# Patient Record
Sex: FEMALE | Race: WHITE | Employment: OTHER | URBAN - NONMETROPOLITAN AREA
[De-identification: names, ages, dates, MRNs, and addresses within clinical notes are randomized per-mention and may not be internally consistent; named-entity substitution may affect disease eponyms.]

---

## 2017-02-07 ENCOUNTER — POST-OP TELEPHONE (OUTPATIENT)
Dept: VASCULAR SURGERY | Age: 77
End: 2017-02-07

## 2018-01-22 ENCOUNTER — HOSPITAL ENCOUNTER (OUTPATIENT)
Dept: GENERAL RADIOLOGY | Age: 78
Discharge: HOME | End: 2018-01-22

## 2018-01-22 DIAGNOSIS — M25.512 LEFT SHOULDER PAIN, UNSPECIFIED CHRONICITY: ICD-10-CM

## 2018-06-08 ENCOUNTER — HOSPITAL ENCOUNTER (OUTPATIENT)
Dept: CARDIAC REHAB | Age: 78
Discharge: HOME | End: 2018-06-08
Attending: FAMILY MEDICINE | Admitting: FAMILY MEDICINE

## 2018-06-08 DIAGNOSIS — R07.89 CHEST PAIN, LOCALIZED: ICD-10-CM

## 2018-06-08 LAB
EKG ATRIAL RATE: 70 BPM
EKG DIAGNOSIS: NORMAL
EKG P AXIS: 68 DEGREES
EKG P-R INTERVAL: 168 MS
EKG Q-T INTERVAL: 446 MS
EKG QRS DURATION: 134 MS
EKG QTC CALCULATION (BAZETT): 481 MS
EKG R AXIS: 49 DEGREES
EKG T AXIS: 77 DEGREES
EKG VENTRICULAR RATE: 70 BPM

## 2018-11-01 ENCOUNTER — HOSPITAL ENCOUNTER (OUTPATIENT)
Dept: MAMMOGRAPHY | Age: 78
Discharge: HOME | End: 2018-11-01
Payer: MEDICARE

## 2018-11-01 DIAGNOSIS — Z12.31 VISIT FOR SCREENING MAMMOGRAM: ICD-10-CM

## 2018-11-01 PROCEDURE — 77067 SCR MAMMO BI INCL CAD: CPT

## 2019-11-06 ENCOUNTER — HOSPITAL ENCOUNTER (OUTPATIENT)
Dept: MAMMOGRAPHY | Age: 79
Discharge: HOME | End: 2019-11-06
Payer: MEDICARE

## 2019-11-06 DIAGNOSIS — Z12.39 BREAST CANCER SCREENING: ICD-10-CM

## 2019-11-06 PROCEDURE — 77067 SCR MAMMO BI INCL CAD: CPT

## 2019-12-17 ENCOUNTER — HOSPITAL ENCOUNTER (OUTPATIENT)
Age: 79
Discharge: HOME | End: 2019-12-17
Payer: MEDICARE

## 2019-12-17 ENCOUNTER — HOSPITAL ENCOUNTER (OUTPATIENT)
Dept: CT IMAGING | Age: 79
Discharge: HOME | End: 2019-12-17
Payer: MEDICARE

## 2019-12-17 DIAGNOSIS — R10.32 ABDOMINAL PAIN, LEFT LOWER QUADRANT: ICD-10-CM

## 2019-12-17 LAB
ALBUMIN SERPL-MCNC: 4.2 GM/DL (ref 3.4–5)
ALP BLD-CCNC: 49 IU/L (ref 40–129)
ALT SERPL-CCNC: 11 U/L (ref 10–40)
ANION GAP SERPL CALCULATED.3IONS-SCNC: 16 MMOL/L (ref 4–16)
AST SERPL-CCNC: 17 IU/L (ref 15–37)
BACTERIA: ABNORMAL /HPF
BILIRUB SERPL-MCNC: 0.7 MG/DL (ref 0–1)
BILIRUBIN URINE: NEGATIVE MG/DL
BLOOD, URINE: ABNORMAL
BUN BLDV-MCNC: 10 MG/DL (ref 6–23)
CALCIUM SERPL-MCNC: 9.4 MG/DL (ref 8.3–10.6)
CAST TYPE: NEGATIVE /HPF
CHLORIDE BLD-SCNC: 97 MMOL/L (ref 99–110)
CLARITY: ABNORMAL
CO2: 25 MMOL/L (ref 21–32)
COLOR: YELLOW
CREAT SERPL-MCNC: 0.7 MG/DL (ref 0.6–1.1)
CRYSTAL TYPE: NEGATIVE /HPF
EPITHELIAL CELLS, UA: ABNORMAL /HPF
GFR AFRICAN AMERICAN: >60 ML/MIN/1.73M2
GFR AFRICAN AMERICAN: >60 ML/MIN/1.73M2
GFR NON-AFRICAN AMERICAN: >60 ML/MIN/1.73M2
GFR NON-AFRICAN AMERICAN: >60 ML/MIN/1.73M2
GLUCOSE BLD-MCNC: 130 MG/DL (ref 70–99)
GLUCOSE, URINE: NEGATIVE MG/DL
HCT VFR BLD CALC: 45.9 % (ref 37–47)
HEMOGLOBIN: 15.4 GM/DL (ref 12.5–16)
KETONES, URINE: ABNORMAL MG/DL
LEUKOCYTE ESTERASE, URINE: ABNORMAL
LIPASE: 16 IU/L (ref 13–60)
MCH RBC QN AUTO: 31.8 PG (ref 27–31)
MCHC RBC AUTO-ENTMCNC: 33.6 % (ref 32–36)
MCV RBC AUTO: 94.6 FL (ref 78–100)
NITRITE URINE, QUANTITATIVE: POSITIVE
PDW BLD-RTO: 12.8 % (ref 11.7–14.9)
PH, URINE: 6 (ref 5–8)
PLATELET # BLD: 248 K/CU MM (ref 140–440)
PMV BLD AUTO: 8.6 FL (ref 7.5–11.1)
POC CREATININE: 0.7 MG/DL (ref 0.6–1.1)
POTASSIUM SERPL-SCNC: 4.2 MMOL/L (ref 3.5–5.1)
PROTEIN UA: NEGATIVE MG/DL
RBC # BLD: 4.85 M/CU MM (ref 4.2–5.4)
RBC URINE: ABNORMAL /HPF (ref 0–6)
SODIUM BLD-SCNC: 138 MMOL/L (ref 135–145)
SPECIFIC GRAVITY UA: 1.01 (ref 1–1.03)
TOTAL PROTEIN: 6.7 GM/DL (ref 6.4–8.2)
UROBILINOGEN, URINE: 0.2 MG/DL (ref 0.2–1)
WBC # BLD: 14.5 K/CU MM (ref 4–10.5)
WBC UA: ABNORMAL /HPF (ref 0–5)

## 2019-12-17 PROCEDURE — 80053 COMPREHEN METABOLIC PANEL: CPT

## 2019-12-17 PROCEDURE — 83690 ASSAY OF LIPASE: CPT

## 2019-12-17 PROCEDURE — 36415 COLL VENOUS BLD VENIPUNCTURE: CPT

## 2019-12-17 PROCEDURE — 74177 CT ABD & PELVIS W/CONTRAST: CPT

## 2019-12-17 PROCEDURE — 6360000004 HC RX CONTRAST MEDICATION: Performed by: PHYSICIAN ASSISTANT

## 2019-12-17 PROCEDURE — 87077 CULTURE AEROBIC IDENTIFY: CPT

## 2019-12-17 PROCEDURE — 81001 URINALYSIS AUTO W/SCOPE: CPT

## 2019-12-17 PROCEDURE — 87186 SC STD MICRODIL/AGAR DIL: CPT

## 2019-12-17 PROCEDURE — 85027 COMPLETE CBC AUTOMATED: CPT

## 2019-12-17 PROCEDURE — 87086 URINE CULTURE/COLONY COUNT: CPT

## 2019-12-17 RX ADMIN — IOHEXOL 50 ML: 240 INJECTION, SOLUTION INTRATHECAL; INTRAVASCULAR; INTRAVENOUS; ORAL at 17:19

## 2019-12-17 RX ADMIN — IOPAMIDOL 75 ML: 755 INJECTION, SOLUTION INTRAVENOUS at 17:19

## 2019-12-20 LAB
CULTURE: ABNORMAL
Lab: ABNORMAL
SPECIMEN: ABNORMAL
TOTAL COLONY COUNT: ABNORMAL

## 2020-12-08 ENCOUNTER — HOSPITAL ENCOUNTER (OUTPATIENT)
Dept: ULTRASOUND IMAGING | Age: 80
Discharge: HOME | End: 2020-12-08
Payer: MEDICARE

## 2020-12-08 PROCEDURE — 76770 US EXAM ABDO BACK WALL COMP: CPT

## 2020-12-18 ENCOUNTER — HOSPITAL ENCOUNTER (OUTPATIENT)
Age: 80
Discharge: HOME | End: 2020-12-18
Payer: MEDICARE

## 2020-12-18 PROCEDURE — U0002 COVID-19 LAB TEST NON-CDC: HCPCS

## 2020-12-18 PROCEDURE — C9803 HOPD COVID-19 SPEC COLLECT: HCPCS

## 2020-12-19 LAB
SARS-COV-2: NOT DETECTED
SOURCE: NORMAL

## 2021-06-07 LAB
ALBUMIN SERPL-MCNC: 4.6 G/DL
ALBUMIN SERPL-MCNC: 4.6 G/DL
ALP BLD-CCNC: 53 U/L
ALP BLD-CCNC: 53 U/L
ALT SERPL-CCNC: 17 U/L
ALT SERPL-CCNC: 17 U/L
ANION GAP SERPL CALCULATED.3IONS-SCNC: NORMAL MMOL/L
ANION GAP SERPL CALCULATED.3IONS-SCNC: NORMAL MMOL/L
AST SERPL-CCNC: 32 U/L
AST SERPL-CCNC: 53 U/L
BASOPHILS ABSOLUTE: ABNORMAL
BASOPHILS RELATIVE PERCENT: ABNORMAL
BILIRUB SERPL-MCNC: 0.5 MG/DL (ref 0.1–1.4)
BILIRUB SERPL-MCNC: 0.5 MG/DL (ref 0.1–1.4)
BUN BLDV-MCNC: 11 MG/DL
BUN BLDV-MCNC: 11 MG/DL
CALCIUM SERPL-MCNC: 9.6 MG/DL
CALCIUM SERPL-MCNC: 9.6 MG/DL
CHLORIDE BLD-SCNC: 99 MMOL/L
CHLORIDE BLD-SCNC: 99 MMOL/L
CO2: 31 MMOL/L
CO2: 31 MMOL/L
CREAT SERPL-MCNC: 0.8 MG/DL
CREAT SERPL-MCNC: 0.8 MG/DL
EOSINOPHILS ABSOLUTE: ABNORMAL
EOSINOPHILS RELATIVE PERCENT: ABNORMAL
GFR CALCULATED: NORMAL
GFR CALCULATED: NORMAL
GLUCOSE BLD-MCNC: 102 MG/DL
GLUCOSE BLD-MCNC: 102 MG/DL
HCT VFR BLD CALC: 48.5 % (ref 36–46)
HEMOGLOBIN: 15.8 G/DL (ref 12–16)
LYMPHOCYTES ABSOLUTE: ABNORMAL
LYMPHOCYTES RELATIVE PERCENT: ABNORMAL
MCH RBC QN AUTO: ABNORMAL PG
MCHC RBC AUTO-ENTMCNC: ABNORMAL G/DL
MCV RBC AUTO: ABNORMAL FL
MONOCYTES ABSOLUTE: ABNORMAL
MONOCYTES RELATIVE PERCENT: ABNORMAL
NEUTROPHILS ABSOLUTE: ABNORMAL
NEUTROPHILS RELATIVE PERCENT: ABNORMAL
PLATELET # BLD: 249 K/ΜL
PMV BLD AUTO: ABNORMAL FL
POTASSIUM SERPL-SCNC: 4.9 MMOL/L
POTASSIUM SERPL-SCNC: 4.9 MMOL/L
RBC # BLD: 5.03 10^6/ΜL
SODIUM BLD-SCNC: 137 MMOL/L
SODIUM BLD-SCNC: 137 MMOL/L
TOTAL PROTEIN: 7.3
TOTAL PROTEIN: 7.3
TROPONIN: 0.01
WBC # BLD: 6 10^3/ML

## 2021-06-11 ENCOUNTER — HOSPITAL ENCOUNTER (OUTPATIENT)
Dept: ULTRASOUND IMAGING | Age: 81
Discharge: HOME | End: 2021-06-11
Payer: MEDICARE

## 2021-06-11 DIAGNOSIS — R10.11 RIGHT UPPER QUADRANT PAIN: ICD-10-CM

## 2021-06-11 PROCEDURE — 76705 ECHO EXAM OF ABDOMEN: CPT

## 2021-06-15 ENCOUNTER — HOSPITAL ENCOUNTER (OUTPATIENT)
Dept: CARDIAC REHAB | Age: 81
Discharge: HOME | End: 2021-06-15
Payer: MEDICARE

## 2021-06-15 PROCEDURE — 93226 XTRNL ECG REC<48 HR SCAN A/R: CPT

## 2021-06-15 PROCEDURE — 93225 XTRNL ECG REC<48 HRS REC: CPT

## 2021-06-23 NOTE — PROCEDURES
[facility]                  [address] [facility], [address] [facility]                                 HOLTER MONITOR    PATIENT NAME: Enriqueta Aviles                 :        1940  MED REC NO:   [id number]                          ROOM:  ACCOUNT NO:   [de-identified]                           ADMIT DATE: 06/15/2021  PROVIDER:     Emeka Falcon MD    HOLTER MONITOR 24-HOURS    DATE OF STUDY:  06/15/2021    INDICATION:  Palpitations. INTERPRETATION:  24 hours of cardiac monitoring consistent with normal  sinus rhythm at average rate of 68 beats per minute. Minimum rate of 52  beats per minute occurred at 06:07 a.m. Maximum rate was 98 beats per  minute, occurred at 11:37 a.m. Frequent 12,192 PVCs are noted. Sometimes, PVCs occurred in trigeminal fashion without complex  ventricular arrhythmias. No significant supraventricular ectopy is  noted. The patient did not submit the diary, did not report any  activities or symptoms for correlation. PVC burden appeared to be relatively high at night  between 06:00 p.m. and 04:00 a.m. CONCLUSION:  Abnormal Holter findings suggesting normal sinus rhythm  with extremely frequent low-grade ventricular ectopy. Symptom  correlation is needed.         Domingo Roca MD    D: 2021 13:16:18       T: 2021 17:18:40     [email or web address]  Job#: [id number]     Doc#: [id number]    CC:

## 2021-06-29 RX ORDER — MECLIZINE HYDROCHLORIDE 25 MG/1
25 TABLET ORAL 3 TIMES DAILY PRN
Status: ON HOLD | COMMUNITY
Start: 2020-07-06 | End: 2021-08-20 | Stop reason: HOSPADM

## 2021-06-29 RX ORDER — METOPROLOL SUCCINATE 25 MG/1
25 TABLET, EXTENDED RELEASE ORAL DAILY
COMMUNITY
Start: 2021-03-03 | End: 2021-07-15 | Stop reason: ALTCHOICE

## 2021-06-29 RX ORDER — OMEPRAZOLE 20 MG/1
20 CAPSULE, DELAYED RELEASE ORAL PRN
Status: ON HOLD | COMMUNITY
Start: 2021-06-07 | End: 2021-08-20 | Stop reason: HOSPADM

## 2021-06-29 RX ORDER — ACETAMINOPHEN 325 MG/1
325-650 TABLET ORAL EVERY 6 HOURS PRN
COMMUNITY

## 2021-06-29 RX ORDER — MULTIVITAMIN
1 CAPSULE ORAL
COMMUNITY

## 2021-06-29 RX ORDER — ESTRADIOL 0.1 MG/G
CREAM VAGINAL
COMMUNITY
Start: 2020-11-04

## 2021-06-29 RX ORDER — HYOSCYAMINE SULFATE 0.12 MG/1
TABLET SUBLINGUAL
Status: ON HOLD | COMMUNITY
Start: 2021-01-13 | End: 2021-08-20 | Stop reason: HOSPADM

## 2021-06-29 RX ORDER — LISINOPRIL 10 MG/1
10 TABLET ORAL
Status: ON HOLD | COMMUNITY
Start: 2021-04-28 | End: 2021-08-20 | Stop reason: HOSPADM

## 2021-06-29 RX ORDER — OXYCODONE HYDROCHLORIDE 5 MG/1
5 TABLET ORAL EVERY 6 HOURS PRN
COMMUNITY
Start: 2020-08-03 | End: 2021-07-15 | Stop reason: ALTCHOICE

## 2021-06-29 RX ORDER — LORAZEPAM 0.5 MG/1
TABLET ORAL
COMMUNITY
Start: 2020-10-08 | End: 2021-07-15 | Stop reason: ALTCHOICE

## 2021-07-15 ENCOUNTER — HOSPITAL ENCOUNTER (OUTPATIENT)
Age: 81
Discharge: HOME | End: 2021-07-15
Payer: MEDICARE

## 2021-07-15 ENCOUNTER — INITIAL CONSULT (OUTPATIENT)
Dept: CARDIOLOGY CLINIC | Age: 81
End: 2021-07-15
Payer: MEDICARE

## 2021-07-15 VITALS
HEART RATE: 70 BPM | RESPIRATION RATE: 16 BRPM | BODY MASS INDEX: 26.13 KG/M2 | DIASTOLIC BLOOD PRESSURE: 68 MMHG | WEIGHT: 142 LBS | HEIGHT: 62 IN | SYSTOLIC BLOOD PRESSURE: 122 MMHG

## 2021-07-15 DIAGNOSIS — R53.83 OTHER FATIGUE: ICD-10-CM

## 2021-07-15 DIAGNOSIS — R07.89 CHEST PRESSURE: ICD-10-CM

## 2021-07-15 DIAGNOSIS — R07.89 OTHER CHEST PAIN: ICD-10-CM

## 2021-07-15 DIAGNOSIS — R00.2 PALPITATIONS: Primary | ICD-10-CM

## 2021-07-15 DIAGNOSIS — I49.3 PVC (PREMATURE VENTRICULAR CONTRACTION): ICD-10-CM

## 2021-07-15 LAB
MAGNESIUM: 2.2 MG/DL (ref 1.8–2.4)
T4 FREE: 1.44 NG/DL (ref 0.9–1.8)
TSH HIGH SENSITIVITY: 2.2 UIU/ML (ref 0.27–4.2)

## 2021-07-15 PROCEDURE — 99204 OFFICE O/P NEW MOD 45 MIN: CPT | Performed by: INTERNAL MEDICINE

## 2021-07-15 PROCEDURE — G8417 CALC BMI ABV UP PARAM F/U: HCPCS | Performed by: INTERNAL MEDICINE

## 2021-07-15 PROCEDURE — G8427 DOCREV CUR MEDS BY ELIG CLIN: HCPCS | Performed by: INTERNAL MEDICINE

## 2021-07-15 PROCEDURE — 84439 ASSAY OF FREE THYROXINE: CPT

## 2021-07-15 PROCEDURE — 84443 ASSAY THYROID STIM HORMONE: CPT

## 2021-07-15 PROCEDURE — 1090F PRES/ABSN URINE INCON ASSESS: CPT | Performed by: INTERNAL MEDICINE

## 2021-07-15 PROCEDURE — 36415 COLL VENOUS BLD VENIPUNCTURE: CPT

## 2021-07-15 PROCEDURE — 93000 ELECTROCARDIOGRAM COMPLETE: CPT | Performed by: INTERNAL MEDICINE

## 2021-07-15 PROCEDURE — 83735 ASSAY OF MAGNESIUM: CPT

## 2021-07-15 RX ORDER — DILTIAZEM HYDROCHLORIDE 120 MG/1
120 CAPSULE, COATED, EXTENDED RELEASE ORAL DAILY
Qty: 30 CAPSULE | Refills: 3 | Status: ON HOLD | OUTPATIENT
Start: 2021-07-15 | End: 2021-08-20 | Stop reason: HOSPADM

## 2021-07-15 RX ORDER — MAGNESIUM OXIDE 400 MG/1
400 TABLET ORAL DAILY
Qty: 30 TABLET | Refills: 1 | Status: ON HOLD | OUTPATIENT
Start: 2021-07-15 | End: 2021-08-20 | Stop reason: HOSPADM

## 2021-07-15 NOTE — PROGRESS NOTES
CARDIOLOGY CONSULT  NOTE    Chief Complaint: pvc     HPI:   Josiah Hernández is a [de-identified]y.o. year old who has Past medical history as noted below. Very pleasant lady comes in for evaluation due to abnormal EKG showing frequent PVCs. Started on metoprolol but it made her very tired and fatigued she could not continue taking it. Says she is tired and fatigued all the time she did have a Holter monitor in June 2021 for 24 hours during which she was noted to have significant PVC burden especially while sleeping. Importantly she does snore at night. She also reports some chest pressure. Her EKG in the office today shows frequent PVC  with sinus rhythm  Reports that occasionally she notices it also gets dizzy      Current Outpatient Medications   Medication Sig Dispense Refill    acetaminophen (TYLENOL) 325 MG tablet Take 325-650 mg by mouth every 6 hours as needed      estradiol (ESTRACE) 0.1 MG/GM vaginal cream Apply pea sized amount to vagina nightly x7 days, then every other night for 7 days, then every 3rd night therafter      Hyoscyamine Sulfate SL 0.125 MG SUBL       lisinopril (PRINIVIL;ZESTRIL) 10 MG tablet Take 10 mg by mouth Daily with lunch      meclizine (ANTIVERT) 25 MG tablet Take 25 mg by mouth 3 times daily as needed      Multiple Vitamin (MULTIVITAMIN) capsule Take 1 capsule by mouth Daily with lunch      omeprazole (PRILOSEC) 20 MG delayed release capsule Take 20 mg by mouth as needed       levothyroxine (SYNTHROID) 50 MCG tablet Take 50 mcg by mouth Daily      dicyclomine (BENTYL) 10 MG capsule Take 10 mg by mouth as needed       ALPRAZolam (XANAX) 0.25 MG tablet Take 0.25 mg by mouth nightly as needed for Sleep       No current facility-administered medications for this visit.        Allergies:   Iodine and Pcn [penicillins]    Patient History:  Past Medical History:   Diagnosis Date    Arthritis     History of Holter monitoring 06/15/2021    Frequent PVCs. 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  Thyroid disease      Past Surgical History:   Procedure Laterality Date    APPENDECTOMY      BACK SURGERY      COLONOSCOPY      DILATION AND CURETTAGE OF UTERUS      HYSTERECTOMY      TUMOR REMOVAL       Family History   Problem Relation Age of Onset    Cancer Father      Social History     Tobacco Use    Smoking status: Never Smoker    Smokeless tobacco: Never Used   Substance Use Topics    Alcohol use: No        Review of Systems:   · Constitutional: No Fever or Weight Loss   · Eyes: No Decreased Vision  · ENT: No Headaches, Hearing Loss or Vertigo  · Cardiovascular: as per note above   · Respiratory: No cough or wheezing and as per note above. · Gastrointestinal: No abdominal pain, appetite loss, blood in stools, constipation, diarrhea or heartburn  · Genitourinary: No dysuria, trouble voiding, or hematuria  · Musculoskeletal:  denies any new  joint aches , swelling  or pain   · Integumentary: No rash or pruritis  · Neurological: No TIA or stroke symptoms  · Psychiatric: No anxiety or depression  · Endocrine: No malaise, fatigue or temperature intolerance  · Hematologic/Lymphatic: No bleeding problems, blood clots or swollen lymph nodes  · Allergic/Immunologic: No nasal congestion or hives    Objective:      Physical Exam:  /68   Pulse 70   Resp 16   Ht 5' 2\" (1.575 m)   Wt 142 lb (64.4 kg)   BMI 25.97 kg/m²   Wt Readings from Last 3 Encounters:   07/15/21 142 lb (64.4 kg)   02/02/17 140 lb 6.4 oz (63.7 kg)   07/31/16 142 lb (64.4 kg)     Body mass index is 25.97 kg/m². Vitals:    07/15/21 1047   BP: 122/68   Pulse: 70   Resp: 16        General Appearance:  No distress, conversant  Constitutional:  Well developed, Well nourished, No acute distress, Non-toxic appearance.    HENT:  Normocephalic, Atraumatic, Bilateral external ears normal, Oropharynx moist, No oral exudates, Nose normal. Neck- Normal range of motion, No tenderness, Supple, No stridor,no apical-carotid delay  Eyes:  PERRL, EOMI, Conjunctiva normal, No discharge. Respiratory:  Normal breath sounds, No respiratory distress, No wheezing, No chest tenderness. ,no use of accessory muscles, NO crackles  Cardiovascular: (PMI) apex non displaced,no lifts no thrills,S1 and S2 audible, No added heart sounds, No signs of ankle edema, or volume overload, No evidence of JVD, No crackles  GI:  Bowel sounds normal, Soft, No tenderness, No masses, No gross visceromegaly   :  No costovertebral angle tenderness   Musculoskeletal:  No edema, no tenderness, no deformities. Back- no tenderness  Integument:  Well hydrated, no rash   Lymphatic:  No lymphadenopathy noted   Neurologic:  Alert & oriented x 3, CN 2-12 normal, normal motor function, normal sensory function, no focal deficits noted   Psychiatric:  Speech and behavior appropriate       Medical decision making and Data review:  DATA:  Lab Results   Component Value Date    TROPONINT <0.010 09/30/2015     BNP:    Lab Results   Component Value Date    PROBNP 377.3 (H) 09/30/2015     PT/INR:  No results found for: PTINR  No results found for: LABA1C  Lab Results   Component Value Date    CHOL 237 (H) 09/10/2012    TRIG 109 09/10/2012    HDL 68 09/10/2012    LDLCALC 147 (H) 09/10/2012     Lab Results   Component Value Date    ALT 17 06/07/2021    ALT 17 06/07/2021    AST 32 06/07/2021    AST 53 06/07/2021     No results for input(s): WBC, HGB, HCT, MCV, PLT in the last 72 hours.   TSH: No results found for: TSH  Lab Results   Component Value Date    AST 32 06/07/2021    AST 53 06/07/2021    ALT 17 06/07/2021    ALT 17 06/07/2021    BILITOT 0.5 06/07/2021    BILITOT 0.5 06/07/2021    ALKPHOS 53 06/07/2021    ALKPHOS 53 06/07/2021     Lab Results   Component Value Date    PROBNP 377.3 (H) 09/30/2015     No results found for: LABA1C  Lab Results   Component Value Date    WBC 6.00 06/07/2021    HGB 15.8 06/07/2021    HCT 48.5 (A) 06/07/2021     06/07/2021     All labs, medications and tests reviewed by myself including data and history from outside source , patient and available family . Assessment & Plan:      1. Palpitations    2. Other chest pain    3. PVC (premature ventricular contraction)    4. Other fatigue    5. Chest pressure         No problem-specific Assessment & Plan notes found for this encounter. Dyslipidemia :  All available lab work was reviewed. Patient was advised to repeat lab work before next visit. Necessary orders were placed , instructions given by myself       Counseled extensively and medication compliance urged. We discussed that for the  prevention of ASCVD our  goal is aggressive risk modification. Patient is encouraged to exercise if they can , educated about  brisk walk for 30 minutes  at least 3 to 4 times a week if there are no physical limitations  Various goals were discussed and questions answered. Continue current medications. Appropriate prescriptions are addressed and refills ordered. Questions answered and patient verbalizes understanding. Call for any problems, questions, or concerns. Greater than 60 % of time spent counseling besides reviewing data and images     Continue all other medications of all above medical condition listed as is. No follow-ups on file. Please note this report has been partially produced using speech recognition software and may contain errors related to that system including errors in grammar, punctuation, and spelling, as well as words and phrases that may be inappropriate. If there are any questions or concerns please feel free to contact the dictating provider for clarification.

## 2021-07-15 NOTE — ASSESSMENT & PLAN NOTE
I would like to see the PVC burden after starting Cardizem 120 mg daily and magnesium 400 mg daily please Holter monitor 2 weeks after starting medication also get stress test to rule out ischemia as etiology. Check magnesium level check TSH level get echo to rule out structural heart disease.

## 2021-07-15 NOTE — ASSESSMENT & PLAN NOTE
Notices some pressure with exertion also having a lot of PVCs we will get a stress test ideally a Cardiolite

## 2021-07-28 ENCOUNTER — HOSPITAL ENCOUNTER (OUTPATIENT)
Dept: SLEEP CENTER | Age: 81
Discharge: HOME | DRG: 233 | End: 2021-07-28
Payer: MEDICARE

## 2021-07-28 VITALS
HEIGHT: 62 IN | WEIGHT: 142 LBS | OXYGEN SATURATION: 99 % | BODY MASS INDEX: 26.13 KG/M2 | SYSTOLIC BLOOD PRESSURE: 139 MMHG | DIASTOLIC BLOOD PRESSURE: 64 MMHG | HEART RATE: 70 BPM

## 2021-07-28 DIAGNOSIS — G47.10 HYPERSOMNIA: ICD-10-CM

## 2021-07-28 DIAGNOSIS — G47.33 OSA (OBSTRUCTIVE SLEEP APNEA): ICD-10-CM

## 2021-07-28 PROCEDURE — 99204 OFFICE O/P NEW MOD 45 MIN: CPT | Performed by: INTERNAL MEDICINE

## 2021-07-28 PROCEDURE — 99211 OFF/OP EST MAY X REQ PHY/QHP: CPT

## 2021-07-28 ASSESSMENT — SLEEP AND FATIGUE QUESTIONNAIRES
HOW LIKELY ARE YOU TO NOD OFF OR FALL ASLEEP WHILE SITTING INACTIVE IN A PUBLIC PLACE: 0
HOW LIKELY ARE YOU TO NOD OFF OR FALL ASLEEP IN A CAR, WHILE STOPPED FOR A FEW MINUTES IN TRAFFIC: 0
HOW LIKELY ARE YOU TO NOD OFF OR FALL ASLEEP WHILE LYING DOWN TO REST IN THE AFTERNOON WHEN CIRCUMSTANCES PERMIT: 3
HOW LIKELY ARE YOU TO NOD OFF OR FALL ASLEEP WHILE WATCHING TV: 1
HOW LIKELY ARE YOU TO NOD OFF OR FALL ASLEEP WHILE SITTING QUIETLY AFTER LUNCH WITHOUT ALCOHOL: 0
ESS TOTAL SCORE: 6
HOW LIKELY ARE YOU TO NOD OFF OR FALL ASLEEP WHEN YOU ARE A PASSENGER IN A CAR FOR AN HOUR WITHOUT A BREAK: 1
HOW LIKELY ARE YOU TO NOD OFF OR FALL ASLEEP WHILE SITTING AND TALKING TO SOMEONE: 0
HOW LIKELY ARE YOU TO NOD OFF OR FALL ASLEEP WHILE SITTING AND READING: 1

## 2021-07-28 NOTE — CONSULTS
Jose Martin Yanes MD, Rossy Galdamez MD, Shani Arroyo MD, Camilla Jade MD, [facility]      [address], [address] [facility]   PH: [phone]  F: [phone]     Subjective:     Patient ID: Sarahi Romero is a [de-identified] y.o. female, referred to the sleep center for   Chief Complaint   Patient presents with    Palpitations    Chest Pain    Fatigue   .     Referring physician:  Dr. Kimmie Campbell been referred for the HORACE for palpitations and PVC    Symptoms:   [x]  Snoring                                                                    [x]  Dry Mouth  []  Choking                                                                   [x]  Morning Headaches  []  Gasping for Air                                                        []  Trouble Falling asleep  [x]  Tired during the daytime                                         []  Trouble Staying Asleep  [x]  Tired when you wake up                                         []  Weight Gain in Last 5 Years  [x]  Wake up frequently at night                                    []  Weight Loss in Last 5 Years  []  Shortness Of Breath                                               []  Shift Worker  []  Coughing                                                                []  Smoker (Previous or Current)  []  Chest Pain                                                              []  Anxiety  []  Trouble keeping your legs still at night                   []  Depression  []  Kicking your legs in your sleep                               []  Insomnia     [] Palpitation  []   Stop breathing      []  Other:     Significant Co-morbidities:  []  Congestive Heart Failure     []  COPD         []  Stroke (Past 30 Days)      []  Supplemental Oxygen Usage       []  Cognitive Impairment      []  Neuromuscular Problems  []  Epilepsy/Neurological Disorders           Social History     Socioeconomic History    Marital status:      Spouse name: Not on file    Number of children: Not on file    Years of education: Not on file    Highest education level: Not on file   Occupational History    Not on file   Tobacco Use    Smoking status: Never Smoker    Smokeless tobacco: Never Used   Substance and Sexual Activity    Alcohol use: No    Drug use: No    Sexual activity: Not on file   Other Topics Concern    Not on file   Social History Narrative    Not on file     Social Determinants of Health     Financial Resource Strain:     Difficulty of Paying Living Expenses:    Food Insecurity:     Worried About Running Out of Food in the Last Year:     [address] in the Last Year:    Transportation Needs:     Lack of Transportation (Medical):  Lack of Transportation (Non-Medical):    Physical Activity:     Days of Exercise per Week:     Minutes of Exercise per Session:    Stress:     Feeling of Stress :    Social Connections:     Frequency of Communication with Friends and Family:     Frequency of Social Gatherings with Friends and Family:     Attends Islam Services:     Active Member of Clubs or Organizations:     Attends Club or Organization Meetings:     Marital Status:    Intimate Partner Violence:     Fear of Current or Ex-Partner:     Emotionally Abused:     Physically Abused:     Sexually Abused:        Prior to Admission medications    Medication Sig Start Date End Date Taking?  Authorizing Provider   magnesium oxide (MAG-OX) 400 MG tablet Take 1 tablet by mouth daily 7/15/21  Yes El Yancey MD   dilTIAZem (CARDIZEM CD) 120 MG extended release capsule Take 1 capsule by mouth daily 7/15/21  Yes El Yancey MD   acetaminophen (TYLENOL) 325 MG tablet Take 325-650 mg by mouth every 6 hours as needed   Yes Historical Provider, MD   estradiol (ESTRACE) 0.1 MG/GM vaginal cream Apply pea sized amount to vagina nightly x7 days, then every other night for 7 days, then every 3rd night therafter 11/4/20  Yes Historical Provider, MD   lisinopril (PRINIVIL;ZESTRIL) 10 MG tablet Take 10 mg by mouth Daily with lunch 4/28/21  Yes Historical Provider, MD   Multiple Vitamin (MULTIVITAMIN) capsule Take 1 capsule by mouth Daily with lunch   Yes Historical Provider, MD   levothyroxine (SYNTHROID) 50 MCG tablet Take 50 mcg by mouth Daily   Yes Historical Provider, MD   dicyclomine (BENTYL) 10 MG capsule Take 10 mg by mouth as needed    Yes Historical Provider, MD   ALPRAZolam (XANAX) 0.25 MG tablet Take 0.25 mg by mouth nightly as needed for Sleep   Yes Historical Provider, MD   Hyoscyamine Sulfate SL 0.125 MG SUBL  1/13/21   Historical Provider, MD   meclizine (ANTIVERT) 25 MG tablet Take 25 mg by mouth 3 times daily as needed  Patient not taking: Reported on 7/28/2021 7/6/20   Historical Provider, MD   omeprazole (PRILOSEC) 20 MG delayed release capsule Take 20 mg by mouth as needed   Patient not taking: Reported on 7/28/2021 6/7/21   Historical Provider, MD       Allergies as of 07/28/2021 - Fully Reviewed 07/28/2021   Allergen Reaction Noted    Iodine Hives 09/30/2015    Pcn [penicillins] Hives 09/30/2015       Patient Active Problem List   Diagnosis    PVC (premature ventricular contraction)    Fatigue    Chest pressure    HORACE (obstructive sleep apnea)    Hypersomnia       Past Medical History:   Diagnosis Date    Arthritis     History of Holter monitoring 06/15/2021    Frequent PVCs. 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     Thyroid disease        Past Surgical History:   Procedure Laterality Date    APPENDECTOMY      BACK SURGERY      COLONOSCOPY      DILATION AND CURETTAGE OF UTERUS      HYSTERECTOMY      TUMOR REMOVAL         Family History   Problem Relation Age of Onset    Cancer Father          Objective:   /64   Pulse 70   Ht 5' 2\" (1.575 m)   Wt 142 lb (64.4 kg)   SpO2 99%   BMI 25.97 kg/m²   Body mass index is 25.97 kg/m². Sleep Medicine 7/28/2021   Sitting and reading 1   Watching TV 1   Sitting, inactive in a public place (e.g. a theatre or a meeting) 0   As a passenger in a car for an hour without a break 1   Lying down to rest in the afternoon when circumstances permit 3   Sitting and talking to someone 0   Sitting quietly after a lunch without alcohol 0   In a car, while stopped for a few minutes in traffic 0   Total score 6   Neck circumference 12.25     {MALLAMPATI:3    Vitals:    07/28/21 1335   BP: 139/64   Pulse: 70   SpO2: 99%   Weight: 142 lb (64.4 kg)   Height: 5' 2\" (1.575 m)     Neck circumference: 12.25  Inches  [place] - Total score: 6    Gen: No distress. Eyes: PERRL. No sclera icterus. No conjunctival injection. ENT: No discharge. Pharynx clear. External appearance of ears and nose normal.  Neck: Trachea midline. No obvious mass. Resp: No accessory muscle use. No crackles. No wheezes. No rhonchi. No dullness on percussion. CV: Regular rate. Regular rhythm. No murmur or rub. No edema. GI: Non-tender. Non-distended. No hernia. Skin: Warm, dry, normal texture and turgor. No nodule on exposed extremities. Lymph: No cervical LAD. No supraclavicular LAD. M/S: No cyanosis. No clubbing. No joint deformity. Psych: Oriented x 3. No anxiety. Awake. Alert. Intact judgement and insight.     Mallampati Airway Classification:   []1 []2 [x]3 []4        Assessment and Plan     Diagnosis:    Problem List        Pulmonary Problems    HORACE (obstructive sleep apnea)      She has symptoms of HORACE  Advised to go for the PSG           Relevant Orders    Baseline Diagnostic Sleep Study       Other    Hypersomnia      Advised to go for the PSG  Sleep hygiene measures                     Additional Plan:     [x]  Sleep hygiene/ relaxation methods & CBTi principles review with patient   [x]  Avoid supine/back sleep until sleep study   [x]  Driving precautions   [x]  Medical consequences of untreated HORACE   [x]  Weight loss recommendations   [x]  Diet recommendations   [x]  Exercise   []  Advised to quit smoking       []  PFT referral   []  Bariatric Program referral      Follow-Up:    No follow-ups on file.     Electronically signed by Lashell Sorensen MD on 7/28/2021 at 1:53 PM

## 2021-07-29 ENCOUNTER — NURSE ONLY (OUTPATIENT)
Dept: CARDIOLOGY CLINIC | Age: 81
End: 2021-07-29

## 2021-07-29 ENCOUNTER — PROCEDURE VISIT (OUTPATIENT)
Dept: CARDIOLOGY CLINIC | Age: 81
End: 2021-07-29
Payer: MEDICARE

## 2021-07-29 ENCOUNTER — HOSPITAL ENCOUNTER (INPATIENT)
Dept: CARDIAC CATH/INVASIVE PROCEDURES | Age: 81
LOS: 11 days | Discharge: NURSING, REHAB OR LONG-TERM CARE | DRG: 233 | End: 2021-08-09
Attending: INTERNAL MEDICINE
Payer: MEDICARE

## 2021-07-29 DIAGNOSIS — R53.83 OTHER FATIGUE: ICD-10-CM

## 2021-07-29 DIAGNOSIS — R00.2 PALPITATIONS: ICD-10-CM

## 2021-07-29 DIAGNOSIS — R07.89 CHEST PRESSURE: ICD-10-CM

## 2021-07-29 DIAGNOSIS — R07.89 OTHER CHEST PAIN: ICD-10-CM

## 2021-07-29 DIAGNOSIS — I49.3 PVC (PREMATURE VENTRICULAR CONTRACTION): ICD-10-CM

## 2021-07-29 PROBLEM — I25.10 CAD IN NATIVE ARTERY: Status: ACTIVE | Noted: 2021-07-29

## 2021-07-29 PROBLEM — I10 ESSENTIAL HYPERTENSION: Status: ACTIVE | Noted: 2021-07-29

## 2021-07-29 PROBLEM — I20.0 UNSTABLE ANGINA (HCC): Status: ACTIVE | Noted: 2021-07-29

## 2021-07-29 LAB
ANION GAP SERPL CALCULATED.3IONS-SCNC: 10 MMOL/L (ref 4–16)
BASE EXCESS MIXED: 1.8 (ref 0–2.3)
BUN BLDV-MCNC: 11 MG/DL (ref 6–23)
CALCIUM SERPL-MCNC: 9.5 MG/DL (ref 8.3–10.6)
CARBON MONOXIDE, BLOOD: 2 % (ref 0–5)
CHLORIDE BLD-SCNC: 100 MMOL/L (ref 99–110)
CO2 CONTENT: 26.8 MMOL/L (ref 19–24)
CO2: 30 MMOL/L (ref 21–32)
COMMENT: ABNORMAL
CREAT SERPL-MCNC: 0.7 MG/DL (ref 0.6–1.1)
ESTIMATED AVERAGE GLUCOSE: 111 MG/DL
GFR AFRICAN AMERICAN: >60 ML/MIN/1.73M2
GFR NON-AFRICAN AMERICAN: >60 ML/MIN/1.73M2
GLUCOSE BLD-MCNC: 102 MG/DL (ref 70–99)
HBA1C MFR BLD: 5.5 % (ref 4.2–6.3)
HCO3 ARTERIAL: 25.7 MMOL/L (ref 18–23)
HCT VFR BLD CALC: 47.6 % (ref 37–47)
HEMOGLOBIN: 15.6 GM/DL (ref 12.5–16)
LV EF: 23 %
LV EF: 58 %
LVEF MODALITY: NORMAL
LVEF MODALITY: NORMAL
MCH RBC QN AUTO: 31.4 PG (ref 27–31)
MCHC RBC AUTO-ENTMCNC: 32.8 % (ref 32–36)
MCV RBC AUTO: 95.8 FL (ref 78–100)
METHEMOGLOBIN ARTERIAL: 1.2 %
O2 SATURATION: 94 % (ref 96–97)
PCO2 ARTERIAL: 37 MMHG (ref 32–45)
PDW BLD-RTO: 12.1 % (ref 11.7–14.9)
PH BLOOD: 7.45 (ref 7.34–7.45)
PLATELET # BLD: 269 K/CU MM (ref 140–440)
PMV BLD AUTO: 8.7 FL (ref 7.5–11.1)
PO2 ARTERIAL: 70 MMHG (ref 75–100)
POTASSIUM SERPL-SCNC: 4.3 MMOL/L (ref 3.5–5.1)
RBC # BLD: 4.97 M/CU MM (ref 4.2–5.4)
SARS-COV-2, NAAT: NOT DETECTED
SODIUM BLD-SCNC: 140 MMOL/L (ref 135–145)
SOURCE: NORMAL
WBC # BLD: 5.7 K/CU MM (ref 4–10.5)

## 2021-07-29 PROCEDURE — 93458 L HRT ARTERY/VENTRICLE ANGIO: CPT

## 2021-07-29 PROCEDURE — C1894 INTRO/SHEATH, NON-LASER: HCPCS

## 2021-07-29 PROCEDURE — 93016 CV STRESS TEST SUPVJ ONLY: CPT | Performed by: INTERNAL MEDICINE

## 2021-07-29 PROCEDURE — 85027 COMPLETE CBC AUTOMATED: CPT

## 2021-07-29 PROCEDURE — 93306 TTE W/DOPPLER COMPLETE: CPT | Performed by: INTERNAL MEDICINE

## 2021-07-29 PROCEDURE — 87635 SARS-COV-2 COVID-19 AMP PRB: CPT

## 2021-07-29 PROCEDURE — 2500000003 HC RX 250 WO HCPCS

## 2021-07-29 PROCEDURE — 2580000003 HC RX 258: Performed by: INTERNAL MEDICINE

## 2021-07-29 PROCEDURE — 2140000000 HC CCU INTERMEDIATE R&B

## 2021-07-29 PROCEDURE — 94761 N-INVAS EAR/PLS OXIMETRY MLT: CPT

## 2021-07-29 PROCEDURE — 80048 BASIC METABOLIC PNL TOTAL CA: CPT

## 2021-07-29 PROCEDURE — 83036 HEMOGLOBIN GLYCOSYLATED A1C: CPT

## 2021-07-29 PROCEDURE — 93017 CV STRESS TEST TRACING ONLY: CPT | Performed by: INTERNAL MEDICINE

## 2021-07-29 PROCEDURE — 85610 PROTHROMBIN TIME: CPT

## 2021-07-29 PROCEDURE — 93018 CV STRESS TEST I&R ONLY: CPT | Performed by: INTERNAL MEDICINE

## 2021-07-29 PROCEDURE — 2709999900 HC NON-CHARGEABLE SUPPLY

## 2021-07-29 PROCEDURE — 6360000002 HC RX W HCPCS

## 2021-07-29 PROCEDURE — 93458 L HRT ARTERY/VENTRICLE ANGIO: CPT | Performed by: INTERNAL MEDICINE

## 2021-07-29 PROCEDURE — A9500 TC99M SESTAMIBI: HCPCS | Performed by: INTERNAL MEDICINE

## 2021-07-29 PROCEDURE — 82803 BLOOD GASES ANY COMBINATION: CPT

## 2021-07-29 PROCEDURE — 6360000004 HC RX CONTRAST MEDICATION

## 2021-07-29 PROCEDURE — 6370000000 HC RX 637 (ALT 250 FOR IP): Performed by: SURGERY

## 2021-07-29 RX ORDER — SODIUM CHLORIDE 0.9 % (FLUSH) 0.9 %
5-40 SYRINGE (ML) INJECTION EVERY 12 HOURS SCHEDULED
Status: DISCONTINUED | OUTPATIENT
Start: 2021-07-29 | End: 2021-08-09 | Stop reason: HOSPADM

## 2021-07-29 RX ORDER — DIPHENHYDRAMINE HCL 25 MG
25 TABLET ORAL ONCE
Status: DISCONTINUED | OUTPATIENT
Start: 2021-07-29 | End: 2021-08-03

## 2021-07-29 RX ORDER — DIAZEPAM 5 MG/1
5 TABLET ORAL ONCE
Status: DISCONTINUED | OUTPATIENT
Start: 2021-07-29 | End: 2021-08-03

## 2021-07-29 RX ORDER — SODIUM CHLORIDE 9 MG/ML
INJECTION, SOLUTION INTRAVENOUS CONTINUOUS
Status: DISPENSED | OUTPATIENT
Start: 2021-07-29 | End: 2021-07-30

## 2021-07-29 RX ORDER — ASPIRIN 81 MG/1
81 TABLET ORAL DAILY
Qty: 30 TABLET | Refills: 5 | OUTPATIENT
Start: 2021-07-29

## 2021-07-29 RX ORDER — SODIUM CHLORIDE 9 MG/ML
INJECTION, SOLUTION INTRAVENOUS CONTINUOUS
Status: DISCONTINUED | OUTPATIENT
Start: 2021-07-29 | End: 2021-07-30

## 2021-07-29 RX ORDER — SODIUM CHLORIDE 0.9 % (FLUSH) 0.9 %
5-40 SYRINGE (ML) INJECTION PRN
Status: DISCONTINUED | OUTPATIENT
Start: 2021-07-29 | End: 2021-08-09 | Stop reason: HOSPADM

## 2021-07-29 RX ORDER — SODIUM CHLORIDE 9 MG/ML
25 INJECTION, SOLUTION INTRAVENOUS PRN
Status: DISCONTINUED | OUTPATIENT
Start: 2021-07-29 | End: 2021-08-09 | Stop reason: HOSPADM

## 2021-07-29 RX ORDER — SIMETHICONE 80 MG
80 TABLET,CHEWABLE ORAL 4 TIMES DAILY PRN
Status: DISCONTINUED | OUTPATIENT
Start: 2021-07-29 | End: 2021-08-09 | Stop reason: HOSPADM

## 2021-07-29 RX ORDER — ACETAMINOPHEN 325 MG/1
650 TABLET ORAL EVERY 4 HOURS PRN
Status: DISCONTINUED | OUTPATIENT
Start: 2021-07-29 | End: 2021-08-09 | Stop reason: HOSPADM

## 2021-07-29 RX ORDER — LANOLIN ALCOHOL/MO/W.PET/CERES
3 CREAM (GRAM) TOPICAL NIGHTLY PRN
Status: DISCONTINUED | OUTPATIENT
Start: 2021-07-29 | End: 2021-08-09 | Stop reason: HOSPADM

## 2021-07-29 RX ADMIN — Medication 3 MG: at 22:50

## 2021-07-29 RX ADMIN — SODIUM CHLORIDE: 9 INJECTION, SOLUTION INTRAVENOUS at 16:00

## 2021-07-29 RX ADMIN — SODIUM CHLORIDE, PRESERVATIVE FREE 10 ML: 5 INJECTION INTRAVENOUS at 22:50

## 2021-07-29 RX ADMIN — SODIUM CHLORIDE: 9 INJECTION, SOLUTION INTRAVENOUS at 22:53

## 2021-07-29 RX ADMIN — SODIUM CHLORIDE, PRESERVATIVE FREE 10 ML: 5 INJECTION INTRAVENOUS at 22:34

## 2021-07-29 RX ADMIN — SIMETHICONE 80 MG: 80 TABLET, CHEWABLE ORAL at 22:50

## 2021-07-29 NOTE — LETTER
Erica            [address] [facility], [address], [facility], [address] [facility]  [address]  INFORMED CONSENT FOR NUCLEAR STRESS TESTING - (Treadmill & Pharmacologic)    Patient Name:Chrystal Pollack      :1940      K#[id number]    Purpose & Explanation  In order to assist in the evaluation & treatment of a possible cardiac illness, your physician has ordered a nuclear stress test for you. The test will determine the state of blood flow to your heart muscle. This information will aid your physician in determining the state of my cardiovascular health. This test requires a small IV to be inserted into your hand/arm. Baseline EKGs & blood pressures will be obtained before, during, & after test. Depending on your ability to walk & your EKG, your physician my decide to preform a treadmill or pharmacologic (medication) stress test. If it is felt that you are able to walk, the test will be performed on a monitor driven treadmill with the amount of effort gradually increasing with advance stages, depending on your heart rate & fitness level. I do understand that I may stop the test at anytime do to signs of discomfort or fatigue. At peak exercise, the Cardiolite imaging agent will be injected through your established IV. Exercise will continue for one more minute & then the treadmill will slowly stop. This is the recovery stage. If your physician decides to perform the pharmacological stress test, you will be given an IV medication called Lexiscan that will increase blood flow to the heart that are similar to the treadmill exercise test. No matter which test you have done, you will be require to return hours later for the second set of images. Risks & Discomforts   There exists the possibility of certain changes to occur during either test. The medication normally produces changes in the heart rate & blood pressure.  Side-effects include shortness of breath, chest pain, nausea, headache, abdominal cramping, dizziness, flushed/sweaty-feeling, heart rhythm changes, & in rare instances, heart attack, stroke, or even death. Every effort will be made to minimize these risks by evaluation of preliminary information & by observations during the test.     Responsibilities of the patient  Information you possess about your health status or previous experience of unusual feelings with physical effort will affect the safety & value of your test. Your prompt reporting of feelings with the effort during the test itself, are also of great importance. You are responsible to fully disclose such information when requested by the testing staff. Inquires  Any questions about the procedures used during testing are encouraged. If you have any doubts or questions, please ask us for further explanations. The information which is obtained will be treated as privileged & confidential and will not be released or revealed to such person except my physician without my written consent. The information obtained, however, may be used for a statistical or scientific purpose with my right to privacy retained. Freedom of consents  Your permission to perform this testing is voluntary. You are free to deny consent or stop the test at any point, if you so desire. I HAVE READ THE FORGOING, & UNDERSTAND THE PROCEDURE, ALONG WITH THE POTENTIAL RISKS INVOLVED. I ALSO UNDERSTAND ANY QUESTION THAT MAY OCCUR TO ME, HAS BEEN ANSWERED TO MY SATISFACTION. SIGNATURE OF PATIENT:_____________________________________DATE: _____________    Oksana Epley OF WITNESS:____________________________________DATE: _____________               [place]            [address], [address], [facility], [address] [facility]  [address] [facility], [address] [facility]  Date:_______________     Patient Hayley Gonzales    [facility]#[id number]    Doctor: Dr. Tyler Toledo    PCP: Age:80 y.o. Sex:female     Allergies:Iodine and Pcn [penicillins]    Ht:      62\"     Wt:  142lbs    Medical History:                                                            **Indications for Test: CP, Palpitations  Past Medical History:   Diagnosis Date    Arthritis     History of Holter monitoring 06/15/2021    Frequent PVCs. 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  Thyroid disease       Stress Cardiolite   Lexiscan=Reason: ______________  Chest Pain Character: DENIES Burning  Pounding  Dull Ache/Discomfort    Squeezing  Pressure Twinge Sharp/Stab    Palpitation Heaviness  Soreness SOB/Tightness   Location: Substernal  Anterior Chest  Precordial Pain  Other   Radiation:  None  Left Arm  Right Arm Lt. Rt.    Jaw    Lt. Rt.  Neck Back  Rt. Shoulder  Lt. Shoulder   Precipitating Factors:  None  Exertion  Exercise  Stress     Eating  Other      Relieved By:  Rest  NTG Antacids  Other    **COMMENTS DURING STRESS**  Chest Pain:   Arrhythmia:   ST Changes:   Symptoms:   SOB  Fatigue  Flushed/Diaphoresis  HA  Palpitations  Cramps/Nausea  Dizziness  ___Sx's Resolved   THR Achieved:           YES         NO          N/A   Exercise Effort:           Poor        Fair         Good          Excellent         N/A   B-Blocker Held:          YES         NO          N/A          **Beta Blocker: None  *Blood Thinner: None   Joaquin-dur Held:            YES         NO         N/A      METS:                                         %MPHR:                                  **THR: _____________**   Stress Activity:_________mCi of Cardiolite. Rest Activity:________mCi of Cardiolite.    **Cardiolite Injected: @                       to                        ____*0.4 mg Lexiscan (Regadenoson) IV Given                                    ____Stress/Rest    ____*50 mg Aminophylline IV Given          ____*Caffeine Given          ____Rest/Stress        Pre BP:  Recovery BP:  Recovery HR:   Post BP: 2 min BP:    Resting HR:  3 min BP:    Maximum HR: 4 min BP:     Tot. Exer. Time:       min Max. Speed:  Elevation:                        %           Stage  MPH Grade Time B/P HR   I 1.7 10%      II 2.5 12%      III 3.4 14%      IV 4.2 16%      V 5 18%

## 2021-07-29 NOTE — PROGRESS NOTES
Dr. Gia Johnston here with patient and family speaking to them regarding the need for surgery. Patient will be admitted and scheduled for surgery for Monday 8/2/2021 at 0700.

## 2021-07-29 NOTE — CONSULTS
Department of Cardiovascular & Thoracic Surgery   Consult Note    Reason for Consult: Coronary artery disease    Requesting Physician: Dr. Edouard Hall    Date of Consult: 7/29/21      History Obtained From:  patient     HISTORY OF PRESENT ILLNESS:    The patient is a [de-identified] y.o. female who presents with history of cardiac symptoms including chest pain irregular heartbeats and shortness of breath  Underwent cardiac catheter today following which an urgent consultation requested because of the findings  Patient despite her age claims to be very active and independent and her family members confirm her active status  H she is tired and not able to carry on things that she was doing before. Past Medical History:        Diagnosis Date    Arthritis     CAD in native artery 7/29/2021    Essential hypertension 7/29/2021    History of Holter monitoring 06/15/2021    Frequent PVCs. 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  Thyroid disease      Past Surgical History:        Procedure Laterality Date    APPENDECTOMY      BACK SURGERY      COLONOSCOPY      DILATION AND CURETTAGE OF UTERUS      HYSTERECTOMY      TUMOR REMOVAL       Current Medications:   Current Facility-Administered Medications: diphenhydrAMINE (BENADRYL) tablet 25 mg, 25 mg, Oral, Once  diazePAM (VALIUM) tablet 5 mg, 5 mg, Oral, Once  0.9 % sodium chloride infusion, , Intravenous, Continuous  0.9 % sodium chloride infusion, , Intravenous, Continuous  sodium chloride flush 0.9 % injection 5-40 mL, 5-40 mL, Intravenous, 2 times per day  sodium chloride flush 0.9 % injection 5-40 mL, 5-40 mL, Intravenous, PRN  0.9 % sodium chloride infusion, 25 mL, Intravenous, PRN  acetaminophen (TYLENOL) tablet 650 mg, 650 mg, Oral, Q4H PRN  Allergies:     Allergies   Allergen Reactions    Iodine Hives     Topical only per pt; sts 1974 she had sx and broke out in hives after application of topical iodine only; denies knowledge of allergy to contrast dyes     Pcn [Penicillins] Hives       Social History:   Social History     Socioeconomic History    Marital status:      Spouse name: Not on file    Number of children: Not on file    Years of education: Not on file    Highest education level: Not on file   Occupational History    Not on file   Tobacco Use    Smoking status: Never Smoker    Smokeless tobacco: Never Used   Substance and Sexual Activity    Alcohol use: No    Drug use: No    Sexual activity: Not on file   Other Topics Concern    Not on file   Social History Narrative    Not on file     Social Determinants of Health     Financial Resource Strain:     Difficulty of Paying Living Expenses:    Food Insecurity:     Worried About Running Out of Food in the Last Year:     [address] in the Last Year:    Transportation Needs:     Lack of Transportation (Medical):  Lack of Transportation (Non-Medical):    Physical Activity:     Days of Exercise per Week:     Minutes of Exercise per Session:    Stress:     Feeling of Stress :    Social Connections:     Frequency of Communication with Friends and Family:     Frequency of Social Gatherings with Friends and Family:     Attends Religion Services:     Active Member of Clubs or Organizations:     Attends Club or Organization Meetings:     Marital Status:    Intimate Partner Violence:     Fear of Current or Ex-Partner:     Emotionally Abused:     Physically Abused:     Sexually Abused:        Family History:        Problem Relation Age of Onset    Cancer Father        REVIEW OF SYSTEMS:  Constitutional: Negative for fever, chills, diaphoresis, appetite change and fatigue. HENT: Negative for sore throat, trouble swallowing and voice change. Respiratory: Negative for cough, positive for shortness of breath no wheezing. Cardiovascular: Negative for chest pain positive for SOB with one flight of stairs exertion, no pitting LE edema. Gastrointestinal: Negative for nausea, vomiting, abdominal distention, constipation, no diarrhea, blood in stool, anal bleeding or rectal pain. Musculoskeletal: Negative for joint swelling and arthralgias. Skin: Warm and dry, well perfused. Neurological: Negative for seizures, syncope, speech difficulty and weakness. Hematological/Lymphatic: Negative for adenopathy. No history of DVT/PE. Does not bruise/bleed easily. Psychiatric/Behavioral: Negative for agitation. All others reviewed and negative. EXAM:  Constitutional: Blood pressure 128/66, pulse 74, temperature 97.8 °F (36.6 °C), resp. rate 12, height 5' 2\" (1.575 m), weight 142 lb (64.4 kg), SpO2 98 %. No apparent distress, appears stated age and cooperative. Neurologic: follows commands, no focal weakness noted   Lungs: Good respiratory effort.  Clear to auscultation,   CV: Regular rate/ rhythm , no peripheral edema, feet warm and well perfused  GI: Soft, non-tender in all four quadrants, non-distended, + bowel sounds, liver and spleen no palpable masses  : bladder nondistended   MSK: no obvious deformity   Skin: warm, pink and dry       DATA:  Cardiac catheterization images reviewed and discussed with Dr. Belvie Goldberg  Multiple severe lesions of nearly all coronary arteries are noted as described  Left ventricular anterior apical portion appear to be hypofunctional overall ejection fraction probably in the 40% range    IMPRESSION  Patient Active Problem List   Diagnosis    PVC (premature ventricular contraction)    Fatigue    Chest pressure    HORACE (obstructive sleep apnea)    Hypersomnia    Unstable angina (HCC)    Essential hypertension    CAD in native artery             RECOMMENDATIONS:  Discussed the imaging findings and treatment options the patient multiple times members and consideration revascularization surgery especially in view of her age and ventricular function after extended discussions and answering multiple questions

## 2021-07-29 NOTE — PROGRESS NOTES
Chart reviewed by Cardiac Rehab for consult for CABG by Dr Karen Escalante for Dr. Aaron Mustafa. Patient seen in Cath lab holding with spouse at bedside. History gathered. Dr. Rossy Khanna will be in after clinic to see patient.

## 2021-07-29 NOTE — PROGRESS NOTES
1035-  Called to stress test room in office due to abnormal EKG while patient was under stress. EKG does have multiple T wave abnormalities noted including lead aVL to pericardial leads. Patient did have complaints of chest pain with stress 6 out of 10 midsternal.  EKGs changes and chest pain resolved with reversal.  103-  Called Dr. Irwin Zaragoza as he is [facility]. Reviewed EKG changes and patient chest pain. Advised to call Dr. Anjelica Patel for discussion of left heart cath  [phone]  Call Cath Lab and spoke to Marsha Enriquez RN about patient situation and she relayed to Dr. Tammie Valle requested patient be sent by squad to Cath Lab holding for left heart cath today.   EMS called by office staff for patient to be transported to hospital.

## 2021-07-29 NOTE — H&P
HISTORY & PHYSICAL      CHIEF COMPLAINT: Chest pain    HISTORY OF PRESENT ILLNESS:  Quinn Kenny is a [de-identified] y.o. female being evaluated for Chest pain & frequent PVCs, developed chest pain with EKG abnormalities with Inferior wall ST depressions, AVR ST elevations & LBBB during stress testing. Test was aborted & Patient sent to Cath Lab. Patient is being treated for HTN. Sukhi Last has the following history recorded in [facility]:  Patient Active Problem List    Diagnosis Date Noted    HORACE (obstructive sleep apnea) 07/28/2021    Hypersomnia 07/28/2021    PVC (premature ventricular contraction) 07/15/2021    Fatigue 07/15/2021    Chest pressure 07/15/2021     No current facility-administered medications for this encounter. Allergies: Iodine and Pcn [penicillins]  Past Medical History:   Diagnosis Date    Arthritis     History of Holter monitoring 06/15/2021    Frequent PVCs. 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     Thyroid disease      Past Surgical History:   Procedure Laterality Date    APPENDECTOMY      BACK SURGERY      COLONOSCOPY      DILATION AND CURETTAGE OF UTERUS      HYSTERECTOMY      TUMOR REMOVAL       Family History   Problem Relation Age of Onset    Cancer Father      Social History     Tobacco Use    Smoking status: Never Smoker    Smokeless tobacco: Never Used   Substance Use Topics    Alcohol use: No      Review of systems:  HEENT: Neg  Card:Chest pain  GI;Neg  : Neg  Neuro: Neg  Psych: Neg  Derm: Neg  MS; Neg  All: Documented  Constitutional: Neg    Objective:    /68   Pulse 70   Resp 16   Ht 5' 2\" (1.575 m)   Wt 142 lb (64.4 kg)   BMI 25.97 kg/m²     Wt Readings from Last 3 Encounters:   07/28/21 142 lb (64.4 kg)   07/15/21 142 lb (64.4 kg)   02/02/17 140 lb 6.4 oz (63.7 kg)     GENERAL - Alert, oriented, pleasant, in no apparent distress. HEENT - Unremarkable. Neck - Supple. No jugular venous distention noted. No carotid bruits. Cardiovascular - Normal S1 and S2 without obvious murmur or gallop. Extremities - No cyanosis, clubbing, or significant edema. Pulmonary - No respiratory distress. No wheezes or rales. Abdomen - No masses, tenderness, or organomegaly. Musculoskeletal - No significant edema. Neurologic - Cranial nerves II through XII are grossly intact. There were no gross focal neurologic abnormalities. Lab Review   Lab Results   Component Value Date    TROPONINI 0.013 06/07/2021     BNP:  No results found for: BNP  PT/INR:  No results found for: PTINR  No results found for: LABA1C  Lab Results   Component Value Date    CHOL 237 (H) 09/10/2012    TRIG 109 09/10/2012    HDL 68 09/10/2012    LDLCALC 147 (H) 09/10/2012     Lab Results   Component Value Date    ALT 17 06/07/2021    ALT 17 06/07/2021    AST 32 06/07/2021    AST 53 06/07/2021     BMP:    Lab Results   Component Value Date     06/07/2021     06/07/2021    K 4.9 06/07/2021    K 4.9 06/07/2021    CL 99 06/07/2021    CL 99 06/07/2021    CO2 31.0 06/07/2021    CO2 31.0 06/07/2021    BUN 11 06/07/2021    BUN 11 06/07/2021     CMP:   Lab Results   Component Value Date     06/07/2021     06/07/2021    K 4.9 06/07/2021    K 4.9 06/07/2021    CL 99 06/07/2021    CL 99 06/07/2021    CO2 31.0 06/07/2021    CO2 31.0 06/07/2021    BUN 11 06/07/2021    BUN 11 06/07/2021    PROT 6.7 12/17/2019    PROT 6.6 09/10/2012     TSH:  No results found for: TSH    Impression:    Patient Active Problem List   Diagnosis    PVC (premature ventricular contraction)    Fatigue    Chest pressure    HORACE (obstructive sleep apnea)    Hypersomnia     Plan:  Left Heart Cath possible PCI. Informed consent obtained. ASA & Mallampati 2:2  Further recommendations to be based on Cath findings.

## 2021-07-30 ENCOUNTER — APPOINTMENT (OUTPATIENT)
Dept: CT IMAGING | Age: 81
DRG: 233 | End: 2021-07-30
Attending: INTERNAL MEDICINE
Payer: MEDICARE

## 2021-07-30 ENCOUNTER — APPOINTMENT (OUTPATIENT)
Dept: ULTRASOUND IMAGING | Age: 81
DRG: 233 | End: 2021-07-30
Attending: INTERNAL MEDICINE
Payer: MEDICARE

## 2021-07-30 LAB
ALBUMIN SERPL-MCNC: 4.1 GM/DL (ref 3.4–5)
ALP BLD-CCNC: 56 IU/L (ref 40–128)
ALT SERPL-CCNC: 16 U/L (ref 10–40)
ANION GAP SERPL CALCULATED.3IONS-SCNC: 11 MMOL/L (ref 4–16)
APTT: 26.4 SECONDS (ref 25.1–37.1)
AST SERPL-CCNC: 25 IU/L (ref 15–37)
BACTERIA: ABNORMAL /HPF
BASOPHILS ABSOLUTE: 0 K/CU MM
BASOPHILS RELATIVE PERCENT: 0.7 % (ref 0–1)
BILIRUB SERPL-MCNC: 0.5 MG/DL (ref 0–1)
BILIRUBIN URINE: NEGATIVE MG/DL
BLOOD, URINE: ABNORMAL
BUN BLDV-MCNC: 9 MG/DL (ref 6–23)
CALCIUM SERPL-MCNC: 9.1 MG/DL (ref 8.3–10.6)
CHLORIDE BLD-SCNC: 102 MMOL/L (ref 99–110)
CLARITY: ABNORMAL
CO2: 25 MMOL/L (ref 21–32)
COLOR: YELLOW
CREAT SERPL-MCNC: 0.6 MG/DL (ref 0.6–1.1)
DIFFERENTIAL TYPE: ABNORMAL
EOSINOPHILS ABSOLUTE: 0 K/CU MM
EOSINOPHILS RELATIVE PERCENT: 0.2 % (ref 0–3)
ESTIMATED AVERAGE GLUCOSE: 111 MG/DL
GFR AFRICAN AMERICAN: >60 ML/MIN/1.73M2
GFR NON-AFRICAN AMERICAN: >60 ML/MIN/1.73M2
GLUCOSE BLD-MCNC: 161 MG/DL (ref 70–99)
GLUCOSE, URINE: NEGATIVE MG/DL
HBA1C MFR BLD: 5.5 % (ref 4.2–6.3)
HCT VFR BLD CALC: 47 % (ref 37–47)
HEMOGLOBIN: 15.8 GM/DL (ref 12.5–16)
IMMATURE NEUTROPHIL %: 0.4 % (ref 0–0.43)
INR BLD: 0.88 INDEX
KETONES, URINE: ABNORMAL MG/DL
LEUKOCYTE ESTERASE, URINE: ABNORMAL
LYMPHOCYTES ABSOLUTE: 1.8 K/CU MM
LYMPHOCYTES RELATIVE PERCENT: 31.8 % (ref 24–44)
MCH RBC QN AUTO: 32.1 PG (ref 27–31)
MCHC RBC AUTO-ENTMCNC: 33.6 % (ref 32–36)
MCV RBC AUTO: 95.5 FL (ref 78–100)
MONOCYTES ABSOLUTE: 0.6 K/CU MM
MONOCYTES RELATIVE PERCENT: 10.9 % (ref 0–4)
NITRITE URINE, QUANTITATIVE: POSITIVE
NUCLEATED RBC %: 0 %
PDW BLD-RTO: 12.4 % (ref 11.7–14.9)
PH, URINE: 5 (ref 5–8)
PLATELET # BLD: 231 K/CU MM (ref 140–440)
PMV BLD AUTO: 8.5 FL (ref 7.5–11.1)
POTASSIUM SERPL-SCNC: 4.3 MMOL/L (ref 3.5–5.1)
PROTEIN UA: NEGATIVE MG/DL
PROTHROMBIN TIME: 11.3 SECONDS (ref 11.7–14.5)
RBC # BLD: 4.92 M/CU MM (ref 4.2–5.4)
RBC URINE: 5 /HPF (ref 0–6)
SEGMENTED NEUTROPHILS ABSOLUTE COUNT: 3.1 K/CU MM
SEGMENTED NEUTROPHILS RELATIVE PERCENT: 56 % (ref 36–66)
SODIUM BLD-SCNC: 138 MMOL/L (ref 135–145)
SPECIFIC GRAVITY UA: 1.01 (ref 1–1.03)
SQUAMOUS EPITHELIAL: 1 /HPF
TOTAL IMMATURE NEUTOROPHIL: 0.02 K/CU MM
TOTAL NUCLEATED RBC: 0 K/CU MM
TOTAL PROTEIN: 6.2 GM/DL (ref 6.4–8.2)
TRANSITIONAL EPITHELIAL: <1 /HPF
TRICHOMONAS: ABNORMAL /HPF
UROBILINOGEN, URINE: NEGATIVE MG/DL (ref 0.2–1)
WBC # BLD: 5.5 K/CU MM (ref 4–10.5)
WBC CLUMP: ABNORMAL /HPF
WBC UA: 69 /HPF (ref 0–5)

## 2021-07-30 PROCEDURE — 86900 BLOOD TYPING SEROLOGIC ABO: CPT

## 2021-07-30 PROCEDURE — 86901 BLOOD TYPING SEROLOGIC RH(D): CPT

## 2021-07-30 PROCEDURE — 94761 N-INVAS EAR/PLS OXIMETRY MLT: CPT

## 2021-07-30 PROCEDURE — 93971 EXTREMITY STUDY: CPT

## 2021-07-30 PROCEDURE — 94010 BREATHING CAPACITY TEST: CPT

## 2021-07-30 PROCEDURE — 93880 EXTRACRANIAL BILAT STUDY: CPT

## 2021-07-30 PROCEDURE — 83036 HEMOGLOBIN GLYCOSYLATED A1C: CPT

## 2021-07-30 PROCEDURE — 2580000003 HC RX 258: Performed by: PHYSICIAN ASSISTANT

## 2021-07-30 PROCEDURE — 80053 COMPREHEN METABOLIC PANEL: CPT

## 2021-07-30 PROCEDURE — 85025 COMPLETE CBC W/AUTO DIFF WBC: CPT

## 2021-07-30 PROCEDURE — 6370000000 HC RX 637 (ALT 250 FOR IP): Performed by: PHYSICIAN ASSISTANT

## 2021-07-30 PROCEDURE — 71250 CT THORAX DX C-: CPT

## 2021-07-30 PROCEDURE — 85730 THROMBOPLASTIN TIME PARTIAL: CPT

## 2021-07-30 PROCEDURE — 86922 COMPATIBILITY TEST ANTIGLOB: CPT

## 2021-07-30 PROCEDURE — 81001 URINALYSIS AUTO W/SCOPE: CPT

## 2021-07-30 PROCEDURE — 85610 PROTHROMBIN TIME: CPT

## 2021-07-30 PROCEDURE — 6370000000 HC RX 637 (ALT 250 FOR IP): Performed by: INTERNAL MEDICINE

## 2021-07-30 PROCEDURE — 86850 RBC ANTIBODY SCREEN: CPT

## 2021-07-30 PROCEDURE — 36415 COLL VENOUS BLD VENIPUNCTURE: CPT

## 2021-07-30 PROCEDURE — 6370000000 HC RX 637 (ALT 250 FOR IP): Performed by: SURGERY

## 2021-07-30 PROCEDURE — 2140000000 HC CCU INTERMEDIATE R&B

## 2021-07-30 PROCEDURE — 87081 CULTURE SCREEN ONLY: CPT

## 2021-07-30 RX ORDER — SODIUM CHLORIDE 0.9 % (FLUSH) 0.9 %
10 SYRINGE (ML) INJECTION EVERY 12 HOURS SCHEDULED
Status: DISCONTINUED | OUTPATIENT
Start: 2021-07-30 | End: 2021-08-02 | Stop reason: ALTCHOICE

## 2021-07-30 RX ORDER — ASPIRIN 81 MG/1
81 TABLET ORAL DAILY
Status: DISCONTINUED | OUTPATIENT
Start: 2021-07-30 | End: 2021-08-02 | Stop reason: ALTCHOICE

## 2021-07-30 RX ORDER — ATORVASTATIN CALCIUM 80 MG/1
80 TABLET, FILM COATED ORAL NIGHTLY
Status: DISCONTINUED | OUTPATIENT
Start: 2021-07-30 | End: 2021-08-02 | Stop reason: ALTCHOICE

## 2021-07-30 RX ORDER — SODIUM CHLORIDE 9 MG/ML
25 INJECTION, SOLUTION INTRAVENOUS PRN
Status: DISCONTINUED | OUTPATIENT
Start: 2021-07-30 | End: 2021-08-02 | Stop reason: ALTCHOICE

## 2021-07-30 RX ORDER — SODIUM CHLORIDE 0.9 % (FLUSH) 0.9 %
10 SYRINGE (ML) INJECTION PRN
Status: DISCONTINUED | OUTPATIENT
Start: 2021-07-30 | End: 2021-08-02 | Stop reason: ALTCHOICE

## 2021-07-30 RX ADMIN — SIMETHICONE 80 MG: 80 TABLET, CHEWABLE ORAL at 20:09

## 2021-07-30 RX ADMIN — ATORVASTATIN CALCIUM 80 MG: 80 TABLET, FILM COATED ORAL at 20:09

## 2021-07-30 RX ADMIN — ACETAMINOPHEN 650 MG: 325 TABLET ORAL at 01:58

## 2021-07-30 RX ADMIN — Medication 3 MG: at 20:09

## 2021-07-30 RX ADMIN — SODIUM CHLORIDE, PRESERVATIVE FREE 10 ML: 5 INJECTION INTRAVENOUS at 20:09

## 2021-07-30 RX ADMIN — ACETAMINOPHEN 650 MG: 325 TABLET ORAL at 17:06

## 2021-07-30 RX ADMIN — ASPIRIN 81 MG: 81 TABLET, COATED ORAL at 17:06

## 2021-07-30 ASSESSMENT — PAIN SCALES - GENERAL
PAINLEVEL_OUTOF10: 1
PAINLEVEL_OUTOF10: 0
PAINLEVEL_OUTOF10: 3
PAINLEVEL_OUTOF10: 3
PAINLEVEL_OUTOF10: 0

## 2021-07-30 ASSESSMENT — PAIN DESCRIPTION - PAIN TYPE: TYPE: ACUTE PAIN

## 2021-07-30 ASSESSMENT — PAIN DESCRIPTION - LOCATION: LOCATION: FLANK;RIB CAGE

## 2021-07-30 ASSESSMENT — PAIN DESCRIPTION - ORIENTATION: ORIENTATION: RIGHT;OUTER

## 2021-07-30 NOTE — PROGRESS NOTES
PATIENT NAME: Moo Brito    TODAY'S DATE: 07/30/21    Reason for today's visit: CAD awaiting CABG     SUBJECTIVE:    Pt is feeling well. She has had some mild chest discomfort intermittently. OBJECTIVE:   VITALS:    Vitals:    07/30/21 1645   BP: 117/88   Pulse: 78   Resp:    Temp: 98 °F (36.7 °C)   SpO2:      INTAKE/OUTPUT:    Date 07/30/21 0000 - 07/30/21 2359   Shift 4049-5466 5231-2805 9562-3238 24 Hour Total   INTAKE   I.V.(mL/kg/hr) 650(1.3)   650   Shift Total(mL/kg) 650(10.3)   650(10.3)   OUTPUT   Shift Total(mL/kg)       Weight (kg) 63 63 63 63      Patient Vitals for the past 96 hrs (Last 3 readings):   Weight   07/30/21 0426 139 lb (63 kg)   07/29/21 1208 142 lb (64.4 kg)       EXAM:  Constitutional: Blood pressure 117/88, pulse 78, temperature 98 °F (36.7 °C), temperature source Oral, resp. rate 18, height 5' 2\" (1.575 m), weight 139 lb (63 kg), SpO2 96 %. No apparent distress, appears stated age and cooperative. Neurologic: follows commands, no focal weakness noted   Lungs: Good respiratory effort. Clear to auscultation,   CV: Regular rate/ rhythm , no peripheral edema, feet warm and well perfused  GI: Soft, non-tender in all four quadrants, non-distended, + bowel sounds, spleen and liver no palpable masses   : bladder nondistended   MSK: no obvious deformity   Skin: warm, pink and dry       Data:  CBC:   Recent Labs     07/29/21  1200 07/30/21  1301   WBC 5.7 5.5   HGB 15.6 15.8   HCT 47.6* 47.0    231     BMP:    Recent Labs     07/29/21  1200 07/30/21  1301    138   K 4.3 4.3    102   CO2 30 25   BUN 11 9   CREATININE 0.7 0.6   GLUCOSE 102* 161*     Hepatic:   Recent Labs     07/30/21  1301   AST 25   ALT 16   BILITOT 0.5   ALKPHOS 56     Mag:    No results for input(s): MG in the last 72 hours. Phos:   No results for input(s): PHOS in the last 72 hours.    INR:   Recent Labs     07/30/21  1301   INR 0.88       Radiology Review: preop testing noted, no issues noted       ASSESSMENT:  Patient Active Problem List   Diagnosis    PVC (premature ventricular contraction)    Fatigue    Chest pressure    HORACE (obstructive sleep apnea)    Hypersomnia    Unstable angina (HCC)    Essential hypertension    CAD in native artery       CAD    PLAN:     preop testing complete. Will proceed with CABG on Monday. Questions answered.      Reji Paz PA-C

## 2021-07-30 NOTE — PROGRESS NOTES
Chart reviewed by cardiac rehab nurse. Met patient and grandchildren in room 3125. Introduced myself and teaching plan. Patient's planned procedure is CABG. Teaching done on A&P of the heart and formation of CAD. Explained the surgical process, Pre-Op,Inter-Op and Post-Op. Discussed length of stay and recovery. Explanation given of pre op routine tests, lines/tubes/equipment, and infection control. Educated on weaning from ventilator, medication and equipment to expect, on progressive activity, post op pain, and how it will be managed. Encouraged pt to communicate about pain in order to create a partnership for her recovery success. Discussed importance of coughing and deep breathing and sternal precautions. Introduced multidisciplinary approach and daily routine in CVICU. Pt verbalized commitment to recovery program.        Teaching done on post discharge recovery, activity guidelines, and weight monitoring. Discussed follow up appointments, possibility of ECF, role of home health, and family involvement. Pt lives with her spouse but he requires a lot of care. Patient has two kids and two sisters who will be assisting with her care when she returns home. Introduced benefits of out patient cardiac rehab after appropriate time frame. Went over visitation policy with patient. Given Understanding Heart Surgery book. Patient is concerned about her  being at home without her during her stay, she states he is very dependent on her and doesn't like her away from him for any length of time. She stated that her kids and her sister would be assisting with his care while she is hospitalized. Patient and family had no further questions at this time. Kelly Lopesma notified that patient needs home meds restarted.

## 2021-07-30 NOTE — PLAN OF CARE
Problem: Pain:  Goal: Pain level will decrease  Description: Pain level will decrease  Outcome: Ongoing  Goal: Control of acute pain  Description: Control of acute pain  Outcome: Ongoing  Goal: Control of chronic pain  Description: Control of chronic pain  Outcome: Ongoing     Problem: Cardiac Output - Decreased:  Goal: Hemodynamic stability will improve  Description: Hemodynamic stability will improve  Outcome: Ongoing     Problem: SAFETY  Goal: Free from accidental physical injury  Outcome: Ongoing  Goal: Free from intentional harm  Outcome: Ongoing     Problem: DAILY CARE  Goal: Daily care needs are met  Outcome: Ongoing     Problem: SKIN INTEGRITY  Goal: Skin integrity is maintained or improved  Outcome: Ongoing     Problem: KNOWLEDGE DEFICIT  Goal: Patient/S.O. demonstrates understanding of disease process, treatment plan, medications, and discharge instructions.   Outcome: Ongoing     Problem: DISCHARGE BARRIERS  Goal: Patient's continuum of care needs are met  Outcome: Ongoing

## 2021-07-30 NOTE — PROGRESS NOTES
Bedside Spirometry complete results in red chart. Normal Spirometry, no obstruction or restriction noted.

## 2021-07-30 NOTE — CARE COORDINATION
CM in to see Pt to discuss discharge planning. Pt grandchildren present. Pt is from home with her spouse. Pt has numerous family members that live nearby that provide support as needed. Pt states she is independent with ADL's prior to admission and uses no DME. Pt denies the need for home care at this time. Pt has insurance, pcp, and can afford medications. Pt denies any needs at this time. CM available if needs arise.

## 2021-07-30 NOTE — PROGRESS NOTES
I was requested by floor nurse to call and speak to patient's sister Maryanne Brunner who is her POA. I spoke to Maryanne Brunner and she was confused regarding visitation during stay at the hospital.  I informed her that 2 visitors are allowed to come see her prior to surgery that morning, during surgery in the waiting area only 1 visitor is permitted, then after surgery once visitation is permitted 2 visitors at a time permitted. Sister had no further questions at this time.

## 2021-07-30 NOTE — PROGRESS NOTES
Spoke to Canon agustin RN on [employer] regarding patient. Red folder placed in chart with STS form. Awaiting pre-op orders for patient. Risk score and pre-op check list started and placed in chart. Not completed due to testing not completed at this time.

## 2021-07-31 ENCOUNTER — ANESTHESIA EVENT (OUTPATIENT)
Dept: OPERATING ROOM | Age: 81
DRG: 233 | End: 2021-07-31
Payer: MEDICARE

## 2021-07-31 LAB
APTT: 116 SECONDS (ref 25.1–37.1)
APTT: 66.6 SECONDS (ref 25.1–37.1)
APTT: >240 SECONDS (ref 25.1–37.1)
HCT VFR BLD CALC: 46.3 % (ref 37–47)
HEMOGLOBIN: 15.9 GM/DL (ref 12.5–16)
MCH RBC QN AUTO: 32.4 PG (ref 27–31)
MCHC RBC AUTO-ENTMCNC: 34.3 % (ref 32–36)
MCV RBC AUTO: 94.5 FL (ref 78–100)
PDW BLD-RTO: 12.5 % (ref 11.7–14.9)
PLATELET # BLD: 243 K/CU MM (ref 140–440)
PMV BLD AUTO: 8.7 FL (ref 7.5–11.1)
RBC # BLD: 4.9 M/CU MM (ref 4.2–5.4)
WBC # BLD: 5.8 K/CU MM (ref 4–10.5)

## 2021-07-31 PROCEDURE — 6360000002 HC RX W HCPCS: Performed by: PHYSICIAN ASSISTANT

## 2021-07-31 PROCEDURE — 36415 COLL VENOUS BLD VENIPUNCTURE: CPT

## 2021-07-31 PROCEDURE — 94761 N-INVAS EAR/PLS OXIMETRY MLT: CPT

## 2021-07-31 PROCEDURE — 6370000000 HC RX 637 (ALT 250 FOR IP): Performed by: PHYSICIAN ASSISTANT

## 2021-07-31 PROCEDURE — 2140000000 HC CCU INTERMEDIATE R&B

## 2021-07-31 PROCEDURE — 85027 COMPLETE CBC AUTOMATED: CPT

## 2021-07-31 PROCEDURE — 85730 THROMBOPLASTIN TIME PARTIAL: CPT

## 2021-07-31 PROCEDURE — 6370000000 HC RX 637 (ALT 250 FOR IP): Performed by: INTERNAL MEDICINE

## 2021-07-31 PROCEDURE — 2580000003 HC RX 258: Performed by: PHYSICIAN ASSISTANT

## 2021-07-31 RX ORDER — HEPARIN SODIUM 1000 [USP'U]/ML
30 INJECTION, SOLUTION INTRAVENOUS; SUBCUTANEOUS PRN
Status: ACTIVE | OUTPATIENT
Start: 2021-07-31 | End: 2021-08-02

## 2021-07-31 RX ORDER — LEVOTHYROXINE SODIUM 0.05 MG/1
50 TABLET ORAL DAILY
Status: DISCONTINUED | OUTPATIENT
Start: 2021-07-31 | End: 2021-08-09 | Stop reason: HOSPADM

## 2021-07-31 RX ORDER — HEPARIN SODIUM 10000 [USP'U]/100ML
5-30 INJECTION, SOLUTION INTRAVENOUS CONTINUOUS
Status: DISCONTINUED | OUTPATIENT
Start: 2021-07-31 | End: 2021-08-01

## 2021-07-31 RX ORDER — ALPRAZOLAM 0.25 MG/1
0.25 TABLET ORAL NIGHTLY PRN
Status: DISCONTINUED | OUTPATIENT
Start: 2021-07-31 | End: 2021-08-09 | Stop reason: HOSPADM

## 2021-07-31 RX ORDER — HEPARIN SODIUM 1000 [USP'U]/ML
60 INJECTION, SOLUTION INTRAVENOUS; SUBCUTANEOUS ONCE
Status: COMPLETED | OUTPATIENT
Start: 2021-07-31 | End: 2021-07-31

## 2021-07-31 RX ORDER — HEPARIN SODIUM 1000 [USP'U]/ML
60 INJECTION, SOLUTION INTRAVENOUS; SUBCUTANEOUS PRN
Status: ACTIVE | OUTPATIENT
Start: 2021-07-31 | End: 2021-08-02

## 2021-07-31 RX ADMIN — SODIUM CHLORIDE, PRESERVATIVE FREE 10 ML: 5 INJECTION INTRAVENOUS at 21:58

## 2021-07-31 RX ADMIN — LEVOTHYROXINE SODIUM 50 MCG: 0.05 TABLET ORAL at 12:28

## 2021-07-31 RX ADMIN — ASPIRIN 81 MG: 81 TABLET, COATED ORAL at 08:50

## 2021-07-31 RX ADMIN — HEPARIN SODIUM AND DEXTROSE 12 UNITS/KG/HR: 10000; 5 INJECTION INTRAVENOUS at 12:27

## 2021-07-31 RX ADMIN — SODIUM CHLORIDE, PRESERVATIVE FREE 10 ML: 5 INJECTION INTRAVENOUS at 12:33

## 2021-07-31 RX ADMIN — ACETAMINOPHEN 650 MG: 325 TABLET ORAL at 00:39

## 2021-07-31 RX ADMIN — ACETAMINOPHEN 650 MG: 325 TABLET ORAL at 08:54

## 2021-07-31 RX ADMIN — ALPRAZOLAM 0.25 MG: 0.25 TABLET ORAL at 21:58

## 2021-07-31 RX ADMIN — ATORVASTATIN CALCIUM 80 MG: 80 TABLET, FILM COATED ORAL at 21:58

## 2021-07-31 RX ADMIN — HEPARIN SODIUM 3790 UNITS: 1000 INJECTION INTRAVENOUS; SUBCUTANEOUS at 12:28

## 2021-07-31 ASSESSMENT — PAIN DESCRIPTION - PROGRESSION: CLINICAL_PROGRESSION: GRADUALLY WORSENING

## 2021-07-31 ASSESSMENT — PAIN SCALES - GENERAL
PAINLEVEL_OUTOF10: 0
PAINLEVEL_OUTOF10: 3
PAINLEVEL_OUTOF10: 5
PAINLEVEL_OUTOF10: 0
PAINLEVEL_OUTOF10: 3

## 2021-07-31 ASSESSMENT — PAIN - FUNCTIONAL ASSESSMENT: PAIN_FUNCTIONAL_ASSESSMENT: ACTIVITIES ARE NOT PREVENTED

## 2021-07-31 ASSESSMENT — PAIN DESCRIPTION - FREQUENCY: FREQUENCY: CONTINUOUS

## 2021-07-31 ASSESSMENT — PAIN DESCRIPTION - PAIN TYPE: TYPE: ACUTE PAIN

## 2021-07-31 ASSESSMENT — PAIN DESCRIPTION - ONSET: ONSET: GRADUAL

## 2021-07-31 ASSESSMENT — PAIN DESCRIPTION - ORIENTATION: ORIENTATION: MID;LOWER

## 2021-07-31 ASSESSMENT — PAIN DESCRIPTION - DESCRIPTORS: DESCRIPTORS: ACHING;CONSTANT

## 2021-07-31 ASSESSMENT — PAIN DESCRIPTION - LOCATION: LOCATION: BACK

## 2021-07-31 NOTE — PROGRESS NOTES
contraction)    Fatigue    Chest pressure    HORACE (obstructive sleep apnea)    Hypersomnia    Unstable angina (HCC)    Essential hypertension    CAD in native artery       CAD    PLAN:     preop testing complete. Will proceed with CABG on Monday. Questions answered. Transfer to ICU for closer monitoring of her chest pain. She may need nitro or heparin if it persists.      Reji Paz PA-C

## 2021-07-31 NOTE — PROGRESS NOTES
Clarifying with lab if thermal amplitudes done on blood work sent, Al Stern confirmed they have not, pt will need to be redrawn. Clarified that patient has not had blood transfusion in the last 3 months because type and screen will  Monday night. Al Stern will send extension form to patient to have her sign, to extend type and screen to 14 days, purple top needs drawn and send with label to run thermal amplitudes. Shira Miller RN with plan.

## 2021-07-31 NOTE — ANESTHESIA PRE PROCEDURE
Department of Anesthesiology  Preprocedure Note       Name:  Jermaine Srinivasan   Age:  [de-identified] y.o.  :  1940                                          MRN:  [id number]         Date:  2021      Surgeon: Balbina Shepard):  Merrilee Cabot, MD    Procedure: Procedure(s):  CABG CORONARY ARTERY BYPASS    Medications prior to admission:   Prior to Admission medications    Medication Sig Start Date End Date Taking?  Authorizing Provider   magnesium oxide (MAG-OX) 400 MG tablet Take 1 tablet by mouth daily 7/15/21  Yes Krishan Benites MD   dilTIAZem (CARDIZEM CD) 120 MG extended release capsule Take 1 capsule by mouth daily 7/15/21  Yes Krishan Benites MD   acetaminophen (TYLENOL) 325 MG tablet Take 325-650 mg by mouth every 6 hours as needed   Yes Historical Provider, MD   estradiol (ESTRACE) 0.1 MG/GM vaginal cream Apply pea sized amount to vagina nightly x7 days, then every other night for 7 days, then every 3rd night therafter 20  Yes Historical Provider, MD   lisinopril (PRINIVIL;ZESTRIL) 10 MG tablet Take 10 mg by mouth Daily with lunch 21  Yes Historical Provider, MD   Multiple Vitamin (MULTIVITAMIN) capsule Take 1 capsule by mouth Daily with lunch   Yes Historical Provider, MD   levothyroxine (SYNTHROID) 50 MCG tablet Take 50 mcg by mouth Daily   Yes Historical Provider, MD   dicyclomine (BENTYL) 10 MG capsule Take 10 mg by mouth as needed    Yes Historical Provider, MD   ALPRAZolam (XANAX) 0.25 MG tablet Take 0.25 mg by mouth nightly as needed for Sleep   Yes Historical Provider, MD   Hyoscyamine Sulfate SL 0.125 MG SUBL  21   Historical Provider, MD   meclizine (ANTIVERT) 25 MG tablet Take 25 mg by mouth 3 times daily as needed  Patient not taking: Reported on 2021   Historical Provider, MD   omeprazole (PRILOSEC) 20 MG delayed release capsule Take 20 mg by mouth as needed   Patient not taking: Reported on 2021   Historical Provider, MD       Current medications: Current Facility-Administered Medications   Medication Dose Route Frequency Provider Last Rate Last Admin    ALPRAZolam Rhoderick Mullet) tablet 0.25 mg  0.25 mg Oral Nightly PRN Marisa Solo, PA-C        levothyroxine (SYNTHROID) tablet 50 mcg  50 mcg Oral Daily Marisa Solo, PA-C        sodium chloride flush 0.9 % injection 10 mL  10 mL Intravenous 2 times per day Marisa Solo, PA-C   10 mL at 07/30/21 2009    sodium chloride flush 0.9 % injection 10 mL  10 mL Intravenous PRN Marisa Solo, PA-C        0.9 % sodium chloride infusion  25 mL Intravenous PRN Marisa Solo, PA-C        aspirin EC tablet 81 mg  81 mg Oral Daily Marisa Solo, PA-C   81 mg at 07/31/21 0850    atorvastatin (LIPITOR) tablet 80 mg  80 mg Oral Nightly Marisa Solo, PA-C   80 mg at 07/30/21 2009    diphenhydrAMINE (BENADRYL) tablet 25 mg  25 mg Oral Once Nati Lundberg MD        diazePAM (VALIUM) tablet 5 mg  5 mg Oral Once Nati Lundberg MD        sodium chloride flush 0.9 % injection 5-40 mL  5-40 mL Intravenous 2 times per day Nati Lundberg MD   10 mL at 07/29/21 2250    sodium chloride flush 0.9 % injection 5-40 mL  5-40 mL Intravenous PRN Nati Lundberg MD        0.9 % sodium chloride infusion  25 mL Intravenous PRN Nati Lundberg MD        acetaminophen (TYLENOL) tablet 650 mg  650 mg Oral Q4H PRN Nati Lundberg MD   650 mg at 07/31/21 0854    melatonin tablet 3 mg  3 mg Oral Nightly PRN Mars Haynes MD   3 mg at 07/30/21 2009    [facility]) chewable tablet 80 mg  80 mg Oral 4x Daily PRN Mars Haynes MD   80 mg at 07/30/21 2009       Allergies:     Allergies   Allergen Reactions    Iodine Hives     Topical only per pt; sts 1974 she had sx and broke out in hives after application of topical iodine only; denies knowledge of allergy to contrast dyes     Pcn [Penicillins] Hives       Problem List:    Patient Active Problem List   Diagnosis Code    PVC (premature ventricular contraction) I49.3    Fatigue R53.83    Chest pressure R07.89    HORACE (obstructive sleep apnea) G47.33    Hypersomnia G47.10    Unstable angina (HCC) I20.0    Essential hypertension I10    CAD in native artery I25.10       Past Medical History:        Diagnosis Date    Arthritis     CAD in native artery 07/29/2021    Essential hypertension 07/29/2021    H/O echocardiogram 07/29/2021    Left ventricular function and size is normal, EF is estimated at 55-60%. Mild mitral ,tricuspid and moderate aortic regurgitation is present.  History of Holter monitoring 06/15/2021    Frequent PVCs. 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  Thyroid disease        Past Surgical History:        Procedure Laterality Date    APPENDECTOMY      BACK SURGERY      COLONOSCOPY      DILATION AND CURETTAGE OF UTERUS      HYSTERECTOMY      TUMOR REMOVAL         Social History:    Social History     Tobacco Use    Smoking status: Never Smoker    Smokeless tobacco: Never Used   Substance Use Topics    Alcohol use: No                                Counseling given: Not Answered      Vital Signs (Current):   Vitals:    07/30/21 1645 07/30/21 2001 07/31/21 0040 07/31/21 0813   BP: 117/88 112/74 133/70 121/69   Pulse: 78 87 83 84   Resp:  20 20 20   Temp: 36.7 °C (98 °F) 36.6 °C (97.8 °F) 36.4 °C (97.6 °F) 36.8 °C (98.2 °F)   TempSrc: Oral Oral Oral Oral   SpO2:  98% 97% 97%   Weight:       Height:                                                  BP Readings from Last 3 Encounters:   07/31/21 121/69   07/28/21 139/64   07/15/21 122/68       NPO Status:                                                                                 BMI:   Wt Readings from Last 3 Encounters:   07/30/21 139 lb (63 kg)   07/28/21 142 lb (64.4 kg)   07/15/21 142 lb (64.4 kg)     Body mass index is 25.42 kg/m².     CBC:   Lab Results   Component Value Date    WBC 5.5 07/30/2021    RBC 4.92 07/30/2021    HGB 15.8 07/30/2021    HCT 47.0 07/30/2021    MCV 95.5 07/30/2021    RDW 12.4 07/30/2021     07/30/2021       CMP:   Lab Results   Component Value Date     07/30/2021    K 4.3 07/30/2021     07/30/2021    CO2 25 07/30/2021    BUN 9 07/30/2021    CREATININE 0.6 07/30/2021    GFRAA >60 07/30/2021    LABGLOM >60 07/30/2021    GLUCOSE 161 07/30/2021    PROT 6.2 07/30/2021    PROT 6.6 09/10/2012    CALCIUM 9.1 07/30/2021    BILITOT 0.5 07/30/2021    ALKPHOS 56 07/30/2021    AST 25 07/30/2021    ALT 16 07/30/2021       POC Tests: No results for input(s): POCGLU, POCNA, POCK, POCCL, POCBUN, POCHEMO, POCHCT in the last 72 hours.     Coags:   Lab Results   Component Value Date    PROTIME 11.3 07/30/2021    INR 0.88 07/30/2021    APTT 26.4 07/30/2021       HCG (If Applicable): No results found for: PREGTESTUR, PREGSERUM, HCG, HCGQUANT     ABGs:   Lab Results   Component Value Date    PO2ART 70 07/29/2021    QPU0XWQ 37.0 07/29/2021    PYA3IMK 25.7 07/29/2021        Type & Screen (If Applicable):  No results found for: LABABO, LABRH    Drug/Infectious Status (If Applicable):  No results found for: HIV, HEPCAB    COVID-19 Screening (If Applicable):   Lab Results   Component Value Date    COVID19 NOT DETECTED 07/29/2021    COVID19 NOT DETECTED 12/18/2020           Anesthesia Evaluation  Patient summary reviewed no history of anesthetic complications:   Airway: Mallampati: II  TM distance: >3 FB   Neck ROM: full  Mouth opening: > = 3 FB Dental:      Comment: Denies loose teeth     Pulmonary:normal exam    (+) sleep apnea:      (-) not a current smoker                          ROS comment: Mild aortic calcinosis, mainly involving the aortic arch and the descending  thoracic aorta.     Otherwise unremarkable noncontrast chest CT.          Cardiovascular:  Exercise tolerance: poor (<4 METS),   (+) hypertension:, angina:, CAD:,       NYHA Classification: III                 Beta Blocker:  Dose within 24 Hrs      ROS comment: C  LM: No significant disease. LAD: 80 % tandem lesions proximally, followed by long area of   diffuse disease + 99 & 95 % tandem lesions distally. CIRCUMFLEX: 90 & 80 % tandem stenosis in proximal portion. with similar lesions in a high good size OM. RCA: 90 % mid vessel tandem stenosis in its mid section in   addition to 50 %, hazy proximal stenosis. LV: Regional + global WMA with severely reduced LV function. LVEF is estimated to be 20 to 25 %. Echo   Summary   Left ventricular function and size is normal, EF is estimated at 55-60%. Mild left ventricular hypertrophy. E/A reversal; indeterminate diastolic function. Hypokineses of the basal anterior lateral and mid anterior lateral segments. Aneurysmal interatrial septum. Mild mitral ,tricuspid and moderate aortic regurgitation is present. RVSP is 25 mmHg. No evidence of pericardial effusion. Neuro/Psych:      (-) neuromuscular disease and TIA           GI/Hepatic/Renal:        (-) GERD       Endo/Other:                     Abdominal:             Vascular:           ROS comment: The right internal carotid artery demonstrates 0-50% stenosis.     The left internal carotid artery demonstrates 0-50% stenosis.     Bilateral vertebral arteries are patent with flow in the normal direction. . Other Findings:           Anesthesia Plan      general     ASA 4       Induction: intravenous. arterial line, BIS, central line, CVP, PA catheter and RALEIGH  MIPS: Postoperative opioids intended, Prophylactic antiemetics administered and Postoperative ventilation. Anesthetic plan and risks discussed with patient. Use of blood products discussed with patient whom. Plan discussed with attending.                 Mackey Nyhan, ROMAINE - CRNA   7/31/2021

## 2021-08-01 LAB
APTT: 93.4 SECONDS (ref 25.1–37.1)
APTT: 93.7 SECONDS (ref 25.1–37.1)
CULTURE: NORMAL
EKG ATRIAL RATE: 76 BPM
EKG DIAGNOSIS: NORMAL
EKG P AXIS: 50 DEGREES
EKG P-R INTERVAL: 178 MS
EKG Q-T INTERVAL: 482 MS
EKG QRS DURATION: 138 MS
EKG QTC CALCULATION (BAZETT): 542 MS
EKG R AXIS: -9 DEGREES
EKG T AXIS: 196 DEGREES
EKG VENTRICULAR RATE: 76 BPM
Lab: NORMAL
SPECIMEN: NORMAL

## 2021-08-01 PROCEDURE — 6370000000 HC RX 637 (ALT 250 FOR IP): Performed by: PHYSICIAN ASSISTANT

## 2021-08-01 PROCEDURE — 6370000000 HC RX 637 (ALT 250 FOR IP): Performed by: SURGERY

## 2021-08-01 PROCEDURE — 93010 ELECTROCARDIOGRAM REPORT: CPT | Performed by: INTERNAL MEDICINE

## 2021-08-01 PROCEDURE — 36415 COLL VENOUS BLD VENIPUNCTURE: CPT

## 2021-08-01 PROCEDURE — 2000000000 HC ICU R&B

## 2021-08-01 PROCEDURE — 85730 THROMBOPLASTIN TIME PARTIAL: CPT

## 2021-08-01 PROCEDURE — 2580000003 HC RX 258: Performed by: INTERNAL MEDICINE

## 2021-08-01 PROCEDURE — 93005 ELECTROCARDIOGRAM TRACING: CPT | Performed by: SURGERY

## 2021-08-01 PROCEDURE — 6360000002 HC RX W HCPCS: Performed by: PHYSICIAN ASSISTANT

## 2021-08-01 PROCEDURE — 2580000003 HC RX 258: Performed by: PHYSICIAN ASSISTANT

## 2021-08-01 PROCEDURE — 94761 N-INVAS EAR/PLS OXIMETRY MLT: CPT

## 2021-08-01 PROCEDURE — 6370000000 HC RX 637 (ALT 250 FOR IP): Performed by: INTERNAL MEDICINE

## 2021-08-01 RX ORDER — LOPERAMIDE HYDROCHLORIDE 2 MG/1
2 CAPSULE ORAL 2 TIMES DAILY PRN
Status: DISCONTINUED | OUTPATIENT
Start: 2021-08-01 | End: 2021-08-09 | Stop reason: HOSPADM

## 2021-08-01 RX ORDER — CEFAZOLIN SODIUM 2 G/100ML
2000 INJECTION, SOLUTION INTRAVENOUS
Status: DISCONTINUED | OUTPATIENT
Start: 2021-08-02 | End: 2021-08-02 | Stop reason: HOSPADM

## 2021-08-01 RX ORDER — LOPERAMIDE HYDROCHLORIDE 2 MG/1
2 CAPSULE ORAL 2 TIMES DAILY PRN
COMMUNITY

## 2021-08-01 RX ORDER — CARVEDILOL 3.12 MG/1
3.12 TABLET ORAL
Status: COMPLETED | OUTPATIENT
Start: 2021-08-02 | End: 2021-08-02

## 2021-08-01 RX ORDER — CHLORHEXIDINE GLUCONATE 0.12 MG/ML
30 RINSE ORAL 2 TIMES DAILY
Status: COMPLETED | OUTPATIENT
Start: 2021-08-01 | End: 2021-08-02

## 2021-08-01 RX ORDER — NOREPINEPHRINE BIT/0.9 % NACL 16MG/250ML
2-100 INFUSION BOTTLE (ML) INTRAVENOUS
Status: COMPLETED | OUTPATIENT
Start: 2021-08-02 | End: 2021-08-02

## 2021-08-01 RX ORDER — CHLORHEXIDINE GLUCONATE 0.12 MG/ML
15 RINSE ORAL
Status: COMPLETED | OUTPATIENT
Start: 2021-08-02 | End: 2021-08-02

## 2021-08-01 RX ORDER — CHLORHEXIDINE GLUCONATE 4 G/100ML
SOLUTION TOPICAL SEE ADMIN INSTRUCTIONS
Status: DISCONTINUED | OUTPATIENT
Start: 2021-08-01 | End: 2021-08-02 | Stop reason: HOSPADM

## 2021-08-01 RX ORDER — HEPARIN SODIUM 10000 [USP'U]/100ML
5-30 INJECTION, SOLUTION INTRAVENOUS CONTINUOUS
Status: ACTIVE | OUTPATIENT
Start: 2021-08-01 | End: 2021-08-02

## 2021-08-01 RX ADMIN — SIMETHICONE 80 MG: 80 TABLET, CHEWABLE ORAL at 02:12

## 2021-08-01 RX ADMIN — SODIUM CHLORIDE, PRESERVATIVE FREE 10 ML: 5 INJECTION INTRAVENOUS at 08:57

## 2021-08-01 RX ADMIN — SODIUM CHLORIDE, PRESERVATIVE FREE 10 ML: 5 INJECTION INTRAVENOUS at 22:10

## 2021-08-01 RX ADMIN — HEPARIN SODIUM AND DEXTROSE 12 UNITS/KG/HR: 10000; 5 INJECTION INTRAVENOUS at 12:31

## 2021-08-01 RX ADMIN — ACETAMINOPHEN 650 MG: 325 TABLET ORAL at 02:11

## 2021-08-01 RX ADMIN — LEVOTHYROXINE SODIUM 50 MCG: 0.05 TABLET ORAL at 08:56

## 2021-08-01 RX ADMIN — 0.12% CHLORHEXIDINE GLUCONATE 30 ML: 1.2 RINSE ORAL at 22:05

## 2021-08-01 RX ADMIN — ASPIRIN 81 MG: 81 TABLET, COATED ORAL at 08:57

## 2021-08-01 RX ADMIN — SODIUM CHLORIDE, PRESERVATIVE FREE 10 ML: 5 INJECTION INTRAVENOUS at 22:11

## 2021-08-01 RX ADMIN — ALPRAZOLAM 0.25 MG: 0.25 TABLET ORAL at 22:05

## 2021-08-01 RX ADMIN — ACETAMINOPHEN 650 MG: 325 TABLET ORAL at 12:32

## 2021-08-01 RX ADMIN — ATORVASTATIN CALCIUM 80 MG: 80 TABLET, FILM COATED ORAL at 22:05

## 2021-08-01 RX ADMIN — Medication: at 22:05

## 2021-08-01 ASSESSMENT — PAIN DESCRIPTION - DESCRIPTORS
DESCRIPTORS: ACHING;CRAMPING;DISCOMFORT
DESCRIPTORS: ACHING;CRAMPING;DISCOMFORT

## 2021-08-01 ASSESSMENT — PAIN - FUNCTIONAL ASSESSMENT
PAIN_FUNCTIONAL_ASSESSMENT: ACTIVITIES ARE NOT PREVENTED
PAIN_FUNCTIONAL_ASSESSMENT: ACTIVITIES ARE NOT PREVENTED

## 2021-08-01 ASSESSMENT — PAIN DESCRIPTION - PAIN TYPE
TYPE: ACUTE PAIN
TYPE: ACUTE PAIN

## 2021-08-01 ASSESSMENT — PAIN SCALES - GENERAL
PAINLEVEL_OUTOF10: 0
PAINLEVEL_OUTOF10: 0
PAINLEVEL_OUTOF10: 3
PAINLEVEL_OUTOF10: 5
PAINLEVEL_OUTOF10: 0
PAINLEVEL_OUTOF10: 3
PAINLEVEL_OUTOF10: 0
PAINLEVEL_OUTOF10: 0

## 2021-08-01 ASSESSMENT — PAIN DESCRIPTION - FREQUENCY
FREQUENCY: CONTINUOUS
FREQUENCY: CONTINUOUS

## 2021-08-01 ASSESSMENT — PAIN DESCRIPTION - ONSET
ONSET: ON-GOING
ONSET: ON-GOING

## 2021-08-01 ASSESSMENT — PAIN DESCRIPTION - LOCATION
LOCATION: ABDOMEN;RIB CAGE
LOCATION: ABDOMEN;RIB CAGE

## 2021-08-01 ASSESSMENT — PAIN DESCRIPTION - ORIENTATION
ORIENTATION: LEFT;UPPER;LOWER
ORIENTATION: LEFT;LOWER;UPPER

## 2021-08-01 ASSESSMENT — LIFESTYLE VARIABLES: SMOKING_STATUS: 0

## 2021-08-01 ASSESSMENT — PAIN DESCRIPTION - PROGRESSION
CLINICAL_PROGRESSION: NOT CHANGED
CLINICAL_PROGRESSION: NOT CHANGED

## 2021-08-01 NOTE — PROGRESS NOTES
Pt arrived from - placed in 2102- connected to monitor. Pt awake- alert/oriented x4- oriented to room, call light within reach.  VSS

## 2021-08-01 NOTE — PROGRESS NOTES
Cardiac Surgery Risk Factor Worksheet      STS Criteria  Possible Score Score ICD 10 Notes   Emergency Case 6 0     Serum Creatinine   0  Baseline CR- 0.6   >1.2 0 0     >1.6 to <1.8mg/dl 1      >.1.9 4      Acute/Chronic Renal Failure/Renal Insufficiency 0 0 GFR  Stage of CKD    Left Ventricular Dysfunction(EF<40%) 3 0 0 EF- 55-60%   Operative Mitral Valve Insufficiency 3 0 0    Reoperation 3 0 0    Age >65 and <74 years 1  0    Age >75 years 2 2 yes Pt is [de-identified]   Prior Vascular Surgery 2 0 no    COPD +History of Patient Use of Bronchodilators 2 0 Acuity of  COPD    COPD 0 0     Current Smoker of History of Smoking  0 0     Anemia (Hemotocrit<0.34) 2 0  HCT- 46.3   ASA / Anticoagulants/ Bleeding Tendiencies / Antiplatelets 0 0     Operative Aortic Valve Stenosis 1 0       Weight<143 lbs (  BMI<18.5) 1 1 Obesity with  #'s   Weight >200 lbs (BMI >30    0 0     Diabetes Oral or Insulin Therapy 1 0 Type & Control HGB A1C- 5.5    Cerebrovascular Disease 1 0     Impaired Mobility 0 0     Walker/Cane/Wheelchair 0 0     Recent MI 0 0     Hypertension 0 0  yes   Peripheral Vascular Disease 0 0  Yes- numbness to bilateral feet    Lives Alone 0 0  Lives with     Other (GI Auto Immune Hepatic etc) 0 0 Malnutrition &   Acuity    CHF & Type & Acuity Pt has IBS    TOTAL       Note The surgeon must be notified for all risk scores >7    2  8/1/2021

## 2021-08-01 NOTE — PROGRESS NOTES
Clarified with Dr. Tila Antonio in regards to the time stopping IV HEP GTT prior to OR in the AM.    1. D/C IV Heparin Gtt 8-2-21 @ 0700( 4 hours prior to surgery)

## 2021-08-01 NOTE — PROGRESS NOTES
PATIENT NAME: Kacey Quinn    TODAY'S DATE: 08/01/21    Reason for visit: Unstable angina    SUBJECTIVE:    Pt has had some mild dull symptoms but she states this is not different from before. She did have an IBS attack last night. She feels weak from that. OBJECTIVE:   VITALS:    Vitals:    08/01/21 0737   BP: 126/71   Pulse: 92   Resp: 20   Temp: 98.2 °F (36.8 °C)   SpO2: 99%     INTAKE/OUTPUT:       EXAM:  Blood pressure 126/71, pulse 92, temperature 98.2 °F (36.8 °C), temperature source Oral, resp. rate 20, height 5' 2\" (1.575 m), weight 140 lb 6.4 oz (63.7 kg), SpO2 99 %. General appearance: No apparent distress, appears stated age and cooperative. Skin: unremarkable  HEENT Normocephalic, atraumatic without obvious deformity. Neck: Supple, Trachea midline   Lungs: Good respiratory effort. Clear to auscultation, bilaterally  Heart: Regular rate/ rhythm   Abdomen: Soft, non-tender or non-distended   Extremities: No lower extremity edema  Neurologic: Alert, grossly intact  Mental status: normal affect      Data:  CBC:   Recent Labs     07/29/21  1200 07/30/21  1301 07/31/21  1340   WBC 5.7 5.5 5.8   HGB 15.6 15.8 15.9   HCT 47.6* 47.0 46.3    231 243     BMP:    Recent Labs     07/29/21  1200 07/30/21  1301    138   K 4.3 4.3    102   CO2 30 25   BUN 11 9   CREATININE 0.7 0.6   GLUCOSE 102* 161*     Hepatic:   Recent Labs     07/30/21  1301   AST 25   ALT 16   BILITOT 0.5   ALKPHOS 56     Mag:    No results for input(s): MG in the last 72 hours. Phos:   No results for input(s): PHOS in the last 72 hours. INR:   Recent Labs     07/30/21  1301   INR 0.88     Radiology Review:      CT reviewed in detail. No significant ascending aortic calcification. No significant pulmonary issues.     ASSESSMENT AND PLAN:  Unstable angina    * No surgery date entered *  Patient Active Problem List   Diagnosis    PVC (premature ventricular contraction)    Fatigue    Chest pressure    HORACE (obstructive sleep apnea)    Hypersomnia    Unstable angina (HCC)    Essential hypertension    CAD in native artery       Plan for surgery tomorrow. We will stop the heparin drip 4 hours prior to surgery. She will be moved to the ICU this evening in preparation for surgery. All this was discussed in detail with the patient. She is a bit nervous for surgery though we did talk about the risks and benefits of surgery once again.       Electronically signed by Beto Siddiqui MD on 8/1/2021 at 10:43 AM

## 2021-08-02 ENCOUNTER — ANESTHESIA (OUTPATIENT)
Dept: OPERATING ROOM | Age: 81
DRG: 233 | End: 2021-08-02
Payer: MEDICARE

## 2021-08-02 ENCOUNTER — APPOINTMENT (OUTPATIENT)
Dept: GENERAL RADIOLOGY | Age: 81
DRG: 233 | End: 2021-08-02
Attending: INTERNAL MEDICINE
Payer: MEDICARE

## 2021-08-02 VITALS
SYSTOLIC BLOOD PRESSURE: 102 MMHG | OXYGEN SATURATION: 100 % | DIASTOLIC BLOOD PRESSURE: 59 MMHG | TEMPERATURE: 97.2 F | RESPIRATION RATE: 13 BRPM

## 2021-08-02 PROBLEM — I50.43 ACUTE ON CHRONIC COMBINED SYSTOLIC AND DIASTOLIC HEART FAILURE (HCC): Status: ACTIVE | Noted: 2021-08-02

## 2021-08-02 LAB
ANION GAP SERPL CALCULATED.3IONS-SCNC: 8 MMOL/L (ref 4–16)
APTT: 34.4 SECONDS (ref 25.1–37.1)
APTT: 85.2 SECONDS (ref 25.1–37.1)
BASE EXCESS MIXED: 0.1 (ref 0–2.3)
BASE EXCESS MIXED: 0.7 (ref 0–2.3)
BASE EXCESS MIXED: 1.5 (ref 0–2.3)
BASE EXCESS MIXED: 2.2 (ref 0–2.3)
BASE EXCESS MIXED: 3.4 (ref 0–2.3)
BASE EXCESS MIXED: 4.4 (ref 0–2.3)
BASE EXCESS MIXED: 6 (ref 0–2.3)
BASE EXCESS MIXED: 6.3 (ref 0–2.3)
BASE EXCESS MIXED: 6.6 (ref 0–2.3)
BASE EXCESS MIXED: 7 (ref 0–2.3)
BASE EXCESS MIXED: 7.9 (ref 0–2.3)
BASE EXCESS: ABNORMAL (ref 0–2)
BASE EXCESS: ABNORMAL (ref 0–2.4)
BUN BLDV-MCNC: 8 MG/DL (ref 6–23)
CALCIUM SERPL-MCNC: 7.7 MG/DL (ref 8.3–10.6)
CHLORIDE BLD-SCNC: 117 MMOL/L (ref 99–110)
CO2: 18 MMOL/L (ref 21–32)
CO2: 19 MMOL/L (ref 21–32)
CO2: 19 MMOL/L (ref 21–32)
CO2: 20 MMOL/L (ref 21–32)
CO2: 21 MMOL/L (ref 21–32)
CO2: 21 MMOL/L (ref 21–32)
CO2: 22 MMOL/L (ref 21–32)
CO2: 24 MMOL/L (ref 21–32)
CO2: 25 MMOL/L (ref 21–32)
CREAT SERPL-MCNC: 0.6 MG/DL (ref 0.6–1.1)
GFR AFRICAN AMERICAN: >60 ML/MIN/1.73M2
GFR AFRICAN AMERICAN: >60 ML/MIN/1.73M2
GFR NON-AFRICAN AMERICAN: 56 ML/MIN/1.73M2
GFR NON-AFRICAN AMERICAN: >60 ML/MIN/1.73M2
GLUCOSE BLD-MCNC: 105 MG/DL (ref 70–99)
GLUCOSE BLD-MCNC: 119 MG/DL (ref 70–99)
GLUCOSE BLD-MCNC: 125 MG/DL (ref 70–99)
GLUCOSE BLD-MCNC: 125 MG/DL (ref 70–99)
GLUCOSE BLD-MCNC: 130 MG/DL (ref 70–99)
GLUCOSE BLD-MCNC: 145 MG/DL (ref 70–99)
GLUCOSE BLD-MCNC: 150 MG/DL (ref 70–99)
GLUCOSE BLD-MCNC: 185 MG/DL (ref 70–99)
GLUCOSE BLD-MCNC: 57 MG/DL (ref 70–99)
GLUCOSE BLD-MCNC: 81 MG/DL (ref 70–99)
GLUCOSE BLD-MCNC: 82 MG/DL (ref 70–99)
GLUCOSE BLD-MCNC: 85 MG/DL (ref 70–99)
GLUCOSE BLD-MCNC: 90 MG/DL (ref 70–99)
GLUCOSE BLD-MCNC: 94 MG/DL (ref 70–99)
HCO3 ARTERIAL: 16.9 MMOL/L (ref 18–23)
HCO3 ARTERIAL: 18 MMOL/L (ref 18–23)
HCO3 ARTERIAL: 18.3 MMOL/L (ref 18–23)
HCO3 ARTERIAL: 19.3 MMOL/L (ref 18–23)
HCO3 ARTERIAL: 19.4 MMOL/L (ref 18–23)
HCO3 ARTERIAL: 21.2 MMOL/L (ref 18–23)
HCO3 ARTERIAL: 21.2 MMOL/L (ref 18–23)
HCO3 ARTERIAL: 21.5 MMOL/L (ref 18–23)
HCO3 ARTERIAL: 22.7 MMOL/L (ref 18–23)
HCO3 ARTERIAL: 22.8 MMOL/L (ref 18–23)
HCO3 ARTERIAL: 23.2 MMOL/L (ref 22–26)
HCO3 ARTERIAL: 23.8 MMOL/L (ref 18–23)
HCT VFR BLD CALC: 23 % (ref 37–47)
HCT VFR BLD CALC: 25 % (ref 37–47)
HCT VFR BLD CALC: 26 % (ref 37–47)
HCT VFR BLD CALC: 27 % (ref 37–47)
HCT VFR BLD CALC: 27 % (ref 37–47)
HCT VFR BLD CALC: 28 % (ref 37–47)
HCT VFR BLD CALC: 29 % (ref 37–47)
HCT VFR BLD CALC: 29 % (ref 37–47)
HCT VFR BLD CALC: 31.2 % (ref 37–47)
HCT VFR BLD CALC: 37 % (ref 37–47)
HCT VFR BLD CALC: 46.2 % (ref 37–47)
HEMOGLOBIN: 10 GM/DL (ref 12.5–16)
HEMOGLOBIN: 10.5 GM/DL (ref 12.5–16)
HEMOGLOBIN: 12.5 GM/DL (ref 12.5–16)
HEMOGLOBIN: 15.1 GM/DL (ref 12.5–16)
HEMOGLOBIN: 7.8 GM/DL (ref 12.5–16)
HEMOGLOBIN: 8.6 GM/DL (ref 12.5–16)
HEMOGLOBIN: 8.7 GM/DL (ref 12.5–16)
HEMOGLOBIN: 8.8 GM/DL (ref 12.5–16)
HEMOGLOBIN: 8.8 GM/DL (ref 12.5–16)
HEMOGLOBIN: 9 GM/DL (ref 12.5–16)
HEMOGLOBIN: 9.1 GM/DL (ref 12.5–16)
HEMOGLOBIN: 9.1 GM/DL (ref 12.5–16)
HEMOGLOBIN: 9.4 GM/DL (ref 12.5–16)
HEMOGLOBIN: 9.9 GM/DL (ref 12.5–16)
INR BLD: 1.4 INDEX
MAGNESIUM: 2.9 MG/DL (ref 1.8–2.4)
MCH RBC QN AUTO: 31.2 PG (ref 27–31)
MCH RBC QN AUTO: 32.6 PG (ref 27–31)
MCHC RBC AUTO-ENTMCNC: 32.7 % (ref 32–36)
MCHC RBC AUTO-ENTMCNC: 33.7 % (ref 32–36)
MCV RBC AUTO: 95.5 FL (ref 78–100)
MCV RBC AUTO: 96.9 FL (ref 78–100)
O2 SATURATION: 100 % (ref 94–100)
O2 SATURATION: 100 % (ref 96–97)
O2 SATURATION: 96.1 % (ref 96–97)
O2 SATURATION: 98.6 % (ref 96–97)
O2 SATURATION: 98.7 % (ref 96–97)
O2 SATURATION: 98.7 % (ref 96–97)
O2 SATURATION: 99.1 % (ref 96–97)
O2 SATURATION: 99.9 % (ref 96–97)
PCO2 ARTERIAL: 25.8 MMHG (ref 32–45)
PCO2 ARTERIAL: 31.3 MMHG (ref 32–45)
PCO2 ARTERIAL: 31.8 MMHG (ref 32–45)
PCO2 ARTERIAL: 33.3 MMHG (ref 32–45)
PCO2 ARTERIAL: 35.3 MMHG (ref 32–45)
PCO2 ARTERIAL: 35.7 MMHG (ref 32–45)
PCO2 ARTERIAL: 36.7 MMHG (ref 32–45)
PCO2 ARTERIAL: 37.9 MMHG (ref 32–45)
PCO2 ARTERIAL: 39 MMHG (ref 32–45)
PCO2 ARTERIAL: 39.4 MMHG (ref 32–45)
PCO2 ARTERIAL: 40.3 MMHG (ref 32–45)
PCO2 ARTERIAL: 41.8 MMHG (ref 32–45)
PDW BLD-RTO: 12.6 % (ref 11.7–14.9)
PDW BLD-RTO: 12.6 % (ref 11.7–14.9)
PH BLOOD: 7.3 (ref 7.34–7.45)
PH BLOOD: 7.33 (ref 7.34–7.45)
PH BLOOD: 7.33 (ref 7.34–7.45)
PH BLOOD: 7.34 (ref 7.34–7.45)
PH BLOOD: 7.34 (ref 7.34–7.45)
PH BLOOD: 7.35 (ref 7.34–7.45)
PH BLOOD: 7.37 (ref 7.34–7.45)
PH BLOOD: 7.38 (ref 7.34–7.45)
PH BLOOD: 7.38 (ref 7.34–7.45)
PH BLOOD: 7.41 (ref 7.34–7.45)
PH BLOOD: 7.42 (ref 7.34–7.45)
PH BLOOD: 7.53 (ref 7.34–7.45)
PLATELET # BLD: 211 K/CU MM (ref 140–440)
PLATELET # BLD: 277 K/CU MM (ref 140–440)
PMV BLD AUTO: 8.9 FL (ref 7.5–11.1)
PMV BLD AUTO: 9 FL (ref 7.5–11.1)
PO2 ARTERIAL: 122.3 MMHG (ref 75–100)
PO2 ARTERIAL: 123.8 MMHG (ref 75–100)
PO2 ARTERIAL: 125.2 MMHG (ref 75–100)
PO2 ARTERIAL: 136.5 MMHG (ref 75–100)
PO2 ARTERIAL: 266.5 MMHG (ref 75–100)
PO2 ARTERIAL: 347.2 MMHG (ref 75–100)
PO2 ARTERIAL: 411.5 MMHG (ref 75–100)
PO2 ARTERIAL: 425.4 MMHG (ref 75–100)
PO2 ARTERIAL: 641.5 MMHG (ref 75–100)
PO2 ARTERIAL: 651 MMHG (ref 75–100)
PO2 ARTERIAL: 734.4 MMHG (ref 80–90)
PO2 ARTERIAL: 86.4 MMHG (ref 75–100)
POC ACT PLUS: 125 SEC
POC ACT PLUS: 126 SEC
POC ACT PLUS: 447 SEC
POC ACT PLUS: 453 SEC
POC ACT PLUS: 552 SEC
POC ACT PLUS: 99 SEC
POC ACT PLUS: >1000 SEC
POC CALCIUM: 1 MMOL/L (ref 1.12–1.32)
POC CALCIUM: 1.08 MMOL/L (ref 1.12–1.32)
POC CALCIUM: 1.09 MMOL/L (ref 1.12–1.32)
POC CALCIUM: 1.14 MMOL/L (ref 1.12–1.32)
POC CALCIUM: 1.16 MMOL/L (ref 1.12–1.32)
POC CALCIUM: 1.18 MMOL/L (ref 1.12–1.32)
POC CALCIUM: 1.22 MMOL/L (ref 1.12–1.32)
POC CALCIUM: 1.24 MMOL/L (ref 1.12–1.32)
POC CALCIUM: 1.34 MMOL/L (ref 1.12–1.32)
POC CALCIUM: 1.35 MMOL/L (ref 1.12–1.32)
POC CALCIUM: 1.37 MMOL/L (ref 1.12–1.32)
POC CALCIUM: 1.5 MMOL/L (ref 1.12–1.32)
POC CHLORIDE: 107 MMOL/L (ref 98–109)
POC CHLORIDE: 108 MMOL/L (ref 98–109)
POC CHLORIDE: 111 MMOL/L (ref 98–109)
POC CHLORIDE: 113 MMOL/L (ref 98–109)
POC CHLORIDE: 113 MMOL/L (ref 98–109)
POC CHLORIDE: 115 MMOL/L (ref 98–109)
POC CHLORIDE: 116 MMOL/L (ref 98–109)
POC CHLORIDE: 116 MMOL/L (ref 98–109)
POC CHLORIDE: 118 MMOL/L (ref 98–109)
POC CHLORIDE: 118 MMOL/L (ref 98–109)
POC CREATININE: 0.8 MG/DL (ref 0.6–1.1)
POC CREATININE: 0.9 MG/DL (ref 0.6–1.1)
POC CREATININE: 1 MG/DL (ref 0.6–1.1)
POC CREATININE: 1 MG/DL (ref 0.6–1.1)
POC CREATININE: 1.1 MG/DL (ref 0.6–1.1)
POTASSIUM SERPL-SCNC: 3.7 MMOL/L (ref 3.5–4.5)
POTASSIUM SERPL-SCNC: 3.8 MMOL/L (ref 3.5–4.5)
POTASSIUM SERPL-SCNC: 3.8 MMOL/L (ref 3.5–5.1)
POTASSIUM SERPL-SCNC: 3.9 MMOL/L (ref 3.5–4.5)
POTASSIUM SERPL-SCNC: 4.3 MMOL/L (ref 3.5–4.5)
POTASSIUM SERPL-SCNC: 4.4 MMOL/L (ref 3.5–4.5)
POTASSIUM SERPL-SCNC: 4.5 MMOL/L (ref 3.5–4.5)
POTASSIUM SERPL-SCNC: 4.5 MMOL/L (ref 3.6–5.1)
POTASSIUM SERPL-SCNC: 5.3 MMOL/L (ref 3.5–4.5)
PROTHROMBIN TIME: 18.1 SECONDS (ref 11.7–14.5)
RBC # BLD: 3.22 M/CU MM (ref 4.2–5.4)
RBC # BLD: 4.84 M/CU MM (ref 4.2–5.4)
SODIUM BLD-SCNC: 140 MMOL/L (ref 138–146)
SODIUM BLD-SCNC: 143 MMOL/L (ref 138–146)
SODIUM BLD-SCNC: 143 MMOL/L (ref 138–146)
SODIUM BLD-SCNC: 144 MMOL/L (ref 136–145)
SODIUM BLD-SCNC: 144 MMOL/L (ref 138–146)
SODIUM BLD-SCNC: 144 MMOL/L (ref 138–146)
SODIUM BLD-SCNC: 145 MMOL/L (ref 138–146)
SODIUM BLD-SCNC: 146 MMOL/L (ref 135–145)
SODIUM BLD-SCNC: 146 MMOL/L (ref 138–146)
SODIUM BLD-SCNC: 147 MMOL/L (ref 138–146)
SODIUM BLD-SCNC: 149 MMOL/L (ref 138–146)
SOURCE, BLOOD GAS: ABNORMAL
WBC # BLD: 17.7 K/CU MM (ref 4–10.5)
WBC # BLD: 7.5 K/CU MM (ref 4–10.5)

## 2021-08-02 PROCEDURE — C1894 INTRO/SHEATH, NON-LASER: HCPCS | Performed by: SURGERY

## 2021-08-02 PROCEDURE — 2580000003 HC RX 258

## 2021-08-02 PROCEDURE — 94640 AIRWAY INHALATION TREATMENT: CPT

## 2021-08-02 PROCEDURE — P9016 RBC LEUKOCYTES REDUCED: HCPCS

## 2021-08-02 PROCEDURE — 5A1935Z RESPIRATORY VENTILATION, LESS THAN 24 CONSECUTIVE HOURS: ICD-10-PCS | Performed by: SURGERY

## 2021-08-02 PROCEDURE — B2111ZZ FLUOROSCOPY OF MULTIPLE CORONARY ARTERIES USING LOW OSMOLAR CONTRAST: ICD-10-PCS | Performed by: SURGERY

## 2021-08-02 PROCEDURE — 02110Z9 BYPASS CORONARY ARTERY, TWO ARTERIES FROM LEFT INTERNAL MAMMARY, OPEN APPROACH: ICD-10-PCS | Performed by: SURGERY

## 2021-08-02 PROCEDURE — 6370000000 HC RX 637 (ALT 250 FOR IP)

## 2021-08-02 PROCEDURE — 85730 THROMBOPLASTIN TIME PARTIAL: CPT

## 2021-08-02 PROCEDURE — 2500000003 HC RX 250 WO HCPCS: Performed by: PHYSICIAN ASSISTANT

## 2021-08-02 PROCEDURE — 2700000000 HC OXYGEN THERAPY PER DAY

## 2021-08-02 PROCEDURE — 2720000010 HC SURG SUPPLY STERILE: Performed by: SURGERY

## 2021-08-02 PROCEDURE — 6360000002 HC RX W HCPCS: Performed by: SURGERY

## 2021-08-02 PROCEDURE — 6370000000 HC RX 637 (ALT 250 FOR IP): Performed by: SURGERY

## 2021-08-02 PROCEDURE — 82962 GLUCOSE BLOOD TEST: CPT

## 2021-08-02 PROCEDURE — 2500000003 HC RX 250 WO HCPCS

## 2021-08-02 PROCEDURE — 3600000018 HC SURGERY OHS ADDTL 15MIN: Performed by: SURGERY

## 2021-08-02 PROCEDURE — 2500000003 HC RX 250 WO HCPCS: Performed by: SURGERY

## 2021-08-02 PROCEDURE — 4A03X5D MEASUREMENT OF ARTERIAL FLOW, INTRACRANIAL, EXTERNAL APPROACH: ICD-10-PCS | Performed by: SURGERY

## 2021-08-02 PROCEDURE — 7100000000 HC PACU RECOVERY - FIRST 15 MIN

## 2021-08-02 PROCEDURE — 6370000000 HC RX 637 (ALT 250 FOR IP): Performed by: PHYSICIAN ASSISTANT

## 2021-08-02 PROCEDURE — 2580000003 HC RX 258: Performed by: NURSE ANESTHETIST, CERTIFIED REGISTERED

## 2021-08-02 PROCEDURE — 2580000003 HC RX 258: Performed by: SURGERY

## 2021-08-02 PROCEDURE — C1729 CATH, DRAINAGE: HCPCS | Performed by: SURGERY

## 2021-08-02 PROCEDURE — 3700000001 HC ADD 15 MINUTES (ANESTHESIA): Performed by: SURGERY

## 2021-08-02 PROCEDURE — 6360000002 HC RX W HCPCS: Performed by: PHYSICIAN ASSISTANT

## 2021-08-02 PROCEDURE — 85027 COMPLETE CBC AUTOMATED: CPT

## 2021-08-02 PROCEDURE — 6360000002 HC RX W HCPCS: Performed by: NURSE ANESTHETIST, CERTIFIED REGISTERED

## 2021-08-02 PROCEDURE — 021209W BYPASS CORONARY ARTERY, THREE ARTERIES FROM AORTA WITH AUTOLOGOUS VENOUS TISSUE, OPEN APPROACH: ICD-10-PCS | Performed by: SURGERY

## 2021-08-02 PROCEDURE — 84132 ASSAY OF SERUM POTASSIUM: CPT

## 2021-08-02 PROCEDURE — 83735 ASSAY OF MAGNESIUM: CPT

## 2021-08-02 PROCEDURE — 71045 X-RAY EXAM CHEST 1 VIEW: CPT

## 2021-08-02 PROCEDURE — 3700000000 HC ANESTHESIA ATTENDED CARE: Performed by: SURGERY

## 2021-08-02 PROCEDURE — 94761 N-INVAS EAR/PLS OXIMETRY MLT: CPT

## 2021-08-02 PROCEDURE — 0BH17EZ INSERTION OF ENDOTRACHEAL AIRWAY INTO TRACHEA, VIA NATURAL OR ARTIFICIAL OPENING: ICD-10-PCS | Performed by: SURGERY

## 2021-08-02 PROCEDURE — 80048 BASIC METABOLIC PNL TOTAL CA: CPT

## 2021-08-02 PROCEDURE — 2580000003 HC RX 258: Performed by: PHYSICIAN ASSISTANT

## 2021-08-02 PROCEDURE — C1751 CATH, INF, PER/CENT/MIDLINE: HCPCS | Performed by: SURGERY

## 2021-08-02 PROCEDURE — 94002 VENT MGMT INPAT INIT DAY: CPT

## 2021-08-02 PROCEDURE — 06BP4ZZ EXCISION OF RIGHT SAPHENOUS VEIN, PERCUTANEOUS ENDOSCOPIC APPROACH: ICD-10-PCS | Performed by: SURGERY

## 2021-08-02 PROCEDURE — 85610 PROTHROMBIN TIME: CPT

## 2021-08-02 PROCEDURE — 36415 COLL VENOUS BLD VENIPUNCTURE: CPT

## 2021-08-02 PROCEDURE — 2709999900 HC NON-CHARGEABLE SUPPLY: Performed by: SURGERY

## 2021-08-02 PROCEDURE — 2500000003 HC RX 250 WO HCPCS: Performed by: NURSE ANESTHETIST, CERTIFIED REGISTERED

## 2021-08-02 PROCEDURE — 2580000003 HC RX 258: Performed by: INTERNAL MEDICINE

## 2021-08-02 PROCEDURE — 6360000002 HC RX W HCPCS

## 2021-08-02 PROCEDURE — 2000000000 HC ICU R&B

## 2021-08-02 PROCEDURE — 85347 COAGULATION TIME ACTIVATED: CPT

## 2021-08-02 PROCEDURE — 3600000008 HC SURGERY OHS BASE: Performed by: SURGERY

## 2021-08-02 PROCEDURE — P9045 ALBUMIN (HUMAN), 5%, 250 ML: HCPCS | Performed by: NURSE ANESTHETIST, CERTIFIED REGISTERED

## 2021-08-02 PROCEDURE — P9045 ALBUMIN (HUMAN), 5%, 250 ML: HCPCS | Performed by: PHYSICIAN ASSISTANT

## 2021-08-02 DEVICE — 6 FOOT DISPOSABLE EXTENSION CABLE WITH SAFE CONNECT / SCREW-DOWN: Type: IMPLANTABLE DEVICE | Site: CHEST | Status: FUNCTIONAL

## 2021-08-02 DEVICE — Z INACTIVE USE 2540311 LEAD PACE L475MM CHN A OR V MYOCARDIAL STEROID ELUT SIL: Type: IMPLANTABLE DEVICE | Site: CHEST | Status: FUNCTIONAL

## 2021-08-02 RX ORDER — CARVEDILOL 3.12 MG/1
3.12 TABLET ORAL 2 TIMES DAILY
Status: DISCONTINUED | OUTPATIENT
Start: 2021-08-02 | End: 2021-08-09 | Stop reason: HOSPADM

## 2021-08-02 RX ORDER — MAGNESIUM SULFATE IN WATER 40 MG/ML
2000 INJECTION, SOLUTION INTRAVENOUS PRN
Status: DISCONTINUED | OUTPATIENT
Start: 2021-08-02 | End: 2021-08-09 | Stop reason: HOSPADM

## 2021-08-02 RX ORDER — SODIUM CHLORIDE 9 MG/ML
25 INJECTION, SOLUTION INTRAVENOUS PRN
Status: DISCONTINUED | OUTPATIENT
Start: 2021-08-02 | End: 2021-08-09 | Stop reason: HOSPADM

## 2021-08-02 RX ORDER — HYDROCODONE BITARTRATE AND ACETAMINOPHEN 5; 325 MG/1; MG/1
1 TABLET ORAL EVERY 4 HOURS PRN
Status: DISCONTINUED | OUTPATIENT
Start: 2021-08-02 | End: 2021-08-09

## 2021-08-02 RX ORDER — IPRATROPIUM BROMIDE AND ALBUTEROL SULFATE 2.5; .5 MG/3ML; MG/3ML
1 SOLUTION RESPIRATORY (INHALATION)
Status: DISCONTINUED | OUTPATIENT
Start: 2021-08-02 | End: 2021-08-04

## 2021-08-02 RX ORDER — FENTANYL CITRATE 0.05 MG/ML
INJECTION, SOLUTION INTRAMUSCULAR; INTRAVENOUS PRN
Status: DISCONTINUED | OUTPATIENT
Start: 2021-08-02 | End: 2021-08-02 | Stop reason: SDUPTHER

## 2021-08-02 RX ORDER — SENNA AND DOCUSATE SODIUM 50; 8.6 MG/1; MG/1
1 TABLET, FILM COATED ORAL DAILY
Status: DISCONTINUED | OUTPATIENT
Start: 2021-08-02 | End: 2021-08-09 | Stop reason: HOSPADM

## 2021-08-02 RX ORDER — ACETAMINOPHEN 325 MG/1
650 TABLET ORAL EVERY 4 HOURS PRN
Status: DISCONTINUED | OUTPATIENT
Start: 2021-08-02 | End: 2021-08-09 | Stop reason: HOSPADM

## 2021-08-02 RX ORDER — VANCOMYCIN HYDROCHLORIDE 1 G/20ML
INJECTION, POWDER, LYOPHILIZED, FOR SOLUTION INTRAVENOUS PRN
Status: DISCONTINUED | OUTPATIENT
Start: 2021-08-02 | End: 2021-08-02 | Stop reason: SDUPTHER

## 2021-08-02 RX ORDER — ATORVASTATIN CALCIUM 20 MG/1
20 TABLET, FILM COATED ORAL NIGHTLY
Status: DISCONTINUED | OUTPATIENT
Start: 2021-08-03 | End: 2021-08-09 | Stop reason: HOSPADM

## 2021-08-02 RX ORDER — DEXAMETHASONE SODIUM PHOSPHATE 4 MG/ML
INJECTION, SOLUTION INTRA-ARTICULAR; INTRALESIONAL; INTRAMUSCULAR; INTRAVENOUS; SOFT TISSUE PRN
Status: DISCONTINUED | OUTPATIENT
Start: 2021-08-02 | End: 2021-08-02 | Stop reason: SDUPTHER

## 2021-08-02 RX ORDER — EPINEPHRINE 1 MG/ML
INJECTION, SOLUTION, CONCENTRATE INTRAVENOUS PRN
Status: DISCONTINUED | OUTPATIENT
Start: 2021-08-02 | End: 2021-08-02 | Stop reason: SDUPTHER

## 2021-08-02 RX ORDER — ONDANSETRON 2 MG/ML
INJECTION INTRAMUSCULAR; INTRAVENOUS PRN
Status: DISCONTINUED | OUTPATIENT
Start: 2021-08-02 | End: 2021-08-02 | Stop reason: SDUPTHER

## 2021-08-02 RX ORDER — FUROSEMIDE 10 MG/ML
20 INJECTION INTRAMUSCULAR; INTRAVENOUS
Status: ACTIVE | OUTPATIENT
Start: 2021-08-02 | End: 2021-08-02

## 2021-08-02 RX ORDER — HYDRALAZINE HYDROCHLORIDE 20 MG/ML
5 INJECTION INTRAMUSCULAR; INTRAVENOUS EVERY 5 MIN PRN
Status: DISCONTINUED | OUTPATIENT
Start: 2021-08-02 | End: 2021-08-09 | Stop reason: HOSPADM

## 2021-08-02 RX ORDER — NOREPINEPHRINE BIT/0.9 % NACL 16MG/250ML
2 INFUSION BOTTLE (ML) INTRAVENOUS CONTINUOUS PRN
Status: DISCONTINUED | OUTPATIENT
Start: 2021-08-02 | End: 2021-08-09 | Stop reason: HOSPADM

## 2021-08-02 RX ORDER — ONDANSETRON 2 MG/ML
4 INJECTION INTRAMUSCULAR; INTRAVENOUS EVERY 8 HOURS PRN
Status: DISCONTINUED | OUTPATIENT
Start: 2021-08-02 | End: 2021-08-09 | Stop reason: HOSPADM

## 2021-08-02 RX ORDER — VANCOMYCIN 1 G/200ML
1000 INJECTION, SOLUTION INTRAVENOUS EVERY 12 HOURS
Status: COMPLETED | OUTPATIENT
Start: 2021-08-03 | End: 2021-08-04

## 2021-08-02 RX ORDER — ALBUMIN, HUMAN INJ 5% 5 %
25 SOLUTION INTRAVENOUS PRN
Status: DISCONTINUED | OUTPATIENT
Start: 2021-08-02 | End: 2021-08-09 | Stop reason: HOSPADM

## 2021-08-02 RX ORDER — FENTANYL CITRATE 50 UG/ML
25 INJECTION, SOLUTION INTRAMUSCULAR; INTRAVENOUS
Status: ACTIVE | OUTPATIENT
Start: 2021-08-02 | End: 2021-08-03

## 2021-08-02 RX ORDER — SODIUM CHLORIDE 0.9 % (FLUSH) 0.9 %
10 SYRINGE (ML) INJECTION PRN
Status: DISCONTINUED | OUTPATIENT
Start: 2021-08-02 | End: 2021-08-09 | Stop reason: HOSPADM

## 2021-08-02 RX ORDER — METOPROLOL TARTRATE 5 MG/5ML
2.5 INJECTION INTRAVENOUS EVERY 10 MIN PRN
Status: DISCONTINUED | OUTPATIENT
Start: 2021-08-02 | End: 2021-08-09 | Stop reason: HOSPADM

## 2021-08-02 RX ORDER — GLYCOPYRROLATE 1 MG/5 ML
SYRINGE (ML) INTRAVENOUS PRN
Status: DISCONTINUED | OUTPATIENT
Start: 2021-08-02 | End: 2021-08-02 | Stop reason: SDUPTHER

## 2021-08-02 RX ORDER — SODIUM CHLORIDE 0.9 % (FLUSH) 0.9 %
10 SYRINGE (ML) INJECTION EVERY 12 HOURS SCHEDULED
Status: DISCONTINUED | OUTPATIENT
Start: 2021-08-02 | End: 2021-08-09 | Stop reason: HOSPADM

## 2021-08-02 RX ORDER — KETOROLAC TROMETHAMINE 30 MG/ML
15 INJECTION, SOLUTION INTRAMUSCULAR; INTRAVENOUS EVERY 6 HOURS PRN
Status: DISPENSED | OUTPATIENT
Start: 2021-08-02 | End: 2021-08-07

## 2021-08-02 RX ORDER — VECURONIUM BROMIDE 1 MG/ML
INJECTION, POWDER, LYOPHILIZED, FOR SOLUTION INTRAVENOUS PRN
Status: DISCONTINUED | OUTPATIENT
Start: 2021-08-02 | End: 2021-08-02 | Stop reason: SDUPTHER

## 2021-08-02 RX ORDER — NITROGLYCERIN 20 MG/100ML
10 INJECTION INTRAVENOUS CONTINUOUS PRN
Status: DISCONTINUED | OUTPATIENT
Start: 2021-08-02 | End: 2021-08-09 | Stop reason: HOSPADM

## 2021-08-02 RX ORDER — BISACODYL 10 MG
10 SUPPOSITORY, RECTAL RECTAL DAILY PRN
Status: DISCONTINUED | OUTPATIENT
Start: 2021-08-02 | End: 2021-08-09 | Stop reason: HOSPADM

## 2021-08-02 RX ORDER — MULTIVITAMIN WITH IRON
1 TABLET ORAL
Status: DISCONTINUED | OUTPATIENT
Start: 2021-08-03 | End: 2021-08-09 | Stop reason: HOSPADM

## 2021-08-02 RX ORDER — ALBUMIN, HUMAN INJ 5% 5 %
SOLUTION INTRAVENOUS PRN
Status: DISCONTINUED | OUTPATIENT
Start: 2021-08-02 | End: 2021-08-02 | Stop reason: SDUPTHER

## 2021-08-02 RX ORDER — PROPOFOL 10 MG/ML
INJECTION, EMULSION INTRAVENOUS PRN
Status: DISCONTINUED | OUTPATIENT
Start: 2021-08-02 | End: 2021-08-02 | Stop reason: SDUPTHER

## 2021-08-02 RX ORDER — CALCIUM GLUCONATE 20 MG/ML
2000 INJECTION, SOLUTION INTRAVENOUS PRN
Status: DISCONTINUED | OUTPATIENT
Start: 2021-08-02 | End: 2021-08-09 | Stop reason: HOSPADM

## 2021-08-02 RX ORDER — PANTOPRAZOLE SODIUM 40 MG/1
40 TABLET, DELAYED RELEASE ORAL DAILY
Status: DISCONTINUED | OUTPATIENT
Start: 2021-08-02 | End: 2021-08-09 | Stop reason: HOSPADM

## 2021-08-02 RX ORDER — PROTAMINE SULFATE 10 MG/ML
INJECTION, SOLUTION INTRAVENOUS PRN
Status: DISCONTINUED | OUTPATIENT
Start: 2021-08-02 | End: 2021-08-02 | Stop reason: SDUPTHER

## 2021-08-02 RX ORDER — DEXTROSE MONOHYDRATE 25 G/50ML
12.5 INJECTION, SOLUTION INTRAVENOUS PRN
Status: DISCONTINUED | OUTPATIENT
Start: 2021-08-02 | End: 2021-08-09 | Stop reason: HOSPADM

## 2021-08-02 RX ORDER — SODIUM CHLORIDE 450 MG/100ML
INJECTION, SOLUTION INTRAVENOUS CONTINUOUS
Status: DISCONTINUED | OUTPATIENT
Start: 2021-08-02 | End: 2021-08-06

## 2021-08-02 RX ORDER — AMIODARONE HYDROCHLORIDE 200 MG/1
200 TABLET ORAL DAILY
Status: DISCONTINUED | OUTPATIENT
Start: 2021-08-08 | End: 2021-08-09 | Stop reason: HOSPADM

## 2021-08-02 RX ORDER — DEXTROSE MONOHYDRATE 50 MG/ML
100 INJECTION, SOLUTION INTRAVENOUS PRN
Status: DISCONTINUED | OUTPATIENT
Start: 2021-08-02 | End: 2021-08-09 | Stop reason: HOSPADM

## 2021-08-02 RX ORDER — MIDAZOLAM HYDROCHLORIDE 5 MG/ML
INJECTION INTRAMUSCULAR; INTRAVENOUS PRN
Status: DISCONTINUED | OUTPATIENT
Start: 2021-08-02 | End: 2021-08-02 | Stop reason: SDUPTHER

## 2021-08-02 RX ORDER — POTASSIUM CHLORIDE 29.8 MG/ML
20 INJECTION INTRAVENOUS PRN
Status: DISCONTINUED | OUTPATIENT
Start: 2021-08-02 | End: 2021-08-09 | Stop reason: HOSPADM

## 2021-08-02 RX ORDER — SUCCINYLCHOLINE/SOD CL,ISO/PF 100 MG/5ML
SYRINGE (ML) INTRAVENOUS PRN
Status: DISCONTINUED | OUTPATIENT
Start: 2021-08-02 | End: 2021-08-02 | Stop reason: SDUPTHER

## 2021-08-02 RX ORDER — SODIUM PHOSPHATE, DIBASIC AND SODIUM PHOSPHATE, MONOBASIC 7; 19 G/133ML; G/133ML
1 ENEMA RECTAL DAILY PRN
Status: DISCONTINUED | OUTPATIENT
Start: 2021-08-02 | End: 2021-08-09 | Stop reason: HOSPADM

## 2021-08-02 RX ORDER — HYDROCODONE BITARTRATE AND ACETAMINOPHEN 5; 325 MG/1; MG/1
2 TABLET ORAL EVERY 4 HOURS PRN
Status: DISCONTINUED | OUTPATIENT
Start: 2021-08-02 | End: 2021-08-09

## 2021-08-02 RX ORDER — NICOTINE POLACRILEX 4 MG
15 LOZENGE BUCCAL PRN
Status: DISCONTINUED | OUTPATIENT
Start: 2021-08-02 | End: 2021-08-09 | Stop reason: HOSPADM

## 2021-08-02 RX ORDER — HEPARIN SODIUM 1000 [USP'U]/ML
INJECTION, SOLUTION INTRAVENOUS; SUBCUTANEOUS PRN
Status: DISCONTINUED | OUTPATIENT
Start: 2021-08-02 | End: 2021-08-02 | Stop reason: SDUPTHER

## 2021-08-02 RX ORDER — AMIODARONE HYDROCHLORIDE 200 MG/1
200 TABLET ORAL 3 TIMES DAILY
Status: COMPLETED | OUTPATIENT
Start: 2021-08-02 | End: 2021-08-07

## 2021-08-02 RX ORDER — ROCURONIUM BROMIDE 10 MG/ML
INJECTION, SOLUTION INTRAVENOUS PRN
Status: DISCONTINUED | OUTPATIENT
Start: 2021-08-02 | End: 2021-08-02 | Stop reason: SDUPTHER

## 2021-08-02 RX ORDER — ASPIRIN 81 MG/1
81 TABLET ORAL DAILY
Status: DISCONTINUED | OUTPATIENT
Start: 2021-08-03 | End: 2021-08-09 | Stop reason: HOSPADM

## 2021-08-02 RX ADMIN — MIDAZOLAM 1 MG: 5 INJECTION INTRAMUSCULAR; INTRAVENOUS at 15:48

## 2021-08-02 RX ADMIN — FENTANYL CITRATE 200 MCG: 50 INJECTION, SOLUTION INTRAMUSCULAR; INTRAVENOUS at 12:01

## 2021-08-02 RX ADMIN — Medication 50 MEQ: at 16:42

## 2021-08-02 RX ADMIN — EPINEPHRINE 10 MCG: 1 INJECTION, SOLUTION, CONCENTRATE INTRAVENOUS at 13:38

## 2021-08-02 RX ADMIN — CALCIUM GLUCONATE 2000 MG: 20 INJECTION, SOLUTION INTRAVENOUS at 20:23

## 2021-08-02 RX ADMIN — SODIUM BICARBONATE 50 MEQ: 84 INJECTION, SOLUTION INTRAVENOUS at 21:30

## 2021-08-02 RX ADMIN — FENTANYL CITRATE 100 MCG: 50 INJECTION, SOLUTION INTRAMUSCULAR; INTRAVENOUS at 12:43

## 2021-08-02 RX ADMIN — Medication 2 MCG/MIN: at 17:48

## 2021-08-02 RX ADMIN — IPRATROPIUM BROMIDE AND ALBUTEROL SULFATE 1 AMPULE: .5; 3 SOLUTION RESPIRATORY (INHALATION) at 19:06

## 2021-08-02 RX ADMIN — LEVOTHYROXINE SODIUM 50 MCG: 0.05 TABLET ORAL at 07:29

## 2021-08-02 RX ADMIN — AMIODARONE HYDROCHLORIDE 200 MG: 200 TABLET ORAL at 22:53

## 2021-08-02 RX ADMIN — MIDAZOLAM 2 MG: 5 INJECTION INTRAMUSCULAR; INTRAVENOUS at 17:07

## 2021-08-02 RX ADMIN — FENTANYL CITRATE 100 MCG: 50 INJECTION, SOLUTION INTRAMUSCULAR; INTRAVENOUS at 12:42

## 2021-08-02 RX ADMIN — KETOROLAC TROMETHAMINE 15 MG: 30 INJECTION, SOLUTION INTRAMUSCULAR; INTRAVENOUS at 20:23

## 2021-08-02 RX ADMIN — 0.12% CHLORHEXIDINE GLUCONATE 30 ML: 1.2 RINSE ORAL at 07:29

## 2021-08-02 RX ADMIN — PROTAMINE SULFATE 300 MG: 10 INJECTION, SOLUTION INTRAVENOUS at 15:49

## 2021-08-02 RX ADMIN — AMINOCAPROIC ACID 5 G/HR: 250 INJECTION, SOLUTION INTRAVENOUS at 12:30

## 2021-08-02 RX ADMIN — VANCOMYCIN 1000 MG: 1 INJECTION, SOLUTION INTRAVENOUS at 12:33

## 2021-08-02 RX ADMIN — Medication: at 07:41

## 2021-08-02 RX ADMIN — DEXAMETHASONE SODIUM PHOSPHATE 8 MG: 4 INJECTION, SOLUTION INTRAMUSCULAR; INTRAVENOUS at 16:01

## 2021-08-02 RX ADMIN — FENTANYL CITRATE 50 MCG: 50 INJECTION, SOLUTION INTRAMUSCULAR; INTRAVENOUS at 16:30

## 2021-08-02 RX ADMIN — Medication 0.3 MG: at 12:44

## 2021-08-02 RX ADMIN — CARVEDILOL 3.12 MG: 3.12 TABLET, FILM COATED ORAL at 07:29

## 2021-08-02 RX ADMIN — Medication: at 07:29

## 2021-08-02 RX ADMIN — HYDROCODONE BITARTRATE AND ACETAMINOPHEN 1 TABLET: 5; 325 TABLET ORAL at 22:53

## 2021-08-02 RX ADMIN — MIDAZOLAM 2 MG: 5 INJECTION INTRAMUSCULAR; INTRAVENOUS at 12:01

## 2021-08-02 RX ADMIN — ROCURONIUM BROMIDE 50 MG: 10 INJECTION INTRAVENOUS at 12:09

## 2021-08-02 RX ADMIN — INSULIN HUMAN 2 UNITS/HR: 1 INJECTION, SOLUTION INTRAVENOUS at 19:17

## 2021-08-02 RX ADMIN — VECURONIUM BROMIDE FOR INJECTION 5 MG: 1 INJECTION, POWDER, LYOPHILIZED, FOR SOLUTION INTRAVENOUS at 12:41

## 2021-08-02 RX ADMIN — MIDAZOLAM 1 MG: 5 INJECTION INTRAMUSCULAR; INTRAVENOUS at 16:15

## 2021-08-02 RX ADMIN — ALBUMIN (HUMAN) 250 ML: 12.5 INJECTION, SOLUTION INTRAVENOUS at 16:07

## 2021-08-02 RX ADMIN — HEPARIN SODIUM 25000 UNITS: 1000 INJECTION, SOLUTION INTRAVENOUS; SUBCUTANEOUS at 13:38

## 2021-08-02 RX ADMIN — FENTANYL CITRATE 50 MCG: 50 INJECTION, SOLUTION INTRAMUSCULAR; INTRAVENOUS at 15:51

## 2021-08-02 RX ADMIN — VECURONIUM BROMIDE FOR INJECTION 2 MG: 1 INJECTION, POWDER, LYOPHILIZED, FOR SOLUTION INTRAVENOUS at 13:56

## 2021-08-02 RX ADMIN — ALBUMIN (HUMAN) 25 G: 12.5 INJECTION, SOLUTION INTRAVENOUS at 17:30

## 2021-08-02 RX ADMIN — PROPOFOL 50 MG: 10 INJECTION, EMULSION INTRAVENOUS at 12:01

## 2021-08-02 RX ADMIN — SODIUM CHLORIDE, PRESERVATIVE FREE 10 ML: 5 INJECTION INTRAVENOUS at 21:09

## 2021-08-02 RX ADMIN — MIDAZOLAM 1 MG: 5 INJECTION INTRAMUSCULAR; INTRAVENOUS at 11:45

## 2021-08-02 RX ADMIN — SODIUM CHLORIDE: 9 INJECTION, SOLUTION INTRAVENOUS at 11:45

## 2021-08-02 RX ADMIN — 0.12% CHLORHEXIDINE GLUCONATE 30 ML: 1.2 RINSE ORAL at 07:35

## 2021-08-02 RX ADMIN — ONDANSETRON 4 MG: 2 INJECTION INTRAMUSCULAR; INTRAVENOUS at 16:01

## 2021-08-02 RX ADMIN — Medication 100 MG: at 12:01

## 2021-08-02 RX ADMIN — MIDAZOLAM 2 MG: 5 INJECTION INTRAMUSCULAR; INTRAVENOUS at 12:40

## 2021-08-02 RX ADMIN — SODIUM CHLORIDE, PRESERVATIVE FREE 10 ML: 5 INJECTION INTRAVENOUS at 21:03

## 2021-08-02 RX ADMIN — EPINEPHRINE 3 MCG/MIN: 1 INJECTION, SOLUTION, CONCENTRATE INTRAVENOUS at 15:38

## 2021-08-02 RX ADMIN — Medication: at 21:03

## 2021-08-02 RX ADMIN — SODIUM CHLORIDE: 4.5 INJECTION, SOLUTION INTRAVENOUS at 17:16

## 2021-08-02 RX ADMIN — MIDAZOLAM 1 MG: 5 INJECTION INTRAMUSCULAR; INTRAVENOUS at 14:00

## 2021-08-02 RX ADMIN — EPINEPHRINE 5 MCG/MIN: 1 INJECTION, SOLUTION, CONCENTRATE INTRAVENOUS at 17:17

## 2021-08-02 RX ADMIN — Medication 2 MCG/MIN: at 17:55

## 2021-08-02 ASSESSMENT — PULMONARY FUNCTION TESTS
PIF_VALUE: 13
PIF_VALUE: 1
PIF_VALUE: 10
PIF_VALUE: 12
PIF_VALUE: 13
PIF_VALUE: 17
PIF_VALUE: 17
PIF_VALUE: 11
PIF_VALUE: 17
PIF_VALUE: 16
PIF_VALUE: 17
PIF_VALUE: 17
PIF_VALUE: 1
PIF_VALUE: 10
PIF_VALUE: 1
PIF_VALUE: 1
PIF_VALUE: 10
PIF_VALUE: 1
PIF_VALUE: 12
PIF_VALUE: 1
PIF_VALUE: 1
PIF_VALUE: 14
PIF_VALUE: 12
PIF_VALUE: 15
PIF_VALUE: 8
PIF_VALUE: 18
PIF_VALUE: 15
PIF_VALUE: 19
PIF_VALUE: 1
PIF_VALUE: 13
PIF_VALUE: 1
PIF_VALUE: 1
PIF_VALUE: 10
PIF_VALUE: 1
PIF_VALUE: 16
PIF_VALUE: 1
PIF_VALUE: 15
PIF_VALUE: 13
PIF_VALUE: 1
PIF_VALUE: 17
PIF_VALUE: 18
PIF_VALUE: 1
PIF_VALUE: 1
PIF_VALUE: 12
PIF_VALUE: 9
PIF_VALUE: 12
PIF_VALUE: 1
PIF_VALUE: 13
PIF_VALUE: 12
PIF_VALUE: 17
PIF_VALUE: 1
PIF_VALUE: 15
PIF_VALUE: 13
PIF_VALUE: 12
PIF_VALUE: 9
PIF_VALUE: 15
PIF_VALUE: 17
PIF_VALUE: 1
PIF_VALUE: 12
PIF_VALUE: 1
PIF_VALUE: 1
PIF_VALUE: 17
PIF_VALUE: 1
PIF_VALUE: 1
PIF_VALUE: 12
PIF_VALUE: 15
PIF_VALUE: 15
PIF_VALUE: 11
PIF_VALUE: 11
PIF_VALUE: 1
PIF_VALUE: 11
PIF_VALUE: 0
PIF_VALUE: 12
PIF_VALUE: 1
PIF_VALUE: 12
PIF_VALUE: 12
PIF_VALUE: 1
PIF_VALUE: 20
PIF_VALUE: 12
PIF_VALUE: 1
PIF_VALUE: 12
PIF_VALUE: 12
PIF_VALUE: 1
PIF_VALUE: 1
PIF_VALUE: 18
PIF_VALUE: 7
PIF_VALUE: 1
PIF_VALUE: 19
PIF_VALUE: 20
PIF_VALUE: 12
PIF_VALUE: 1
PIF_VALUE: 9
PIF_VALUE: 1
PIF_VALUE: 15
PIF_VALUE: 7
PIF_VALUE: 12
PIF_VALUE: 1
PIF_VALUE: 17
PIF_VALUE: 12
PIF_VALUE: 17
PIF_VALUE: 12
PIF_VALUE: 11
PIF_VALUE: 12
PIF_VALUE: 17
PIF_VALUE: 10
PIF_VALUE: 1
PIF_VALUE: 20
PIF_VALUE: 12
PIF_VALUE: 16
PIF_VALUE: 13
PIF_VALUE: 15
PIF_VALUE: 1
PIF_VALUE: 1
PIF_VALUE: 13
PIF_VALUE: 1
PIF_VALUE: 13
PIF_VALUE: 17
PIF_VALUE: 11
PIF_VALUE: 10
PIF_VALUE: 9
PIF_VALUE: 13
PIF_VALUE: 1
PIF_VALUE: 1
PIF_VALUE: 10
PIF_VALUE: 12
PIF_VALUE: 1
PIF_VALUE: 12
PIF_VALUE: 12
PIF_VALUE: 11
PIF_VALUE: 21
PIF_VALUE: 10
PIF_VALUE: 22
PIF_VALUE: 8
PIF_VALUE: 2
PIF_VALUE: 9
PIF_VALUE: 2
PIF_VALUE: 13
PIF_VALUE: 1
PIF_VALUE: 1
PIF_VALUE: 17
PIF_VALUE: 1
PIF_VALUE: 12
PIF_VALUE: 0
PIF_VALUE: 1
PIF_VALUE: 15
PIF_VALUE: 12
PIF_VALUE: 12
PIF_VALUE: 1
PIF_VALUE: 10
PIF_VALUE: 12
PIF_VALUE: 10
PIF_VALUE: 22
PIF_VALUE: 12
PIF_VALUE: 17
PIF_VALUE: 1
PIF_VALUE: 9
PIF_VALUE: 1
PIF_VALUE: 12
PIF_VALUE: 13
PIF_VALUE: 12
PIF_VALUE: 12
PIF_VALUE: 15
PIF_VALUE: 15
PIF_VALUE: 1
PIF_VALUE: 14
PIF_VALUE: 2
PIF_VALUE: 12
PIF_VALUE: 15
PIF_VALUE: 13
PIF_VALUE: 15
PIF_VALUE: 12
PIF_VALUE: 18
PIF_VALUE: 13
PIF_VALUE: 15
PIF_VALUE: 24
PIF_VALUE: 1
PIF_VALUE: 13
PIF_VALUE: 19
PIF_VALUE: 15
PIF_VALUE: 1
PIF_VALUE: 13
PIF_VALUE: 1
PIF_VALUE: 14
PIF_VALUE: 1
PIF_VALUE: 1
PIF_VALUE: 12
PIF_VALUE: 17
PIF_VALUE: 17
PIF_VALUE: 1
PIF_VALUE: 3
PIF_VALUE: 17
PIF_VALUE: 15
PIF_VALUE: 15
PIF_VALUE: 10
PIF_VALUE: 1
PIF_VALUE: 1
PIF_VALUE: 21
PIF_VALUE: 1
PIF_VALUE: 9
PIF_VALUE: 18
PIF_VALUE: 12
PIF_VALUE: 1
PIF_VALUE: 12
PIF_VALUE: 16
PIF_VALUE: 16
PIF_VALUE: 1
PIF_VALUE: 1
PIF_VALUE: 15
PIF_VALUE: 8
PIF_VALUE: 11
PIF_VALUE: 1
PIF_VALUE: 17
PIF_VALUE: 9
PIF_VALUE: 1
PIF_VALUE: 1
PIF_VALUE: 9
PIF_VALUE: 15
PIF_VALUE: 9
PIF_VALUE: 12
PIF_VALUE: 1
PIF_VALUE: 10
PIF_VALUE: 13
PIF_VALUE: 1
PIF_VALUE: 12
PIF_VALUE: 12
PIF_VALUE: 1
PIF_VALUE: 12
PIF_VALUE: 1
PIF_VALUE: 10
PIF_VALUE: 9
PIF_VALUE: 12
PIF_VALUE: 24
PIF_VALUE: 16
PIF_VALUE: 1
PIF_VALUE: 20
PIF_VALUE: 12
PIF_VALUE: 10
PIF_VALUE: 1
PIF_VALUE: 9
PIF_VALUE: 14
PIF_VALUE: 1
PIF_VALUE: 15
PIF_VALUE: 1
PIF_VALUE: 16
PIF_VALUE: 12
PIF_VALUE: 1
PIF_VALUE: 10
PIF_VALUE: 12
PIF_VALUE: 1
PIF_VALUE: 2
PIF_VALUE: 9
PIF_VALUE: 18
PIF_VALUE: 10
PIF_VALUE: 12
PIF_VALUE: 1
PIF_VALUE: 12
PIF_VALUE: 10
PIF_VALUE: 12
PIF_VALUE: 1
PIF_VALUE: 11
PIF_VALUE: 1
PIF_VALUE: 9
PIF_VALUE: 13
PIF_VALUE: 1
PIF_VALUE: 20
PIF_VALUE: 5
PIF_VALUE: 1
PIF_VALUE: 1
PIF_VALUE: 13
PIF_VALUE: 12
PIF_VALUE: 1
PIF_VALUE: 10
PIF_VALUE: 12
PIF_VALUE: 9
PIF_VALUE: 11
PIF_VALUE: 17
PIF_VALUE: 1
PIF_VALUE: 16
PIF_VALUE: 1
PIF_VALUE: 1
PIF_VALUE: 13
PIF_VALUE: 1
PIF_VALUE: 17
PIF_VALUE: 1
PIF_VALUE: 13
PIF_VALUE: 12
PIF_VALUE: 13
PIF_VALUE: 1
PIF_VALUE: 1
PIF_VALUE: 9
PIF_VALUE: 10
PIF_VALUE: 1
PIF_VALUE: 17
PIF_VALUE: 10
PIF_VALUE: 17
PIF_VALUE: 1
PIF_VALUE: 12
PIF_VALUE: 1
PIF_VALUE: 13
PIF_VALUE: 10
PIF_VALUE: 1
PIF_VALUE: 9
PIF_VALUE: 12
PIF_VALUE: 17
PIF_VALUE: 1
PIF_VALUE: 10
PIF_VALUE: 1
PIF_VALUE: 12

## 2021-08-02 ASSESSMENT — PAIN - FUNCTIONAL ASSESSMENT
PAIN_FUNCTIONAL_ASSESSMENT: PREVENTS OR INTERFERES SOME ACTIVE ACTIVITIES AND ADLS
PAIN_FUNCTIONAL_ASSESSMENT: PREVENTS OR INTERFERES SOME ACTIVE ACTIVITIES AND ADLS

## 2021-08-02 ASSESSMENT — PAIN DESCRIPTION - LOCATION
LOCATION: STERNUM
LOCATION: STERNUM

## 2021-08-02 ASSESSMENT — PAIN DESCRIPTION - PAIN TYPE
TYPE: SURGICAL PAIN
TYPE: SURGICAL PAIN

## 2021-08-02 ASSESSMENT — PAIN DESCRIPTION - ORIENTATION
ORIENTATION: MID
ORIENTATION: MID

## 2021-08-02 ASSESSMENT — PAIN DESCRIPTION - PROGRESSION
CLINICAL_PROGRESSION: GRADUALLY WORSENING
CLINICAL_PROGRESSION: GRADUALLY WORSENING

## 2021-08-02 ASSESSMENT — PAIN SCALES - GENERAL
PAINLEVEL_OUTOF10: 0
PAINLEVEL_OUTOF10: 6

## 2021-08-02 ASSESSMENT — PAIN DESCRIPTION - FREQUENCY
FREQUENCY: INTERMITTENT
FREQUENCY: INTERMITTENT

## 2021-08-02 ASSESSMENT — PAIN DESCRIPTION - ONSET
ONSET: GRADUAL
ONSET: GRADUAL

## 2021-08-02 ASSESSMENT — PAIN DESCRIPTION - DESCRIPTORS
DESCRIPTORS: ACHING;SORE
DESCRIPTORS: ACHING;SORE

## 2021-08-02 ASSESSMENT — PAIN SCALES - WONG BAKER
WONGBAKER_NUMERICALRESPONSE: 0
WONGBAKER_NUMERICALRESPONSE: 4

## 2021-08-02 NOTE — PLAN OF CARE
Problem: Pain:  Goal: Pain level will decrease  Description: Pain level will decrease  8/2/2021 0815 by Sancho Kelley. Helen Camejo RN  Outcome: Ongoing  8/1/2021 2110 by Meredith Lee RN  Outcome: Ongoing  Goal: Control of acute pain  Description: Control of acute pain  8/2/2021 0815 by Sancho Kelley. Helen Camejo RN  Outcome: Ongoing  8/1/2021 2110 by Meredith Lee RN  Outcome: Ongoing  Goal: Control of chronic pain  Description: Control of chronic pain  8/2/2021 0815 by Sancho Kelley. Helen Camejo RN  Outcome: Ongoing  8/1/2021 2110 by Meredith Lee RN  Outcome: Ongoing     Problem: Cardiac Output - Decreased:  Goal: Hemodynamic stability will improve  Description: Hemodynamic stability will improve  8/2/2021 0815 by Sancho Kelley. Helen Camejo RN  Outcome: Ongoing  8/1/2021 2110 by Meredith Lee RN  Outcome: Ongoing     Problem: SAFETY  Goal: Free from accidental physical injury  8/2/2021 0815 by Sancho Kelley. Helen Camejo RN  Outcome: Ongoing  8/1/2021 2110 by Meredith Lee RN  Outcome: Ongoing  Goal: Free from intentional harm  8/2/2021 0815 by Sancho Kelley. Helen Camejo RN  Outcome: Ongoing  8/1/2021 2110 by Meredith Lee RN  Outcome: Ongoing     Problem: DAILY CARE  Goal: Daily care needs are met  8/2/2021 0815 by Sancho Kelley. Helen Camejo RN  Outcome: Ongoing  8/1/2021 2110 by Meredith Lee RN  Outcome: Ongoing     Problem: SKIN INTEGRITY  Goal: Skin integrity is maintained or improved  8/2/2021 0815 by Sancho Kelley. Helen Camejo RN  Outcome: Ongoing  8/1/2021 2110 by Meredith Lee RN  Outcome: Ongoing     Problem: KNOWLEDGE DEFICIT  Goal: Patient/S.O. demonstrates understanding of disease process, treatment plan, medications, and discharge instructions. 8/2/2021 0815 by Sancho Camejo RN  Outcome: Ongoing  8/1/2021 2110 by Meredith Lee RN  Outcome: Ongoing     Problem: DISCHARGE BARRIERS  Goal: Patient's continuum of care needs are met  8/2/2021 0815 by Sancho Kelley.  Helen Camejo RN  Outcome: Ongoing  8/1/2021 2110 by Debby Centeno RN  Outcome: Ongoing

## 2021-08-02 NOTE — PROGRESS NOTES
Patient arrived in CVICU from [address] [place] [address] with OR RN and anesthesia. Patient bagged per Anesthesia. Intubated pt connected to vent per Ebony RT at bedside. Patient connected to CVICU monitor. Vital signs obtained. Assessment completed, see documentation. Labs and ABG's obtained. Drips infusing from OR: Epi at 5 mcg/min  AV pacing wires in place. Connected to pacer box pacing at a rate of 80. Bilateral legs accessed for grafting. Ace wrap to bilateral legs. Report received from [address] [place] [address] at bedside. Out from OR:  EBL 850ml, cell saver 350 ml, and urine 400 ml. Recovery process in progress from CABG x 5. Will continue to monitor closely. AV pacing intermittent capture for atrial lead. OR team switched pacing blue wire out with same intermittent capture for atrial pacing. Will continue to monitor patient closely.    Electronically signed by Abram Nicholas RN on 8/2/2021 at 5:03 PM

## 2021-08-02 NOTE — PLAN OF CARE
Patient education attempted. Pt sedated from surgery still will reassess for ability to educate as patient wakes up.

## 2021-08-02 NOTE — ANESTHESIA POSTPROCEDURE EVALUATION
Department of Anesthesiology  Postprocedure Note    Patient: Jacinto Estrada  MRN: [id number]  YOB: 1940  Date of evaluation: 8/2/2021  Time:  5:21 PM     Procedure Summary     Date: 08/02/21 Room / Location: [address] [place] [address]    Anesthesia Start: 5649 Anesthesia Stop: 1635    Procedure: CABG CORONARY ARTERY BYPASS X 5, INTRAOPERATIVE RALEIGH, INDUCED HYPOTHERMIA AND BILATERAL ENDOHARVEST OF SAPHENOUS VEINS (N/A Chest) Diagnosis: (CAD)    Surgeons: Maryana Mosqueda MD Responsible Provider: Davon Barr MD    Anesthesia Type: general ASA Status: 4          Anesthesia Type: general    Nikolay Phase I: 7    Nikolay Phase II:  7    Last vitals: Reviewed and per EMR flowsheets.        Anesthesia Post Evaluation    Patient location during evaluation: ICU  Patient participation: complete - patient cannot participate  Level of consciousness: sedated and ventilated  Pain score: 1  Airway patency: patent  Nausea & Vomiting: no nausea and no vomiting  Complications: no  Cardiovascular status: vasoactive/inotropes  Respiratory status: ventilator and intubated  Hydration status: stable

## 2021-08-02 NOTE — PROGRESS NOTES
Dr. Samir Gifford in rounding on patient. Up dated on pacing troubles. Assessed underlying rhythm 2 to 1 heart block noted. Patient paced now at 80 AV sequential with intermittent capture. Dr Samir Gifford aware. Will continue to monitor.

## 2021-08-02 NOTE — OP NOTE
Operative Note      Patient: Selwyn Burkett  YOB: 1940  MRN: [id number]    Date of Procedure: 8/2/2021    Date: 08/02/21  Patient:  Ekta Vizcaino [facility]     Op Note    Pre-op Diagnosis:    Active Hospital Problems    Diagnosis Date Noted    Unstable angina ([facility] [address] [I20.0] 07/29/2021    Essential hypertension [I10] 07/29/2021    CAD in native artery [I25.10] 07/29/2021    HORACE (obstructive sleep apnea) [G47.33] 07/28/2021    PVC (premature ventricular contraction) [I49.3] 07/15/2021       Past Medical History:   Diagnosis Date    Arthritis     CAD in native artery 07/29/2021    Essential hypertension 07/29/2021    H/O cardiovascular stress test 07/29/2021    Abnormal Stress EKG results and patient was transferred to [facility] cath lab per Dr. Jaxson Willis.  H/O echocardiogram 07/29/2021    Left ventricular function and size is normal, EF is estimated at 55-60%. Mild mitral ,tricuspid and moderate aortic regurgitation is present.  History of Holter monitoring 06/15/2021    Frequent PVCs. 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     Thyroid disease         EF pre pump ~10%    Post-op Diagnosis and Findings: As above     Active Hospital Problems    Diagnosis Date Noted    Unstable angina ([facility] [address] [I20.0] 07/29/2021    Essential hypertension [I10] 07/29/2021    CAD in native artery [I25.10] 07/29/2021    HORACE (obstructive sleep apnea) [G47.33] 07/28/2021    PVC (premature ventricular contraction) [I49.3] 07/15/2021     LVH quality of the coronaries diffuse disease  Quality of YARELI excellent  Quality of saphanous vein good, lower leg vein small unusable therefore R thigh also harvested      Procedure:  CABG X 5  LIMA to LAD x 2  SVG to RCA, Ramus, OM    Additional procedure:  Triple lumen CVP  Sun Oar catheter  Vein Mapping    Surgeon: Tre Ochoa MD/Mars Hirsch MD    Assistant(s):  Micaela Thornton PA-C    Anesthesia:  General      Drains:  Two chest Mack Sicard, MD on 8/2/2021 at 4:25 PM

## 2021-08-03 ENCOUNTER — APPOINTMENT (OUTPATIENT)
Dept: CT IMAGING | Age: 81
DRG: 233 | End: 2021-08-03
Attending: INTERNAL MEDICINE
Payer: MEDICARE

## 2021-08-03 ENCOUNTER — APPOINTMENT (OUTPATIENT)
Dept: GENERAL RADIOLOGY | Age: 81
DRG: 233 | End: 2021-08-03
Attending: INTERNAL MEDICINE
Payer: MEDICARE

## 2021-08-03 LAB
ALBUMIN SERPL-MCNC: 3.6 GM/DL (ref 3.4–5)
ALP BLD-CCNC: 21 IU/L (ref 40–128)
ALT SERPL-CCNC: 74 U/L (ref 10–40)
ANION GAP SERPL CALCULATED.3IONS-SCNC: 11 MMOL/L (ref 4–16)
ANION GAP SERPL CALCULATED.3IONS-SCNC: 13 MMOL/L (ref 4–16)
ANION GAP SERPL CALCULATED.3IONS-SCNC: 13 MMOL/L (ref 4–16)
APTT: 31.8 SECONDS (ref 25.1–37.1)
APTT: 34 SECONDS (ref 25.1–37.1)
AST SERPL-CCNC: 227 IU/L (ref 15–37)
BILIRUB SERPL-MCNC: 0.6 MG/DL (ref 0–1)
BUN BLDV-MCNC: 14 MG/DL (ref 6–23)
BUN BLDV-MCNC: 14 MG/DL (ref 6–23)
BUN BLDV-MCNC: 15 MG/DL (ref 6–23)
CALCIUM IONIZED: 4.88 MG/DL (ref 4.48–5.28)
CALCIUM SERPL-MCNC: 7.7 MG/DL (ref 8.3–10.6)
CALCIUM SERPL-MCNC: 7.8 MG/DL (ref 8.3–10.6)
CALCIUM SERPL-MCNC: 8.6 MG/DL (ref 8.3–10.6)
CHLORIDE BLD-SCNC: 109 MMOL/L (ref 99–110)
CHLORIDE BLD-SCNC: 110 MMOL/L (ref 99–110)
CHLORIDE BLD-SCNC: 113 MMOL/L (ref 99–110)
CO2: 18 MMOL/L (ref 21–32)
CO2: 19 MMOL/L (ref 21–32)
CO2: 19 MMOL/L (ref 21–32)
CREAT SERPL-MCNC: 0.9 MG/DL (ref 0.6–1.1)
CREAT SERPL-MCNC: 1 MG/DL (ref 0.6–1.1)
CREAT SERPL-MCNC: 1 MG/DL (ref 0.6–1.1)
EKG ATRIAL RATE: 85 BPM
EKG DIAGNOSIS: NORMAL
EKG P AXIS: 60 DEGREES
EKG P-R INTERVAL: 146 MS
EKG Q-T INTERVAL: 404 MS
EKG QRS DURATION: 124 MS
EKG QTC CALCULATION (BAZETT): 480 MS
EKG R AXIS: 16 DEGREES
EKG T AXIS: 168 DEGREES
EKG VENTRICULAR RATE: 85 BPM
GFR AFRICAN AMERICAN: >60 ML/MIN/1.73M2
GFR NON-AFRICAN AMERICAN: 53 ML/MIN/1.73M2
GFR NON-AFRICAN AMERICAN: 53 ML/MIN/1.73M2
GFR NON-AFRICAN AMERICAN: >60 ML/MIN/1.73M2
GLUCOSE BLD-MCNC: 103 MG/DL (ref 70–99)
GLUCOSE BLD-MCNC: 104 MG/DL (ref 70–99)
GLUCOSE BLD-MCNC: 113 MG/DL (ref 70–99)
GLUCOSE BLD-MCNC: 115 MG/DL (ref 70–99)
GLUCOSE BLD-MCNC: 122 MG/DL (ref 70–99)
GLUCOSE BLD-MCNC: 123 MG/DL (ref 70–99)
GLUCOSE BLD-MCNC: 124 MG/DL (ref 70–99)
GLUCOSE BLD-MCNC: 125 MG/DL (ref 70–99)
GLUCOSE BLD-MCNC: 128 MG/DL (ref 70–99)
GLUCOSE BLD-MCNC: 131 MG/DL (ref 70–99)
GLUCOSE BLD-MCNC: 131 MG/DL (ref 70–99)
GLUCOSE BLD-MCNC: 132 MG/DL (ref 70–99)
GLUCOSE BLD-MCNC: 133 MG/DL (ref 70–99)
GLUCOSE BLD-MCNC: 134 MG/DL (ref 70–99)
GLUCOSE BLD-MCNC: 137 MG/DL (ref 70–99)
HCT VFR BLD CALC: 27.5 % (ref 37–47)
HCT VFR BLD CALC: 33 % (ref 37–47)
HEMOGLOBIN: 11.1 GM/DL (ref 12.5–16)
HEMOGLOBIN: 9.1 GM/DL (ref 12.5–16)
INR BLD: 1.19 INDEX
IONIZED CA: 1.22 MMOL/L (ref 1.12–1.32)
MAGNESIUM: 2.3 MG/DL (ref 1.8–2.4)
MCH RBC QN AUTO: 32.4 PG (ref 27–31)
MCH RBC QN AUTO: 32.8 PG (ref 27–31)
MCHC RBC AUTO-ENTMCNC: 33.1 % (ref 32–36)
MCHC RBC AUTO-ENTMCNC: 33.6 % (ref 32–36)
MCV RBC AUTO: 97.6 FL (ref 78–100)
MCV RBC AUTO: 97.9 FL (ref 78–100)
PDW BLD-RTO: 13.2 % (ref 11.7–14.9)
PDW BLD-RTO: 13.2 % (ref 11.7–14.9)
PLATELET # BLD: 108 K/CU MM (ref 140–440)
PLATELET # BLD: 150 K/CU MM (ref 140–440)
PMV BLD AUTO: 9.4 FL (ref 7.5–11.1)
PMV BLD AUTO: 9.4 FL (ref 7.5–11.1)
POTASSIUM SERPL-SCNC: 4.2 MMOL/L (ref 3.5–5.1)
POTASSIUM SERPL-SCNC: 4.2 MMOL/L (ref 3.5–5.1)
POTASSIUM SERPL-SCNC: 4.5 MMOL/L (ref 3.5–5.1)
PROTHROMBIN TIME: 15.4 SECONDS (ref 11.7–14.5)
RBC # BLD: 2.81 M/CU MM (ref 4.2–5.4)
RBC # BLD: 3.38 M/CU MM (ref 4.2–5.4)
SODIUM BLD-SCNC: 139 MMOL/L (ref 135–145)
SODIUM BLD-SCNC: 141 MMOL/L (ref 135–145)
SODIUM BLD-SCNC: 145 MMOL/L (ref 135–145)
TOTAL PROTEIN: 4.5 GM/DL (ref 6.4–8.2)
WBC # BLD: 12.6 K/CU MM (ref 4–10.5)
WBC # BLD: 16.5 K/CU MM (ref 4–10.5)

## 2021-08-03 PROCEDURE — 2700000000 HC OXYGEN THERAPY PER DAY

## 2021-08-03 PROCEDURE — 70498 CT ANGIOGRAPHY NECK: CPT

## 2021-08-03 PROCEDURE — 85730 THROMBOPLASTIN TIME PARTIAL: CPT

## 2021-08-03 PROCEDURE — 6370000000 HC RX 637 (ALT 250 FOR IP): Performed by: PHYSICIAN ASSISTANT

## 2021-08-03 PROCEDURE — 71045 X-RAY EXAM CHEST 1 VIEW: CPT

## 2021-08-03 PROCEDURE — 84484 ASSAY OF TROPONIN QUANT: CPT

## 2021-08-03 PROCEDURE — 80048 BASIC METABOLIC PNL TOTAL CA: CPT

## 2021-08-03 PROCEDURE — 6360000004 HC RX CONTRAST MEDICATION: Performed by: INTERNAL MEDICINE

## 2021-08-03 PROCEDURE — 2580000003 HC RX 258: Performed by: INTERNAL MEDICINE

## 2021-08-03 PROCEDURE — 85610 PROTHROMBIN TIME: CPT

## 2021-08-03 PROCEDURE — 6360000002 HC RX W HCPCS: Performed by: SURGERY

## 2021-08-03 PROCEDURE — 94761 N-INVAS EAR/PLS OXIMETRY MLT: CPT

## 2021-08-03 PROCEDURE — P9045 ALBUMIN (HUMAN), 5%, 250 ML: HCPCS

## 2021-08-03 PROCEDURE — 80053 COMPREHEN METABOLIC PANEL: CPT

## 2021-08-03 PROCEDURE — 83735 ASSAY OF MAGNESIUM: CPT

## 2021-08-03 PROCEDURE — 2000000000 HC ICU R&B

## 2021-08-03 PROCEDURE — 82962 GLUCOSE BLOOD TEST: CPT

## 2021-08-03 PROCEDURE — 94640 AIRWAY INHALATION TREATMENT: CPT

## 2021-08-03 PROCEDURE — 82330 ASSAY OF CALCIUM: CPT

## 2021-08-03 PROCEDURE — 6360000002 HC RX W HCPCS

## 2021-08-03 PROCEDURE — 85027 COMPLETE CBC AUTOMATED: CPT

## 2021-08-03 PROCEDURE — 2580000003 HC RX 258: Performed by: PHYSICIAN ASSISTANT

## 2021-08-03 PROCEDURE — 70450 CT HEAD/BRAIN W/O DYE: CPT

## 2021-08-03 PROCEDURE — 93010 ELECTROCARDIOGRAM REPORT: CPT | Performed by: INTERNAL MEDICINE

## 2021-08-03 PROCEDURE — P9045 ALBUMIN (HUMAN), 5%, 250 ML: HCPCS | Performed by: PHYSICIAN ASSISTANT

## 2021-08-03 PROCEDURE — 6360000002 HC RX W HCPCS: Performed by: PHYSICIAN ASSISTANT

## 2021-08-03 PROCEDURE — 93005 ELECTROCARDIOGRAM TRACING: CPT | Performed by: SURGERY

## 2021-08-03 RX ORDER — DIPHENHYDRAMINE HYDROCHLORIDE 50 MG/ML
12.5 INJECTION INTRAMUSCULAR; INTRAVENOUS ONCE
Status: COMPLETED | OUTPATIENT
Start: 2021-08-03 | End: 2021-08-03

## 2021-08-03 RX ORDER — CLOPIDOGREL BISULFATE 75 MG/1
75 TABLET ORAL DAILY
Status: DISCONTINUED | OUTPATIENT
Start: 2021-08-03 | End: 2021-08-09 | Stop reason: HOSPADM

## 2021-08-03 RX ORDER — MAGNESIUM SULFATE 1 G/100ML
1000 INJECTION INTRAVENOUS ONCE
Status: COMPLETED | OUTPATIENT
Start: 2021-08-03 | End: 2021-08-03

## 2021-08-03 RX ORDER — FUROSEMIDE 10 MG/ML
20 INJECTION INTRAMUSCULAR; INTRAVENOUS ONCE
Status: COMPLETED | OUTPATIENT
Start: 2021-08-03 | End: 2021-08-03

## 2021-08-03 RX ORDER — ALBUMIN, HUMAN INJ 5% 5 %
12.5 SOLUTION INTRAVENOUS ONCE
Status: COMPLETED | OUTPATIENT
Start: 2021-08-03 | End: 2021-08-03

## 2021-08-03 RX ORDER — MIDODRINE HYDROCHLORIDE 5 MG/1
10 TABLET ORAL
Status: DISCONTINUED | OUTPATIENT
Start: 2021-08-03 | End: 2021-08-09 | Stop reason: HOSPADM

## 2021-08-03 RX ADMIN — Medication: at 21:18

## 2021-08-03 RX ADMIN — ACETAMINOPHEN 650 MG: 325 TABLET ORAL at 13:41

## 2021-08-03 RX ADMIN — AMIODARONE HYDROCHLORIDE 200 MG: 200 TABLET ORAL at 13:41

## 2021-08-03 RX ADMIN — VANCOMYCIN 1000 MG: 1 INJECTION, SOLUTION INTRAVENOUS at 11:45

## 2021-08-03 RX ADMIN — PANTOPRAZOLE SODIUM 40 MG: 40 TABLET, DELAYED RELEASE ORAL at 06:18

## 2021-08-03 RX ADMIN — MIDODRINE HYDROCHLORIDE 10 MG: 5 TABLET ORAL at 17:11

## 2021-08-03 RX ADMIN — FUROSEMIDE 20 MG: 10 INJECTION, SOLUTION INTRAVENOUS at 06:56

## 2021-08-03 RX ADMIN — IPRATROPIUM BROMIDE AND ALBUTEROL SULFATE 1 AMPULE: .5; 3 SOLUTION RESPIRATORY (INHALATION) at 19:11

## 2021-08-03 RX ADMIN — IPRATROPIUM BROMIDE AND ALBUTEROL SULFATE 1 AMPULE: .5; 3 SOLUTION RESPIRATORY (INHALATION) at 07:43

## 2021-08-03 RX ADMIN — ALBUMIN (HUMAN) 25 G: 12.5 INJECTION, SOLUTION INTRAVENOUS at 05:58

## 2021-08-03 RX ADMIN — SODIUM CHLORIDE, PRESERVATIVE FREE 10 ML: 5 INJECTION INTRAVENOUS at 10:24

## 2021-08-03 RX ADMIN — HYDROCODONE BITARTRATE AND ACETAMINOPHEN 1 TABLET: 5; 325 TABLET ORAL at 06:18

## 2021-08-03 RX ADMIN — IPRATROPIUM BROMIDE AND ALBUTEROL SULFATE 1 AMPULE: .5; 3 SOLUTION RESPIRATORY (INHALATION) at 15:22

## 2021-08-03 RX ADMIN — VANCOMYCIN 1000 MG: 1 INJECTION, SOLUTION INTRAVENOUS at 00:41

## 2021-08-03 RX ADMIN — CLOPIDOGREL BISULFATE 75 MG: 75 TABLET ORAL at 13:48

## 2021-08-03 RX ADMIN — EPINEPHRINE 7 MCG/MIN: 1 INJECTION, SOLUTION, CONCENTRATE INTRAVENOUS at 14:39

## 2021-08-03 RX ADMIN — AMIODARONE HYDROCHLORIDE 200 MG: 200 TABLET ORAL at 21:18

## 2021-08-03 RX ADMIN — SODIUM CHLORIDE, PRESERVATIVE FREE 10 ML: 5 INJECTION INTRAVENOUS at 21:19

## 2021-08-03 RX ADMIN — ONDANSETRON 4 MG: 2 INJECTION INTRAMUSCULAR; INTRAVENOUS at 08:26

## 2021-08-03 RX ADMIN — LEVOTHYROXINE SODIUM 50 MCG: 0.05 TABLET ORAL at 06:18

## 2021-08-03 RX ADMIN — ASPIRIN 81 MG: 81 TABLET ORAL at 08:26

## 2021-08-03 RX ADMIN — KETOROLAC TROMETHAMINE 15 MG: 30 INJECTION, SOLUTION INTRAMUSCULAR; INTRAVENOUS at 15:10

## 2021-08-03 RX ADMIN — IPRATROPIUM BROMIDE AND ALBUTEROL SULFATE 1 AMPULE: .5; 3 SOLUTION RESPIRATORY (INHALATION) at 11:31

## 2021-08-03 RX ADMIN — ASPIRIN 81 MG: 81 TABLET ORAL at 10:00

## 2021-08-03 RX ADMIN — MAGNESIUM SULFATE HEPTAHYDRATE 1000 MG: 1 INJECTION, SOLUTION INTRAVENOUS at 11:48

## 2021-08-03 RX ADMIN — SODIUM CHLORIDE: 4.5 INJECTION, SOLUTION INTRAVENOUS at 07:30

## 2021-08-03 RX ADMIN — SODIUM CHLORIDE, PRESERVATIVE FREE 10 ML: 5 INJECTION INTRAVENOUS at 10:23

## 2021-08-03 RX ADMIN — HYDROCODONE BITARTRATE AND ACETAMINOPHEN 1 TABLET: 5; 325 TABLET ORAL at 18:41

## 2021-08-03 RX ADMIN — IOPAMIDOL 80 ML: 755 INJECTION, SOLUTION INTRAVENOUS at 09:17

## 2021-08-03 RX ADMIN — EPINEPHRINE 7 MCG/MIN: 1 INJECTION, SOLUTION, CONCENTRATE INTRAVENOUS at 03:59

## 2021-08-03 RX ADMIN — Medication: at 08:26

## 2021-08-03 RX ADMIN — AMIODARONE HYDROCHLORIDE 200 MG: 200 TABLET ORAL at 08:26

## 2021-08-03 RX ADMIN — MIDODRINE HYDROCHLORIDE 10 MG: 5 TABLET ORAL at 13:41

## 2021-08-03 RX ADMIN — DIPHENHYDRAMINE HYDROCHLORIDE 12.5 MG: 50 INJECTION INTRAMUSCULAR; INTRAVENOUS at 08:54

## 2021-08-03 RX ADMIN — ATORVASTATIN CALCIUM 20 MG: 20 TABLET, FILM COATED ORAL at 21:18

## 2021-08-03 RX ADMIN — ALBUMIN (HUMAN) 12.5 G: 12.5 INJECTION, SOLUTION INTRAVENOUS at 13:35

## 2021-08-03 RX ADMIN — KETOROLAC TROMETHAMINE 15 MG: 30 INJECTION, SOLUTION INTRAMUSCULAR; INTRAVENOUS at 08:26

## 2021-08-03 RX ADMIN — HYDROCODONE BITARTRATE AND ACETAMINOPHEN 1 TABLET: 5; 325 TABLET ORAL at 00:56

## 2021-08-03 RX ADMIN — SODIUM CHLORIDE, PRESERVATIVE FREE 10 ML: 5 INJECTION INTRAVENOUS at 15:10

## 2021-08-03 ASSESSMENT — PAIN SCALES - GENERAL
PAINLEVEL_OUTOF10: 0
PAINLEVEL_OUTOF10: 6
PAINLEVEL_OUTOF10: 8
PAINLEVEL_OUTOF10: 2
PAINLEVEL_OUTOF10: 3
PAINLEVEL_OUTOF10: 8
PAINLEVEL_OUTOF10: 6

## 2021-08-03 ASSESSMENT — PAIN DESCRIPTION - FREQUENCY
FREQUENCY: INTERMITTENT
FREQUENCY: CONTINUOUS
FREQUENCY: INTERMITTENT

## 2021-08-03 ASSESSMENT — PAIN DESCRIPTION - ORIENTATION
ORIENTATION: ANTERIOR
ORIENTATION: MID

## 2021-08-03 ASSESSMENT — PAIN DESCRIPTION - DESCRIPTORS
DESCRIPTORS: ACHING;SORE

## 2021-08-03 ASSESSMENT — PAIN DESCRIPTION - ONSET
ONSET: GRADUAL

## 2021-08-03 ASSESSMENT — PAIN DESCRIPTION - PROGRESSION
CLINICAL_PROGRESSION: GRADUALLY WORSENING

## 2021-08-03 ASSESSMENT — PAIN - FUNCTIONAL ASSESSMENT
PAIN_FUNCTIONAL_ASSESSMENT: PREVENTS OR INTERFERES SOME ACTIVE ACTIVITIES AND ADLS
PAIN_FUNCTIONAL_ASSESSMENT: ACTIVITIES ARE NOT PREVENTED

## 2021-08-03 ASSESSMENT — PAIN DESCRIPTION - LOCATION
LOCATION: STERNUM
LOCATION: CHEST;INCISION

## 2021-08-03 ASSESSMENT — PAIN DESCRIPTION - PAIN TYPE
TYPE: SURGICAL PAIN

## 2021-08-03 NOTE — FLOWSHEET NOTE
DR. Claudell Gamble on phone with [facility] radiology ( Dr. Russell Ralph) at this time for results of CTA.

## 2021-08-03 NOTE — PROGRESS NOTES
Pt sleepy but awakens easily. Right visual deficits are slightly decreased. Sister updated after security code was given. Albumin infusing per order. VSS. Pt does not want to eat yet today. Encouragement given.

## 2021-08-03 NOTE — FLOWSHEET NOTE
Stroke alert called at 0839, right peripheral unable to see. Dr. Berry Merlin to bedside, as well as Dr. Ester Cisneros.

## 2021-08-03 NOTE — FLOWSHEET NOTE
Placing a call to Dr Plummer Shelter ophthalmology patient sees eye doctor as out patient awaiting to see if he can come

## 2021-08-03 NOTE — CODE DOCUMENTATION
Dr. Zeenat Velasco will be reached by [facility] stroke staff, awaiting contact back.

## 2021-08-03 NOTE — PROGRESS NOTES
Jerry ECHEVERRIA called and updated on 3-4 beat runs of tach noted this last few minutes, VS, CO/CI, CVP 14. Electrolytes wnl and had received 1 gram IVBP mag earlier per Dr. Himanshu Evans for pairs of PVC's. Order to decrease epi gtt and see if that helps and draw another BMP to check lytes. Levo gtt off at this time. Pt refuses anything po except water and gingerale today so far. Urine output marginal today.

## 2021-08-03 NOTE — PROGRESS NOTES
Dr. Karyna Pena at bedside and ordered 1 gram IV mag and advanced the swan catheter to 60 cm from 48 cm. No need for a chest x ray for confirmation. Ophthalmology ordered by Dr. Karyna Pena as well. Pt has seen Dr. Trinidad Torres in the past for eye exams.

## 2021-08-03 NOTE — CODE DOCUMENTATION
Per Dr. Nayana Alvarenga not tpa candidate, awaiting results of CTA, if nothing critical continue current management. Pt cannot go for MRI per DR. Ramires. Stroke alert ended. Dr. Royal Spearing and Dell Adams deferring neuro consult at this time. Family at bedside, Dr. Royal Spearing explaining events.

## 2021-08-03 NOTE — CARE COORDINATION
Aj ECHEVERRIA spoke with CM re; possible ARU for pt due to stroke event this am affecting pt's vision. Cm contacted ARU admissions and requested that they follow for possible need for ARU at discharge. PT/OT are on hold until tomorrow. Cm to follow.

## 2021-08-03 NOTE — PROGRESS NOTES
Physical Therapy  Per RN, hold PT/OT all day due to stroke alert this morning. Will continue to attempt as pt becomes more medically stable.

## 2021-08-03 NOTE — PROGRESS NOTES
PATIENT NAME: Viki Conteh    TODAY'S DATE: 08/03/21    SUBJECTIVE:    Pt is POD # 1 s/p CABG x 5. Pt c/o peripheral vision loss. Stroke alert was called this am.      OBJECTIVE:   VITALS:    Vitals:    08/03/21 1153   BP:    Pulse: 85   Resp: 25   Temp:    SpO2: 93%     INTAKE/OUTPUT:    Date 08/03/21 0000 - 08/03/21 2359   Shift 5859-3784 6235-8222 0469-0380 24 Hour Total   INTAKE   P.O. 80   80   I. V.(mL/kg/hr) 0395(0.6)   1296   IV Piggyback 850   850   Shift Total(mL/kg) 4094(23)   7432(71)   OUTPUT   Urine(mL/kg/hr) 225(0.4) 455  680   Chest Tube 380 80  460   Shift Total(mL/kg) 605(9.5) 535(8.4)  1140(17.9)   Weight (kg) 63.7 63.7 63.7 63.7      Patient Vitals for the past 96 hrs (Last 3 readings):   Weight   08/01/21 0023 140 lb 6.4 oz (63.7 kg)       EXAM:  Blood pressure 117/67, pulse 85, temperature 99 °F (37.2 °C), temperature source Core, resp. rate 25, height 5' 2\" (1.575 m), weight 140 lb 6.4 oz (63.7 kg), SpO2 93 %. General appearance: No apparent distress, appears stated age and cooperative. Skin: unremarkable  HEENT Normocephalic, atraumatic without obvious deformity. Neck: Supple, Trachea midline   Lungs: Good respiratory effort. Clear to auscultation, bilaterally  Heart: Regular rate/ rhythm inc c/d/i  Abdomen: Soft, non-tender or non-distended   Extremities: 1+ edema warm well perfused  Neurologic: Alert, grossly intact, no focal weakness. R eye peripheral vision loss noted. Mental status: normal affect      Data:  CBC:   Recent Labs     08/02/21  1717 08/02/21  1800 08/02/21  2106 08/03/21  0530 08/03/21  0931   WBC 17.7*  --   --  16.5* 12.6*   HGB 10.5*   < > 10.0* 11.1* 9.1*   HCT 31.2*   < > 29.0* 33.0* 27.5*     --   --  150 108*    < > = values in this interval not displayed.      BMP:    Recent Labs     08/02/21  1717 08/02/21  1800 08/02/21  2106 08/03/21  0530 08/03/21  0931   *   < > 145 145 141   K 3.8   < > 3.8 4.5 4.2   *  --   --  113* 110   CO2 21 < > 20* 19* 18*   BUN 8  --   --  14 14   CREATININE 0.6   < > 0.9 1.0 0.9   GLUCOSE 94  --   --  131* 125*    < > = values in this interval not displayed. Hepatic:   Recent Labs     08/03/21  0931   *   ALT 74*   BILITOT 0.6   ALKPHOS 21*     Mag:      Recent Labs     08/02/21  1717 08/03/21  0530   MG 2.9* 2.3      Phos:   No results for input(s): PHOS in the last 72 hours. INR:   Recent Labs     08/02/21  1717 08/03/21  0931   INR 1.40 1.19       Radiology Review:  CXR  Impression:     Interval removal of the endotracheal tube and nasogastric tube, with mild   improved aeration at the lung bases. 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 There may be some subtle low-attenuation changes within the left occipital   lobe which is concordant with the patient's right visual field defect.  MRI   may be precluded given history of recent surgery     Small bilateral apical pneumothorax with associated subcutaneous emphysema   within the neck.  These changes are compatible with CABG 1 day prior            ASSESSMENT AND PLAN:    Patient Active Problem List   Diagnosis    PVC (premature ventricular contraction)    Fatigue    Chest pressure    HORACE (obstructive sleep apnea)    Hypersomnia    Unstable angina (HCC)    Essential hypertension    CAD in native artery    Acute on chronic combined systolic and diastolic heart failure (HCC)       S/P CABG x 5    Cardio: CI 1-1.5, on epi and levo. Add proamatine and will give some volume. Current gtts: epi 7, levo 2  Pulm: stable on RA, encourage IS  GI: stable  Renal: creatinine stable 0.9, marginal UOP. Will see how she responds to volume. Weight stable. Continue MIVF for now. Tubes/Lines: keep all tubes and lines for now. Wound: stable   Neuro: new R sided peripheral vision loss, CTA positive for occlusion of the distal P2 segment of the left posterior cerebral artery. Add plavix.  Will try to keep SBP > 120. Ophthalmology consult pending.      Sly Gonsalez PA-C

## 2021-08-03 NOTE — FLOWSHEET NOTE
abg results sent to Dr. Rossy Khanna. Ph 7.33, pco2 36.7,PO2 123, bicarb 19.3, base excess -6. Vent settings: ac 12, tv 480, p5, 40% O2. Orders given for 1 amp Na bicarb ivp now.

## 2021-08-04 ENCOUNTER — ANESTHESIA (OUTPATIENT)
Dept: ICU | Age: 81
DRG: 233 | End: 2021-08-04
Payer: MEDICARE

## 2021-08-04 ENCOUNTER — APPOINTMENT (OUTPATIENT)
Dept: GENERAL RADIOLOGY | Age: 81
DRG: 233 | End: 2021-08-04
Attending: INTERNAL MEDICINE
Payer: MEDICARE

## 2021-08-04 ENCOUNTER — ANESTHESIA EVENT (OUTPATIENT)
Dept: ICU | Age: 81
DRG: 233 | End: 2021-08-04
Payer: MEDICARE

## 2021-08-04 LAB
ANION GAP SERPL CALCULATED.3IONS-SCNC: 10 MMOL/L (ref 4–16)
APTT: 34.7 SECONDS (ref 25.1–37.1)
BUN BLDV-MCNC: 16 MG/DL (ref 6–23)
CALCIUM IONIZED: 4.92 MG/DL (ref 4.48–5.28)
CALCIUM SERPL-MCNC: 8 MG/DL (ref 8.3–10.6)
CHLORIDE BLD-SCNC: 109 MMOL/L (ref 99–110)
CO2: 20 MMOL/L (ref 21–32)
CREAT SERPL-MCNC: 0.8 MG/DL (ref 0.6–1.1)
GFR AFRICAN AMERICAN: >60 ML/MIN/1.73M2
GFR NON-AFRICAN AMERICAN: >60 ML/MIN/1.73M2
GLUCOSE BLD-MCNC: 100 MG/DL (ref 70–99)
GLUCOSE BLD-MCNC: 103 MG/DL (ref 70–99)
GLUCOSE BLD-MCNC: 104 MG/DL (ref 70–99)
GLUCOSE BLD-MCNC: 106 MG/DL (ref 70–99)
GLUCOSE BLD-MCNC: 106 MG/DL (ref 70–99)
GLUCOSE BLD-MCNC: 107 MG/DL (ref 70–99)
GLUCOSE BLD-MCNC: 107 MG/DL (ref 70–99)
GLUCOSE BLD-MCNC: 109 MG/DL (ref 70–99)
GLUCOSE BLD-MCNC: 110 MG/DL (ref 70–99)
GLUCOSE BLD-MCNC: 111 MG/DL (ref 70–99)
GLUCOSE BLD-MCNC: 113 MG/DL (ref 70–99)
GLUCOSE BLD-MCNC: 115 MG/DL (ref 70–99)
GLUCOSE BLD-MCNC: 117 MG/DL (ref 70–99)
GLUCOSE BLD-MCNC: 118 MG/DL (ref 70–99)
GLUCOSE BLD-MCNC: 123 MG/DL (ref 70–99)
GLUCOSE BLD-MCNC: 129 MG/DL (ref 70–99)
GLUCOSE BLD-MCNC: 98 MG/DL (ref 70–99)
HCT VFR BLD CALC: 30.1 % (ref 37–47)
HEMOGLOBIN: 9.5 GM/DL (ref 12.5–16)
IONIZED CA: 1.23 MMOL/L (ref 1.12–1.32)
MAGNESIUM: 2.2 MG/DL (ref 1.8–2.4)
MCH RBC QN AUTO: 31.5 PG (ref 27–31)
MCHC RBC AUTO-ENTMCNC: 31.6 % (ref 32–36)
MCV RBC AUTO: 99.7 FL (ref 78–100)
PDW BLD-RTO: 13.7 % (ref 11.7–14.9)
PLATELET # BLD: 107 K/CU MM (ref 140–440)
PMV BLD AUTO: 10.2 FL (ref 7.5–11.1)
POTASSIUM SERPL-SCNC: 4.3 MMOL/L (ref 3.5–5.1)
RBC # BLD: 3.02 M/CU MM (ref 4.2–5.4)
SODIUM BLD-SCNC: 139 MMOL/L (ref 135–145)
WBC # BLD: 14.6 K/CU MM (ref 4–10.5)

## 2021-08-04 PROCEDURE — 2580000003 HC RX 258: Performed by: INTERNAL MEDICINE

## 2021-08-04 PROCEDURE — 71045 X-RAY EXAM CHEST 1 VIEW: CPT

## 2021-08-04 PROCEDURE — 97530 THERAPEUTIC ACTIVITIES: CPT

## 2021-08-04 PROCEDURE — P9045 ALBUMIN (HUMAN), 5%, 250 ML: HCPCS | Performed by: PHYSICIAN ASSISTANT

## 2021-08-04 PROCEDURE — 85027 COMPLETE CBC AUTOMATED: CPT

## 2021-08-04 PROCEDURE — 6370000000 HC RX 637 (ALT 250 FOR IP): Performed by: PHYSICIAN ASSISTANT

## 2021-08-04 PROCEDURE — 97163 PT EVAL HIGH COMPLEX 45 MIN: CPT

## 2021-08-04 PROCEDURE — 2000000000 HC ICU R&B

## 2021-08-04 PROCEDURE — 82962 GLUCOSE BLOOD TEST: CPT

## 2021-08-04 PROCEDURE — 97167 OT EVAL HIGH COMPLEX 60 MIN: CPT

## 2021-08-04 PROCEDURE — 6360000002 HC RX W HCPCS: Performed by: PHYSICIAN ASSISTANT

## 2021-08-04 PROCEDURE — 80048 BASIC METABOLIC PNL TOTAL CA: CPT

## 2021-08-04 PROCEDURE — 94640 AIRWAY INHALATION TREATMENT: CPT

## 2021-08-04 PROCEDURE — 36620 INSERTION CATHETER ARTERY: CPT | Performed by: NURSE ANESTHETIST, CERTIFIED REGISTERED

## 2021-08-04 PROCEDURE — 2580000003 HC RX 258: Performed by: PHYSICIAN ASSISTANT

## 2021-08-04 PROCEDURE — 97112 NEUROMUSCULAR REEDUCATION: CPT

## 2021-08-04 PROCEDURE — 85730 THROMBOPLASTIN TIME PARTIAL: CPT

## 2021-08-04 PROCEDURE — 82330 ASSAY OF CALCIUM: CPT

## 2021-08-04 PROCEDURE — 83735 ASSAY OF MAGNESIUM: CPT

## 2021-08-04 PROCEDURE — 94761 N-INVAS EAR/PLS OXIMETRY MLT: CPT

## 2021-08-04 RX ORDER — IPRATROPIUM BROMIDE AND ALBUTEROL SULFATE 2.5; .5 MG/3ML; MG/3ML
1 SOLUTION RESPIRATORY (INHALATION) EVERY 4 HOURS PRN
Status: DISCONTINUED | OUTPATIENT
Start: 2021-08-04 | End: 2021-08-09 | Stop reason: HOSPADM

## 2021-08-04 RX ORDER — ALBUMIN, HUMAN INJ 5% 5 %
12.5 SOLUTION INTRAVENOUS ONCE
Status: COMPLETED | OUTPATIENT
Start: 2021-08-04 | End: 2021-08-04

## 2021-08-04 RX ORDER — DOBUTAMINE HYDROCHLORIDE 200 MG/100ML
2.5 INJECTION INTRAVENOUS CONTINUOUS
Status: DISCONTINUED | OUTPATIENT
Start: 2021-08-04 | End: 2021-08-06

## 2021-08-04 RX ADMIN — ACETAMINOPHEN 650 MG: 325 TABLET ORAL at 06:30

## 2021-08-04 RX ADMIN — PANTOPRAZOLE SODIUM 40 MG: 40 TABLET, DELAYED RELEASE ORAL at 06:29

## 2021-08-04 RX ADMIN — MIDODRINE HYDROCHLORIDE 10 MG: 5 TABLET ORAL at 16:52

## 2021-08-04 RX ADMIN — SODIUM CHLORIDE, PRESERVATIVE FREE 10 ML: 5 INJECTION INTRAVENOUS at 08:23

## 2021-08-04 RX ADMIN — SODIUM CHLORIDE, PRESERVATIVE FREE 10 ML: 5 INJECTION INTRAVENOUS at 21:22

## 2021-08-04 RX ADMIN — EPINEPHRINE 6 MCG/MIN: 1 INJECTION, SOLUTION, CONCENTRATE INTRAVENOUS at 05:32

## 2021-08-04 RX ADMIN — IPRATROPIUM BROMIDE AND ALBUTEROL SULFATE 1 AMPULE: .5; 3 SOLUTION RESPIRATORY (INHALATION) at 08:36

## 2021-08-04 RX ADMIN — MIDODRINE HYDROCHLORIDE 10 MG: 5 TABLET ORAL at 08:22

## 2021-08-04 RX ADMIN — ACETAMINOPHEN 650 MG: 325 TABLET ORAL at 00:20

## 2021-08-04 RX ADMIN — DOBUTAMINE IN DEXTROSE 5 MCG/KG/MIN: 200 INJECTION, SOLUTION INTRAVENOUS at 10:29

## 2021-08-04 RX ADMIN — HYDROCODONE BITARTRATE AND ACETAMINOPHEN 1 TABLET: 5; 325 TABLET ORAL at 21:21

## 2021-08-04 RX ADMIN — SODIUM CHLORIDE: 4.5 INJECTION, SOLUTION INTRAVENOUS at 13:39

## 2021-08-04 RX ADMIN — VANCOMYCIN 1000 MG: 1 INJECTION, SOLUTION INTRAVENOUS at 00:06

## 2021-08-04 RX ADMIN — ASPIRIN 81 MG: 81 TABLET ORAL at 08:22

## 2021-08-04 RX ADMIN — MIDODRINE HYDROCHLORIDE 10 MG: 5 TABLET ORAL at 12:52

## 2021-08-04 RX ADMIN — ACETAMINOPHEN 650 MG: 325 TABLET ORAL at 17:30

## 2021-08-04 RX ADMIN — THERA TABS 1 TABLET: TAB at 08:22

## 2021-08-04 RX ADMIN — SODIUM CHLORIDE: 4.5 INJECTION, SOLUTION INTRAVENOUS at 00:03

## 2021-08-04 RX ADMIN — Medication: at 21:21

## 2021-08-04 RX ADMIN — ONDANSETRON 4 MG: 2 INJECTION INTRAMUSCULAR; INTRAVENOUS at 08:46

## 2021-08-04 RX ADMIN — Medication: at 08:22

## 2021-08-04 RX ADMIN — SODIUM CHLORIDE, PRESERVATIVE FREE 10 ML: 5 INJECTION INTRAVENOUS at 21:23

## 2021-08-04 RX ADMIN — LEVOTHYROXINE SODIUM 50 MCG: 0.05 TABLET ORAL at 06:29

## 2021-08-04 RX ADMIN — AMIODARONE HYDROCHLORIDE 200 MG: 200 TABLET ORAL at 13:38

## 2021-08-04 RX ADMIN — ATORVASTATIN CALCIUM 20 MG: 20 TABLET, FILM COATED ORAL at 21:21

## 2021-08-04 RX ADMIN — ALBUMIN (HUMAN) 12.5 G: 12.5 INJECTION, SOLUTION INTRAVENOUS at 13:38

## 2021-08-04 RX ADMIN — DOCUSATE SODIUM 50 MG AND SENNOSIDES 8.6 MG 1 TABLET: 8.6; 5 TABLET, FILM COATED ORAL at 08:22

## 2021-08-04 RX ADMIN — AMIODARONE HYDROCHLORIDE 200 MG: 200 TABLET ORAL at 21:21

## 2021-08-04 RX ADMIN — CLOPIDOGREL BISULFATE 75 MG: 75 TABLET ORAL at 08:22

## 2021-08-04 RX ADMIN — AMIODARONE HYDROCHLORIDE 200 MG: 200 TABLET ORAL at 08:22

## 2021-08-04 ASSESSMENT — PAIN DESCRIPTION - LOCATION
LOCATION: STERNUM;NECK
LOCATION: CHEST
LOCATION: CHEST;STERNUM
LOCATION: CHEST;STERNUM
LOCATION: HEAD

## 2021-08-04 ASSESSMENT — PAIN DESCRIPTION - PROGRESSION
CLINICAL_PROGRESSION: GRADUALLY WORSENING
CLINICAL_PROGRESSION: GRADUALLY WORSENING
CLINICAL_PROGRESSION: NOT CHANGED
CLINICAL_PROGRESSION: GRADUALLY WORSENING
CLINICAL_PROGRESSION: GRADUALLY WORSENING

## 2021-08-04 ASSESSMENT — PAIN - FUNCTIONAL ASSESSMENT
PAIN_FUNCTIONAL_ASSESSMENT: ACTIVITIES ARE NOT PREVENTED
PAIN_FUNCTIONAL_ASSESSMENT: PREVENTS OR INTERFERES SOME ACTIVE ACTIVITIES AND ADLS

## 2021-08-04 ASSESSMENT — PAIN DESCRIPTION - DESCRIPTORS
DESCRIPTORS: ACHING;SORE
DESCRIPTORS: ACHING;SORE;HEADACHE
DESCRIPTORS: ACHING
DESCRIPTORS: ACHING;SORE
DESCRIPTORS: HEADACHE

## 2021-08-04 ASSESSMENT — PAIN DESCRIPTION - FREQUENCY
FREQUENCY: INTERMITTENT
FREQUENCY: CONTINUOUS
FREQUENCY: INTERMITTENT
FREQUENCY: CONTINUOUS
FREQUENCY: INTERMITTENT

## 2021-08-04 ASSESSMENT — PAIN DESCRIPTION - PAIN TYPE
TYPE: SURGICAL PAIN
TYPE: SURGICAL PAIN
TYPE: ACUTE PAIN
TYPE: SURGICAL PAIN
TYPE: SURGICAL PAIN

## 2021-08-04 ASSESSMENT — PAIN SCALES - GENERAL
PAINLEVEL_OUTOF10: 3
PAINLEVEL_OUTOF10: 6
PAINLEVEL_OUTOF10: 0
PAINLEVEL_OUTOF10: 3
PAINLEVEL_OUTOF10: 0
PAINLEVEL_OUTOF10: 0
PAINLEVEL_OUTOF10: 3
PAINLEVEL_OUTOF10: 0
PAINLEVEL_OUTOF10: 3
PAINLEVEL_OUTOF10: 3

## 2021-08-04 ASSESSMENT — PAIN DESCRIPTION - ORIENTATION
ORIENTATION: LEFT;MID
ORIENTATION: LEFT;MID
ORIENTATION: MID
ORIENTATION: MID
ORIENTATION: ANTERIOR

## 2021-08-04 ASSESSMENT — PAIN DESCRIPTION - ONSET
ONSET: ON-GOING
ONSET: ON-GOING
ONSET: GRADUAL

## 2021-08-04 NOTE — PLAN OF CARE
Nutrition Problem #1: Inadequate oral intake  Intervention: Food and/or Nutrient Delivery: Modify Current Diet, Start Oral Nutrition Supplement  Nutritional Goals: Pt will consume greater than 50% of meals and supplements

## 2021-08-04 NOTE — CONSULTS
[address], 1940, 2102/2102-A, 8/4/2021    History  [place]:  There were no encounter diagnoses. Patient  has a past medical history of Arthritis, CAD in native artery, Essential hypertension, H/O cardiovascular stress test, H/O echocardiogram, History of Holter monitoring, and Thyroid disease. Patient  has a past surgical history that includes tumor removal; Dilation and curettage of uterus; Hysterectomy; Colonoscopy; Appendectomy; back surgery; Coronary artery bypass graft (08/02/2021); and Coronary artery bypass graft (N/A, 8/2/2021). Subjective:  Patient states:  \"I'm sorry for being so weak. I'm not usually like this. \"    Pain:  Denies pain at rest, pain with L UE movement but pt does not rate. Communication with other providers:  Handoff to RN, OT  Restrictions: sternal precautions, fall risk, general precautions    Home Setup/Prior level of function  Social/Functional History  Lives With: Spouse  Type of Home: House  Home Layout: One level  Home Access: Stairs to enter without rails  Entrance Stairs - Number of Steps: 2  Entrance Stairs - Rails: None  Bathroom Shower/Tub: Walk-in shower  Bathroom Toilet: Standard  Bathroom Equipment: Grab bars in shower, Built-in shower seat, Grab bars around toilet  Home Equipment: [employer] ( uses cane)  ADL Assistance: Independent  Homemaking Assistance: Independent  Homemaking Responsibilities: Yes  Ambulation Assistance: Independent  Transfer Assistance: Independent  Active : Yes  Occupation: Retired    Examination of body systems (includes body structures/functions, activity/participation limitations):  · Observation:  Pt supine in bed with RN in room upon arrival and agreeable to therapy  · Vision:  Pt with R field cut of R eye. Bilateral tracking of L eye intact when R eye closed. Pt with R peripheral cut.   · [facility]  · Cardiopulmonary:  No O2 needs  · Cognition: impaired- sequencing impaired, see OT/SLP note for further evaluation. Musculoskeletal  · ROM R/L:  WFL. · Strength R/L:  4/5, moderate impairment in function and endurance. · Neuro:  Coordination impaired during finger to nose with R UE, unsure if this is focus/strength impairments or true coordination impairments. Sensation intact bilaterally. R field cut of R eye. Bilateral strength equal.      Mobility:  · [place] L/R: Pt educated on sternal precautions prior to mobility. Mod A x2 with cues for sequencing. · Supine to sit:  Mod A x2 with cues for sequencing. Increased time and effort to complete   · Transfers: Pt completed STS from EOB min A with cues for sequencing and sternal precautions. Pt completed stand pivot transfer with cardiac walker min A with one LOB backward requiring mod A to recover. Cues provided for sequencing throughout and assist provided for walker management throughout. · Sitting balance:  Fair: pt sat EOB ~15 minutes min A progressing to CGA with 3 LOB backward requiring min A to recover. Cues provided for sitting posture throughout and for maintaining eyes open. · Standing balance:  Fair+; pt stood at cardiac walker ~3 minutes CGA. Pt with complaints of dizziness, subsided with cues for pursed lip breathing. Bout terminated due to pt feeling lightheaded and pt stating they might pass out. Pt returned to sitting. BP obtained: 99/64 (75). · Gait: NT due to safety concerns    [facility] 6 Clicks Inpatient Mobility:  AM-PAC Inpatient Mobility Raw Score : 10    Safety: patient left in chair with alarm on and RN in room, call light within reach, RN notified, gait belt used. Assessment:  Pt is an [de-identified] y.o. female admitted to the hospital and underwent CABG x5 on 8/2/2021. Pt is typically independent with all ambulation and transfers without a device. Pt currently requires mod A x2 for bed mobility, mod A for transfers, and unable to ambulate at this time.  Pt is presenting with increased pain, decreased strength, impaired balance, impaired transfers, impaired gait, impaired coordination, impaired vision, and impaired bed mobility. Pt would benefit from continued acute care PT as well as ARU placement upon discharge to continue to address impairments as pt was fully independent without a device prior to surgery and expect pt to progress functionally as medical status improves. Complexity: high    Prognosis: Good, no significant barriers to participation at this time.      Plan Times per week: 6+/week     Equipment: TBD at next level of care    Goals:  Short term goals  Time Frame for Short term goals: 1 week  Short term goal 1: Pt to complete all bed mobility mod A  Short term goal 2: Pt to complete all STS transfers to/from bed, commode, and chair CGA  Short term goal 3: Pt to ambulate 22' with LRAD CGA       Treatment plan:  Bed mobility, transfers, balance, gait, TA, TX    Recommendations for NURSING mobility: stand pivot with mod A, cardiac walker, and gait belt    Time:   Time in: 0850  Time out: 0943  Timed treatment minutes: 40  Total time: 53    Electronically signed by:    Emely Warren PT  8/4/2021, 10:40 AM

## 2021-08-04 NOTE — PLAN OF CARE
Problem: Pain:  Goal: Pain level will decrease  Description: Pain level will decrease  Outcome: Ongoing  Goal: Control of acute pain  Description: Control of acute pain  Outcome: Ongoing  Goal: Control of chronic pain  Description: Control of chronic pain  Outcome: Ongoing     Problem: Cardiac Output - Decreased:  Goal: Hemodynamic stability will improve  Description: Hemodynamic stability will improve  Outcome: Ongoing     Problem: SAFETY  Goal: Free from accidental physical injury  Outcome: Ongoing  Goal: Free from intentional harm  Outcome: Ongoing     Problem: DAILY CARE  Goal: Daily care needs are met  Outcome: Ongoing     Problem: SKIN INTEGRITY  Goal: Skin integrity is maintained or improved  Outcome: Ongoing     Problem: KNOWLEDGE DEFICIT  Goal: Patient/S.O. demonstrates understanding of disease process, treatment plan, medications, and discharge instructions.   Outcome: Ongoing     Problem: DISCHARGE BARRIERS  Goal: Patient's continuum of care needs are met  Outcome: Ongoing     Problem: Nutrition  Goal: Optimal nutrition therapy  Outcome: Ongoing

## 2021-08-04 NOTE — PROGRESS NOTES
Pt educated on removal of chest tubes, deep breathing prior to removing chest tube. Pt verbalizes understanding. Chest tubes removed per order per sterile procedure, occlusive dressing placed, pt tolerated very well. Educated on bedrest x2 hours and post removal chest xray. Pt verbalizes understanding, will continue to monitor closely.    Electronically signed by Mainor Hutchinson RN on 8/4/2021 at 1:58 PM

## 2021-08-04 NOTE — PROGRESS NOTES
[address] [place] [address] ACUTE CARE OCCUPATIONAL THERAPY EVALUATION    Vinod Smith, 1940, 2102/2102-A, 8/5/2021    Discharge Recommendation:  Inpatient Rehabilitation      History:  [place]:  There were no encounter diagnoses. Subjective:  Patient states: Agreeable to therapy. Pain: Pt reported pain in LUE, dizziness and nausea throughout session. Does not give numerical rating. Communication with other providers: PT, RN,   Restrictions: General Precautions, Fall Risk, Sternal precautions    Home Setup/Prior level of function:  Social/Functional History  Lives With: Spouse  Type of Home: House  Home Layout: One level  Home Access: Stairs to enter without rails  Entrance Stairs - Number of Steps: 2  Entrance Stairs - Rails: None  Bathroom Shower/Tub: Walk-in shower  Bathroom Toilet: Standard  Bathroom Equipment: Grab bars in shower, Built-in shower seat, Grab bars around toilet  Home Equipment: [employer] ( uses cane)  ADL Assistance: Independent  Homemaking Assistance: Independent  Homemaking Responsibilities: Yes  Ambulation Assistance: Independent  Transfer Assistance: Independent  Active : Yes  Occupation: Retired    Examination:  · Observation: Supine in bed upon arrival  · Vision:   · L eye WFL  · Pt demo R side to midline visual field cut in R eye. · Pt demo imp convergence in R eye. · Hearing: [facility]  · Vitals: Stable vitals throughout session    Body Systems and functions:  · ROM:  LUE shoulder flexion approx 80 degrees  RUE shoulder flexion approx 120 degrees  · Strength:   ·  strength, symmetrical and WNL  · BUE shoulder flexion not formally tested d/t sternal precautions. · Sensation:   · LUE WFL  · Proximal RUE had inconsistent responses by pt for tactile sensation. · Distal RUE WFL for tactile sensation. · Tone: Normal  · Coordination:   · BUE were more deliberate and less isolated for opposition of thumb and digits assessment.   · BUE had decreased coordination for finger to nose assessment. Activities of Daily Living (ADLs):  · Feeding: SBA  setup, increased time and SBA d/t visual deficits. Recommend placement of food on L side, as well as contrast to increased visibility. · Grooming: Brittney In standing, anticipate pt would require assist for dynamic standing balance during task. Setup, increased time, VC throughout. Assist d/t visual deficits and decreased coordination at this time. Anticipate numerous rest breaks this date. · UB bathing: Brittney Setup, increased time, VC throughout. In seated, anticipate pt would require assist for dynamic sitting balance and assist d/t visual deficits and decreased coordination. · LB bathing: ModA Setup, increased time, VC throughout. In seated, anticipate pt would require assist for dynamic sitting balance and distal reaching, as well as assist d/t visual deficits. · UB dressing: ModA Setup, increased time, VC throughout. Anticipate pt would require assist for dynamic sitting balance and threading UB clothing d/t sternal precautions. · LB dressing: MaxA Setup, increased time, VC throughout. Anticipate pt would require assist for dynamic standing balance and threading LB clothing. · Toileting: MaxA Anticipate pt would require assist for commode transfer, nancy care and LB clothing manipulation. Cognitive and Psychosocial Functioning:  · Overall cognitive status: Pt oriented to time, date, person, place, city  · Affect: Normal     Balance:   · Sitting: Brittney (pt sat EOB demo poor dynamic sitting balance. Pt demo 1 LOB with posterior lean. Pt reports numerous episodes of feeling dizzy). · Standing: CGA-Brittney (pt stood with cardiac walker. Demo fair dynamic standing balance. Reports increased nausea and dizziness in standing and reports, \"I am going to faint. \" Pt demo decreased tolerance, returned to seated and was provided cool towel. BP noted to be 99/64 and stabilized in seated. RN notified).      Functional Mobility:   · Bed Mobility: ModA X2 (pt performed supine to seated bed mobility with assist for BLE positioning, trunk support, and VC for sequencing throughout). · Transfers: Brittney  (pt performed STS from EOB with with RW. Required VC throughout for sequencing and increased time). · Pt completed stand pivot with Brittney and 1 LOB and ModA to recover. · Ambulation: NT this date d/t safety concerns with pt reporting dizziness and fatigue. AM-PAC 6 click short form for inpatient daily activity:   How much help from another person does the patient currently need. .. Unable  Dep A Lot  Max A A Lot   Mod A A Little  Min A A Little   CGA  SBA None   Mod I  Indep  Sup   1. Putting on and taking off regular lower body clothing? [] 1    [x] 2   [] 2   [] 3   [] 3   [] 4      2. Bathing (including washing, rinsing, drying)? [] 1   [] 2   [x] 2 [] 3 [] 3 [] 4   3. Toileting, which includes using toilet, bedpan, or urinal? [] 1    [x] 2   [] 2   [] 3   [] 3   [] 4     4. Putting on and taking off regular upper body clothing? [] 1   [] 2   [x] 2   [] 3   [] 3    [] 4      5. Taking care of personal grooming such as brushing teeth? [] 1   [] 2    [] 2 [x] 3    [] 3   [] 4      6. Eating meals? [] 1   [] 2   [] 2   [] 3   [x] 3   [] 4      Raw Score:  14     [24=0% impaired(CH), 23=1-19%(CI), 20-22=20-39%(CJ), 15-19=40-59%(CK), 10-14=60-79%(CL), 7-9=80-99%(CM), 6=100%(CN)]    Treatment:  Therapeutic Activity Training:   Therapeutic activity training was instructed today. Cues were given for safety, sequence, UE/LE placement, awareness, and balance. Activities performed today included bed mobility training, sup-sit, sit-stand, ambulation.       Safety Measures: Gait belt used, Left in chair, Alarm in place    Assessment:  Assessment  Performance deficits / Impairments: Decreased functional mobility , Decreased strength, Decreased endurance, Decreased vision/visual deficit, Decreased coordination, Decreased ADL status, Decreased sensation, Decreased high-level IADLs, Decreased balance, Decreased fine motor control  Prognosis: Good  Decision Making: High Complexity  REQUIRES OT FOLLOW UP: Yes  Discharge Recommendations: Days[facility] Cueto    Pt is an [de-identified]year old F admitted for unstable angina. Pt underwent CABG on 08/02/2021. Pt had stroke alert called on 8/3 and appears to have visual deficits related to incident. Pt reports that prior to admission she was IND in ADLs, IADLs, and functional mobility. This date, pt demo decreased coordination, sensation, balance, activity tolerance, vision and overall functional mobility impacting ADL status. This date pt reports pain, dizziness, and nausea as limiting factors to performance in session. Pt is currently functioning below baseline and would benefit from skilled OT services at [facility]. Plan:  Plan  Times per week: 3+  Times per day: Daily      Goals:  1. Pt will complete all aspects of bed mobility for EOB/OOB ADLs Brittney. 2. Pt will complete LB bathing with Brittney and AE as needed. 3. Pt will complete all aspects of LB dressing with Brittney and AE as needed. 4. Pt will complete all functional transfers to and from bed, chair, toilet, shower chair with CGA and AD. 5. Pt will ambulate HH distance to bathroom for toileting with CGA and AD. 6. Pt will complete all aspects of toileting task with Brittney. 7. Pt will complete oral hygiene/grooming routine in standing at sink with CGA. demo good dynamic standing balance for approx 8 minutes. 8. Pt will complete ther ex/ther act with focus on UB strengthening. Time:   Time in: 850  Time out: 943  Timed treatment minutes: 40  Total time: 53    Electronically signed by:      Maninder Alberto OT/S    Electronically signed by:       REBEL Michelle/L, [address] [id number]

## 2021-08-04 NOTE — ANESTHESIA PROCEDURE NOTES
Arterial Line:    An arterial line was placed using surface landmarks, in the ICU for the following indication(s): continuous blood pressure monitoring and blood sampling needed. A 20 gauge (size), 1 and 3/4 inch (length), Angiocath (type) catheter was placed, Seldinger technique not used, into the right radial artery and 1% lido, secured by tape and Tegaderm. Anesthesia type: Local  Local infiltration: Injection    Events:  patient tolerated procedure well with no complications.     Additional notes:  Left side arterial line stooped working and is painful  8/4/2021 10:00 AM8/4/2021 10:10 AM  Resident/CRNA: ROMAINE Stanley - CRNA  Preanesthetic Checklist  Completed: patient identified, IV checked, site marked, risks and benefits discussed, monitors and equipment checked and timeout performed

## 2021-08-04 NOTE — PROGRESS NOTES
Comprehensive Nutrition Assessment    Type and Reason for Visit:  Positive Nutrition Screen    Nutrition Recommendations/Plan:   Liberalize diet to encourage increased po intake and no h/o DM per chart review  Add oral nutrition supplements TID  Encourage po intake as able  Please document all po intake  Will assess for appropriateness of diet education at f/u  Will monitor nutrition status, poc    Nutrition Assessment:  Pt admitted for abnormal regulst fo cardiovascular function study, CAD in native artery, PMH: CAD, thyroid disease, POD #2 s/p CABG x 5, noted +vision loss, noted pt weak and tired this morning, pt currently on 4 carb/2 gm sodium diet with no po intake documented in the past 72 hr, will defer diet education until patient more appropriate, pt is at high nutrition risk    Malnutrition Assessment:  Malnutrition Status:  Insufficient data    Context:  Acute Illness     Estimated Daily Nutrient Needs:  Energy (kcal):  0694-8913 (23-26 kcal/kg); Weight Used for Energy Requirements:  Current     Protein (g):  64-77 (1.0-1.2 g/kg); Weight Used for Protein Requirements:  Current        Fluid (ml/day):  2253-3576; Method Used for Fluid Requirements:  1 ml/kcal      Nutrition Related Findings:  POC glucose 118,109, 106,107      Wounds:  Surgical Incision       Current Nutrition Therapies:    ADULT DIET; Regular; 4 carb choices (60 gm/meal); Low Sodium (2 gm)    Anthropometric Measures:  · Height: 5' 2.01\" (157.5 cm)  · Current Body Weight: 140 lb 6.9 oz (63.7 kg)   · Admission Body Weight: 138 lb 14.2 oz (63 kg) (first measured weight)    · Ideal Body Weight: 110 lbs; % Ideal Body Weight 127.7 %   · BMI: 25.7  · BMI Categories: Overweight (BMI 25.0-29. 9)       Nutrition Diagnosis:   · Inadequate oral intake related to acute injury/trauma as evidenced by intake 0-25%    Nutrition Interventions:   Food and/or Nutrient Delivery:  Modify Current Diet, Start Oral Nutrition Supplement  Nutrition Education/Counseling:  Education needed   Coordination of Nutrition Care:  Continue to monitor while inpatient    Goals:  Pt will consume greater than 50% of meals and supplements       Nutrition Monitoring and Evaluation:   Behavioral-Environmental Outcomes:  None Identified   Food/Nutrient Intake Outcomes:  Supplement Intake, Food and Nutrient Intake  Physical Signs/Symptoms Outcomes:  Biochemical Data, Weight, GI Status, Hemodynamic Status, Fluid Status or Edema     Discharge Planning:     Too soon to determine     Electronically signed by Amy Bolden MS, RD, LD on 8/4/21 at 10:30 AM EDT    Contact: [phone]

## 2021-08-04 NOTE — PROGRESS NOTES
Mag:      Recent Labs     08/02/21  1717 08/03/21  0530 08/04/21  0530   MG 2.9* 2.3 2.2      Phos:   No results for input(s): PHOS in the last 72 hours. INR:   Recent Labs     08/02/21  1717 08/03/21  0931   INR 1.40 1.19       Radiology Review:  CXR  Impression:     1. Boykins-Raissa catheter has been advanced.  The tip now overlies the mid to   distal portion of the right pulmonary artery. 2. Low lung volumes with the right base airspace opacity, likely atelectasis. 3. Stable mild enlargement of the cardiac silhouette. 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 Start dobutamine at 5 and wean off epi   Current gtts: epi 6  Pulm: stable on RA, encourage IS  GI: stable  Renal: creatinine stable 0.8, marginal UOP. Hopefully will diurese more with dobutamine. DC MIVF when she is taking adequate PO. Tubes/Lines: new art line placed by anesthesia. DC CT after walk. Wound: stable   Neuro: on ASA/plavix for acute CVA, symptoms stable.        Ismael Hidalgo PA-C

## 2021-08-04 NOTE — PROGRESS NOTES
Pt up to chair, very weak and tired. Pt's CI 1.3, arterial line not working, 99/64 NIBP. Pt on epi @ 6mcg/min. JUWAN Salinas at bedside and updated. Orders to start dobutamine gtt @ 5mcg/min, wean off epi gtt as tolerated, place consult for anesthesia to replace arterial line, CRNA called and updated. Will continue to monitor closely.

## 2021-08-05 ENCOUNTER — APPOINTMENT (OUTPATIENT)
Dept: GENERAL RADIOLOGY | Age: 81
DRG: 233 | End: 2021-08-05
Attending: INTERNAL MEDICINE
Payer: MEDICARE

## 2021-08-05 LAB
ANION GAP SERPL CALCULATED.3IONS-SCNC: 10 MMOL/L (ref 4–16)
APTT: 31.4 SECONDS (ref 25.1–37.1)
BUN BLDV-MCNC: 12 MG/DL (ref 6–23)
CALCIUM IONIZED: 4.72 MG/DL (ref 4.48–5.28)
CALCIUM SERPL-MCNC: 8.1 MG/DL (ref 8.3–10.6)
CHLORIDE BLD-SCNC: 108 MMOL/L (ref 99–110)
CO2: 20 MMOL/L (ref 21–32)
CREAT SERPL-MCNC: 0.6 MG/DL (ref 0.6–1.1)
GFR AFRICAN AMERICAN: >60 ML/MIN/1.73M2
GFR NON-AFRICAN AMERICAN: >60 ML/MIN/1.73M2
GLUCOSE BLD-MCNC: 102 MG/DL (ref 70–99)
GLUCOSE BLD-MCNC: 125 MG/DL (ref 70–99)
GLUCOSE BLD-MCNC: 198 MG/DL (ref 70–99)
GLUCOSE BLD-MCNC: 86 MG/DL (ref 70–99)
GLUCOSE BLD-MCNC: 95 MG/DL (ref 70–99)
HCT VFR BLD CALC: 28.1 % (ref 37–47)
HEMOGLOBIN: 9.1 GM/DL (ref 12.5–16)
IONIZED CA: 1.18 MMOL/L (ref 1.12–1.32)
MAGNESIUM: 2.1 MG/DL (ref 1.8–2.4)
MCH RBC QN AUTO: 31.5 PG (ref 27–31)
MCHC RBC AUTO-ENTMCNC: 32.4 % (ref 32–36)
MCV RBC AUTO: 97.2 FL (ref 78–100)
PDW BLD-RTO: 13.6 % (ref 11.7–14.9)
PLATELET # BLD: 91 K/CU MM (ref 140–440)
PMV BLD AUTO: 10.2 FL (ref 7.5–11.1)
POTASSIUM SERPL-SCNC: 4.1 MMOL/L (ref 3.5–5.1)
RBC # BLD: 2.89 M/CU MM (ref 4.2–5.4)
SODIUM BLD-SCNC: 138 MMOL/L (ref 135–145)
WBC # BLD: 9.7 K/CU MM (ref 4–10.5)

## 2021-08-05 PROCEDURE — 94761 N-INVAS EAR/PLS OXIMETRY MLT: CPT

## 2021-08-05 PROCEDURE — 6370000000 HC RX 637 (ALT 250 FOR IP): Performed by: PHYSICIAN ASSISTANT

## 2021-08-05 PROCEDURE — 94150 VITAL CAPACITY TEST: CPT

## 2021-08-05 PROCEDURE — 80048 BASIC METABOLIC PNL TOTAL CA: CPT

## 2021-08-05 PROCEDURE — 6360000002 HC RX W HCPCS: Performed by: PHYSICIAN ASSISTANT

## 2021-08-05 PROCEDURE — 82330 ASSAY OF CALCIUM: CPT

## 2021-08-05 PROCEDURE — 85027 COMPLETE CBC AUTOMATED: CPT

## 2021-08-05 PROCEDURE — 2000000000 HC ICU R&B

## 2021-08-05 PROCEDURE — 99232 SBSQ HOSP IP/OBS MODERATE 35: CPT | Performed by: INTERNAL MEDICINE

## 2021-08-05 PROCEDURE — 85730 THROMBOPLASTIN TIME PARTIAL: CPT

## 2021-08-05 PROCEDURE — 6360000002 HC RX W HCPCS: Performed by: SURGERY

## 2021-08-05 PROCEDURE — 2580000003 HC RX 258: Performed by: PHYSICIAN ASSISTANT

## 2021-08-05 PROCEDURE — 97530 THERAPEUTIC ACTIVITIES: CPT

## 2021-08-05 PROCEDURE — 71045 X-RAY EXAM CHEST 1 VIEW: CPT

## 2021-08-05 PROCEDURE — 97535 SELF CARE MNGMENT TRAINING: CPT

## 2021-08-05 PROCEDURE — 83735 ASSAY OF MAGNESIUM: CPT

## 2021-08-05 PROCEDURE — 97116 GAIT TRAINING THERAPY: CPT

## 2021-08-05 PROCEDURE — 97110 THERAPEUTIC EXERCISES: CPT

## 2021-08-05 PROCEDURE — 2580000003 HC RX 258: Performed by: INTERNAL MEDICINE

## 2021-08-05 PROCEDURE — 82962 GLUCOSE BLOOD TEST: CPT

## 2021-08-05 RX ORDER — FUROSEMIDE 10 MG/ML
20 INJECTION INTRAMUSCULAR; INTRAVENOUS 2 TIMES DAILY
Status: DISCONTINUED | OUTPATIENT
Start: 2021-08-05 | End: 2021-08-08

## 2021-08-05 RX ADMIN — LEVOTHYROXINE SODIUM 50 MCG: 0.05 TABLET ORAL at 07:11

## 2021-08-05 RX ADMIN — CLOPIDOGREL BISULFATE 75 MG: 75 TABLET ORAL at 08:55

## 2021-08-05 RX ADMIN — ACETAMINOPHEN 650 MG: 325 TABLET ORAL at 16:40

## 2021-08-05 RX ADMIN — ONDANSETRON 4 MG: 2 INJECTION INTRAMUSCULAR; INTRAVENOUS at 09:00

## 2021-08-05 RX ADMIN — PANTOPRAZOLE SODIUM 40 MG: 40 TABLET, DELAYED RELEASE ORAL at 07:11

## 2021-08-05 RX ADMIN — SODIUM CHLORIDE: 4.5 INJECTION, SOLUTION INTRAVENOUS at 16:44

## 2021-08-05 RX ADMIN — SODIUM CHLORIDE: 4.5 INJECTION, SOLUTION INTRAVENOUS at 03:40

## 2021-08-05 RX ADMIN — Medication: at 21:00

## 2021-08-05 RX ADMIN — AMIODARONE HYDROCHLORIDE 200 MG: 200 TABLET ORAL at 16:40

## 2021-08-05 RX ADMIN — SODIUM CHLORIDE, PRESERVATIVE FREE 10 ML: 5 INJECTION INTRAVENOUS at 21:00

## 2021-08-05 RX ADMIN — ATORVASTATIN CALCIUM 20 MG: 20 TABLET, FILM COATED ORAL at 21:00

## 2021-08-05 RX ADMIN — CALCIUM GLUCONATE 2000 MG: 20 INJECTION, SOLUTION INTRAVENOUS at 08:49

## 2021-08-05 RX ADMIN — SODIUM CHLORIDE, PRESERVATIVE FREE 10 ML: 5 INJECTION INTRAVENOUS at 08:56

## 2021-08-05 RX ADMIN — AMIODARONE HYDROCHLORIDE 200 MG: 200 TABLET ORAL at 08:55

## 2021-08-05 RX ADMIN — MIDODRINE HYDROCHLORIDE 10 MG: 5 TABLET ORAL at 16:40

## 2021-08-05 RX ADMIN — AMIODARONE HYDROCHLORIDE 200 MG: 200 TABLET ORAL at 20:59

## 2021-08-05 RX ADMIN — MIDODRINE HYDROCHLORIDE 10 MG: 5 TABLET ORAL at 08:55

## 2021-08-05 RX ADMIN — FUROSEMIDE 20 MG: 10 INJECTION, SOLUTION INTRAMUSCULAR; INTRAVENOUS at 08:52

## 2021-08-05 RX ADMIN — Medication: at 08:55

## 2021-08-05 RX ADMIN — ASPIRIN 81 MG: 81 TABLET ORAL at 08:55

## 2021-08-05 RX ADMIN — SODIUM CHLORIDE, PRESERVATIVE FREE 10 ML: 5 INJECTION INTRAVENOUS at 21:01

## 2021-08-05 RX ADMIN — DOBUTAMINE IN DEXTROSE 5 MCG/KG/MIN: 200 INJECTION, SOLUTION INTRAVENOUS at 08:52

## 2021-08-05 RX ADMIN — FUROSEMIDE 20 MG: 10 INJECTION, SOLUTION INTRAMUSCULAR; INTRAVENOUS at 16:40

## 2021-08-05 RX ADMIN — DOCUSATE SODIUM 50 MG AND SENNOSIDES 8.6 MG 1 TABLET: 8.6; 5 TABLET, FILM COATED ORAL at 08:55

## 2021-08-05 RX ADMIN — SODIUM CHLORIDE, PRESERVATIVE FREE 10 ML: 5 INJECTION INTRAVENOUS at 08:55

## 2021-08-05 ASSESSMENT — PAIN DESCRIPTION - PAIN TYPE: TYPE: SURGICAL PAIN

## 2021-08-05 ASSESSMENT — PAIN SCALES - GENERAL
PAINLEVEL_OUTOF10: 0
PAINLEVEL_OUTOF10: 0
PAINLEVEL_OUTOF10: 2
PAINLEVEL_OUTOF10: 3
PAINLEVEL_OUTOF10: 0

## 2021-08-05 NOTE — PROGRESS NOTES
PATIENT NAME: Sandeep Last    TODAY'S DATE: 08/05/21    SUBJECTIVE:    Pt is POD # 3 s/p CABG x 5.  Pt's vision loss remains stable. Did work wit therapy. OBJECTIVE:   VITALS:    Vitals:    08/05/21 1402   BP: (!) 124/59   Pulse: 87   Resp: 24   Temp:    SpO2: 96%     INTAKE/OUTPUT:    Date 08/05/21 0000 - 08/05/21 2359   Shift 6799-0627 5541-7762 3555-2607 24 Hour Total   INTAKE   P.O. 120 100  220   I. V.(mL/kg/hr) 1016.6(2) 20  1036.6   Shift Total(mL/kg) 1136.6(17.8) 120(1.9)  1256. 6(19.7)   OUTPUT   Urine(mL/kg/hr) 540(1.1) 930  1470   Shift Total(mL/kg) 540(8.5) 930(14.6)  1470(23.1)   Weight (kg) 63.7 63.7 63.7 63.7      No data found. EXAM:  Blood pressure (!) 124/59, pulse 87, temperature 98.6 °F (37 °C), temperature source Core, resp. rate 24, height 5' 2.01\" (1.575 m), weight 140 lb 6.4 oz (63.7 kg), SpO2 96 %. General appearance: No apparent distress, appears stated age and cooperative. Skin: unremarkable  HEENT Normocephalic, atraumatic without obvious deformity. Neck: Supple, Trachea midline   Lungs: Good respiratory effort. Clear to auscultation, bilaterally  Heart: Regular rate/ rhythm inc c/d/i  Abdomen: Soft, non-tender or non-distended   Extremities: 1+ edema warm well perfused  Neurologic: Alert, grossly intact, no focal weakness. R eye peripheral vision loss noted.   Mental status: normal affect      Data:  CBC:   Recent Labs     08/03/21  0931 08/04/21  0530 08/05/21  0415   WBC 12.6* 14.6* 9.7   HGB 9.1* 9.5* 9.1*   HCT 27.5* 30.1* 28.1*   * 107* 91*     BMP:    Recent Labs     08/03/21  1732 08/04/21  0530 08/05/21  0415    139 138   K 4.2 4.3 4.1    109 108   CO2 19* 20* 20*   BUN 15 16 12   CREATININE 1.0 0.8 0.6   GLUCOSE 133* 111* 86     Hepatic:   Recent Labs     08/03/21  0931   *   ALT 74*   BILITOT 0.6   ALKPHOS 21*     Mag:      Recent Labs     08/03/21  0530 08/04/21  0530 08/05/21  0415   MG 2.3 2.2 2.1      Phos:   No results for input(s): PHOS in the last 72 hours. INR:   Recent Labs     21  1717 21  0931   INR 1.40 1.19       Radiology Review:  CXR        ASSESSMENT AND PLAN:    Patient Active Problem List   Diagnosis    PVC (premature ventricular contraction)    Fatigue    Chest pressure    HORACE (obstructive sleep apnea)    Hypersomnia    Unstable angina (HCC)    Essential hypertension    CAD in native artery    Acute on chronic combined systolic and diastolic heart failure (HCC)       S/P CABG x 5    Cardio: On dobutamine at 5, off other gtts, will plan to wean dobutrex Reinier   Current gtts:  5  Pulm: stable on RA, encourage IS  GI: stable  Renal: creatinine stable 0.6, diuresing well  Tubes/Lines: amee  Wound: stable   Neuro: on ASA/plavix for acute CVA, symptoms stable.

## 2021-08-05 NOTE — CONSULTS
Roberto Juan MD.  Section of General Neurology - Adult  Consult Note        Reason for Consult:    Requesting Physician:  Alex Mccabe MD  [address] [facility] [address],  [address] [facility]  Thank you for your kind referral.    CHIEF COMPLAINT:  Left occipital infarct         HISTORY OF PRESENT ILLNESS:              The patient is a [de-identified] y.o. female with a history of left occipital infarct. pt has right visual field deficit. pt also has left PCA occlusion. pt is post op CABG. cho was positive for interatrial septal aneurysm. pt is on asa and plavix. Past Medical History:        Diagnosis Date    Arthritis     CAD in native artery 07/29/2021    Essential hypertension 07/29/2021    H/O cardiac catheterization 07/27/2021    Several vessel serious  stenosis, Urgent CABG is recommended.  H/O cardiovascular stress test 07/29/2021    Abnormal Stress EKG results and patient was transferred to [facility] cath lab per Dr. Surya Weiss.  H/O echocardiogram 07/29/2021    Left ventricular function and size is normal, EF is estimated at 55-60%. Mild mitral ,tricuspid and moderate aortic regurgitation is present.  History of Holter monitoring 06/15/2021    Frequent PVCs. 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     Thyroid disease      Past Surgical History:        Procedure Laterality Date    APPENDECTOMY      BACK SURGERY      COLONOSCOPY      CORONARY ARTERY BYPASS GRAFT  08/02/2021    CABG X 5 vessel with Dr Mario Miller N/A 8/2/2021    CABG CORONARY ARTERY BYPASS X 5, INTRAOPERATIVE [place], INDUCED HYPOTHERMIA AND BILATERAL ENDOHARVEST OF SAPHENOUS VEINS performed by Merrilee Cabot, MD at [address] [place] [address]      HYSTERECTOMY      TUMOR REMOVAL       Current Medications:   Current Facility-Administered Medications: furosemide (LASIX) injection 20 mg, 20 mg, Intravenous, BID  ipratropium-albuterol (DUONEB) nebulizer solution 1 ampule, 1 ampule, Inhalation, Q4H PRN  DOBUTamine (DOBUTREX) 500 mg in dextrose 5 % 250 mL infusion, 5 mcg/kg/min, Intravenous, Continuous  clopidogrel (PLAVIX) tablet 75 mg, 75 mg, Oral, Daily  midodrine (PROAMATINE) tablet 10 mg, 10 mg, Oral, TID WC  0.45 % sodium chloride infusion, , Intravenous, Continuous  sodium chloride flush 0.9 % injection 10 mL, 10 mL, Intravenous, 2 times per day  sodium chloride flush 0.9 % injection 10 mL, 10 mL, Intravenous, PRN  0.9 % sodium chloride infusion, 25 mL, Intravenous, PRN  ondansetron (ZOFRAN) injection 4 mg, 4 mg, Intravenous, Q8H PRN  aspirin EC tablet 81 mg, 81 mg, Oral, Daily  acetaminophen (TYLENOL) tablet 650 mg, 650 mg, Oral, Q4H PRN  HYDROcodone-acetaminophen (NORCO) 5-325 MG per tablet 1 tablet, 1 tablet, Oral, Q4H PRN **OR** HYDROcodone-acetaminophen (NORCO) 5-325 MG per tablet 2 tablet, 2 tablet, Oral, Q4H PRN  ketorolac (TORADOL) injection 15 mg, 15 mg, Intravenous, Q6H PRN  amiodarone (CORDARONE) tablet 200 mg, 200 mg, Oral, TID  hydrALAZINE (APRESOLINE) injection 5 mg, 5 mg, Intravenous, Q5 Min PRN  metoprolol (LOPRESSOR) injection 2.5 mg, 2.5 mg, Intravenous, Q10 Min PRN  mupirocin (BACTROBAN) 2 % ointment, , Nasal, BID  multivitamin 1 tablet, 1 tablet, Oral, Daily with breakfast  sennosides-docusate sodium (SENOKOT-S) 8.6-50 MG tablet 1 tablet, 1 tablet, Oral, Daily  bisacodyl (DULCOLAX) suppository 10 mg, 10 mg, Rectal, Daily PRN  fleet rectal enema 1 enema, 1 enema, Rectal, Daily PRN  carvedilol (COREG) tablet 3.125 mg, 3.125 mg, Oral, BID  atorvastatin (LIPITOR) tablet 20 mg, 20 mg, Oral, Nightly  pantoprazole (PROTONIX) tablet 40 mg, 40 mg, Oral, Daily  potassium chloride 20 mEq/50 mL IVPB (Central Line), 20 mEq, Intravenous, PRN  magnesium sulfate 2000 mg in 50 mL IVPB premix, 2,000 mg, Intravenous, PRN  calcium gluconate 2000 mg in sodium chloride 100 mL, 2,000 mg, Intravenous, PRN  albumin human 5 % IV solution 25 g, 25 g, Intravenous, PRN  norepinephrine (LEVOPHED) 16 mg in sodium chloride 0.9 % 250 mL infusion, 2 mcg/min, Intravenous, Continuous PRN  EPINEPHrine (EPINEPHrine HCL) 5 mg in dextrose 5 % 250 mL infusion, 1-30 mcg/min, Intravenous, Continuous PRN  nitroGLYCERIN 50 mg in dextrose 5% 250 mL infusion, 10 mcg/min, Intravenous, Continuous PRN  glucose (GLUTOSE) 40 % oral gel 15 g, 15 g, Oral, PRN  dextrose 50 % IV solution, 12.5 g, Intravenous, PRN  glucagon (rDNA) injection 1 mg, 1 mg, Intramuscular, PRN  dextrose 5 % solution, 100 mL/hr, Intravenous, PRN  [START ON 8/8/2021] amiodarone (CORDARONE) tablet 200 mg, 200 mg, Oral, Daily  insulin regular (MYXREDLIN) 100 units in sodium chloride 0.9 % 100 ml infusion, 1-50 Units/hr, Intravenous, Continuous  loperamide (IMODIUM) capsule 2 mg, 2 mg, Oral, BID PRN  sodium chloride 0.9 % 1,000 mL with gentamicin (GARAMYCIN) 80 mg, , Irrigation, Once  ALPRAZolam (XANAX) tablet 0.25 mg, 0.25 mg, Oral, Nightly PRN  levothyroxine (SYNTHROID) tablet 50 mcg, 50 mcg, Oral, Daily  sodium chloride flush 0.9 % injection 5-40 mL, 5-40 mL, Intravenous, 2 times per day  sodium chloride flush 0.9 % injection 5-40 mL, 5-40 mL, Intravenous, PRN  0.9 % sodium chloride infusion, 25 mL, Intravenous, PRN  acetaminophen (TYLENOL) tablet 650 mg, 650 mg, Oral, Q4H PRN  melatonin tablet 3 mg, 3 mg, Oral, Nightly PRN  simethicone (MYLICON) chewable tablet 80 mg, 80 mg, Oral, 4x Daily PRN  Allergies:  Iodine and Pcn [penicillins]    Social History:  TOBACCO:   reports that she has never smoked. She has never used smokeless tobacco.  ETOH:   reports no history of alcohol use. DRUGS:   reports no history of drug use.   Family History:       Problem Relation Age of Onset    Cancer Father        REVIEW OF SYSTEMS:  CONSTITUTIONAL:  negative  HEENT:  negative  RESPIRATORY:  negative  CARDIOVASCULAR:  negative  GASTROINTESTINAL:  negative  GENITOURINARY:  negative  MUSCULOSKELETAL:  negative  BEHAVIOR/PSYCH:  Negative    ROS Neg    Family hx neg    PHYSICAL EXAM  ------------------------  Vitals:  /62   Pulse 87   Temp 98.6 °F (37 °C) (Core)   Resp 24   Ht 5' 2.01\" (1.575 m)   Wt 140 lb 6.4 oz (63.7 kg)   SpO2 93%   BMI 25.67 kg/m²      General:  Awake, alert, oriented X 3. Well developed, well nourished, well groomed. No apparent distress. HEENT:  Normocephalic, atraumatic. Pupils equal, round, reactive to light. No scleral icterus. No conjunctival injection. Normal lips, teeth, and gums. No nasal discharge. Neck:  Supple  Heart:  RRR, no murmurs, gallops, rubs  Lungs:  CTA bilaterally, bilat symmetrical expansion, no wheeze, rales, or rhonchi  Abdomen: Bowel sounds present, soft, nontender, no masses, no organomegaly, no peritoneal signs  Extremities:  No clubbing, cyanosis, or edema  Skin:  Warm and dry, no open lesions or rash  Breast: deferred  Rectal: deferred  Genitalia:  deferred    NEUROLOGICAL EXAM  ---------------------------------    Mental Status Exam:             Alert and oriented times three,follows commands,speech and language intact    Cranial [id number] Intact.         Cranial nerve II           Visual acuity:  Right homonymous hemianopsia                 Cranial nerve III           Pupils:  equal, round, reactive to light      Cranial nerves III, IV, VI           Extraocular Movements: intact      Cranial nerve V           Facial sensation:  intact      Cranial nerve VII           Facial strength: intact      Cranial nerve VIII           Hearing:  intact      Cranial nerve IX           Palate:  intact      Cranial nerve XI         Shoulder shrug:  intact      Cranial nerve XII          Tongue movement:  normal    Motor:    Drift:  absent  Motor exam is symmetrical 5 out of 5 all extremities bilaterally  Tone:  normal  Abnormal Movements:  Absent    DTRs-1+ biceps,triceps,brachioradialis,knee jerks and ankle jerks bilaterally symmetrical.  Toes-downgoing bilaterally            Sensory:normal TSH  VITAMIN B12: No components found for: B12  FOLATE:  No results found for: FOLATE  RPR:  No results found for: RPR  POOJA:  No results found for: ANATITER, POOJA  Urine Toxicology:  No components found for: IAMMENTA, IBARBIT, IBENZO, ICOCAINE, IMARTHC, IOPIATES, IPHENCYC     IMPRESSION:    Left occipital infarct    Left PCA occlusion    PLAN:    CT brain as above    CTA head neck as above    Mri brain if can be done post-CABG    Echo positive for aneurysmal interatrial septum    Homocysteine level    Continue asa plavix    Discussed dx prognosis meds side effects and above with pt and family and answered all questions. Jacqueline Velez MD MD  BOARD CERTIFIED-NEUROLOGY.

## 2021-08-05 NOTE — PROGRESS NOTES
I met with patient in room. This is POD # 3. I re-introduced myself to patient as the cardiac rehab nurse, as well as re-introducing the [facility].  I explained that this program is a customized out patient program of exercise and education. I explained to the patient that Cardiac Rehab is designed to help improve your hearts future. Cardiac rehab is a medically supervised program designed to improve your cardiovascular health through educating about nutrition, exercise, and stress release. The [facility] program overview video watched by patient at this time. I explained to patient that she would not be starting program for six weeks and that the Cardiac Rehab facility would be in contact with her to setup an appointment when it is closer to her release date from restrictions. Patient had no further questions regarding rehab at this time.

## 2021-08-05 NOTE — PROGRESS NOTES
Salvatore Bush catheter removed per verbal order of Dr. Isabella Butcher. Pt ate slightly better for lunch.

## 2021-08-05 NOTE — CONSULTS
Department of Ophthalmology  Attending Consult Note        Reason for Consult:  Visual field defect   Requesting Physician:  Anthony Huffman COMPLAINT:  Right sides vision loss    HISTORY OF PRESENT ILLNESS:                Patient is a [de-identified] y.o. female who presents with right sided visual field defect since suffering a left occipital lobe CVA. Describes things as looking as weird or missing off to her right. Denies any double vision or any other eye complaints    Past Ophthalmic History:    Cataract surgery both eyes    Past Medical History:        Diagnosis Date    Arthritis     CAD in native artery 07/29/2021    Essential hypertension 07/29/2021    H/O cardiac catheterization 07/27/2021    Several vessel serious  stenosis, Urgent CABG is recommended.  H/O cardiovascular stress test 07/29/2021    Abnormal Stress EKG results and patient was transferred to [facility] cath lab per Dr. Page Marks.  H/O echocardiogram 07/29/2021    Left ventricular function and size is normal, EF is estimated at 55-60%. Mild mitral ,tricuspid and moderate aortic regurgitation is present.  History of Holter monitoring 06/15/2021    Frequent PVCs. 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     Thyroid disease      Past Surgical History:        Procedure Laterality Date    APPENDECTOMY      BACK SURGERY      COLONOSCOPY      CORONARY ARTERY BYPASS GRAFT  08/02/2021    CABG X 5 vessel with Dr Padmini Thompson N/A 8/2/2021    CABG CORONARY ARTERY BYPASS X 5, INTRAOPERATIVE [place], INDUCED HYPOTHERMIA AND BILATERAL ENDOHARVEST OF SAPHENOUS VEINS performed by Morales Bauer MD at [place] [address]      TUMOR REMOVAL       Medications Prior to Admission:  Medications Prior to Admission: loperamide (IMODIUM) 2 MG capsule, Take 2 mg by mouth 2 times daily as needed for Diarrhea  magnesium oxide (MAG-OX) 400 MG tablet, Take 1 tablet by mouth daily  dilTIAZem (CARDIZEM CD) 120 MG extended release capsule, Take 1 capsule by mouth daily  acetaminophen (TYLENOL) 325 MG tablet, Take 325-650 mg by mouth every 6 hours as needed  estradiol (ESTRACE) 0.1 MG/GM vaginal cream, Apply pea sized amount to vagina nightly x7 days, then every other night for 7 days, then every 3rd night therafter  lisinopril (PRINIVIL;ZESTRIL) 10 MG tablet, Take 10 mg by mouth Daily with lunch  Multiple Vitamin (MULTIVITAMIN) capsule, Take 1 capsule by mouth Daily with lunch  levothyroxine (SYNTHROID) 50 MCG tablet, Take 50 mcg by mouth Daily  dicyclomine (BENTYL) 10 MG capsule, Take 10 mg by mouth as needed   ALPRAZolam (XANAX) 0.25 MG tablet, Take 0.25 mg by mouth nightly as needed for Sleep  Hyoscyamine Sulfate SL 0.125 MG SUBL,   meclizine (ANTIVERT) 25 MG tablet, Take 25 mg by mouth 3 times daily as needed (Patient not taking: Reported on 7/28/2021)  omeprazole (PRILOSEC) 20 MG delayed release capsule, Take 20 mg by mouth as needed  (Patient not taking: Reported on 7/28/2021)    Allergies: Iodine and Pcn [penicillins]    Social History:    TOBACCO:   reports that she has never smoked. She has never used smokeless tobacco.  ETOH:   reports no history of alcohol use. DRUGS:   reports no history of drug use. SEXUAL HISTORY:    DOMESTIC VIOLENCE:  no  ACTIVITIES OF DAILY LIVING:    INSTRUMENTAL ACTIVITIES OF DAILY LIVING:  Patient is able to perform all instrumental activities of daily living.   MARITAL STATUS:    OCCUPATION:    LEVEL OF EDUCATION:    Family History:     Glaucoma:  no  Retinal Problems:  no      Problem Relation Age of Onset    Cancer Father        REVIEW OF SYSTEMS:    CONSTITUTIONAL:  negative  EYES:  positive for  blurred vision  HEENT:  negative  RESPIRATORY:  negative  CARDIOVASCULAR:  negative  GASTROINTESTINAL:  negative  GENITOURINARY:  negative  INTEGUMENT/BREAST:  negative  HEMATOLOGIC/LYMPHATIC:  negative  ALLERGIC/IMMUNOLOGIC: negative  ENDOCRINE:  negative  MUSCULOSKELETAL:  negative  NEUROLOGICAL:  negative  BEHAVIOR/PSYCH:  negative    PHYSICAL EXAM:      Vitals:  BP (!) 124/59   Pulse 87   Temp 98.6 °F (37 °C) (Core)   Resp 24   Ht 5' 2.01\" (1.575 m)   Wt 140 lb 6.4 oz (63.7 kg)   SpO2 96%   BMI 25.67 kg/m²     Ophthalmic Examination:      Visual Acuity: Right eye: with correction (glasses) near 20/20  Left eye: with correction (glasses) near 20/20        Pupils Reactive to Light: right full and left full    Motility (EOM):  Right full and Left full    Visual Field: Right eye confrontation shows right vf defect respects midline and Left eye confrontation shows right vf defect that respects midline    Intraocular Pressure: Right eye applanation 14 and Left eye applanation 14    Drops Given: none    Anterior Segment Exam: lids/lashes/lactimal system normal OU  conjuctiva/sclera normal OU  cornea normal OU  anterior chamber normal OU  iris normal OU  lens pciol  OU    Posterior Segment Exam: nerve cup/disc ratio:  0.4 OU  nerve appearance normal OU  macula normal OU              IMPRESSION/RECOMMENDATIONS:      1. 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 Recommend f/u as outpatient in our office for automated visual field testing to determine extent of visual field loss. See 1-2 weeks after discharge.

## 2021-08-05 NOTE — CARE COORDINATION
Met with patient and wife regarding pre-cert process and discussed ARU. Explained to patient the required 3 hours of therapy a day. Also explained the average length of stay is 11 days, could be longer or shorter depending on recommendations of therapy and Dr. Maria Luz Gipson. Patient expresses her understanding and states she's agreeable to admit to ARU. Per patient she is still unable to see out of her right eye. Patient also having issues with grasping with her right hand. Patient states the right sided weakness occurred same day as loss of vision. Per patient her goal is to return home with her  at discharge from ARU. Spoke with patients daughter Romana Way) who states goal is for patient to return home at discharge from ARU. Per Sam Awan she will be staying with patient and  once patient is discharged from ARU. Answered questions regarding ARU.     1400:  Presented clinicals and therapy note to Dr. Maria Luz Gipson. Patient meets criteria and is approved to come to ARU. Patient able to admit once medically stable and after ARU Medical Director and  sign the pre-admission screen (PAS), pending bed availability.      COVID negative test noted on 7/29, patient will not need a new COVID test.

## 2021-08-05 NOTE — PROGRESS NOTES
Physical Therapy    Physical Therapy Treatment Note  Name: Harleen Moore MRN: [id number] :   1940   Date:  2021   Admission Date: 2021 Room:  -A   Restrictions/Precautions:        Sternal, no pushing, pulling or lifting more than 5 pounds for the first 2 weeks and then 10 pounds up to 3 months,   Arms are to support chest when changing position, coughing or sneezing. No lifting arms above 90 degrees except with the ex's  Communication with other providers:  Sriram Guardado RN states pt is ok to see for therapy. Co-treat with OT  Subjective:  Patient states:  She feels so weak, she is her husbands care giver, c/o visual deficit in the R eye  Pain:   Location, Type, Intensity (0/10 to 10/10):  soreness  Objective:    Observation:  Pt was sitting up on the side of the bed with OT  Treatment, including education/measures:  Vital Signs  HR: 83, B/P 131/46, O2 96% on room air  Transfers with line management of IV's quintana, tele, multiple other lines  Scooting :min A of 1 to EOB and depend of 2 to back of the chair and VC's for sternal precautions  Sit to stand :min A of 2 and VC's for sternal precautions  Stand to sit :mod A of 2 and VC's for sternal precautions  Gait:  Pt amb with CW for 45 ft with mod A of 2, pt began to have increased sweating and felt weak stating she needed to sit, pt's color in her face was grayish.  Chair was brought up and pt trans to sitting with mod a of 2  Pt needed VC's for keep eyes open, PLB, pathway and encouragement  Pt was taken back to her room via the chair  Education:  Pt was instructed in Sternal Precautions, Purpose of Exercise Program, Zaki Scale and Signs and Symptoms of Exercise Intolerance per Cardiac Protocol  Pt was instructed in Intermediate Cardiac ex program:sitting  Overhead Side Stretch x 5  Ankle Pumps/ Heel Raises x 5  Marching Seated x 5  Forward Arm Raises x 5  Side Arm Raises x 5  Arm Crosses x 5  Arm Circles Forwards and Backwards x 5  SittingTrunkTwist x 5  Safety  Patient left safely in the chair, with call light/phone in reach with alarm applied. Gait belt and mask were used for transfers and gait. Assessment / Impression:    Patient's tolerance of treatment:  Fair to good, pt is feeling weak and low endurance but able to increase her activity level well today   Adverse Reaction: none  Significant change in status and impact:  none  Barriers to improvement:  Endurance, weakness,  Plan for Next Session:    Will cont to progress ex's and gt per cardiac protocol and educate pt on sternal precautions, S & S of ex intolerance, purpose of ex's, progression of ex's and gt at home. Time in:  0920  Time out:  1020  Timed treatment minutes:  60  Total treatment time:  61  Previously filed items:  Social/Functional History  Lives With: Spouse  Type of Home: House  Home Layout: One level  Home Access: Stairs to enter without rails  Entrance Stairs - Number of Steps: 2  Entrance Stairs - Rails: None  Bathroom Shower/Tub: Walk-in shower  Bathroom Toilet: Standard  Bathroom Equipment: Grab bars in shower, Built-in shower seat, Grab bars around toilet  Home Equipment: Lila Ok ( uses cane)  ADL Assistance: Independent  Homemaking Assistance: Independent  Homemaking Responsibilities: Yes  Ambulation Assistance: Independent  Transfer Assistance: Independent  Active : Yes  Occupation: Retired  Short term goals  Time Frame for [employer] term goals: 1 week  Short term goal 1: Pt to complete all bed mobility mod A  Short term goal 2: Pt to complete all STS transfers to/from bed, commode, and chair CGA  Short term goal 3: Pt to ambulate 25' with LRAD CGA     Electronically signed by:     Geo Thomas PTA  8/5/2021, 10:05 AM

## 2021-08-05 NOTE — PROGRESS NOTES
CARDIOLOGY  NOTE        Name:  Stephanie Ho /Age/Sex: 1940  ([de-identified] y.o. female)   MRN & CSN:  [id number] & [id number] Admission Date/Time: 2021 11:50 AM   Location:  -A PCP: No primary care provider on file. Hospital Day: 8        PLAN FROM CARDIOLOGY FOR TODAY:   rehab      - cardiology consult is for:  cad    -  Interval history:  Cabg,     · ASSESSMENT/ PLAN:  1. Cad post cabg  2. Visual/ neuro  deficit stble  3. Risk factor modification          Subjective: Todays complain: Patient  No cp    HPI:  Zuly Rasheed is a [de-identified] y. o.year old who and presents with abn stress from [facility]          Objective: Temperature:  Current - Temp: 98.6 °F (37 °C); Max - Temp  Av.6 °F (37 °C)  Min: 98.2 °F (36.8 °C)  Max: 99.3 °F (37.4 °C)    Respiratory Rate : Resp  Av.2  Min: 16  Max: 31    Pulse Range: Pulse  Av.4  Min: 79  Max: 96    Blood Presuure Range:  Systolic (47GFM), LOB:109 , Min:109 , VUY:139   ; Diastolic (13FWT), OVK:76, Min:41, Max:75      Pulse ox Range: SpO2  Av.1 %  Min: 91 %  Max: 96 %    24hr I & O:      Intake/Output Summary (Last 24 hours) at 2021 1140  Last data filed at 2021 1030  Gross per 24 hour   Intake 3789.59 ml   Output 2020 ml   Net 1769.59 ml         /63   Pulse 90   Temp 98.6 °F (37 °C) (Core)   Resp 25   Ht 5' 2.01\" (1.575 m)   Wt 140 lb 6.4 oz (63.7 kg)   SpO2 94%   BMI 25.67 kg/m²           Review of Systems:  All 14 systems reviewed, all negative except for  Visual deficit      TELEMETRY: Sinus    has a past medical history of Arthritis, CAD in native artery, Essential hypertension, H/O cardiac catheterization, H/O cardiovascular stress test, H/O echocardiogram, History of Holter monitoring, and Thyroid disease. has a past surgical history that includes tumor removal; Dilation and curettage of uterus; Hysterectomy; Colonoscopy;  Appendectomy; back surgery; Coronary artery bypass graft (08/02/2021); and Coronary artery bypass graft (N/A, 8/2/2021).   Physical Exam:  General:  Awake, alert, NAD  Head:normal  Eye:normal  Neck:  No JVD   Chest:  Clear to auscultation, respiration easy  Cardiovascular:  RRR S1S2  Abdomen:   nontender  Extremities:  no edema  Pulses; palpable  Neuro: alert    Medications:    furosemide  20 mg Intravenous BID    clopidogrel  75 mg Oral Daily    midodrine  10 mg Oral TID WC    sodium chloride flush  10 mL Intravenous 2 times per day    aspirin  81 mg Oral Daily    amiodarone  200 mg Oral TID    mupirocin   Nasal BID    multivitamin  1 tablet Oral Daily with breakfast    sennosides-docusate sodium  1 tablet Oral Daily    carvedilol  3.125 mg Oral BID    atorvastatin  20 mg Oral Nightly    pantoprazole  40 mg Oral Daily    [START ON 8/8/2021] amiodarone  200 mg Oral Daily    gentamicin irrigation   Irrigation Once    levothyroxine  50 mcg Oral Daily    sodium chloride flush  5-40 mL Intravenous 2 times per day      DOBUTamine 5 mcg/kg/min (08/05/21 0852)    sodium chloride 75 mL/hr at 08/05/21 0340    sodium chloride      norepinephrine Stopped (08/03/21 1714)    EPINEPHrine Stopped (08/05/21 0720)    nitroGLYCERIN      dextrose      insulin regular Stopped (08/05/21 0729)    sodium chloride       ipratropium-albuterol, sodium chloride flush, sodium chloride, ondansetron, acetaminophen, HYDROcodone 5 mg - acetaminophen **OR** HYDROcodone 5 mg - acetaminophen, ketorolac, hydrALAZINE, metoprolol, bisacodyl, fleet, potassium chloride, magnesium sulfate, calcium gluconate, albumin human, norepinephrine, EPINEPHrine, nitroGLYCERIN, glucose, dextrose, glucagon (rDNA), dextrose, loperamide, ALPRAZolam, sodium chloride flush, sodium chloride, acetaminophen, melatonin, simethicone    Lab Data:  CBC:   Recent Labs     08/03/21  0931 08/04/21  0530 08/05/21  0415   WBC 12.6* 14.6* 9.7   HGB 9.1* 9.5* 9.1*   HCT 27.5* 30.1* 28.1*   MCV 97.9 99.7 97.2   * 107* 91*     BMP:   Recent Labs     08/03/21  1732 08/04/21  0530 08/05/21  0415    139 138   K 4.2 4.3 4.1    109 108   CO2 19* 20* 20*   BUN 15 16 12   CREATININE 1.0 0.8 0.6     LIVER PROFILE:   Recent Labs     08/03/21  0931   *   ALT 74*   BILITOT 0.6   ALKPHOS 21*     PT/INR:   Recent Labs     08/02/21  1717 08/03/21  0931   PROTIME 18.1* 15.4*   INR 1.40 1.19     APTT:   Recent Labs     08/03/21  0931 08/04/21  0530 08/05/21  0415   APTT 34.0 34.7 31.4     BNP:  No results for input(s): BNP in the last 72 hours.   TROPONIN: @TROPONINI:3@  Labs, consult, tests reviewed    Assessment/ PLAN:    As above                  Demetrius Rosenbaum MD, MD 8/5/2021 11:40 AM

## 2021-08-05 NOTE — PROGRESS NOTES
Occupational Therapy  Occupational Therapy Treatment Note      Name: Moo Brito MRN: [id number] :   1940   Date:  2021   Admission Date: 2021 Room:  -A     Primary Problem: [place]:  There were no encounter diagnoses. Restrictions/Precautions:    General Precautions, Fall Risk, Sternal precautions    Communication with other providers:  RN, PILAR    Subjective:  Patient states:  Agreeable to therapy. Pain: Pt reports 6/10 pain at surgical site. Objective:    Observation:  Pt was received lying supine in bed. Objective Measures: All vitals were stable throughout session. Treatment, including education:  Self Care Training:   Cues were given for safety, sequence, UE/LE placement, visual cues, and balance. Activities performed today included oral hygiene. Therapeutic Activity Training:   Therapeutic activity training was instructed today. Cues were given for safety, sequence, UE/LE placement, awareness, and balance. Activities performed today included bed mobility training, sup-sit, sit-stand, ambulation. Pt completed oral hygiene Brittney while lying supine in bed with HOB elevated approx 80 degrees. Pt required setup, increased time, and VC throughout for sequencing. Pt required continuous cueing throughout task to locate toothbrush, water cup and basin d/t visual deficits. Pt required assist for manipulation of toothbrush for task. Pt completed supine to seated bed mobility ModAx2 with HOB elevated to approx 80 degrees with assist for trunk control, LE positioning and sequencing throughout. Pt completed STS from EOB with cardiac walker with CGA for sequencing and safety throughout. Pt ambulated 20ft X 10ft X 10ft with CGA and cardiac walker, chair follow utilized. Pt required assist for cardiac walker management and navigation of hallway d/t visual deficits. Pt required 2 standing rest breaks between each bout.  Pt completed stand-sit transfer Brittney to chair with cardiac walker. Pt required assist for eccentric control and cueing for sequencing throughout. Pt was left seated in chair, all needs met, with PTA present. Assessment / Impression:    Patient's tolerance of treatment: fair  Adverse Reaction: None  Significant change in status and impact:  None  Barriers to improvement: Visual field cut, pain, activity tolerance, impaired executive functioning.       Plan for Next Session:    Continue with POC    Time in:  910  Time out:  936  Timed treatment minutes:  26  Total treatment time:  26      Electronically signed by:    Elida Baird,   8/5/2021, 9:36 AM

## 2021-08-05 NOTE — PROGRESS NOTES
Dr. Bagley Been here seeing pt and updated. Order received and read back to consult Opthalmology and Neurology since no change in visual deficit from Tues. May remove swan later today if stable. Keep dobutrex gtt today at 5 mcg/kg/min. Order received and read back to start BID lVP lasix.

## 2021-08-06 LAB
ANION GAP SERPL CALCULATED.3IONS-SCNC: 7 MMOL/L (ref 4–16)
APTT: 28.2 SECONDS (ref 25.1–37.1)
BUN BLDV-MCNC: 9 MG/DL (ref 6–23)
CALCIUM IONIZED: 4.64 MG/DL (ref 4.48–5.28)
CALCIUM SERPL-MCNC: 7.9 MG/DL (ref 8.3–10.6)
CHLORIDE BLD-SCNC: 107 MMOL/L (ref 99–110)
CO2: 24 MMOL/L (ref 21–32)
CREAT SERPL-MCNC: 0.5 MG/DL (ref 0.6–1.1)
GFR AFRICAN AMERICAN: >60 ML/MIN/1.73M2
GFR NON-AFRICAN AMERICAN: >60 ML/MIN/1.73M2
GLUCOSE BLD-MCNC: 116 MG/DL (ref 70–99)
HCT VFR BLD CALC: 27.1 % (ref 37–47)
HEMOGLOBIN: 9.2 GM/DL (ref 12.5–16)
HOMOCYSTEINE: 11.2 UMOL/L (ref 0–10)
IONIZED CA: 1.16 MMOL/L (ref 1.12–1.32)
MAGNESIUM: 1.8 MG/DL (ref 1.8–2.4)
MCH RBC QN AUTO: 32.9 PG (ref 27–31)
MCHC RBC AUTO-ENTMCNC: 33.9 % (ref 32–36)
MCV RBC AUTO: 96.8 FL (ref 78–100)
PDW BLD-RTO: 13.7 % (ref 11.7–14.9)
PLATELET # BLD: 118 K/CU MM (ref 140–440)
PMV BLD AUTO: 9.6 FL (ref 7.5–11.1)
POTASSIUM SERPL-SCNC: 3.5 MMOL/L (ref 3.5–5.1)
POTASSIUM SERPL-SCNC: 3.6 MMOL/L (ref 3.5–5.1)
POTASSIUM SERPL-SCNC: 3.8 MMOL/L (ref 3.5–5.1)
POTASSIUM SERPL-SCNC: 3.8 MMOL/L (ref 3.5–5.1)
RBC # BLD: 2.8 M/CU MM (ref 4.2–5.4)
SODIUM BLD-SCNC: 138 MMOL/L (ref 135–145)
WBC # BLD: 7.5 K/CU MM (ref 4–10.5)

## 2021-08-06 PROCEDURE — 2580000003 HC RX 258: Performed by: PHYSICIAN ASSISTANT

## 2021-08-06 PROCEDURE — 94761 N-INVAS EAR/PLS OXIMETRY MLT: CPT

## 2021-08-06 PROCEDURE — 97110 THERAPEUTIC EXERCISES: CPT

## 2021-08-06 PROCEDURE — 80048 BASIC METABOLIC PNL TOTAL CA: CPT

## 2021-08-06 PROCEDURE — 84132 ASSAY OF SERUM POTASSIUM: CPT

## 2021-08-06 PROCEDURE — 97530 THERAPEUTIC ACTIVITIES: CPT

## 2021-08-06 PROCEDURE — 82330 ASSAY OF CALCIUM: CPT

## 2021-08-06 PROCEDURE — 85027 COMPLETE CBC AUTOMATED: CPT

## 2021-08-06 PROCEDURE — 97535 SELF CARE MNGMENT TRAINING: CPT

## 2021-08-06 PROCEDURE — 85730 THROMBOPLASTIN TIME PARTIAL: CPT

## 2021-08-06 PROCEDURE — 6360000002 HC RX W HCPCS: Performed by: SURGERY

## 2021-08-06 PROCEDURE — 83090 ASSAY OF HOMOCYSTEINE: CPT

## 2021-08-06 PROCEDURE — 6360000002 HC RX W HCPCS: Performed by: PHYSICIAN ASSISTANT

## 2021-08-06 PROCEDURE — 2580000003 HC RX 258: Performed by: INTERNAL MEDICINE

## 2021-08-06 PROCEDURE — 6370000000 HC RX 637 (ALT 250 FOR IP): Performed by: PHYSICIAN ASSISTANT

## 2021-08-06 PROCEDURE — 6370000000 HC RX 637 (ALT 250 FOR IP): Performed by: PSYCHIATRY & NEUROLOGY

## 2021-08-06 PROCEDURE — 2000000000 HC ICU R&B

## 2021-08-06 PROCEDURE — 83735 ASSAY OF MAGNESIUM: CPT

## 2021-08-06 RX ORDER — DOBUTAMINE HYDROCHLORIDE 200 MG/100ML
1 INJECTION INTRAVENOUS CONTINUOUS
Status: DISCONTINUED | OUTPATIENT
Start: 2021-08-06 | End: 2021-08-07

## 2021-08-06 RX ORDER — B12/LEVOMEFOLATE CALCIUM/B-6 2-1.13-25
1 TABLET ORAL DAILY
Status: DISCONTINUED | OUTPATIENT
Start: 2021-08-06 | End: 2021-08-09 | Stop reason: HOSPADM

## 2021-08-06 RX ADMIN — ASPIRIN 81 MG: 81 TABLET ORAL at 08:06

## 2021-08-06 RX ADMIN — SODIUM CHLORIDE, PRESERVATIVE FREE 10 ML: 5 INJECTION INTRAVENOUS at 09:19

## 2021-08-06 RX ADMIN — Medication 1 TABLET: at 21:58

## 2021-08-06 RX ADMIN — ACETAMINOPHEN 650 MG: 325 TABLET ORAL at 12:03

## 2021-08-06 RX ADMIN — SODIUM CHLORIDE, PRESERVATIVE FREE 10 ML: 5 INJECTION INTRAVENOUS at 21:55

## 2021-08-06 RX ADMIN — AMIODARONE HYDROCHLORIDE 200 MG: 200 TABLET ORAL at 21:54

## 2021-08-06 RX ADMIN — ACETAMINOPHEN 650 MG: 325 TABLET ORAL at 20:00

## 2021-08-06 RX ADMIN — SODIUM CHLORIDE, PRESERVATIVE FREE 10 ML: 5 INJECTION INTRAVENOUS at 14:09

## 2021-08-06 RX ADMIN — SODIUM CHLORIDE, PRESERVATIVE FREE 10 ML: 5 INJECTION INTRAVENOUS at 08:11

## 2021-08-06 RX ADMIN — PANTOPRAZOLE SODIUM 40 MG: 40 TABLET, DELAYED RELEASE ORAL at 06:28

## 2021-08-06 RX ADMIN — MAGNESIUM SULFATE HEPTAHYDRATE 2000 MG: 40 INJECTION, SOLUTION INTRAVENOUS at 08:23

## 2021-08-06 RX ADMIN — ACETAMINOPHEN 650 MG: 325 TABLET ORAL at 06:29

## 2021-08-06 RX ADMIN — Medication: at 21:53

## 2021-08-06 RX ADMIN — POTASSIUM CHLORIDE 20 MEQ: 29.8 INJECTION, SOLUTION INTRAVENOUS at 17:13

## 2021-08-06 RX ADMIN — FUROSEMIDE 20 MG: 10 INJECTION, SOLUTION INTRAMUSCULAR; INTRAVENOUS at 08:06

## 2021-08-06 RX ADMIN — FUROSEMIDE 20 MG: 10 INJECTION, SOLUTION INTRAMUSCULAR; INTRAVENOUS at 17:43

## 2021-08-06 RX ADMIN — SODIUM CHLORIDE, PRESERVATIVE FREE 10 ML: 5 INJECTION INTRAVENOUS at 21:54

## 2021-08-06 RX ADMIN — Medication: at 08:06

## 2021-08-06 RX ADMIN — MIDODRINE HYDROCHLORIDE 10 MG: 5 TABLET ORAL at 08:06

## 2021-08-06 RX ADMIN — MIDODRINE HYDROCHLORIDE 10 MG: 5 TABLET ORAL at 12:05

## 2021-08-06 RX ADMIN — SODIUM CHLORIDE, PRESERVATIVE FREE 10 ML: 5 INJECTION INTRAVENOUS at 17:13

## 2021-08-06 RX ADMIN — POTASSIUM CHLORIDE 20 MEQ: 29.8 INJECTION, SOLUTION INTRAVENOUS at 14:08

## 2021-08-06 RX ADMIN — SODIUM CHLORIDE, PRESERVATIVE FREE 10 ML: 5 INJECTION INTRAVENOUS at 09:18

## 2021-08-06 RX ADMIN — AMIODARONE HYDROCHLORIDE 200 MG: 200 TABLET ORAL at 08:05

## 2021-08-06 RX ADMIN — THERA TABS 1 TABLET: TAB at 08:06

## 2021-08-06 RX ADMIN — CLOPIDOGREL BISULFATE 75 MG: 75 TABLET ORAL at 08:05

## 2021-08-06 RX ADMIN — POTASSIUM CHLORIDE 20 MEQ: 29.8 INJECTION, SOLUTION INTRAVENOUS at 08:24

## 2021-08-06 RX ADMIN — SODIUM CHLORIDE, PRESERVATIVE FREE 10 ML: 5 INJECTION INTRAVENOUS at 08:24

## 2021-08-06 RX ADMIN — SODIUM CHLORIDE, PRESERVATIVE FREE 10 ML: 5 INJECTION INTRAVENOUS at 17:43

## 2021-08-06 RX ADMIN — AMIODARONE HYDROCHLORIDE 200 MG: 200 TABLET ORAL at 14:08

## 2021-08-06 RX ADMIN — CALCIUM GLUCONATE 2000 MG: 20 INJECTION, SOLUTION INTRAVENOUS at 08:23

## 2021-08-06 RX ADMIN — DOCUSATE SODIUM 50 MG AND SENNOSIDES 8.6 MG 1 TABLET: 8.6; 5 TABLET, FILM COATED ORAL at 08:06

## 2021-08-06 RX ADMIN — MIDODRINE HYDROCHLORIDE 10 MG: 5 TABLET ORAL at 17:13

## 2021-08-06 RX ADMIN — ATORVASTATIN CALCIUM 20 MG: 20 TABLET, FILM COATED ORAL at 21:54

## 2021-08-06 RX ADMIN — LEVOTHYROXINE SODIUM 50 MCG: 0.05 TABLET ORAL at 06:30

## 2021-08-06 ASSESSMENT — PAIN DESCRIPTION - PAIN TYPE
TYPE: SURGICAL PAIN

## 2021-08-06 ASSESSMENT — PAIN DESCRIPTION - ORIENTATION
ORIENTATION: MID

## 2021-08-06 ASSESSMENT — PAIN DESCRIPTION - DESCRIPTORS
DESCRIPTORS: ACHING;SORE
DESCRIPTORS: ACHING
DESCRIPTORS: ACHING;SORE

## 2021-08-06 ASSESSMENT — PAIN - FUNCTIONAL ASSESSMENT
PAIN_FUNCTIONAL_ASSESSMENT: ACTIVITIES ARE NOT PREVENTED
PAIN_FUNCTIONAL_ASSESSMENT: PREVENTS OR INTERFERES SOME ACTIVE ACTIVITIES AND ADLS
PAIN_FUNCTIONAL_ASSESSMENT: PREVENTS OR INTERFERES SOME ACTIVE ACTIVITIES AND ADLS

## 2021-08-06 ASSESSMENT — PAIN SCALES - GENERAL
PAINLEVEL_OUTOF10: 3
PAINLEVEL_OUTOF10: 0
PAINLEVEL_OUTOF10: 3
PAINLEVEL_OUTOF10: 0
PAINLEVEL_OUTOF10: 3
PAINLEVEL_OUTOF10: 3
PAINLEVEL_OUTOF10: 0
PAINLEVEL_OUTOF10: 3
PAINLEVEL_OUTOF10: 0
PAINLEVEL_OUTOF10: 0

## 2021-08-06 ASSESSMENT — PAIN DESCRIPTION - PROGRESSION
CLINICAL_PROGRESSION: GRADUALLY WORSENING

## 2021-08-06 ASSESSMENT — PAIN DESCRIPTION - LOCATION
LOCATION: STERNUM
LOCATION: CHEST
LOCATION: CHEST

## 2021-08-06 ASSESSMENT — PAIN DESCRIPTION - ONSET
ONSET: GRADUAL
ONSET: GRADUAL
ONSET: ON-GOING

## 2021-08-06 ASSESSMENT — PAIN DESCRIPTION - FREQUENCY
FREQUENCY: CONTINUOUS
FREQUENCY: INTERMITTENT
FREQUENCY: CONTINUOUS

## 2021-08-06 NOTE — PROGRESS NOTES
Dr. Marco Alvarado at bedside, updated on pt's progress, ordres to remove arterial line this afternoon, change dobutamine gtt to 2.5mcg/kg/hr; remove quintana, clip A and V pacing wires. Suppository ordered. Will continue to monitor closely.

## 2021-08-06 NOTE — PROGRESS NOTES
Occupational Therapy  Occupational Therapy Treatment Note      Name: Stephanie Ho MRN: [id number] :   1940   Date:  2021   Admission Date: 2021 Room:  -A     Primary Problem: [place]:  There were no encounter diagnoses. Restrictions/Precautions:    General Precautions, Fall Risk, Sternal precautions    Communication with other providers:  RN    Subjective:  Patient states:  Agreeable to therapy. Pain: Pt denied pain this date (0/10). Objective:    Observation:  Pt was received supine in bed. Objective Measures:  Pt vital were stable throughout session. Treatment, including education:  Self Care Training:   Cues were given for safety, sequence, UE/LE placement, visual cues, and balance. Activities performed today included grooming, oral hygiene and face washing. Therapeutic Activity Training:   Therapeutic activity training was instructed today. Cues were given for safety, sequence, UE/LE placement, awareness, and balance. Activities performed today included bed mobility training, sup-sit, sit-stand, ambulation    Pt completed supine to seated bed mobility with ModA for trunk control and LE placement and HOB elevated approx 80 degrees. Sitting EOB, pt demo fair dynamic sitting balance with posterior lean and 2 LOB requiring Brittney to recover. Sitting EOB, pt completed theraputty activities (5lb orange) focusing on  strength, RUE shoulder strength, fine motor coordination of RUE and visual scanning. Pt was able to use tripod grasp to grab balls of theraputty and to place into cup. Pt required VC for visual scanning to locate theraputty balls in R visual field and to reach for cup when placed in R visual field. Pt was able to use a pincer grasp to  theraputty balls after VC from therapist. Pt was lethargic and required VC for attention to task.  Sitting EOB, pt completed theraputty exercise for  strength (gross grasp) by squeezing theraputty balls 5 times.    Sitting EOB, pt completed oral hygiene, brushed hair and washed face with setup and CGA for dynamic sitting balance. Pt required VC for visual scanning to find items in R visual field, manipulation of toothbrush and sequencing throughout. Pt completed STS from EOB with cardiac walker and Brittney for cardiac walker management and safety throughout. Pt ambulated 20ft X 2 with cardiac walker and CGA for dynamic standing balance and safety throughout. Pt required 1 standing rest break. Pt required VC for navigation of hallway and scanning to R visual field. Pt reported feeling dizziness and took seated rest break. Pt reported symptoms alleviated in seated. Pt completed stand-sit transfer with CGA for eccentric control and sequencing throughout. Pt was left seated in chair, all needs met, alarm in place.      Assessment / Impression:    Patient's tolerance of treatment: fair  Adverse Reaction: None  Significant change in status and impact:  None  Barriers to improvement: dizziness, nausea, activity tolerance, vision, balance      Plan for Next Session:    Continue with POC    Time in:  825  Time out:  910  Timed treatment minutes:  45  Total treatment time:  45      Electronically signed by:    Chasity Presley,   8/6/2021, 9:37 AM

## 2021-08-06 NOTE — PROGRESS NOTES
Pt's educated on removal of pacing wires. Pacing wires cut at skin per order from Dr. Cherelle Hernandes d/t pt being on plavix. Pt without ectopy and VSS, vital signs increased to [id number], pt denies any questions at this time. Will continue to monitor closely.

## 2021-08-06 NOTE — PROGRESS NOTES
PATIENT NAME: Tai Brown    TODAY'S DATE: 08/06/21    SUBJECTIVE:    Pt is POD # 4 s/p . Pt feeling better today. Working with therapy. Still has R sided peripheral vision loss, stable. OBJECTIVE:   VITALS:    Vitals:    08/06/21 0900   BP: 109/79   Pulse: 88   Resp: 19   Temp:    SpO2:      INTAKE/OUTPUT:    Date 08/06/21 0000 - 08/06/21 2359   Shift 2204-3400 2150-2580 2108-3034 24 Hour Total   INTAKE   I.V.(mL/kg/hr) 989.3(1.9)   989. 3   Shift Total(mL/kg) 989. 3(15.5)   989. 3(15.5)   OUTPUT   Urine(mL/kg/hr) 400(0.8)   400   Shift Total(mL/kg) 400(6.3)   400(6.3)   Weight (kg) 63.7 63.7 63.7 63.7      No data found. EXAM:  Blood pressure 109/79, pulse 88, temperature 97.6 °F (36.4 °C), temperature source Oral, resp. rate 19, height 5' 2.01\" (1.575 m), weight 140 lb 6.4 oz (63.7 kg), SpO2 94 %. General appearance: No apparent distress, appears stated age and cooperative. Skin: unremarkable  HEENT Normocephalic, atraumatic without obvious deformity. Neck: Supple, Trachea midline   Lungs: Good respiratory effort. Clear to auscultation, bilaterally  Heart: Regular rate/ rhythm inc c/d/i  Abdomen: Soft, non-tender or non-distended   Extremities: min edema warm well perfused  Neurologic: Alert, grossly intact  Mental status: normal affect      Data:  CBC:   Recent Labs     08/04/21  0530 08/05/21  0415 08/06/21  0525   WBC 14.6* 9.7 7.5   HGB 9.5* 9.1* 9.2*   HCT 30.1* 28.1* 27.1*   * 91* 118*     BMP:    Recent Labs     08/04/21  0530 08/05/21  0415 08/06/21  0525    138 138   K 4.3 4.1 3.5    108 107   CO2 20* 20* 24   BUN 16 12 9   CREATININE 0.8 0.6 0.5*   GLUCOSE 111* 86 116*     Hepatic:   Recent Labs     08/03/21  0931   *   ALT 74*   BILITOT 0.6   ALKPHOS 21*     Mag:      Recent Labs     08/04/21  0530 08/05/21  0415 08/06/21  0525   MG 2.2 2.1 1.8      Phos:   No results for input(s): PHOS in the last 72 hours.    INR:   Recent Labs     08/03/21  0931   INR 1.19 Radiology Review:  CXR  Impression   Minimal increased left basilar atelectasis, with similar right basilar   airspace disease and small effusion.       Similar trace left apical pneumothorax. ASSESSMENT AND PLAN:    Patient Active Problem List   Diagnosis    PVC (premature ventricular contraction)    Fatigue    Chest pressure    HORACE (obstructive sleep apnea)    Hypersomnia    Unstable angina (HCC)    Essential hypertension    CAD in native artery    Acute on chronic combined systolic and diastolic heart failure (HCC)       S/P CABG x 5    Cardio: stable, wean dobutamine to 2. 5. holding coreg. On PO amio. Pulm: stable on RA, encourage IS  GI: suppository today  Renal: creatinine stable 0.5, adequate UOP. Continue lasix BID. Tubes/Lines: DC quintana, art line, pacing wires   Wound: stable    ARU precert pending.        Sly Gonsalez PA-C

## 2021-08-07 ENCOUNTER — APPOINTMENT (OUTPATIENT)
Dept: GENERAL RADIOLOGY | Age: 81
DRG: 233 | End: 2021-08-07
Attending: INTERNAL MEDICINE
Payer: MEDICARE

## 2021-08-07 LAB
ANION GAP SERPL CALCULATED.3IONS-SCNC: 7 MMOL/L (ref 4–16)
APTT: 25 SECONDS (ref 25.1–37.1)
BUN BLDV-MCNC: 10 MG/DL (ref 6–23)
CALCIUM IONIZED: 4.68 MG/DL (ref 4.48–5.28)
CALCIUM SERPL-MCNC: 7.9 MG/DL (ref 8.3–10.6)
CHLORIDE BLD-SCNC: 105 MMOL/L (ref 99–110)
CO2: 28 MMOL/L (ref 21–32)
CREAT SERPL-MCNC: 0.5 MG/DL (ref 0.6–1.1)
GFR AFRICAN AMERICAN: >60 ML/MIN/1.73M2
GFR NON-AFRICAN AMERICAN: >60 ML/MIN/1.73M2
GLUCOSE BLD-MCNC: 95 MG/DL (ref 70–99)
IONIZED CA: 1.17 MMOL/L (ref 1.12–1.32)
POTASSIUM SERPL-SCNC: 3.6 MMOL/L (ref 3.5–5.1)
SODIUM BLD-SCNC: 140 MMOL/L (ref 135–145)

## 2021-08-07 PROCEDURE — 80048 BASIC METABOLIC PNL TOTAL CA: CPT

## 2021-08-07 PROCEDURE — 2000000000 HC ICU R&B

## 2021-08-07 PROCEDURE — 97530 THERAPEUTIC ACTIVITIES: CPT

## 2021-08-07 PROCEDURE — 6370000000 HC RX 637 (ALT 250 FOR IP): Performed by: PHYSICIAN ASSISTANT

## 2021-08-07 PROCEDURE — 6360000002 HC RX W HCPCS: Performed by: PHYSICIAN ASSISTANT

## 2021-08-07 PROCEDURE — 6360000002 HC RX W HCPCS: Performed by: SURGERY

## 2021-08-07 PROCEDURE — 97535 SELF CARE MNGMENT TRAINING: CPT

## 2021-08-07 PROCEDURE — 82330 ASSAY OF CALCIUM: CPT

## 2021-08-07 PROCEDURE — 94761 N-INVAS EAR/PLS OXIMETRY MLT: CPT

## 2021-08-07 PROCEDURE — 85730 THROMBOPLASTIN TIME PARTIAL: CPT

## 2021-08-07 PROCEDURE — 2580000003 HC RX 258: Performed by: PHYSICIAN ASSISTANT

## 2021-08-07 PROCEDURE — 2580000003 HC RX 258: Performed by: INTERNAL MEDICINE

## 2021-08-07 PROCEDURE — 6370000000 HC RX 637 (ALT 250 FOR IP): Performed by: PSYCHIATRY & NEUROLOGY

## 2021-08-07 PROCEDURE — 6370000000 HC RX 637 (ALT 250 FOR IP): Performed by: SURGERY

## 2021-08-07 PROCEDURE — 71046 X-RAY EXAM CHEST 2 VIEWS: CPT

## 2021-08-07 PROCEDURE — 97116 GAIT TRAINING THERAPY: CPT

## 2021-08-07 PROCEDURE — 6370000000 HC RX 637 (ALT 250 FOR IP): Performed by: THORACIC SURGERY (CARDIOTHORACIC VASCULAR SURGERY)

## 2021-08-07 RX ADMIN — CARVEDILOL 3.12 MG: 3.12 TABLET, FILM COATED ORAL at 21:26

## 2021-08-07 RX ADMIN — PANTOPRAZOLE SODIUM 40 MG: 40 TABLET, DELAYED RELEASE ORAL at 06:28

## 2021-08-07 RX ADMIN — LEVOTHYROXINE SODIUM 50 MCG: 0.05 TABLET ORAL at 06:28

## 2021-08-07 RX ADMIN — AMIODARONE HYDROCHLORIDE 200 MG: 200 TABLET ORAL at 08:05

## 2021-08-07 RX ADMIN — MIDODRINE HYDROCHLORIDE 10 MG: 5 TABLET ORAL at 13:13

## 2021-08-07 RX ADMIN — MIDODRINE HYDROCHLORIDE 10 MG: 5 TABLET ORAL at 08:04

## 2021-08-07 RX ADMIN — AMIODARONE HYDROCHLORIDE 200 MG: 200 TABLET ORAL at 15:09

## 2021-08-07 RX ADMIN — SODIUM CHLORIDE, PRESERVATIVE FREE 10 ML: 5 INJECTION INTRAVENOUS at 21:26

## 2021-08-07 RX ADMIN — ASPIRIN 81 MG: 81 TABLET ORAL at 08:04

## 2021-08-07 RX ADMIN — ACETAMINOPHEN 650 MG: 325 TABLET ORAL at 15:25

## 2021-08-07 RX ADMIN — LOPERAMIDE HYDROCHLORIDE 2 MG: 2 CAPSULE ORAL at 21:26

## 2021-08-07 RX ADMIN — Medication 3 MG: at 21:26

## 2021-08-07 RX ADMIN — FUROSEMIDE 20 MG: 10 INJECTION, SOLUTION INTRAMUSCULAR; INTRAVENOUS at 08:04

## 2021-08-07 RX ADMIN — SODIUM CHLORIDE, PRESERVATIVE FREE 10 ML: 5 INJECTION INTRAVENOUS at 21:28

## 2021-08-07 RX ADMIN — FUROSEMIDE 20 MG: 10 INJECTION, SOLUTION INTRAMUSCULAR; INTRAVENOUS at 19:03

## 2021-08-07 RX ADMIN — Medication: at 08:04

## 2021-08-07 RX ADMIN — Medication 1 TABLET: at 08:04

## 2021-08-07 RX ADMIN — ATORVASTATIN CALCIUM 20 MG: 20 TABLET, FILM COATED ORAL at 21:26

## 2021-08-07 RX ADMIN — HYDROCODONE BITARTRATE AND ACETAMINOPHEN 1 TABLET: 5; 325 TABLET ORAL at 21:26

## 2021-08-07 RX ADMIN — SODIUM CHLORIDE, PRESERVATIVE FREE 10 ML: 5 INJECTION INTRAVENOUS at 08:04

## 2021-08-07 RX ADMIN — THERA TABS 1 TABLET: TAB at 08:05

## 2021-08-07 RX ADMIN — CLOPIDOGREL BISULFATE 75 MG: 75 TABLET ORAL at 08:14

## 2021-08-07 RX ADMIN — ACETAMINOPHEN 650 MG: 325 TABLET ORAL at 08:05

## 2021-08-07 RX ADMIN — MIDODRINE HYDROCHLORIDE 10 MG: 5 TABLET ORAL at 19:03

## 2021-08-07 RX ADMIN — CALCIUM GLUCONATE 2000 MG: 20 INJECTION, SOLUTION INTRAVENOUS at 15:09

## 2021-08-07 RX ADMIN — DOBUTAMINE IN DEXTROSE 1 MCG/KG/MIN: 200 INJECTION, SOLUTION INTRAVENOUS at 08:04

## 2021-08-07 ASSESSMENT — PAIN DESCRIPTION - PAIN TYPE: TYPE: SURGICAL PAIN

## 2021-08-07 ASSESSMENT — PAIN DESCRIPTION - LOCATION: LOCATION: CHEST;STERNUM

## 2021-08-07 ASSESSMENT — PAIN SCALES - GENERAL
PAINLEVEL_OUTOF10: 3
PAINLEVEL_OUTOF10: 4
PAINLEVEL_OUTOF10: 0
PAINLEVEL_OUTOF10: 6

## 2021-08-07 ASSESSMENT — PAIN DESCRIPTION - PROGRESSION: CLINICAL_PROGRESSION: GRADUALLY WORSENING

## 2021-08-07 ASSESSMENT — PAIN - FUNCTIONAL ASSESSMENT: PAIN_FUNCTIONAL_ASSESSMENT: ACTIVITIES ARE NOT PREVENTED

## 2021-08-07 ASSESSMENT — PAIN DESCRIPTION - FREQUENCY: FREQUENCY: CONTINUOUS

## 2021-08-07 ASSESSMENT — PAIN DESCRIPTION - DESCRIPTORS: DESCRIPTORS: ACHING;DISCOMFORT

## 2021-08-07 ASSESSMENT — PAIN DESCRIPTION - ONSET: ONSET: GRADUAL

## 2021-08-07 NOTE — PROGRESS NOTES
NEUROLOGY NOTE  DR. Boris Abrams MD.  -------------------------------------------------  Subjective:    Doing better. Denies any new symptoms. Denies headache nausea vomiting dizziness    Denies numbness weakness extremities    Denies blurring of vision double vision    Objective:    BP (!) 101/31   Pulse 75   Temp 97.8 °F (36.6 °C) (Oral)   Resp 24   Ht 5' 2.01\" (1.575 m)   Wt 157 lb 6.4 oz (71.4 kg)   SpO2 95%   BMI 28.78 kg/m²   HEENT nl      Neuro exam    Alert Oriented  X 3  Follow simple commands  EOMI Pupils 3 mm jean  5/5 all 4 extremities      RADIOLOGY  -----------------    ECHO Complete 2D W Doppler W Color    Result Date: 7/29/2021  Transthoracic Echocardiography Report (TTE)  Demographics   Patient Name       Devora RAINEY Date of Study       07/29/2021   Date of Birth      1940         Gender              Female   Age                [de-identified] year(s)         Race                   Patient Number     [id number]           Room Number   Visit Number       [id number]   Corporate ID       [id number]   Accession Number   [id number]         Rosie Borja                                                            CRT   Ordering Physician Kristen Perez MD                 Physician           Renae FRANKLIN  Procedure Type of Study   TTE procedure:ECHOCARDIOGRAM COMPLETE 2D W DOPPLER W COLOR. Procedure Date Date: 07/29/2021 Start: 09:33 AM Study Location: [address] [place] [address] Technical Quality: Fair visualization Indications:Palpitations. Patient Status: Routine Height: 62 inches Weight: 142 pounds BSA: 1.65 m2 BMI: 25.97 kg/m2 Rhythm: Sinus rhythm HR: 78 bpm BP: 122/68 mmHg  Conclusions   Summary  Left ventricular function and size is normal, EF is estimated at 55-60%. Mild left ventricular hypertrophy. E/A reversal; indeterminate diastolic function.   Hypokineses of the basal anterior lateral and mid anterior lateral segments. Aneurysmal interatrial septum. Mild mitral ,tricuspid and moderate aortic regurgitation is present. RVSP is 25 mmHg. No evidence of pericardial effusion. Signature   ------------------------------------------------------------------  Electronically signed by Ishan Walters MD  (Interpreting physician) on 07/29/2021 at 02:45 PM  ------------------------------------------------------------------   Findings   Left Ventricle  Left ventricular function and size is normal, EF is estimated at 55-60%. Mild left ventricular hypertrophy. E/A reversal; indeterminate diastolic function. Hypokineses of the basal anterior lateral and mid anterior lateral segments. Left Atrium  Essentially normal left atrium. Aneurysmal interatrial septum. Right Atrium  Normal right atrium size and structure. Right Ventricle  Essentially normal right ventricle. Aortic Valve  Normal aortic valve structure and function. Moderate aortic regurgitation; PHT: 261msec. Mitral Valve  Normal mitral valve structure and function. Mild mitral regurgitation is present. Tricuspid Valve  Normal tricuspid valve structure and function. Mild tricuspid regurgitation; RVSP is 25 mmHg. Pulmonic Valve  The pulmonic valve was not well visualized. Pericardial Effusion  No evidence of pericardial effusion. Pleural Effusion  No evidence of pleural effusion. Miscellaneous  No abnormalities were noted in the IVC, abdominal aorta, or aortic root.   M-Mode/2D Measurements & Calculations   LV Diastolic Dimension:  LV Systolic Dimension:  LA Dimension: 3.8 cmAO Root  4.74 cm                  3.46 cm                 Dimension: 3.2 cmLA Area:  LV FS:27 %               LV Volume Diastolic: 89 3.72 cm2  LV PW Diastolic: 5.25 cm ml  LV PW Systolic: 3.42 cm  LV Volume Systolic: 35  Septum Diastolic: 2.58   ml  cm                       LV EDV/LV EDV Index: 89 RV Diastolic Dimension:  Septum Systolic: 4.17 cm LP/11 K1MH ESV/LV ESV   2.76 cm  CO: 6.78 l/min           Index: 35 ml/21 m2  CI: 4.11 l/m*m2          EF Calculated (A4C):    LA/Aorta: 1.19                           60.7 %  LV Area Diastolic: 68.8  EF Calculated (2D):     LA volume/Index: 14 ml /8m2  cm2                      52.4 %  LV Area Systolic: 56.9  cm2                      LV Length: 7.15 cm                            LVOT: 2 cm  Doppler Measurements & Calculations   MV Peak E-Wave: 47.3  AV Peak Velocity: 155 cm/s   LVOT Mean Velocity: 80.3  cm/s                  AV Peak Gradient: 9.61 mmHg  cm/s  MV Peak A-Wave: 95.3  AV Mean Velocity: 100 cm/s   LVOT Mean Gradient: 3  cm/s                  AV Mean Gradient: 5 mmHg     mmHg  MV E/A Ratio: 0.5     AV VTI: 31.3 cm              Estimated RVSP: 25 mmHg  MV Peak Gradient:     AV Area (Continuity):2.78    Estimated RAP:3 mmHg  0.89 mmHg             cm2   MV P1/2t: 55 msec     LVOT VTI: 27.7 cm            TR Velocity:157 cm/s  MVA by PHT:4 cm2      AV P1/2t: 261 msec           TR Gradient:9.86 mmHg                        Estimated PASP: 12.86 mmHg   MV E' Lateral  Velocity: 6.82 cm/s  MV A' Lateral  Velocity: 16.3 cm/s  MV E/E' lateral: 6.94      CT HEAD WO CONTRAST    Result Date: 8/3/2021  EXAMINATION: CT OF THE HEAD WITHOUT CONTRAST  8/3/2021 9:03 am TECHNIQUE: CT of the head was performed without the administration of intravenous contrast. Dose modulation, iterative reconstruction, and/or weight based adjustment of the mA/kV was utilized to reduce the radiation dose to as low as reasonably achievable. COMPARISON: None. HISTORY: ORDERING SYSTEM PROVIDED HISTORY: right peripheral vision loss TECHNOLOGIST PROVIDED HISTORY: Reason for exam:->right peripheral vision loss Has a \"code stroke\" or \"stroke alert\" been called? ->Yes Reason for Exam: right peripheral vision loss Acuity: Acute Type of Exam: Initial FINDINGS: BRAIN/VENTRICLES: There is no acute intracranial hemorrhage, mass effect or midline shift.   No abnormal extra-axial fluid collection. The gray-white differentiation is maintained without evidence of an acute infarct. There is no evidence of hydrocephalus. The cerebral sulci and ventricles are enlarged. There is low-attenuation within the periventricular white matter. ORBITS: The visualized portion of the orbits demonstrate no acute abnormality. SINUSES: The visualized paranasal sinuses and mastoid air cells demonstrate no acute abnormality. SOFT TISSUES/SKULL:  No acute abnormality of the visualized skull or soft tissues. 1. No acute intracranial abnormality. These findings were conveyed to Alexandr Alvarez RN at 9:39 a.m. 08/03/2021. 2. Diffuse cerebral atrophy with chronic small vessel ischemic disease. CT chest without contrast    Result Date: 7/30/2021  EXAMINATION: CT OF THE CHEST WITHOUT CONTRAST 7/30/2021 12:30 pm TECHNIQUE: CT of the chest was performed without the administration of intravenous contrast. Multiplanar reformatted images are provided for review. Dose modulation, iterative reconstruction, and/or weight based adjustment of the mA/kV was utilized to reduce the radiation dose to as low as reasonably achievable. COMPARISON: None. HISTORY: ORDERING SYSTEM PROVIDED HISTORY: planning cabg TECHNOLOGIST PROVIDED HISTORY: Reason for exam:->planning cabg Reason for Exam: planning cabg Acuity: Acute Type of Exam: Initial Additional signs and symptoms: no Relevant Medical/Surgical History: none FINDINGS: Mediastinum: Visualized thyroid is atrophic but otherwise unremarkable. No mediastinal or hilar lymphadenopathy is seen. Esophagus is unremarkable. The thoracic aorta is normal in caliber, measuring 3.6 cm in its ascending portion. There is mild aortic calcinosis, seen mainly at the aortic arch and within the descending portion. Cardiac chambers are unremarkable. Coronary calcifications noted. No pericardial effusion. Lungs/pleura: Mild dependent atelectasis noted bilaterally.   The lungs are otherwise clear. Airways are patent. Upper Abdomen: Unremarkable. Soft Tissues/Bones: Visualized extra thoracic soft tissues are unremarkable. No osteolytic or osteoblastic bone lesions are identified. Chronic appearing compression deformity is seen involving the superior endplate of L1. Mild aortic calcinosis, mainly involving the aortic arch and the descending thoracic aorta. Otherwise unremarkable noncontrast chest CT. XR CHEST PORTABLE    Result Date: 8/5/2021  EXAMINATION: ONE XRAY VIEW OF THE CHEST 8/5/2021 5:04 am COMPARISON: 08/04/2021 HISTORY: ORDERING SYSTEM PROVIDED HISTORY: Post op open heart surgery TECHNOLOGIST PROVIDED HISTORY: Reason for exam:->Post op open heart surgery Reason for Exam: Post op open heart surgery Acuity: Acute Type of Exam: Subsequent/Follow-up FINDINGS: Postoperative changes are seen in the chest.  Left subclavian central venous catheter terminates in the SVC, with a Witt-Raissa catheter noted in the right pulmonary arterial branches in the region of the right middle/lower lobe region. Cardiac size is mildly enlarged. Bibasilar airspace disease, right greater than left with a small right effusion. Minimal increased left basilar atelectasis. Osteopenia. Similar left trace apical pneumothorax. Minimal increased left basilar atelectasis, with similar right basilar airspace disease and small effusion. Similar trace left apical pneumothorax. XR CHEST PORTABLE    Result Date: 8/4/2021  EXAMINATION: ONE XRAY VIEW OF THE CHEST 8/4/2021 3:49 pm COMPARISON: Radiograph performed earlier the same day HISTORY: ORDERING SYSTEM PROVIDED HISTORY: s/p chest tube removal TECHNOLOGIST PROVIDED HISTORY: Reason for exam:->s/p chest tube removal Reason for Exam: s/p chest tube removal Acuity: Acute Type of Exam: Initial Additional signs and symptoms: na Relevant Medical/Surgical History: cad FINDINGS: Prior sternotomy. Cardiomediastinal silhouette is unchanged in size.  Witt-Raissa subclavian central venous catheter is well as Wood-Raissa catheter again identified, similar in position. The nasogastric tube is been removed. Cardiac mediastinal silhouettes appear similar given rotation. Mild improved aeration noted at the lung bases. Osseous structures appear stable. Interval removal of the endotracheal tube and nasogastric tube, with mild improved aeration at the lung bases. XR CHEST PORTABLE    Result Date: 8/2/2021  EXAMINATION: ONE XRAY VIEW OF THE CHEST 8/2/2021 5:27 pm COMPARISON: Chest 06/08/2018 HISTORY: ORDERING SYSTEM PROVIDED HISTORY: Post op open heart surgery Acuity: Acute Type of Exam: Initial Additional signs and symptoms: unknown Relevant Medical/Surgical History: CAD FINDINGS: The cardiomediastinal and hilar silhouettes appear unremarkable. Left basilar opacity partially obscures the left hemidiaphragm and there is evidence of left pleural effusion. The right lung appears clear. No right pleural effusion evident. No pneumothorax is seen. No acute osseous abnormality is identified. Median sternotomy wires in keeping with open heart surgery. 2 left pleural catheters. Left IJ CVC tip is at the proximal superior vena cava and left Wood-Raissa catheter via introducer sheath has its tip at the pulmonary outflow tract level. Endotracheal tube tip projects over the trachea, tip at the level of the aortic knob, 3 cm superior to the weston. NG tube is coiled on itself at the distal esophagus, tip extending superiorly within the mid esophagus. 1. Malpositioned NG tube, coiled at the distal esophagus, tip extending superiorly at the mid esophagus. 2. Other life-support appliances appear properly positioned. 3. Left basilar opacity and pleural effusion not unusual following open-heart surgery. Pneumonia is a consideration. 4. No pneumothorax evident. Results were called by Dr. Sherley Mccarthy to Gayle Hidalgo RN on 8/2/2021 at 18:29.   I stressed that nothing should be administered via the NG tube until it has been appropriately repositioned. She repeated the finding back to me on the phone. Vascular carotid duplex bilateral    Result Date: 7/30/2021  EXAMINATION: ULTRASOUND EVALUATION OF THE CAROTID ARTERIES 7/30/2021 COMPARISON: None. HISTORY: ORDERING SYSTEM PROVIDED HISTORY: preop cabg TECHNOLOGIST PROVIDED HISTORY: Reason for exam:->preop cabg Reason for Exam: Pre op CABG on 08/02/21 Acuity: Acute Type of Exam: Initial FINDINGS: RIGHT: The right common carotid artery demonstrates peak systolic velocities of 78, 45 cm/sec in the proximal and distal segments respectively. The right internal carotid artery demonstrates the systolic velocities of 53, 109, 51 cm/sec in the proximal, mid and distal segments respectively. The external carotid artery is patent. The vertebral artery demonstrates normal antegrade flow. Mild plaque. ICA/CCA ratio of 1.4. LEFT: The left common carotid artery demonstrates peak systolic velocities of 67, 54 cm/sec in the proximal and distal segments respectively. The left internal carotid artery demonstrates the systolic velocities of 59, 90, 95 cm/sec in the proximal, mid and distal segments respectively. The external carotid artery is patent. The vertebral artery demonstrates normal antegrade flow. Mild plaque. ICA/CCA ratio of 1.4. The right internal carotid artery demonstrates 0-50% stenosis. The left internal carotid artery demonstrates 0-50% stenosis. Bilateral vertebral arteries are patent with flow in the normal direction. US LOWER EXTREMITY UNILATERAL VEIN MAPPING    Result Date: 7/30/2021  EXAMINATION: ULTRASOUND OF THE LEFT LOWER EXTREMITY FOR VEIN MAPPING, 7/30/2021 3:25 pm TECHNIQUE: Sonographic evaluation of the left greater saphenous vein was performed. COMPARISON: None.  HISTORY: ORDERING SYSTEM PROVIDED HISTORY: preop cabg TECHNOLOGIST PROVIDED HISTORY: Reason for exam:->preop cabg Reason for Exam: Pre op CABG on 08/02/21 Acuity: Acute Type of Exam: Initial FINDINGS: Sapheno-femoral junction: 4mm. Proximal thigh:  2.4mm. Mid thigh:  3.2mm. Distal thigh:  2.8mm. Knee:  3.4mm. Proximal calf: 1.8mm. Mid calf:  1.6mm. Ankle:  1.7mm. Left greater saphenous vein measurements provided above. CTA HEAD NECK W CONTRAST    Result Date: 8/3/2021  EXAMINATION: CTA OF THE HEAD AND NECK WITH CONTRAST 8/3/2021 9:04 am: TECHNIQUE: CTA of the head and neck was performed with the administration of intravenous contrast. Multiplanar reformatted images are provided for review. MIP images are provided for review. Stenosis of the internal carotid arteries measured using NASCET criteria. Dose modulation, iterative reconstruction, and/or weight based adjustment of the mA/kV was utilized to reduce the radiation dose to as low as reasonably achievable. COMPARISON: CT head 08/03/2021 HISTORY: ORDERING SYSTEM PROVIDED HISTORY: stroke alert TECHNOLOGIST PROVIDED HISTORY: Reason for exam:->stroke alert Reason for Exam: STROKE Type of Exam: Initial Relevant Medical/Surgical History: 80 ML ISOVUE 56 FINDINGS: CTA NECK: AORTIC ARCH/ARCH VESSELS: No dissection or arterial injury. No significant stenosis of the brachiocephalic or subclavian arteries. CAROTID ARTERIES: No dissection, arterial injury, or hemodynamically significant stenosis by NASCET criteria. VERTEBRAL ARTERIES: No dissection, arterial injury, or significant stenosis. SOFT TISSUES: Small bilateral apical pneumothoraces are identified. Subcutaneous air is identified within the soft tissues of the neck bilaterally. BONES: No acute osseous abnormality. CTA HEAD: ANTERIOR CIRCULATION: No significant stenosis of the intracranial internal carotid, anterior cerebral, or middle cerebral arteries. No aneurysm. POSTERIOR CIRCULATION: There is occlusion of the distal P2 segment of the left posterior cerebral artery. OTHER: No dural venous sinus thrombosis on this non-dedicated study.  BRAIN: There may be some subtle low-attenuation changes within the left occipital lobe. Occlusion of the distal P2 segment of the left posterior cerebral artery. There may be some subtle low-attenuation changes within the left occipital lobe which is concordant with the patient's right visual field defect. MRI may be precluded given history of recent surgery Small bilateral apical pneumothorax with associated subcutaneous emphysema within the neck. These changes are compatible with CABG 1 day prior Critical results were called by Dr. Ilene Oreilly MD to Dr. Alexandra Cummins On 8/3/2021 at 09:57. NM MYOCARDIAL SPECT REST EXERCISE OR RX    Result Date: 8/1/2021  Cardiac Perfusion Imaging   Demographics   Patient Name      Tosha RAINEY Date of study        07/29/2021   Date of Birth     1940         Gender               Female   Age               [de-identified] year(s)         Race                    Patient Number    [id number]           Room Number   Visit Number      [id number]          Height               62 inches   Corporate ID      [id number]           Weight               142 pounds   Accession Number  [id number]                                        NM Technologist      Tandy Ora CNMT Ordering Rizvi, Othelia Aase  Physician         MD                 Cardiologist         Renae FRANKLIN   The procedure was explained in detail to the patient. Risks,  complications and alternative treatments were reviewed. Written consent  was obtained. Conclusions   Summary  Abnormal Stress EKG results and patient was transferred to [facility] cath lab per  Dr. Marta Gordillo. No images were done.    Signatures   ------------------------------------------------------------------  Electronically signed by Melina Iglesias MD  (Interpreting cardiologist) on 08/01/2021 at 14:55  ------------------------------------------------------------------  Procedure Procedure Type:   Limited Nuclear Stress Test  Indications: Chest pain. Stress Protocols   Resting HR:66 bpm  Resting BP:136/78 mmHg  Stress Protocol:Pharmacologic - Lexiscan  Peak HR:99 bpm                   HR response: Appropriate  Peak BP:132/80 mmHg              BP response: Normal resting BP -  Predicted HR: 140 bpm            appropriate response  % of predicted HR: 71            HR/BP product:[id number]   Exercise duration: 01:00 min  Reason for termination:Completed   Symptoms  Chest pain 6/10. Chest pain was resolved in recovery. Procedure Medications   - Lexiscan I.V. bolus (over 15sec.) 0.4 mg admininstered @ 07/29/2021 10:30.   - Aminophylline I.V. 50 mg admininstered @ 07/29/2021 10:32. Imaging Protocols           Stress           Isotope: Sestamibi 99mTc          Isotope dose:10.2 mCi          Administration route: I.V. Lt. arm          Injection Date:07/29/2021 10:31  Medical History   Accession#:  [id number]  Admission Data Admission date: 07/29/2021 Admission Time: 10:09 Hospital Status: Outpatient.       LAB RESULTS  --------------------    Recent Results (from the past 24 hour(s))   POCT Glucose    Collection Time: 08/05/21  9:39 PM   Result Value Ref Range    POC Glucose 198 (H) 70 - 99 MG/DL   CBC    Collection Time: 08/06/21  5:25 AM   Result Value Ref Range    WBC 7.5 4.0 - 10.5 K/CU MM    RBC 2.80 (L) 4.2 - 5.4 M/CU MM    Hemoglobin 9.2 (L) 12.5 - 16.0 GM/DL    Hematocrit 27.1 (L) 37 - 47 %    MCV 96.8 78 - 100 FL    MCH 32.9 (H) 27 - 31 PG    MCHC 33.9 32.0 - 36.0 %    RDW 13.7 11.7 - 14.9 %    Platelets 726 (L) 731 - 440 K/CU MM    MPV 9.6 7.5 - 11.1 FL   APTT    Collection Time: 08/06/21  5:25 AM   Result Value Ref Range    aPTT 28.2 25.1 - 37.1 SECONDS   Basic Metabolic Panel    Collection Time: 08/06/21  5:25 AM   Result Value Ref Range    Sodium 138 135 - 145 MMOL/L    Potassium 3.5 3.5 - 5.1 MMOL/L    Chloride 107 99 - 110 mMol/L    CO2 24 21 - 32 MMOL/L    Anion Gap 7 4 - 16    BUN 9 6 - 23 MG/DL    CREATININE 0.5 (L) 0.6 - 1.1 MG/DL    Glucose 116 (H) 70 - 99 MG/DL    Calcium 7.9 (L) 8.3 - 10.6 MG/DL    GFR Non-African American >60 >60 mL/min/1.73m2    GFR African American >60 >60 mL/min/1.73m2   Magnesium    Collection Time: 08/06/21  5:25 AM   Result Value Ref Range    Magnesium 1.8 1.8 - 2.4 mg/dl   Calcium, Ionized    Collection Time: 08/06/21  5:25 AM   Result Value Ref Range    Ionized Ca 1.16 1.12 - 1.32 mMOL/L    Calcium, Ion 4.64 4.48 - 5.28 MG/DL   Homocysteine, Serum    Collection Time: 08/06/21  5:25 AM   Result Value Ref Range    Homocysteine 11.2 (H) 0 - 10 umol/L   Potassium    Collection Time: 08/06/21 12:10 PM   Result Value Ref Range    Potassium 3.6 3.5 - 5.1 MMOL/L   Potassium    Collection Time: 08/06/21  4:10 PM   Result Value Ref Range    Potassium 3.8 3.5 - 5.1 MMOL/L   Potassium    Collection Time: 08/06/21  7:12 PM   Result Value Ref Range    Potassium 3.8 3.5 - 5.1 MMOL/L         Medical problems    Patient Active Problem List:     PVC (premature ventricular contraction)     Fatigue     Chest pressure     HORACE (obstructive sleep apnea)     Hypersomnia     Unstable angina (HCC)     Essential hypertension     CAD in native artery     Acute on chronic combined systolic and diastolic heart failure (HCC)      ASSESSMENT:  ---------------------    Left occipital infarct     Left PCA occlusion     PLAN:     CT brain as above     CTA head neck as above     Mri brain if can be done post-CABG     Echo positive for aneurysmal interatrial septum     Homocysteine level high add foltx     Continue asa plavix     Discussed dx prognosis meds side effects and above with pt and family and answered all questions        Electronically signed by Dwain Ricks MD on 8/6/2021 at 8:21 PM

## 2021-08-07 NOTE — PROGRESS NOTES
Comprehensive Nutrition Assessment    Type and Reason for Visit:  Reassess    Nutrition Recommendations/Plan:   · Continue current diet  · Change supplements to a standard supplements    Nutrition Assessment:  Pt is now started on a diet and is consuming very little. Will change supplement to help her meet her estimated needs    Malnutrition Assessment:  Malnutrition Status:  Insufficient data    Context:  Acute Illness       Estimated Daily Nutrient Needs:  Energy (kcal):  6582-1550 (23-26 kcal/kg); Weight Used for Energy Requirements:  Current     Protein (g):  64-77 (1.0-1.2 g/kg); Weight Used for Protein Requirements:  Current        Fluid (ml/day):  2032-3538; Method Used for Fluid Requirements:  1 ml/kcal      Wounds:  Surgical Incision       Current Nutrition Therapies:    ADULT DIET; Regular; Low Sodium (2 gm)  Adult Oral Nutrition Supplement; Clear Liquid Oral Supplement    Anthropometric Measures:  · Height: 5' 2.01\" (157.5 cm)  · Current Body Weight: 157 lb (71.2 kg)   · Admission Body Weight: 138 lb 14.2 oz (63 kg) (first measured weight)    · Ideal Body Weight: 110 lbs; % Ideal Body Weight 127.7 %   · BMI: 28.7  · BMI Categories: Overweight (BMI 25.0-29. 9)       Nutrition Diagnosis:   · Inadequate oral intake related to acute injury/trauma as evidenced by intake 0-25%      Nutrition Interventions:   Food and/or Nutrient Delivery:  Modify Current Diet, Start Oral Nutrition Supplement  Nutrition Education/Counseling:  Education needed   Coordination of Nutrition Care:  Continue to monitor while inpatient    Goals:  Pt will consume greater than 50% of meals and supplements       Nutrition Monitoring and Evaluation:   Behavioral-Environmental Outcomes:  None Identified   Food/Nutrient Intake Outcomes:  Supplement Intake, Food and Nutrient Intake  Physical Signs/Symptoms Outcomes:  Biochemical Data, Weight, GI Status, Hemodynamic Status, Fluid Status or Edema     Discharge Planning:     Too soon to determine     Electronically signed by Maryjane Wheeler RD, LD on 7/6/56 at 8:35 PM EDT    Contact: [phone]

## 2021-08-07 NOTE — PROGRESS NOTES
Occupational Therapy    Occupational Therapy Treatment Note      Name: Sav Tse MRN: [id number] :   1940   Date:  2021   Admission Date: 2021 Room:  -A     Primary Problem: Admitted for unstable angina. Pt underwent CABG on 2021. Pt had stroke alert called on 8/3 and appears to have visual deficits related to incident     Restrictions/Precautions:   General precautions, Fall Risk, Sternal Precautions, IV, Telemetry, BP cuff, Pulse Ox    Communication with other providers: RN, PT Mo     Subjective:  Patient states: \"It just feels like it's asleep\" Pt stated in reference to her R hand   Pain: 3/10 pain this date at incision site     Objective:    Observation: Pt sitting up in chair upon with her son present upon OT arrival   Objective Measures: All vitals stable throughout session, /64 in seated in chair after a brief stand and report of dizziness that quickly resolved     Treatment, including education:  Therapeutic Activity Training:   Therapeutic activity training was instructed today. Cues were given for safety, sequence, UE/LE placement, awareness, and balance. Activities performed today included sup-sit, sit-stand, SPT and education on sternal precautions     Self Care Training:   Cues were given for safety, sequence, UE/LE placement, visual cues, and balance. Activities performed today included hand hygiene, and grooming. Pt sitting up in chair with her son present upon OT arrival, Pt pleasant and agreeable to OT tx and son left the room at that time. Pt was asked to verbalize sternal precautions, Pt stated that she could not remember them all but that she knew to hold her chest when getting up. Pt was thoroughly re-educated on sternal precautions.  While seated at the edge of the chair Pt was asked to read the clock on the wall and was correct for time as well as therapist's name on badge at an appropriate reading distance (~16in.) Pt performed AROM of BL shoulder flexion 160' with no noted asymmetries, Pt performed full AROM of BL elbow flexion and extension, BL wrist flexion/extension and BL gross grasp with no noted asymmetries. Pt performed coordination screen of BL finger to thumb opposition 2x demonstrating good speed and accuracy. Pt performed sit to stand from chair Min A with cues for sternal precautions. Pt performed functional mobility CGA-Min A with cardiac walker 100ft in hallway, Pt demonstrated unsteady gait at times requiring Min A for correction. Pt performed stand to sit in chair Min A with cues for adherence to sternal precautions. Pt's chair was wheeled back into room where Pt performed sit to stand from chair with cues for adherence to sternal precautions. Pt ambulated 10ft Min A without AD to sink. Pt performed oral hygiene task of brushing teeth in standing at sink with CGA and cues to stand tall. Pt demonstrated over/under shooting with R hand during picking up/putting down toothpaste and turning on/off water. Pt performed grooming activity of combing her hair CGA in standing at sink. Pt performed stand to sit in chair Min A with cues for adherence to sternal precautions. Pt left in chair, alarm set, with needs in reach. Assessment / Impression:    Patient's tolerance of treatment: Good   Adverse Reaction: None  Significant change in status and impact: Pt demonstrated increased activity tolerance this date as evident through performing ADLs in standing at sink.    Barriers to improvement: None       Plan for Next Session:    Continue per POC    Time in: 0923  Time out:  0956  Timed treatment minutes: 33  Total treatment time: 33      Electronically signed by:    REBEL Jordan/L, [address] [facility] NATHANAEL [id number]

## 2021-08-07 NOTE — PROGRESS NOTES
this interval not displayed. Hepatic: No results for input(s): AST, ALT, ALB, BILITOT, ALKPHOS in the last 72 hours. Mag:      Recent Labs     08/05/21  0415 08/06/21  0525   MG 2.1 1.8      Phos:   No results for input(s): PHOS in the last 72 hours. INR: No results for input(s): INR in the last 72 hours. Radiology Review:  CXR      ASSESSMENT AND PLAN:  S/P CABG and a neurologic event  Patient Active Problem List   Diagnosis    PVC (premature ventricular contraction)    Fatigue    Chest pressure    HORACE (obstructive sleep apnea)    Hypersomnia    Unstable angina (HCC)    Essential hypertension    CAD in native artery    Acute on chronic combined systolic and diastolic heart failure ([facility] [address]       Mobilize. Pulmonary toilet.      Wean Dobutrex

## 2021-08-07 NOTE — PROGRESS NOTES
Taken in [facility] for PA and lateral. Pt returned to the chair. Dinner here but patient not interested in eating yet. Pt states that she feels sad, informed her that will pass and encouragement given.

## 2021-08-07 NOTE — PROGRESS NOTES
Physical Therapy    Physical Therapy Treatment Note  Name: Yuri Garcia MRN: [id number] :   1940   Date:  2021   Admission Date: 2021 Room:  -A   Restrictions/Precautions:  Fall risk; general precautions; sternal precautions  Communication with other providers:  RN handoff and okays therapy for treatment  Subjective:  Patient states:  \"i'll do what I can\"  Pain:   Location, Type, Intensity (0/10 to 10/10):  Chest pain 3/10  Objective:    Observation:  Pt seated, awake, alert, agreeable to therapy. RN aware and provided handoff. IV,cardiac, BP, pulse ox all left in place t/o session. Treatment, including education/measures:  Therapeutic Activity Training:   Therapeutic activity training was instructed today. Cues were given for safety, sequence, UE/LE placement, awareness, and balance. Activities performed today included bed mobility training, sup-sit, sit-stand, SPT. Gait Training:  Cues were given for safety, sequence, device management, balance, posture, awareness, path. Assessment / Impression:    Initiated functional mobility, transfer, balance, gait training this session. Pt tolerating treatment well, limited by pain, SOB and activity tolerance at this time. Cont to complain of R sided visual deficits and demonstrates need for assist to identify obstacles in the R field of vision during gait training. Pt gait w/ cardiac walker is unsteady, w/ ocassional staggers, path deviations, dec donald; intermittent min-CGA to maintain balance, ambulates 60 ft + 60 ft w/ wc follow before requiring 1 standing rest break and another seated rest break upon termination of ambulation. One additional gait trial initiated w/o AD, 10 ft w/ CGA from gait belt to sink in order to perform ADL's, CGA for steadying to maintain dynamic standing balance t/o performance of ADL's. Pt demonstrates UE > LE deficits, w/ notably impaired proprioception and coordination at times.  Returned to recliner w/ call light in reach, chair alarm armed, nurse notified, use of gait belt. Cont to reinforce precaution education, intermittently noncompliant 2/2 forgetfullness. Patient's tolerance of treatment:  Good   Barriers to improvement:  Dizziness; pain; neuro deficits  Plan for Next Session:    Will cont to progress ex's and gt per cardiac protocol and educate pt on sternal precautions, S & S of ex intolerance, purpose of ex's, progression of ex's and gt at home. Cont to implement neuro re-ed tasks.     Time in:  0923  Time out:  0956  Timed treatment minutes:  33  Total treatment time:  33 (TA, GT)    Previously filed items:  Social/Functional History  Lives With: Spouse  Type of Home: House  Home Layout: One level  Home Access: Stairs to enter without rails  Entrance Stairs - Number of Steps: 2  Entrance Stairs - Rails: None  Bathroom Shower/Tub: Walk-in shower  Bathroom Toilet: Standard  Bathroom Equipment: Grab bars in shower, Built-in shower seat, Grab bars around toilet  Home Equipment: Candance March ( uses cane)  ADL Assistance: Independent  Homemaking Assistance: Independent  Homemaking Responsibilities: Yes  Ambulation Assistance: Independent  Transfer Assistance: Independent  Active : Yes  Occupation: Retired  Short term goals  Time Frame for [employer] term goals: 1 week  Short term goal 1: Pt to complete all bed mobility mod A  Short term goal 2: Pt to complete all STS transfers to/from bed, commode, and chair CGA  Short term goal 3: Pt to ambulate 25' with LRAD CGA       Electronically signed by:    Morgan Steve PT, DPT  8/7/2021, 11:41 AM

## 2021-08-08 LAB
ANION GAP SERPL CALCULATED.3IONS-SCNC: 9 MMOL/L (ref 4–16)
APTT: 25.5 SECONDS (ref 25.1–37.1)
BUN BLDV-MCNC: 12 MG/DL (ref 6–23)
CALCIUM SERPL-MCNC: 8.2 MG/DL (ref 8.3–10.6)
CHLORIDE BLD-SCNC: 103 MMOL/L (ref 99–110)
CO2: 28 MMOL/L (ref 21–32)
CREAT SERPL-MCNC: 0.6 MG/DL (ref 0.6–1.1)
GFR AFRICAN AMERICAN: >60 ML/MIN/1.73M2
GFR NON-AFRICAN AMERICAN: >60 ML/MIN/1.73M2
GLUCOSE BLD-MCNC: 101 MG/DL (ref 70–99)
HCT VFR BLD CALC: 31.6 % (ref 37–47)
HEMOGLOBIN: 10.1 GM/DL (ref 12.5–16)
MCH RBC QN AUTO: 31.8 PG (ref 27–31)
MCHC RBC AUTO-ENTMCNC: 32 % (ref 32–36)
MCV RBC AUTO: 99.4 FL (ref 78–100)
PDW BLD-RTO: 14.4 % (ref 11.7–14.9)
PLATELET # BLD: 202 K/CU MM (ref 140–440)
PMV BLD AUTO: 9.9 FL (ref 7.5–11.1)
POTASSIUM SERPL-SCNC: 4 MMOL/L (ref 3.5–5.1)
RBC # BLD: 3.18 M/CU MM (ref 4.2–5.4)
SODIUM BLD-SCNC: 140 MMOL/L (ref 135–145)
WBC # BLD: 9.9 K/CU MM (ref 4–10.5)

## 2021-08-08 PROCEDURE — 80048 BASIC METABOLIC PNL TOTAL CA: CPT

## 2021-08-08 PROCEDURE — 2580000003 HC RX 258: Performed by: INTERNAL MEDICINE

## 2021-08-08 PROCEDURE — 85027 COMPLETE CBC AUTOMATED: CPT

## 2021-08-08 PROCEDURE — 6360000002 HC RX W HCPCS: Performed by: SURGERY

## 2021-08-08 PROCEDURE — 85730 THROMBOPLASTIN TIME PARTIAL: CPT

## 2021-08-08 PROCEDURE — 6370000000 HC RX 637 (ALT 250 FOR IP): Performed by: PHYSICIAN ASSISTANT

## 2021-08-08 PROCEDURE — 2580000003 HC RX 258: Performed by: PHYSICIAN ASSISTANT

## 2021-08-08 PROCEDURE — 2000000000 HC ICU R&B

## 2021-08-08 PROCEDURE — 6370000000 HC RX 637 (ALT 250 FOR IP): Performed by: PSYCHIATRY & NEUROLOGY

## 2021-08-08 PROCEDURE — 94761 N-INVAS EAR/PLS OXIMETRY MLT: CPT

## 2021-08-08 RX ORDER — FUROSEMIDE 10 MG/ML
20 INJECTION INTRAMUSCULAR; INTRAVENOUS DAILY
Status: DISCONTINUED | OUTPATIENT
Start: 2021-08-08 | End: 2021-08-09 | Stop reason: HOSPADM

## 2021-08-08 RX ADMIN — MIDODRINE HYDROCHLORIDE 10 MG: 5 TABLET ORAL at 08:49

## 2021-08-08 RX ADMIN — ACETAMINOPHEN 650 MG: 325 TABLET ORAL at 18:16

## 2021-08-08 RX ADMIN — PANTOPRAZOLE SODIUM 40 MG: 40 TABLET, DELAYED RELEASE ORAL at 06:42

## 2021-08-08 RX ADMIN — ATORVASTATIN CALCIUM 20 MG: 20 TABLET, FILM COATED ORAL at 21:32

## 2021-08-08 RX ADMIN — AMIODARONE HYDROCHLORIDE 200 MG: 200 TABLET ORAL at 08:49

## 2021-08-08 RX ADMIN — LEVOTHYROXINE SODIUM 50 MCG: 0.05 TABLET ORAL at 06:42

## 2021-08-08 RX ADMIN — THERA TABS 1 TABLET: TAB at 08:49

## 2021-08-08 RX ADMIN — ALPRAZOLAM 0.25 MG: 0.25 TABLET ORAL at 22:00

## 2021-08-08 RX ADMIN — MIDODRINE HYDROCHLORIDE 10 MG: 5 TABLET ORAL at 18:16

## 2021-08-08 RX ADMIN — CLOPIDOGREL BISULFATE 75 MG: 75 TABLET ORAL at 08:49

## 2021-08-08 RX ADMIN — SODIUM CHLORIDE, PRESERVATIVE FREE 10 ML: 5 INJECTION INTRAVENOUS at 20:39

## 2021-08-08 RX ADMIN — Medication 1 TABLET: at 08:49

## 2021-08-08 RX ADMIN — CARVEDILOL 3.12 MG: 3.12 TABLET, FILM COATED ORAL at 21:33

## 2021-08-08 RX ADMIN — ACETAMINOPHEN 650 MG: 325 TABLET ORAL at 06:42

## 2021-08-08 RX ADMIN — FUROSEMIDE 20 MG: 10 INJECTION, SOLUTION INTRAMUSCULAR; INTRAVENOUS at 08:48

## 2021-08-08 RX ADMIN — SODIUM CHLORIDE, PRESERVATIVE FREE 10 ML: 5 INJECTION INTRAVENOUS at 08:49

## 2021-08-08 RX ADMIN — ASPIRIN 81 MG: 81 TABLET ORAL at 08:49

## 2021-08-08 RX ADMIN — SODIUM CHLORIDE, PRESERVATIVE FREE 10 ML: 5 INJECTION INTRAVENOUS at 08:48

## 2021-08-08 RX ADMIN — CARVEDILOL 3.12 MG: 3.12 TABLET, FILM COATED ORAL at 08:49

## 2021-08-08 RX ADMIN — MIDODRINE HYDROCHLORIDE 10 MG: 5 TABLET ORAL at 13:33

## 2021-08-08 RX ADMIN — HYDROCODONE BITARTRATE AND ACETAMINOPHEN 2 TABLET: 5; 325 TABLET ORAL at 20:40

## 2021-08-08 ASSESSMENT — PAIN DESCRIPTION - FREQUENCY
FREQUENCY: INTERMITTENT
FREQUENCY: INTERMITTENT

## 2021-08-08 ASSESSMENT — PAIN DESCRIPTION - ONSET
ONSET: GRADUAL
ONSET: ON-GOING

## 2021-08-08 ASSESSMENT — PAIN DESCRIPTION - ORIENTATION: ORIENTATION: MID

## 2021-08-08 ASSESSMENT — PAIN SCALES - GENERAL
PAINLEVEL_OUTOF10: 0
PAINLEVEL_OUTOF10: 3
PAINLEVEL_OUTOF10: 0
PAINLEVEL_OUTOF10: 7
PAINLEVEL_OUTOF10: 8
PAINLEVEL_OUTOF10: 3

## 2021-08-08 ASSESSMENT — PAIN DESCRIPTION - PROGRESSION
CLINICAL_PROGRESSION: GRADUALLY WORSENING
CLINICAL_PROGRESSION: GRADUALLY WORSENING

## 2021-08-08 ASSESSMENT — PAIN DESCRIPTION - LOCATION
LOCATION: CHEST;STERNUM
LOCATION: CHEST

## 2021-08-08 ASSESSMENT — PAIN DESCRIPTION - PAIN TYPE
TYPE: SURGICAL PAIN
TYPE: SURGICAL PAIN

## 2021-08-08 ASSESSMENT — PAIN DESCRIPTION - DESCRIPTORS
DESCRIPTORS: ACHING
DESCRIPTORS: ACHING

## 2021-08-08 NOTE — PROGRESS NOTES
PATIENT NAME: Harleen Moore    TODAY'S DATE: 08/08/21    SUBJECTIVE:    Pt status post CABG postop day #6  History of CVA affecting the right I field of vision  making steady recovery overall  Offers no specific complaints  Tolerating diet and moderate activity. OBJECTIVE:   VITALS:    Vitals:    08/08/21 1335   BP: (!) 108/49   Pulse: 65   Resp: 20   Temp:    SpO2: 100%     INTAKE/OUTPUT:     Patient Vitals for the past 96 hrs (Last 3 readings):   Weight   08/08/21 0642 154 lb 12.8 oz (70.2 kg)   08/06/21 1203 157 lb 6.4 oz (71.4 kg)       EXAM:  Blood pressure (!) 108/49, pulse 65, temperature 97.9 °F (36.6 °C), temperature source Oral, resp. rate 20, height 5' 2.01\" (1.575 m), weight 154 lb 12.8 oz (70.2 kg), SpO2 100 %. General appearance: No apparent distress, appears stated age and cooperative. Skin: unremarkable  HEENT Normocephalic, atraumatic without obvious deformity. Neck: Supple, Trachea midline   Lungs: Good respiratory effort. Clear to auscultation, bilaterally  Heart: Regular rate/ rhythm inc c/d/i  Abdomen: Soft, non-tender or non-distended   Extremities: edema warm well perfused  Neurologic: Alert, grossly intact  Mental status: normal affect      Data:  CBC:   Recent Labs     08/06/21  0525 08/08/21  0406   WBC 7.5 9.9   HGB 9.2* 10.1*   HCT 27.1* 31.6*   * 202     BMP:    Recent Labs     08/06/21  0525 08/06/21  1210 08/06/21  1912 08/07/21  0635 08/08/21  0406     --   --  140 140   K 3.5   < > 3.8 3.6 4.0     --   --  105 103   CO2 24  --   --  28 28   BUN 9  --   --  10 12   CREATININE 0.5*  --   --  0.5* 0.6   GLUCOSE 116*  --   --  95 101*    < > = values in this interval not displayed. Hepatic: No results for input(s): AST, ALT, ALB, BILITOT, ALKPHOS in the last 72 hours. Mag:      Recent Labs     08/06/21  0525   MG 1.8      Phos:   No results for input(s): PHOS in the last 72 hours.    INR: No results for input(s): INR in the last 72 hours. Radiology Review:  CXR      ASSESSMENT AND PLAN:  S/P as above  Patient Active Problem List   Diagnosis    PVC (premature ventricular contraction)    Fatigue    Chest pressure    HORACE (obstructive sleep apnea)    Hypersomnia    Unstable angina (HCC)    Essential hypertension    CAD in native artery    Acute on chronic combined systolic and diastolic heart failure ([facility] [address]       Mobilize. Pulmonary toilet.   Wean Dobutrex off  Continue PT and OT  Placement plans are in progress

## 2021-08-08 NOTE — PLAN OF CARE
Problem: Pain:  Goal: Pain level will decrease  Description: Pain level will decrease  8/7/2021 2334 by Meri Murray RN  Outcome: Ongoing  8/7/2021 1020 by Tramaine Bacon RN  Outcome: Ongoing  Goal: Control of acute pain  Description: Control of acute pain  8/7/2021 2334 by Meri Murray RN  Outcome: Ongoing  8/7/2021 1020 by Tramaine Bacon RN  Outcome: Ongoing     Problem: SAFETY  Goal: Free from accidental physical injury  8/7/2021 2334 by Meri Murray RN  Outcome: Ongoing  8/7/2021 1020 by Tramaine Bacon RN  Outcome: Ongoing  Goal: Free from intentional harm  8/7/2021 2334 by Meri Murray RN  Outcome: Ongoing  8/7/2021 1020 by Tramaine Bacon RN  Outcome: Ongoing     Problem: DAILY CARE  Goal: Daily care needs are met  Outcome: Ongoing     Problem: SKIN INTEGRITY  Goal: Skin integrity is maintained or improved  Outcome: Ongoing     Problem: KNOWLEDGE DEFICIT  Goal: Patient/S.O. demonstrates understanding of disease process, treatment plan, medications, and discharge instructions.   Outcome: Ongoing     Problem: DISCHARGE BARRIERS  Goal: Patient's continuum of care needs are met  Outcome: Ongoing     Problem: Nutrition  Goal: Optimal nutrition therapy  Outcome: Ongoing     Problem: Falls - Risk of:  Goal: Will remain free from falls  Description: Will remain free from falls  Outcome: Ongoing  Goal: Absence of physical injury  Description: Absence of physical injury  Outcome: Ongoing

## 2021-08-08 NOTE — PROGRESS NOTES
Patient will only drink the clear ensure in [facility]. DC'D the shakes due to them just being thrown away for days. Dr. Ruben Ricks here and decreased IV lasix to once daily. Will try coreg 3.125mg for the first time today.

## 2021-08-08 NOTE — PROGRESS NOTES
NEUROLOGY NOTE  DR. Vu Hendrix MD.  -------------------------------------------------  Subjective:    Doing better. Denies any new symptoms. Denies headache nausea vomiting dizziness    Denies numbness weakness extremities    Denies blurring of vision double vision    Objective:    BP (!) 116/56   Pulse 69   Temp 97.5 °F (36.4 °C) (Oral)   Resp 15   Ht 5' 2.01\" (1.575 m)   Wt 157 lb 6.4 oz (71.4 kg)   SpO2 94%   BMI 28.78 kg/m²   HEENT nl      Neuro exam    Alert Oriented  X 3  Follow simple commands  EOMI Pupils 3 mm jean  5/5 all 4 extremities      RADIOLOGY  -----------------    ECHO Complete 2D W Doppler W Color    Result Date: 7/29/2021  Transthoracic Echocardiography Report (TTE)  Demographics   Patient Name       Karis RAINEY Date of Study       07/29/2021   Date of Birth      1940         Gender              Female   Age                [de-identified] year(s)         Race                   Patient Number     [id number]           Room Number   Visit Number       [id number]   Corporate ID       [id number]   Accession Number   [id number]         Debbie Hernandez,                                                            CRT   Ordering Physician Jennifer Perez MD                 Physician           Renae FRANKLIN  Procedure Type of Study   TTE procedure:ECHOCARDIOGRAM COMPLETE 2D W DOPPLER W COLOR. Procedure Date Date: 07/29/2021 Start: 09:33 AM Study Location: [address] [place] [address] Technical Quality: Fair visualization Indications:Palpitations. Patient Status: Routine Height: 62 inches Weight: 142 pounds BSA: 1.65 m2 BMI: 25.97 kg/m2 Rhythm: Sinus rhythm HR: 78 bpm BP: 122/68 mmHg  Conclusions   Summary  Left ventricular function and size is normal, EF is estimated at 55-60%. Mild left ventricular hypertrophy. E/A reversal; indeterminate diastolic function.   Hypokineses of the basal anterior lateral and mid anterior lateral segments. Aneurysmal interatrial septum. Mild mitral ,tricuspid and moderate aortic regurgitation is present. RVSP is 25 mmHg. No evidence of pericardial effusion. Signature   ------------------------------------------------------------------  Electronically signed by Larry Stoddard MD  (Interpreting physician) on 07/29/2021 at 02:45 PM  ------------------------------------------------------------------   Findings   Left Ventricle  Left ventricular function and size is normal, EF is estimated at 55-60%. Mild left ventricular hypertrophy. E/A reversal; indeterminate diastolic function. Hypokineses of the basal anterior lateral and mid anterior lateral segments. Left Atrium  Essentially normal left atrium. Aneurysmal interatrial septum. Right Atrium  Normal right atrium size and structure. Right Ventricle  Essentially normal right ventricle. Aortic Valve  Normal aortic valve structure and function. Moderate aortic regurgitation; PHT: 261msec. Mitral Valve  Normal mitral valve structure and function. Mild mitral regurgitation is present. Tricuspid Valve  Normal tricuspid valve structure and function. Mild tricuspid regurgitation; RVSP is 25 mmHg. Pulmonic Valve  The pulmonic valve was not well visualized. Pericardial Effusion  No evidence of pericardial effusion. Pleural Effusion  No evidence of pleural effusion. Miscellaneous  No abnormalities were noted in the IVC, abdominal aorta, or aortic root.   M-Mode/2D Measurements & Calculations   LV Diastolic Dimension:  LV Systolic Dimension:  LA Dimension: 3.8 cmAO Root  4.74 cm                  3.46 cm                 Dimension: 3.2 cmLA Area:  LV FS:27 %               LV Volume Diastolic: 89 3.37 cm2  LV PW Diastolic: 8.45 cm ml  LV PW Systolic: 5.52 cm  LV Volume Systolic: 35  Septum Diastolic: 6.42   ml  cm                       LV EDV/LV EDV Index: 89 RV Diastolic Dimension:  Septum Systolic: 1.75 cm KT/11 I7FY ESV/LV ESV   2.76 cm  CO: 6.78 l/min           Index: 35 ml/21 m2  CI: 4.11 l/m*m2          EF Calculated (A4C):    LA/Aorta: 1.19                           60.7 %  LV Area Diastolic: 62.1  EF Calculated (2D):     LA volume/Index: 14 ml /8m2  cm2                      52.4 %  LV Area Systolic: 38.9  cm2                      LV Length: 7.15 cm                            LVOT: 2 cm  Doppler Measurements & Calculations   MV Peak E-Wave: 47.3  AV Peak Velocity: 155 cm/s   LVOT Mean Velocity: 80.3  cm/s                  AV Peak Gradient: 9.61 mmHg  cm/s  MV Peak A-Wave: 95.3  AV Mean Velocity: 100 cm/s   LVOT Mean Gradient: 3  cm/s                  AV Mean Gradient: 5 mmHg     mmHg  MV E/A Ratio: 0.5     AV VTI: 31.3 cm              Estimated RVSP: 25 mmHg  MV Peak Gradient:     AV Area (Continuity):2.78    Estimated RAP:3 mmHg  0.89 mmHg             cm2   MV P1/2t: 55 msec     LVOT VTI: 27.7 cm            TR Velocity:157 cm/s  MVA by PHT:4 cm2      AV P1/2t: 261 msec           TR Gradient:9.86 mmHg                        Estimated PASP: 12.86 mmHg   MV E' Lateral  Velocity: 6.82 cm/s  MV A' Lateral  Velocity: 16.3 cm/s  MV E/E' lateral: 6.94      CT HEAD WO CONTRAST    Result Date: 8/3/2021  EXAMINATION: CT OF THE HEAD WITHOUT CONTRAST  8/3/2021 9:03 am TECHNIQUE: CT of the head was performed without the administration of intravenous contrast. Dose modulation, iterative reconstruction, and/or weight based adjustment of the mA/kV was utilized to reduce the radiation dose to as low as reasonably achievable. COMPARISON: None. HISTORY: ORDERING SYSTEM PROVIDED HISTORY: right peripheral vision loss TECHNOLOGIST PROVIDED HISTORY: Reason for exam:->right peripheral vision loss Has a \"code stroke\" or \"stroke alert\" been called? ->Yes Reason for Exam: right peripheral vision loss Acuity: Acute Type of Exam: Initial FINDINGS: BRAIN/VENTRICLES: There is no acute intracranial hemorrhage, mass effect or midline shift.   No abnormal extra-axial fluid collection. The gray-white differentiation is maintained without evidence of an acute infarct. There is no evidence of hydrocephalus. The cerebral sulci and ventricles are enlarged. There is low-attenuation within the periventricular white matter. ORBITS: The visualized portion of the orbits demonstrate no acute abnormality. SINUSES: The visualized paranasal sinuses and mastoid air cells demonstrate no acute abnormality. SOFT TISSUES/SKULL:  No acute abnormality of the visualized skull or soft tissues. 1. No acute intracranial abnormality. These findings were conveyed to Juan Velásquez RN at 9:39 a.m. 08/03/2021. 2. Diffuse cerebral atrophy with chronic small vessel ischemic disease. CT chest without contrast    Result Date: 7/30/2021  EXAMINATION: CT OF THE CHEST WITHOUT CONTRAST 7/30/2021 12:30 pm TECHNIQUE: CT of the chest was performed without the administration of intravenous contrast. Multiplanar reformatted images are provided for review. Dose modulation, iterative reconstruction, and/or weight based adjustment of the mA/kV was utilized to reduce the radiation dose to as low as reasonably achievable. COMPARISON: None. HISTORY: ORDERING SYSTEM PROVIDED HISTORY: planning cabg TECHNOLOGIST PROVIDED HISTORY: Reason for exam:->planning cabg Reason for Exam: planning cabg Acuity: Acute Type of Exam: Initial Additional signs and symptoms: no Relevant Medical/Surgical History: none FINDINGS: Mediastinum: Visualized thyroid is atrophic but otherwise unremarkable. No mediastinal or hilar lymphadenopathy is seen. Esophagus is unremarkable. The thoracic aorta is normal in caliber, measuring 3.6 cm in its ascending portion. There is mild aortic calcinosis, seen mainly at the aortic arch and within the descending portion. Cardiac chambers are unremarkable. Coronary calcifications noted. No pericardial effusion. Lungs/pleura: Mild dependent atelectasis noted bilaterally.   The lungs are otherwise clear. Airways are patent. Upper Abdomen: Unremarkable. Soft Tissues/Bones: Visualized extra thoracic soft tissues are unremarkable. No osteolytic or osteoblastic bone lesions are identified. Chronic appearing compression deformity is seen involving the superior endplate of L1. Mild aortic calcinosis, mainly involving the aortic arch and the descending thoracic aorta. Otherwise unremarkable noncontrast chest CT. XR CHEST PORTABLE    Result Date: 8/5/2021  EXAMINATION: ONE XRAY VIEW OF THE CHEST 8/5/2021 5:04 am COMPARISON: 08/04/2021 HISTORY: ORDERING SYSTEM PROVIDED HISTORY: Post op open heart surgery TECHNOLOGIST PROVIDED HISTORY: Reason for exam:->Post op open heart surgery Reason for Exam: Post op open heart surgery Acuity: Acute Type of Exam: Subsequent/Follow-up FINDINGS: Postoperative changes are seen in the chest.  Left subclavian central venous catheter terminates in the SVC, with a Gonzales-Raissa catheter noted in the right pulmonary arterial branches in the region of the right middle/lower lobe region. Cardiac size is mildly enlarged. Bibasilar airspace disease, right greater than left with a small right effusion. Minimal increased left basilar atelectasis. Osteopenia. Similar left trace apical pneumothorax. Minimal increased left basilar atelectasis, with similar right basilar airspace disease and small effusion. Similar trace left apical pneumothorax. XR CHEST PORTABLE    Result Date: 8/4/2021  EXAMINATION: ONE XRAY VIEW OF THE CHEST 8/4/2021 3:49 pm COMPARISON: Radiograph performed earlier the same day HISTORY: ORDERING SYSTEM PROVIDED HISTORY: s/p chest tube removal TECHNOLOGIST PROVIDED HISTORY: Reason for exam:->s/p chest tube removal Reason for Exam: s/p chest tube removal Acuity: Acute Type of Exam: Initial Additional signs and symptoms: na Relevant Medical/Surgical History: cad FINDINGS: Prior sternotomy. Cardiomediastinal silhouette is unchanged in size.  Gonzales-Raissa subclavian central venous catheter is well as [place]-Raissa catheter again identified, similar in position. The nasogastric tube is been removed. Cardiac mediastinal silhouettes appear similar given rotation. Mild improved aeration noted at the lung bases. Osseous structures appear stable. Interval removal of the endotracheal tube and nasogastric tube, with mild improved aeration at the lung bases. XR CHEST PORTABLE    Result Date: 8/2/2021  EXAMINATION: ONE XRAY VIEW OF THE CHEST 8/2/2021 5:27 pm COMPARISON: Chest 06/08/2018 HISTORY: ORDERING SYSTEM PROVIDED HISTORY: Post op open heart surgery Acuity: Acute Type of Exam: Initial Additional signs and symptoms: unknown Relevant Medical/Surgical History: CAD FINDINGS: The cardiomediastinal and hilar silhouettes appear unremarkable. Left basilar opacity partially obscures the left hemidiaphragm and there is evidence of left pleural effusion. The right lung appears clear. No right pleural effusion evident. No pneumothorax is seen. No acute osseous abnormality is identified. Median sternotomy wires in keeping with open heart surgery. 2 left pleural catheters. Left IJ CVC tip is at the proximal superior vena cava and left [place]-Raissa catheter via introducer sheath has its tip at the pulmonary outflow tract level. Endotracheal tube tip projects over the trachea, tip at the level of the aortic knob, 3 cm superior to the weston. NG tube is coiled on itself at the distal esophagus, tip extending superiorly within the mid esophagus. 1. Malpositioned NG tube, coiled at the distal esophagus, tip extending superiorly at the mid esophagus. 2. Other life-support appliances appear properly positioned. 3. Left basilar opacity and pleural effusion not unusual following open-heart surgery. Pneumonia is a consideration. 4. No pneumothorax evident. Results were called by Dr. Montana Joseph to Eliz Wilhelm RN on 8/2/2021 at 18:29.   I stressed that nothing should be administered via the NG tube until it has been appropriately repositioned. She repeated the finding back to me on the phone. Vascular carotid duplex bilateral    Result Date: 7/30/2021  EXAMINATION: ULTRASOUND EVALUATION OF THE CAROTID ARTERIES 7/30/2021 COMPARISON: None. HISTORY: ORDERING SYSTEM PROVIDED HISTORY: preop cabg TECHNOLOGIST PROVIDED HISTORY: Reason for exam:->preop cabg Reason for Exam: Pre op CABG on 08/02/21 Acuity: Acute Type of Exam: Initial FINDINGS: RIGHT: The right common carotid artery demonstrates peak systolic velocities of 78, 45 cm/sec in the proximal and distal segments respectively. The right internal carotid artery demonstrates the systolic velocities of 53, 109, 51 cm/sec in the proximal, mid and distal segments respectively. The external carotid artery is patent. The vertebral artery demonstrates normal antegrade flow. Mild plaque. ICA/CCA ratio of 1.4. LEFT: The left common carotid artery demonstrates peak systolic velocities of 67, 54 cm/sec in the proximal and distal segments respectively. The left internal carotid artery demonstrates the systolic velocities of 59, 90, 95 cm/sec in the proximal, mid and distal segments respectively. The external carotid artery is patent. The vertebral artery demonstrates normal antegrade flow. Mild plaque. ICA/CCA ratio of 1.4. The right internal carotid artery demonstrates 0-50% stenosis. The left internal carotid artery demonstrates 0-50% stenosis. Bilateral vertebral arteries are patent with flow in the normal direction. US LOWER EXTREMITY UNILATERAL VEIN MAPPING    Result Date: 7/30/2021  EXAMINATION: ULTRASOUND OF THE LEFT LOWER EXTREMITY FOR VEIN MAPPING, 7/30/2021 3:25 pm TECHNIQUE: Sonographic evaluation of the left greater saphenous vein was performed. COMPARISON: None.  HISTORY: ORDERING SYSTEM PROVIDED HISTORY: preop cabg TECHNOLOGIST PROVIDED HISTORY: Reason for exam:->preop cabg Reason for Exam: Pre op CABG on 08/02/21 some subtle low-attenuation changes within the left occipital lobe. Occlusion of the distal P2 segment of the left posterior cerebral artery. There may be some subtle low-attenuation changes within the left occipital lobe which is concordant with the patient's right visual field defect. MRI may be precluded given history of recent surgery Small bilateral apical pneumothorax with associated subcutaneous emphysema within the neck. These changes are compatible with CABG 1 day prior Critical results were called by Dr. Kimmy Paulson MD to Dr. Cynthia Headley On 8/3/2021 at 09:57. NM MYOCARDIAL SPECT REST EXERCISE OR RX    Result Date: 8/1/2021  Cardiac Perfusion Imaging   Demographics   Patient Name      Lucila RAINEY Date of study        07/29/2021   Date of Birth     1940         Gender               Female   Age               [de-identified] year(s)         Race                    Patient Number    [id number]           Room Number   Visit Number      [id number]          Height               62 inches   Corporate ID      [id number]           Weight               142 pounds   Accession Number  [id number]                                        NM Technologist      Kalia Jack [facility]   Ordering          Pravin Perez MD                 Cardiologist         Renae FRANKLIN   The procedure was explained in detail to the patient. Risks,  complications and alternative treatments were reviewed. Written consent  was obtained. Conclusions   Summary  Abnormal Stress EKG results and patient was transferred to [facility] cath lab per  Dr. Siobhan Khan. No images were done.    Signatures   ------------------------------------------------------------------  Electronically signed by Loni Tate MD  (Interpreting cardiologist) on 08/01/2021 at 14:55  ------------------------------------------------------------------  Procedure Procedure Type:   Limited Nuclear Stress Test  Indications: Chest pain. Stress Protocols   Resting HR:66 bpm  Resting BP:136/78 mmHg  Stress Protocol:Pharmacologic - Lexiscan  Peak HR:99 bpm                   HR response: Appropriate  Peak BP:132/80 mmHg              BP response: Normal resting BP -  Predicted HR: 140 bpm            appropriate response  % of predicted HR: 71            HR/BP product:[id number]   Exercise duration: 01:00 min  Reason for termination:Completed   Symptoms  Chest pain 6/10. Chest pain was resolved in recovery. Procedure Medications   - Lexiscan I.V. bolus (over 15sec.) 0.4 mg admininstered @ 07/29/2021 10:30.   - Aminophylline I.V. 50 mg admininstered @ 07/29/2021 10:32. Imaging Protocols           Stress           Isotope: Sestamibi 99mTc          Isotope dose:10.2 mCi          Administration route: I.V. Lt. arm          Injection Date:07/29/2021 10:31  Medical History   Accession#:  [id number]  Admission Data Admission date: 07/29/2021 Admission Time: 10:09 Hospital Status: Outpatient.       LAB RESULTS  --------------------    Recent Results (from the past 24 hour(s))   APTT    Collection Time: 08/07/21  6:35 AM   Result Value Ref Range    aPTT 25.0 (L) 25.1 - 37.1 SECONDS   Basic Metabolic Panel    Collection Time: 08/07/21  6:35 AM   Result Value Ref Range    Sodium 140 135 - 145 MMOL/L    Potassium 3.6 3.5 - 5.1 MMOL/L    Chloride 105 99 - 110 mMol/L    CO2 28 21 - 32 MMOL/L    Anion Gap 7 4 - 16    BUN 10 6 - 23 MG/DL    CREATININE 0.5 (L) 0.6 - 1.1 MG/DL    Glucose 95 70 - 99 MG/DL    Calcium 7.9 (L) 8.3 - 10.6 MG/DL    GFR Non-African American >60 >60 mL/min/1.73m2    GFR African American >60 >60 mL/min/1.73m2   Calcium, Ionized    Collection Time: 08/07/21  6:35 AM   Result Value Ref Range    Ionized Ca 1.17 1.12 - 1.32 mMOL/L    Calcium, Ion 4.68 4.48 - 5.28 MG/DL         Medical problems    Patient Active Problem List:     PVC (premature ventricular contraction)     Fatigue     Chest pressure     HORACE (obstructive sleep apnea)     Hypersomnia     Unstable angina (HCC)     Essential hypertension     CAD in native artery     Acute on chronic combined systolic and diastolic heart failure (HCC)      ASSESSMENT:  ---------------------    Left occipital infarct     Left PCA occlusion     PLAN:     CT brain as above     CTA head neck as above     Mri brain if can be done post-CABG     Echo positive for aneurysmal interatrial septum     Homocysteine level high add foltx     Continue asa plavix     Discussed dx prognosis meds side effects and above with pt and family and answered all questions        Electronically signed by Daisy Motta MD on 8/7/2021 at 11:22 PM

## 2021-08-08 NOTE — PROGRESS NOTES
Pt c/o slight dizziness today. BP taken sitting and standing with ho significant drop noted. See VS. Family at bedside. ISE use is much better now and pt is getting 500-700 ml. Praise and support given.

## 2021-08-09 ENCOUNTER — HOSPITAL ENCOUNTER (INPATIENT)
Age: 81
LOS: 12 days | Discharge: HOME HEALTH | DRG: 057 | End: 2021-08-21
Attending: PHYSICAL MEDICINE & REHABILITATION | Admitting: PHYSICAL MEDICINE & REHABILITATION
Payer: MEDICARE

## 2021-08-09 VITALS
RESPIRATION RATE: 21 BRPM | TEMPERATURE: 97.5 F | SYSTOLIC BLOOD PRESSURE: 117 MMHG | HEART RATE: 71 BPM | HEIGHT: 62 IN | BODY MASS INDEX: 28.49 KG/M2 | OXYGEN SATURATION: 95 % | DIASTOLIC BLOOD PRESSURE: 69 MMHG | WEIGHT: 154.8 LBS

## 2021-08-09 PROBLEM — I63.9 ACUTE CVA (CEREBROVASCULAR ACCIDENT) (HCC): Status: ACTIVE | Noted: 2021-08-09

## 2021-08-09 LAB
ANION GAP SERPL CALCULATED.3IONS-SCNC: 6 MMOL/L (ref 4–16)
APTT: 26.2 SECONDS (ref 25.1–37.1)
BUN BLDV-MCNC: 12 MG/DL (ref 6–23)
CALCIUM SERPL-MCNC: 8.1 MG/DL (ref 8.3–10.6)
CHLORIDE BLD-SCNC: 103 MMOL/L (ref 99–110)
CO2: 29 MMOL/L (ref 21–32)
CREAT SERPL-MCNC: 0.5 MG/DL (ref 0.6–1.1)
GFR AFRICAN AMERICAN: >60 ML/MIN/1.73M2
GFR NON-AFRICAN AMERICAN: >60 ML/MIN/1.73M2
GLUCOSE BLD-MCNC: 95 MG/DL (ref 70–99)
POTASSIUM SERPL-SCNC: 3.7 MMOL/L (ref 3.5–5.1)
SARS-COV-2, NAAT: NOT DETECTED
SODIUM BLD-SCNC: 138 MMOL/L (ref 135–145)
SOURCE: NORMAL

## 2021-08-09 PROCEDURE — 1280000000 HC REHAB R&B

## 2021-08-09 PROCEDURE — 80048 BASIC METABOLIC PNL TOTAL CA: CPT

## 2021-08-09 PROCEDURE — 6370000000 HC RX 637 (ALT 250 FOR IP): Performed by: PHYSICIAN ASSISTANT

## 2021-08-09 PROCEDURE — 6360000002 HC RX W HCPCS: Performed by: SURGERY

## 2021-08-09 PROCEDURE — 6370000000 HC RX 637 (ALT 250 FOR IP): Performed by: PHYSICAL MEDICINE & REHABILITATION

## 2021-08-09 PROCEDURE — 99223 1ST HOSP IP/OBS HIGH 75: CPT | Performed by: PHYSICAL MEDICINE & REHABILITATION

## 2021-08-09 PROCEDURE — 2580000003 HC RX 258: Performed by: INTERNAL MEDICINE

## 2021-08-09 PROCEDURE — 97110 THERAPEUTIC EXERCISES: CPT

## 2021-08-09 PROCEDURE — 94664 DEMO&/EVAL PT USE INHALER: CPT

## 2021-08-09 PROCEDURE — 97116 GAIT TRAINING THERAPY: CPT

## 2021-08-09 PROCEDURE — 94761 N-INVAS EAR/PLS OXIMETRY MLT: CPT

## 2021-08-09 PROCEDURE — 2580000003 HC RX 258: Performed by: PHYSICIAN ASSISTANT

## 2021-08-09 PROCEDURE — 6370000000 HC RX 637 (ALT 250 FOR IP): Performed by: PSYCHIATRY & NEUROLOGY

## 2021-08-09 PROCEDURE — 87635 SARS-COV-2 COVID-19 AMP PRB: CPT

## 2021-08-09 PROCEDURE — 97530 THERAPEUTIC ACTIVITIES: CPT

## 2021-08-09 PROCEDURE — 85730 THROMBOPLASTIN TIME PARTIAL: CPT

## 2021-08-09 PROCEDURE — 94150 VITAL CAPACITY TEST: CPT

## 2021-08-09 RX ORDER — ALPRAZOLAM 0.25 MG/1
0.25 TABLET ORAL NIGHTLY PRN
Status: CANCELLED | OUTPATIENT
Start: 2021-08-09

## 2021-08-09 RX ORDER — ONDANSETRON 4 MG/1
4 TABLET, ORALLY DISINTEGRATING ORAL 4 TIMES DAILY PRN
Status: DISCONTINUED | OUTPATIENT
Start: 2021-08-09 | End: 2021-08-21 | Stop reason: HOSPADM

## 2021-08-09 RX ORDER — POLYETHYLENE GLYCOL 3350 17 G/17G
17 POWDER, FOR SOLUTION ORAL DAILY PRN
Status: DISCONTINUED | OUTPATIENT
Start: 2021-08-09 | End: 2021-08-21 | Stop reason: HOSPADM

## 2021-08-09 RX ORDER — LEVOTHYROXINE SODIUM 0.05 MG/1
50 TABLET ORAL DAILY
Status: CANCELLED | OUTPATIENT
Start: 2021-08-10

## 2021-08-09 RX ORDER — ALPRAZOLAM 0.25 MG/1
0.25 TABLET ORAL NIGHTLY PRN
Status: DISCONTINUED | OUTPATIENT
Start: 2021-08-09 | End: 2021-08-21 | Stop reason: HOSPADM

## 2021-08-09 RX ORDER — SENNA PLUS 8.6 MG/1
1 TABLET ORAL NIGHTLY
Status: DISCONTINUED | OUTPATIENT
Start: 2021-08-09 | End: 2021-08-21 | Stop reason: HOSPADM

## 2021-08-09 RX ORDER — CLOPIDOGREL BISULFATE 75 MG/1
75 TABLET ORAL DAILY
Status: DISCONTINUED | OUTPATIENT
Start: 2021-08-10 | End: 2021-08-21 | Stop reason: HOSPADM

## 2021-08-09 RX ORDER — CLOPIDOGREL BISULFATE 75 MG/1
75 TABLET ORAL DAILY
Status: CANCELLED | OUTPATIENT
Start: 2021-08-10

## 2021-08-09 RX ORDER — LEVOTHYROXINE SODIUM 0.05 MG/1
50 TABLET ORAL DAILY
Status: DISCONTINUED | OUTPATIENT
Start: 2021-08-10 | End: 2021-08-21 | Stop reason: HOSPADM

## 2021-08-09 RX ORDER — MIDODRINE HYDROCHLORIDE 5 MG/1
10 TABLET ORAL
Status: DISCONTINUED | OUTPATIENT
Start: 2021-08-09 | End: 2021-08-21 | Stop reason: HOSPADM

## 2021-08-09 RX ORDER — ACETAMINOPHEN 325 MG/1
650 TABLET ORAL EVERY 4 HOURS PRN
Status: DISCONTINUED | OUTPATIENT
Start: 2021-08-09 | End: 2021-08-21 | Stop reason: HOSPADM

## 2021-08-09 RX ORDER — ASPIRIN 81 MG/1
81 TABLET ORAL DAILY
Status: CANCELLED | OUTPATIENT
Start: 2021-08-10

## 2021-08-09 RX ORDER — DOCUSATE SODIUM 100 MG/1
100 CAPSULE, LIQUID FILLED ORAL DAILY
Status: DISCONTINUED | OUTPATIENT
Start: 2021-08-10 | End: 2021-08-21 | Stop reason: HOSPADM

## 2021-08-09 RX ORDER — B12/LEVOMEFOLATE CALCIUM/B-6 2-1.13-25
1 TABLET ORAL DAILY
Status: DISCONTINUED | OUTPATIENT
Start: 2021-08-10 | End: 2021-08-21 | Stop reason: HOSPADM

## 2021-08-09 RX ORDER — HYDROCODONE BITARTRATE AND ACETAMINOPHEN 5; 325 MG/1; MG/1
1 TABLET ORAL EVERY 6 HOURS PRN
Status: DISCONTINUED | OUTPATIENT
Start: 2021-08-09 | End: 2021-08-09 | Stop reason: HOSPADM

## 2021-08-09 RX ORDER — PANTOPRAZOLE SODIUM 40 MG/1
40 TABLET, DELAYED RELEASE ORAL
Status: DISCONTINUED | OUTPATIENT
Start: 2021-08-10 | End: 2021-08-21 | Stop reason: HOSPADM

## 2021-08-09 RX ORDER — HYDROCODONE BITARTRATE AND ACETAMINOPHEN 10; 325 MG/1; MG/1
1 TABLET ORAL EVERY 6 HOURS PRN
Status: DISCONTINUED | OUTPATIENT
Start: 2021-08-09 | End: 2021-08-21 | Stop reason: HOSPADM

## 2021-08-09 RX ORDER — CARVEDILOL 3.12 MG/1
3.12 TABLET ORAL 2 TIMES DAILY
Status: CANCELLED | OUTPATIENT
Start: 2021-08-09

## 2021-08-09 RX ORDER — FUROSEMIDE 20 MG/1
20 TABLET ORAL DAILY
Status: DISCONTINUED | OUTPATIENT
Start: 2021-08-09 | End: 2021-08-21 | Stop reason: HOSPADM

## 2021-08-09 RX ORDER — ATORVASTATIN CALCIUM 20 MG/1
20 TABLET, FILM COATED ORAL NIGHTLY
Status: DISCONTINUED | OUTPATIENT
Start: 2021-08-09 | End: 2021-08-21 | Stop reason: HOSPADM

## 2021-08-09 RX ORDER — ATORVASTATIN CALCIUM 20 MG/1
20 TABLET, FILM COATED ORAL NIGHTLY
Status: CANCELLED | OUTPATIENT
Start: 2021-08-09

## 2021-08-09 RX ORDER — FUROSEMIDE 20 MG/1
20 TABLET ORAL DAILY
Status: CANCELLED | OUTPATIENT
Start: 2021-08-09

## 2021-08-09 RX ORDER — MIDODRINE HYDROCHLORIDE 5 MG/1
10 TABLET ORAL
Status: CANCELLED | OUTPATIENT
Start: 2021-08-09

## 2021-08-09 RX ORDER — LANOLIN ALCOHOL/MO/W.PET/CERES
3 CREAM (GRAM) TOPICAL NIGHTLY PRN
Status: DISCONTINUED | OUTPATIENT
Start: 2021-08-09 | End: 2021-08-21 | Stop reason: HOSPADM

## 2021-08-09 RX ORDER — MULTIVITAMIN WITH IRON
1 TABLET ORAL DAILY
Status: DISCONTINUED | OUTPATIENT
Start: 2021-08-10 | End: 2021-08-21 | Stop reason: HOSPADM

## 2021-08-09 RX ORDER — ASPIRIN 81 MG/1
81 TABLET ORAL DAILY
Status: DISCONTINUED | OUTPATIENT
Start: 2021-08-10 | End: 2021-08-21 | Stop reason: HOSPADM

## 2021-08-09 RX ORDER — CARVEDILOL 6.25 MG/1
3.12 TABLET ORAL 2 TIMES DAILY
Status: DISCONTINUED | OUTPATIENT
Start: 2021-08-09 | End: 2021-08-21 | Stop reason: HOSPADM

## 2021-08-09 RX ADMIN — PANTOPRAZOLE SODIUM 40 MG: 40 TABLET, DELAYED RELEASE ORAL at 05:35

## 2021-08-09 RX ADMIN — SODIUM CHLORIDE, PRESERVATIVE FREE 10 ML: 5 INJECTION INTRAVENOUS at 10:28

## 2021-08-09 RX ADMIN — ATORVASTATIN CALCIUM 20 MG: 20 TABLET, FILM COATED ORAL at 20:23

## 2021-08-09 RX ADMIN — ACETAMINOPHEN 650 MG: 325 TABLET ORAL at 05:35

## 2021-08-09 RX ADMIN — FUROSEMIDE 20 MG: 10 INJECTION, SOLUTION INTRAMUSCULAR; INTRAVENOUS at 10:17

## 2021-08-09 RX ADMIN — ACETAMINOPHEN 650 MG: 325 TABLET ORAL at 16:17

## 2021-08-09 RX ADMIN — CLOPIDOGREL BISULFATE 75 MG: 75 TABLET ORAL at 10:17

## 2021-08-09 RX ADMIN — CARVEDILOL 3.12 MG: 3.12 TABLET, FILM COATED ORAL at 10:17

## 2021-08-09 RX ADMIN — FUROSEMIDE 20 MG: 20 TABLET ORAL at 17:35

## 2021-08-09 RX ADMIN — HYDROCODONE BITARTRATE AND ACETAMINOPHEN 2 TABLET: 5; 325 TABLET ORAL at 00:55

## 2021-08-09 RX ADMIN — MIDODRINE HYDROCHLORIDE 10 MG: 5 TABLET ORAL at 12:00

## 2021-08-09 RX ADMIN — Medication 1 TABLET: at 10:17

## 2021-08-09 RX ADMIN — MIDODRINE HYDROCHLORIDE 10 MG: 5 TABLET ORAL at 17:35

## 2021-08-09 RX ADMIN — MIDODRINE HYDROCHLORIDE 10 MG: 5 TABLET ORAL at 10:21

## 2021-08-09 RX ADMIN — SENNOSIDES 8.6 MG: 8.6 TABLET, COATED ORAL at 20:23

## 2021-08-09 RX ADMIN — CARVEDILOL 3.12 MG: 6.25 TABLET, FILM COATED ORAL at 20:23

## 2021-08-09 RX ADMIN — THERA TABS 1 TABLET: TAB at 10:22

## 2021-08-09 RX ADMIN — ASPIRIN 81 MG: 81 TABLET ORAL at 09:00

## 2021-08-09 RX ADMIN — AMIODARONE HYDROCHLORIDE 200 MG: 200 TABLET ORAL at 10:17

## 2021-08-09 RX ADMIN — LEVOTHYROXINE SODIUM 50 MCG: 0.05 TABLET ORAL at 05:35

## 2021-08-09 ASSESSMENT — PAIN SCALES - GENERAL
PAINLEVEL_OUTOF10: 3
PAINLEVEL_OUTOF10: 7
PAINLEVEL_OUTOF10: 5
PAINLEVEL_OUTOF10: 0
PAINLEVEL_OUTOF10: 2
PAINLEVEL_OUTOF10: 3
PAINLEVEL_OUTOF10: 0
PAINLEVEL_OUTOF10: 0
PAINLEVEL_OUTOF10: 3
PAINLEVEL_OUTOF10: 2

## 2021-08-09 ASSESSMENT — PAIN DESCRIPTION - PROGRESSION
CLINICAL_PROGRESSION: GRADUALLY WORSENING
CLINICAL_PROGRESSION: GRADUALLY WORSENING

## 2021-08-09 ASSESSMENT — PAIN DESCRIPTION - FREQUENCY
FREQUENCY: INTERMITTENT
FREQUENCY: INTERMITTENT

## 2021-08-09 ASSESSMENT — PAIN DESCRIPTION - ONSET
ONSET: AWAKENED FROM SLEEP
ONSET: AWAKENED FROM SLEEP

## 2021-08-09 ASSESSMENT — PAIN DESCRIPTION - PAIN TYPE
TYPE: SURGICAL PAIN
TYPE: CHRONIC PAIN;SURGICAL PAIN

## 2021-08-09 ASSESSMENT — PAIN DESCRIPTION - LOCATION
LOCATION: CHEST
LOCATION: BACK;CHEST;GENERALIZED

## 2021-08-09 ASSESSMENT — PAIN DESCRIPTION - DESCRIPTORS
DESCRIPTORS: ACHING;DULL
DESCRIPTORS: ACHING

## 2021-08-09 NOTE — PROGRESS NOTES
NEUROLOGY NOTE  DR. Virgil Crenshaw MD.  -------------------------------------------------  Subjective:    Doing better. Denies any new symptoms. Denies headache nausea vomiting dizziness    Denies numbness weakness extremities    Denies blurring of vision double vision    Objective:    BP (!) 120/53   Pulse 70   Temp 98.4 °F (36.9 °C) (Oral)   Resp 20   Ht 5' 2.01\" (1.575 m)   Wt 154 lb 12.8 oz (70.2 kg)   SpO2 95%   BMI 28.31 kg/m²   HEENT nl      Neuro exam    Alert Oriented  X 3  Follow simple commands  EOMI Pupils 3 mm jean  5/5 all 4 extremities      RADIOLOGY  -----------------    ECHO Complete 2D W Doppler W Color    Result Date: 7/29/2021  Transthoracic Echocardiography Report (TTE)  Demographics   Patient Name       Randa RAINEY Date of Study       07/29/2021   Date of Birth      1940         Gender              Female   Age                [de-identified] year(s)         Race                   Patient Number     [id number]           Room Number   Visit Number       [id number]   Corporate ID       [id number]   Accession Number   [id number]         Amber Kline                                                            CRT   Ordering Physician Bev Perez MD                 Physician           Renae FRANKLIN  Procedure Type of Study   TTE procedure:ECHOCARDIOGRAM COMPLETE 2D W DOPPLER W COLOR. Procedure Date Date: 07/29/2021 Start: 09:33 AM Study Location: [address] [place] [address] Technical Quality: Fair visualization Indications:Palpitations. Patient Status: Routine Height: 62 inches Weight: 142 pounds BSA: 1.65 m2 BMI: 25.97 kg/m2 Rhythm: Sinus rhythm HR: 78 bpm BP: 122/68 mmHg  Conclusions   Summary  Left ventricular function and size is normal, EF is estimated at 55-60%. Mild left ventricular hypertrophy. E/A reversal; indeterminate diastolic function.   Hypokineses of the basal anterior lateral and mid anterior lateral segments. Aneurysmal interatrial septum. Mild mitral ,tricuspid and moderate aortic regurgitation is present. RVSP is 25 mmHg. No evidence of pericardial effusion. Signature   ------------------------------------------------------------------  Electronically signed by Emerald Doran MD  (Interpreting physician) on 07/29/2021 at 02:45 PM  ------------------------------------------------------------------   Findings   Left Ventricle  Left ventricular function and size is normal, EF is estimated at 55-60%. Mild left ventricular hypertrophy. E/A reversal; indeterminate diastolic function. Hypokineses of the basal anterior lateral and mid anterior lateral segments. Left Atrium  Essentially normal left atrium. Aneurysmal interatrial septum. Right Atrium  Normal right atrium size and structure. Right Ventricle  Essentially normal right ventricle. Aortic Valve  Normal aortic valve structure and function. Moderate aortic regurgitation; PHT: 261msec. Mitral Valve  Normal mitral valve structure and function. Mild mitral regurgitation is present. Tricuspid Valve  Normal tricuspid valve structure and function. Mild tricuspid regurgitation; RVSP is 25 mmHg. Pulmonic Valve  The pulmonic valve was not well visualized. Pericardial Effusion  No evidence of pericardial effusion. Pleural Effusion  No evidence of pleural effusion. Miscellaneous  No abnormalities were noted in the IVC, abdominal aorta, or aortic root.   M-Mode/2D Measurements & Calculations   LV Diastolic Dimension:  LV Systolic Dimension:  LA Dimension: 3.8 cmAO Root  4.74 cm                  3.46 cm                 Dimension: 3.2 cmLA Area:  LV FS:27 %               LV Volume Diastolic: 89 8.53 cm2  LV PW Diastolic: 9.29 cm ml  LV PW Systolic: 4.16 cm  LV Volume Systolic: 35  Septum Diastolic: 2.93   ml  cm                       LV EDV/LV EDV Index: 89 RV Diastolic Dimension:  Septum Systolic: 1.82 cm VX/14 E0IZ ESV/LV ESV   2.76 cm  CO: 6.78 l/min           Index: 35 ml/21 m2  CI: 4.11 l/m*m2          EF Calculated (A4C):    LA/Aorta: 1.19                           60.7 %  LV Area Diastolic: 98.3  EF Calculated (2D):     LA volume/Index: 14 ml /8m2  cm2                      52.4 %  LV Area Systolic: 98.2  cm2                      LV Length: 7.15 cm                            LVOT: 2 cm  Doppler Measurements & Calculations   MV Peak E-Wave: 47.3  AV Peak Velocity: 155 cm/s   LVOT Mean Velocity: 80.3  cm/s                  AV Peak Gradient: 9.61 mmHg  cm/s  MV Peak A-Wave: 95.3  AV Mean Velocity: 100 cm/s   LVOT Mean Gradient: 3  cm/s                  AV Mean Gradient: 5 mmHg     mmHg  MV E/A Ratio: 0.5     AV VTI: 31.3 cm              Estimated RVSP: 25 mmHg  MV Peak Gradient:     AV Area (Continuity):2.78    Estimated RAP:3 mmHg  0.89 mmHg             cm2   MV P1/2t: 55 msec     LVOT VTI: 27.7 cm            TR Velocity:157 cm/s  MVA by PHT:4 cm2      AV P1/2t: 261 msec           TR Gradient:9.86 mmHg                        Estimated PASP: 12.86 mmHg   MV E' Lateral  Velocity: 6.82 cm/s  MV A' Lateral  Velocity: 16.3 cm/s  MV E/E' lateral: 6.94      CT HEAD WO CONTRAST    Result Date: 8/3/2021  EXAMINATION: CT OF THE HEAD WITHOUT CONTRAST  8/3/2021 9:03 am TECHNIQUE: CT of the head was performed without the administration of intravenous contrast. Dose modulation, iterative reconstruction, and/or weight based adjustment of the mA/kV was utilized to reduce the radiation dose to as low as reasonably achievable. COMPARISON: None. HISTORY: ORDERING SYSTEM PROVIDED HISTORY: right peripheral vision loss TECHNOLOGIST PROVIDED HISTORY: Reason for exam:->right peripheral vision loss Has a \"code stroke\" or \"stroke alert\" been called? ->Yes Reason for Exam: right peripheral vision loss Acuity: Acute Type of Exam: Initial FINDINGS: BRAIN/VENTRICLES: There is no acute intracranial hemorrhage, mass effect or midline shift.   No abnormal extra-axial fluid collection. The gray-white differentiation is maintained without evidence of an acute infarct. There is no evidence of hydrocephalus. The cerebral sulci and ventricles are enlarged. There is low-attenuation within the periventricular white matter. ORBITS: The visualized portion of the orbits demonstrate no acute abnormality. SINUSES: The visualized paranasal sinuses and mastoid air cells demonstrate no acute abnormality. SOFT TISSUES/SKULL:  No acute abnormality of the visualized skull or soft tissues. 1. No acute intracranial abnormality. These findings were conveyed to Lori Villa RN at 9:39 a.m. 08/03/2021. 2. Diffuse cerebral atrophy with chronic small vessel ischemic disease. CT chest without contrast    Result Date: 7/30/2021  EXAMINATION: CT OF THE CHEST WITHOUT CONTRAST 7/30/2021 12:30 pm TECHNIQUE: CT of the chest was performed without the administration of intravenous contrast. Multiplanar reformatted images are provided for review. Dose modulation, iterative reconstruction, and/or weight based adjustment of the mA/kV was utilized to reduce the radiation dose to as low as reasonably achievable. COMPARISON: None. HISTORY: ORDERING SYSTEM PROVIDED HISTORY: planning cabg TECHNOLOGIST PROVIDED HISTORY: Reason for exam:->planning cabg Reason for Exam: planning cabg Acuity: Acute Type of Exam: Initial Additional signs and symptoms: no Relevant Medical/Surgical History: none FINDINGS: Mediastinum: Visualized thyroid is atrophic but otherwise unremarkable. No mediastinal or hilar lymphadenopathy is seen. Esophagus is unremarkable. The thoracic aorta is normal in caliber, measuring 3.6 cm in its ascending portion. There is mild aortic calcinosis, seen mainly at the aortic arch and within the descending portion. Cardiac chambers are unremarkable. Coronary calcifications noted. No pericardial effusion. Lungs/pleura: Mild dependent atelectasis noted bilaterally.   The lungs are otherwise clear. Airways are patent. Upper Abdomen: Unremarkable. Soft Tissues/Bones: Visualized extra thoracic soft tissues are unremarkable. No osteolytic or osteoblastic bone lesions are identified. Chronic appearing compression deformity is seen involving the superior endplate of L1. Mild aortic calcinosis, mainly involving the aortic arch and the descending thoracic aorta. Otherwise unremarkable noncontrast chest CT. XR CHEST PORTABLE    Result Date: 8/5/2021  EXAMINATION: ONE XRAY VIEW OF THE CHEST 8/5/2021 5:04 am COMPARISON: 08/04/2021 HISTORY: ORDERING SYSTEM PROVIDED HISTORY: Post op open heart surgery TECHNOLOGIST PROVIDED HISTORY: Reason for exam:->Post op open heart surgery Reason for Exam: Post op open heart surgery Acuity: Acute Type of Exam: Subsequent/Follow-up FINDINGS: Postoperative changes are seen in the chest.  Left subclavian central venous catheter terminates in the SVC, with a [place]-Raissa catheter noted in the right pulmonary arterial branches in the region of the right middle/lower lobe region. Cardiac size is mildly enlarged. Bibasilar airspace disease, right greater than left with a small right effusion. Minimal increased left basilar atelectasis. Osteopenia. Similar left trace apical pneumothorax. Minimal increased left basilar atelectasis, with similar right basilar airspace disease and small effusion. Similar trace left apical pneumothorax. XR CHEST PORTABLE    Result Date: 8/4/2021  EXAMINATION: ONE XRAY VIEW OF THE CHEST 8/4/2021 3:49 pm COMPARISON: Radiograph performed earlier the same day HISTORY: ORDERING SYSTEM PROVIDED HISTORY: s/p chest tube removal TECHNOLOGIST PROVIDED HISTORY: Reason for exam:->s/p chest tube removal Reason for Exam: s/p chest tube removal Acuity: Acute Type of Exam: Initial Additional signs and symptoms: na Relevant Medical/Surgical History: cad FINDINGS: Prior sternotomy. Cardiomediastinal silhouette is unchanged in size.  [place]-Raissa catheter is unchanged in position. There has been interval chest tube removal.  Small right pleural effusion. Suspected trace left apical pneumothorax. There are bibasilar opacities, unchanged. No acute osseous abnormality. 1. Left chest tube removal with suspected trace left apical pneumothorax. 2. Bibasilar opacities and small right pleural effusion are unchanged. XR CHEST PORTABLE    Result Date: 8/4/2021  EXAMINATION: ONE XRAY VIEW OF THE CHEST 8/4/2021 5:47 am COMPARISON: August 3, 2021 HISTORY: ORDERING SYSTEM PROVIDED HISTORY: Post op open heart surgery TECHNOLOGIST PROVIDED HISTORY: Reason for exam:->Post op open heart surgery Reason for Exam: Post op open heart surgery Acuity: Acute Type of Exam: Subsequent/Follow-up FINDINGS: [place]-Raissa catheter tip overlies the right pulmonary artery. Left subclavian central catheter tip overlies the mid SVC. Stable position of mediastinal catheters. Cardiac silhouette is mildly enlarged but stable. Mild elevation of the right hemidiaphragm. There are small pleural effusions bilaterally. Airspace opacity in the right base probably represents atelectasis. No visible pneumothorax. 1. [place]-Raissa catheter has been advanced. The tip now overlies the mid to distal portion of the right pulmonary artery. 2. Low lung volumes with the right base airspace opacity, likely atelectasis. 3. Stable mild enlargement of the cardiac silhouette. XR CHEST PORTABLE    Result Date: 8/3/2021  EXAMINATION: ONE XRAY VIEW OF THE CHEST 8/3/2021 4:10 am COMPARISON: Chest x-ray the previous day. HISTORY: ORDERING SYSTEM PROVIDED HISTORY: Post op open heart surgery TECHNOLOGIST PROVIDED HISTORY: Reason for exam:->Post op open heart surgery Reason for Exam: Post op open heart surgery Acuity: Acute Type of Exam: Subsequent/Follow-up FINDINGS: Postoperative changes are seen related to open heart surgery. The patient has been extubated. The patient is also rotated.   A left-sided subclavian central venous catheter is well as [place]-Raissa catheter again identified, similar in position. The nasogastric tube is been removed. Cardiac mediastinal silhouettes appear similar given rotation. Mild improved aeration noted at the lung bases. Osseous structures appear stable. Interval removal of the endotracheal tube and nasogastric tube, with mild improved aeration at the lung bases. XR CHEST PORTABLE    Result Date: 8/2/2021  EXAMINATION: ONE XRAY VIEW OF THE CHEST 8/2/2021 5:27 pm COMPARISON: Chest 06/08/2018 HISTORY: ORDERING SYSTEM PROVIDED HISTORY: Post op open heart surgery Acuity: Acute Type of Exam: Initial Additional signs and symptoms: unknown Relevant Medical/Surgical History: CAD FINDINGS: The cardiomediastinal and hilar silhouettes appear unremarkable. Left basilar opacity partially obscures the left hemidiaphragm and there is evidence of left pleural effusion. The right lung appears clear. No right pleural effusion evident. No pneumothorax is seen. No acute osseous abnormality is identified. Median sternotomy wires in keeping with open heart surgery. 2 left pleural catheters. Left IJ CVC tip is at the proximal superior vena cava and left [place]-Raissa catheter via introducer sheath has its tip at the pulmonary outflow tract level. Endotracheal tube tip projects over the trachea, tip at the level of the aortic knob, 3 cm superior to the weston. NG tube is coiled on itself at the distal esophagus, tip extending superiorly within the mid esophagus. 1. Malpositioned NG tube, coiled at the distal esophagus, tip extending superiorly at the mid esophagus. 2. Other life-support appliances appear properly positioned. 3. Left basilar opacity and pleural effusion not unusual following open-heart surgery. Pneumonia is a consideration. 4. No pneumothorax evident. Results were called by Dr. Dorene Chua to Mali Courtney RN on 8/2/2021 at 18:29.   I stressed that nothing should be administered via the NG tube until it has been appropriately repositioned. She repeated the finding back to me on the phone. Vascular carotid duplex bilateral    Result Date: 7/30/2021  EXAMINATION: ULTRASOUND EVALUATION OF THE CAROTID ARTERIES 7/30/2021 COMPARISON: None. HISTORY: ORDERING SYSTEM PROVIDED HISTORY: preop cabg TECHNOLOGIST PROVIDED HISTORY: Reason for exam:->preop cabg Reason for Exam: Pre op CABG on 08/02/21 Acuity: Acute Type of Exam: Initial FINDINGS: RIGHT: The right common carotid artery demonstrates peak systolic velocities of 78, 45 cm/sec in the proximal and distal segments respectively. The right internal carotid artery demonstrates the systolic velocities of 53, 109, 51 cm/sec in the proximal, mid and distal segments respectively. The external carotid artery is patent. The vertebral artery demonstrates normal antegrade flow. Mild plaque. ICA/CCA ratio of 1.4. LEFT: The left common carotid artery demonstrates peak systolic velocities of 67, 54 cm/sec in the proximal and distal segments respectively. The left internal carotid artery demonstrates the systolic velocities of 59, 90, 95 cm/sec in the proximal, mid and distal segments respectively. The external carotid artery is patent. The vertebral artery demonstrates normal antegrade flow. Mild plaque. ICA/CCA ratio of 1.4. The right internal carotid artery demonstrates 0-50% stenosis. The left internal carotid artery demonstrates 0-50% stenosis. Bilateral vertebral arteries are patent with flow in the normal direction. US LOWER EXTREMITY UNILATERAL VEIN MAPPING    Result Date: 7/30/2021  EXAMINATION: ULTRASOUND OF THE LEFT LOWER EXTREMITY FOR VEIN MAPPING, 7/30/2021 3:25 pm TECHNIQUE: Sonographic evaluation of the left greater saphenous vein was performed. COMPARISON: None.  HISTORY: ORDERING SYSTEM PROVIDED HISTORY: preop cabg TECHNOLOGIST PROVIDED HISTORY: Reason for exam:->preop cabg Reason for Exam: Pre op CABG on 08/02/21 Acuity: Acute Type of Exam: Initial FINDINGS: Sapheno-femoral junction: 4mm. Proximal thigh:  2.4mm. Mid thigh:  3.2mm. Distal thigh:  2.8mm. Knee:  3.4mm. Proximal calf: 1.8mm. Mid calf:  1.6mm. Ankle:  1.7mm. Left greater saphenous vein measurements provided above. CTA HEAD NECK W CONTRAST    Result Date: 8/3/2021  EXAMINATION: CTA OF THE HEAD AND NECK WITH CONTRAST 8/3/2021 9:04 am: TECHNIQUE: CTA of the head and neck was performed with the administration of intravenous contrast. Multiplanar reformatted images are provided for review. MIP images are provided for review. Stenosis of the internal carotid arteries measured using NASCET criteria. Dose modulation, iterative reconstruction, and/or weight based adjustment of the mA/kV was utilized to reduce the radiation dose to as low as reasonably achievable. COMPARISON: CT head 08/03/2021 HISTORY: ORDERING SYSTEM PROVIDED HISTORY: stroke alert TECHNOLOGIST PROVIDED HISTORY: Reason for exam:->stroke alert Reason for Exam: STROKE Type of Exam: Initial Relevant Medical/Surgical History: 80 ML ISOVUE 56 FINDINGS: CTA NECK: AORTIC ARCH/ARCH VESSELS: No dissection or arterial injury. No significant stenosis of the brachiocephalic or subclavian arteries. CAROTID ARTERIES: No dissection, arterial injury, or hemodynamically significant stenosis by NASCET criteria. VERTEBRAL ARTERIES: No dissection, arterial injury, or significant stenosis. SOFT TISSUES: Small bilateral apical pneumothoraces are identified. Subcutaneous air is identified within the soft tissues of the neck bilaterally. BONES: No acute osseous abnormality. CTA HEAD: ANTERIOR CIRCULATION: No significant stenosis of the intracranial internal carotid, anterior cerebral, or middle cerebral arteries. No aneurysm. POSTERIOR CIRCULATION: There is occlusion of the distal P2 segment of the left posterior cerebral artery. OTHER: No dural venous sinus thrombosis on this non-dedicated study.  BRAIN: There may be some subtle low-attenuation changes within the left occipital lobe. Occlusion of the distal P2 segment of the left posterior cerebral artery. There may be some subtle low-attenuation changes within the left occipital lobe which is concordant with the patient's right visual field defect. MRI may be precluded given history of recent surgery Small bilateral apical pneumothorax with associated subcutaneous emphysema within the neck. These changes are compatible with CABG 1 day prior Critical results were called by Dr. John Jarvis MD to Dr. Ester Cisneros On 8/3/2021 at 09:57. NM MYOCARDIAL SPECT REST EXERCISE OR RX    Result Date: 8/1/2021  Cardiac Perfusion Imaging   Demographics   Patient Name      Karis Gamble S Date of study        07/29/2021   Date of Birth     1940         Gender               Female   Age               [de-identified] year(s)         Race                    Patient Number    [id number]           Room Number   Visit Number      [id number]          Height               62 inches   Corporate ID      [id number]           Weight               142 pounds   Accession Number  [id number]                                        NM Technologist      Ewelina Ma [facility]   Ordering          Moses Perez  Physician         MD                 Cardiologist         Renae FRANKLIN   The procedure was explained in detail to the patient. Risks,  complications and alternative treatments were reviewed. Written consent  was obtained. Conclusions   Summary  Abnormal Stress EKG results and patient was transferred to [address] [place] [address] cath lab per  Dr. Argenis Cortes. No images were done.    Signatures   ------------------------------------------------------------------  Electronically signed by Madi Link MD  (Interpreting cardiologist) on 08/01/2021 at 14:55  ------------------------------------------------------------------  Procedure Procedure Type:   Limited Nuclear mL/min/1.73m2    GFR African American >60 >60 mL/min/1.73m2         Medical problems    Patient Active Problem List:     PVC (premature ventricular contraction)     Fatigue     Chest pressure     HORACE (obstructive sleep apnea)     Hypersomnia     Unstable angina (HCC)     Essential hypertension     CAD in native artery     Acute on chronic combined systolic and diastolic heart failure (HCC)      ASSESSMENT:  ---------------------    Left occipital infarct     Left PCA occlusion     PLAN:     CT brain as above     CTA head neck as above     Mri brain if can be done post-CABG     Echo positive for aneurysmal interatrial septum     Homocysteine level high add foltx     Continue asa plavix     Discussed dx prognosis meds side effects and above with pt and family and answered all questions        Electronically signed by Jumana Delgadillo MD on 8/8/2021 at 11:51 PM

## 2021-08-09 NOTE — PLAN OF CARE
Problem: Pain:  Goal: Pain level will decrease  Description: Pain level will decrease  8/9/2021 0747 by Annamary Dance. Kyle Claire RN  Outcome: Ongoing  8/8/2021 2211 by Violetta Lugo. ROSI Almeida  Outcome: Ongoing  Goal: Control of acute pain  Description: Control of acute pain  8/9/2021 0747 by Annamary Dance. Kyle Claire, ROSI  Outcome: Ongoing  8/8/2021 2211 by Violetta Lugo. ROSI Almeida  Outcome: Ongoing     Problem: Pain:  Goal: Pain level will decrease  Description: Pain level will decrease  8/9/2021 0747 by Annamary Dance. Chas Powell RN  Outcome: Ongoing  8/9/2021 0747 by Annamary Dance Quincy Hocking, RN  Outcome: Ongoing  8/8/2021 2211 by Violetta Lugo. ROSI Almeida  Outcome: Ongoing  Goal: Control of acute pain  Description: Control of acute pain  8/9/2021 0747 by Annamary Dance. Chas Powell RN  Outcome: Ongoing  8/9/2021 0747 by Annamary Dance Quincy Hocking, RN  Outcome: Ongoing  8/8/2021 2211 by Violetta Lugo.  ROSI Almeida  Outcome: Ongoing

## 2021-08-09 NOTE — PLAN OF CARE
ARU Interdisciplinary Plan of Care [facility]  1st [address] [facility], [address]  [phone]  Fax: [phone]        Nuvia Rubin    : 1940  Acct #: [de-identified]  MRN: [id number]   PHYSICIAN:  Maksim Briones MD  Primary Active Problems:   Active Hospital Problems    Diagnosis Date Noted    Hemiparesis of right dominant side as late effect of cerebral infarction [facility]) [I69.351]     Right homonymous hemianopsia [H53.461]     Gait disturbance [R26.9]     Acute blood loss anemia [D62]     Uncontrolled pain [R52]     Ischemic cardiomyopathy [I25.5]     Acute CVA (cerebrovascular accident) ([facility] [address] [I63.9] 2021       Rehabilitation Diagnosis:     Cerebral infarction, unspecified [I63.9]  Acute CVA (cerebrovascular accident) ([facility] [address] [I63.9]       ADMIT DATE:2021   CARE PLAN     NURSING:  Nuvia Conklin while on this unit will:      Bowel and Bladder   [x] Be continent of bowel and bladder      [x] Have an adequate number of bowel movements   [] Urinate with no urinary retention >300ml in bladder   [] Bladder Scan: (details)   [] Complete bladder protocol with quintana removal   [] Initiate Bladder Program to toilet every ___ hours   [] Initiate Bowel Program to toilet every ___hours   [] Bladder training    [] Bowel training  Pulmonary   [x] Maintain O2 SATs at 92% or greater  Pain Management   [x] Have pain managed while on ARU        [] Be pain free by discharge    [] Medication Management and Education  Maintenance of Skin Integrity/Wound Management   [x] Have no skin breakdown while on ARU   [] Have improved skin integrity via wound measurements   [] Have no signs/symptoms of infection via infection protection and monitoring at the          wound site  Fall Prevention   [x] Be free from injury during hospitalization via fall prevention measures     [] Disease management and Education  Precautions   [] Weight Bearing Precautions   [] Swallowing Precautions   [x] Monitoring of Risks of Complications   [] DVT Prophylaxis    [] Fluid/electrolyte/Nutrition Management    [x] Complete education with patient/family with understanding demonstrated for          in-room safety with transfers to bed, toilet, wheelchair, shower as well as                bathroom activities and hygiene. [] Adjustment   [x] Other: Sternal Precautions   Nursing interventions may include bowel/bladder training, education for medical assistive devices, medication education, O2 saturation management, energy conservation, stress management techniques, fall prevention, alarms protocol, seating and positioning, skin/wound care, pressure relief instruction,dressing changes,  infection protection, DVT prophylaxis, and/or assistance with in room safety with transfers to bed, toilet, wheelchair, shower as well as bathroom activities and hygiene. Patient/caregiver education for:   [x] Disease/sustained injury/management      [x] Medication Use   [x] Surgical intervention   [x] Safety/Precautions   [] Body mechanics and or joint protection   [x] Health maintenance         PHYSICAL THERAPY:  Goals:                  Short term goals  Time Frame for Short term goals: 10-12 tx days:  Short term goal 1: Pt will complete rolling to the L and sit->sup with SBA-Sup. following sternal precautions. Short term goal 2: Pt will complete rolling to R, sup->sit, and OOB transfers Ind following sternal precautions. Short term goal 3: Pt will ambulate 150 ft over level surface and at least 10 ft of uneven surface using RW with SBA-Sup. Short term goal 4: Pt will ascend/descend curb step using RW and 1 flight of stairs using railings with SBA-Sup. Short term goal 5: Pt will complet object retrieval from the floor using reacher Mod Ind. These goals were reviewed with this patient at the time of assessment and Leilani Templeton is in agreement.      Plan of Care: Pt to be seen 5 days per week for a minimum of 60 minutes for 10-12 days. Current Treatment Recommendations: Strengthening, Gait Training, Patient/Caregiver Education & Training, Stair training, Equipment Evaluation, Education, & procurement, Balance Training, Pain Management, Functional Mobility Training, Endurance Training, Home Exercise Program, Transfer Training, Safety Education & Training, Neuromuscular Re-education community reintegration,animal assisted therapy, and concurrent/group therapy. PT IRF-JOSE scores and goals for initial assessment:   Bed Mobility:   Sit to Lying  Assistance Needed: Partial/moderate assistance  Comment: Mod A (required assist on BLE), pt was compliant with sternal precautions  CARE Score: 3  Discharge Goal: Supervision or touching assistance  Roll Left and Right  Assistance Needed: Partial/moderate assistance  Comment:  Mod A to L (limited by L shoulder arthritis), Sup towards R side; pt was compliant with sternal precautions  CARE Score: 3  Discharge Goal: Supervision or touching assistance  Lying to Sitting on Side of Bed  Comment: Sup. per OT assessment  Discharge Goal: Independent    Transfers:    Sit to Stand  Assistance Needed: Supervision or touching assistance  Comment: SBA with/without use of RW; compliant with sternal precautions; able to stand without use of trunk momentum  CARE Score: 4  Discharge Goal: Independent  Chair/Bed-to-Chair Transfer  Assistance Needed: Supervision or touching assistance  Comment: CGA without AD  CARE Score: 4  Discharge Goal: Independent     Car Transfer  Assistance Needed: Supervision or touching assistance  Comment: CGA without AD  CARE Score: 4  Discharge Goal: Independent    Ambulation:    Walking Ability  Does the Patient Walk?: Yes     Walk 10 Feet  Assistance Needed: Supervision or touching assistance  Comment: CGA using RW  CARE Score: 4  Discharge Goal: Independent     Walk 50 Feet with Two Turns  Assistance Needed: Partial/moderate assistance  Comment: Min A using RW  CARE Score: 3  Discharge Goal: Independent     Walk 150 Feet  Comment: pt was only able to ambulate 77 ft today using RW with Min A (pt limited by fatigue, SOB, and mild dizziness)  Reason if not Attempted: Not attempted due to medical condition or safety concerns  CARE Score: 88  Discharge Goal: Supervision or touching assistance     Walking 10 Feet on Uneven Surfaces  Comment: pt limited by increased weakness/fatigue  Reason if not Attempted: Not attempted due to medical condition or safety concerns  CARE Score: 88  Discharge Goal: Supervision or touching assistance     1 Step (Curb)  Comment: pt limited by increased weakness/fatigue  Discharge Goal: Supervision or touching assistance     4 Steps  Comment: activity attempted and was only able to ascend/descend 3 steps; pt had an episode of dizziness with brief, delayed processing of instruction; pt appeared to have decreased focus on required task requiring termination of activity for safety  Reason if not Attempted: Not attempted due to medical condition or safety concerns  CARE Score: 88  Discharge Goal: Supervision or touching assistance     12 Steps  Reason if not Attempted: Not attempted due to medical condition or safety concerns  CARE Score: 88  Discharge Goal: Supervision or touching assistance    Gait Deviations: []None []Slow donald  [] Increased MARIEL  [] Staggers []Deviated Path  [] Decreased step length  [] Decreased step height  []Decreased arm swing  [] Shuffles  [] Decreased head and trunk rotation  []other:        Wheelchair:  w/c Ability: Wheelchair Ability  Uses a Wheelchair and/or Scooter?: No                Balance:        Object: Picking Up Object  Assistance Needed: Supervision or touching assistance  Comment: CGA using reacher in unsupported stance  CARE Score: 4  Discharge Goal: Independent  OCCUPATIONAL THERAPY:  Goals:             Short term goals  Time Frame for Short term goals: STG=LTG :  Long term goals  Time Frame for Long term goals : 7-10 Days or until d/c  Long term goal 1: Pt will complete feeding/grooming/oral care task c MOD I by d/c  Long term goal 2: Pt will complete total body bathing c MOD I c use of AE by d/c  Long term goal 3: Pt will complete total body dressing (UB/LB/Footwear) c MOD I c use of AE PRN by d/c  Long term goal 4: Pt will complete toileting c MOD I by d/c  Long term goal 5: Pt will complete functional transfer (toilet, tub, shower) c MOD I by d/c. Long term goals 6: Pt will perform therex/therax to facilitate increased strength/endurance/ax tolerance (c emphasis on dynamic standing balance/tolerance >8 mins and BUE endurance c compliance of sternal precautions) c MOD I in order to complete ADLs. Long term goal 7: Pt will IND demo use of compensatory strategies in order to visually scan environment required for ADL/IADL tasks by d/c. :    These goals were reviewed with this patient at the time of assessment and Kacey Quinn is in agreement    Plan of Care:  Pt to be seen 5 days per week for a minimum of 60 minutes for 7-10 days. Plan  Times per day: Daily  Current Treatment Recommendations: Strengthening, Endurance Training, Balance Training, Patient/Caregiver Education & Training, Home Management Training, ROM, Functional Mobility Training, Safety Education & Training, Equipment Evaluation, Education, & procurement, Self-Care / ADL, Cognitive/Perceptual Training, Pain Management         cognitive training, home management, energy conservation training, community reintegration, splint fabrication, patient/caregiver education and training, animal assisted therapy, and concurrent and/or group therapy.       OT IRF-JOSE scores and goals for initial assessment:    ADLs:    Eating: Eating  Assistance Needed: Setup or clean-up assistance  Comment: Setup A c some containers and packages, pt able to demo hand<>mouth excursions and swallow  CARE Score: 5  Discharge Goal: Independent       Oral Hygiene: Oral Hygiene  Assistance Needed: Supervision or touching assistance  Comment: SBA when in stance completing sink-side, v/c for hand/body placement for safety and compliance of sternal precautions  CARE Score: 4  Discharge Goal: Independent    UB/LB Bathing: Shower/Bathe Self  Assistance Needed: Partial/moderate assistance  Comment: Mod A c LB washing/drying in seated position; completed total body bathing sink-side  CARE Score: 3  Discharge Goal: Independent    UB Dressing: Upper Body Dressing  Assistance Needed: Substantial/maximal assistance  Comment: Max A to thread BUE, don over head and down back and v/c c compliance of sternal precautions  CARE Score: 2  Discharge Goal: Independent         LB Dressing: Lower Body Dressing  Assistance Needed: Substantial/maximal assistance  Comment: Max A to thread BLE and assistance c donning over hips in stance  CARE Score: 2  Discharge Goal: Independent    Donning and [place] Footwear: Putting On/Taking Off Footwear  Assistance Needed: Dependent  Comment: Total A c donning/doffing socks and shoes  CARE Score: 1  Discharge Goal: Independent      Toileting: Toileting Hygiene  Assistance Needed: Partial/moderate assistance  Comment: Min A during CM, pt able to wipe self using front to back technique post urination. CARE Score: 3  Discharge Goal: Independent      Toilet Transfers: Toilet Transfer  Assistance Needed: Partial/moderate assistance  Comment: MIN A patient able to transfer to toilet using gait belt sternal precautions  CARE Score: 3  Discharge Goal: Independent      SPEECH THERAPY: (If ordered)  Plan of Care and Goals:   LTG       Duration/Frequency of Treatment  Duration/Frequency of Treatment: No ST at this time . Short-term Goals  Timeframe for Short-term Goals: No ST recommended--pt will work with PT/OT for visual compensatory strategies in fx.   Good recall of safety, good recall and application of sternal precautions. Pt demonstrates appropriate awareness and insight into limitations                      Timeframe for Short-term Goals: No ST recommended--pt will work with PT/OT for visual compensatory strategies in fx. Good recall of safety, good recall and application of sternal precautions. Pt demonstrates appropriate awareness and insight into limitations     LTG:                           Treatments may include speech/language/communication therapy, cognitive training, animal assisted therapy, group therapy, education, and/or dysphagia therapy based on the above goals. Co-treats where appropriate with PT or OT to facilitate patient goals in functional tasks. These goals were reviewed with this patient at the time of assessment and Adelina Corado is in agreement. CASE MANAGEMENT:  Goals:   Assist patient/family with discharge planning, patient/family counseling, assistance in obtaining recommended equipment and other services, and coordination with insurance during ARU stay. Patient Goals:   Return to maximum level of independent function. Nutrition goal: Patient will consume at least 50-75% at meals during stay       Activities Prior to Admit:   Homemaking Responsibilities: Yes  Active : Yes     Occupation: Retired  Leisure & Hobbies: word searches, games on phone, reading, simple yard care (picking up sticks), Jewish, grocery. Meds set up in pill box and pill bottles by pt. Pt manages finances with dtr assisting at this time. Intensity of Therapy  Alecia Pollack will be seen a minimum of 3 hours of therapy per day/a minimum of 5 out of 7 days per week. [] In this rare instance due to the nature of this patient's medical involvement, this patient will be seen 15 hours per week (900 minutes within a 7 day period).     Treatments may include therapeutic exercises, gait training, neuromuscular re-ed, transfer training, community reintegration, bed mobility, w/c mobility and training, self care, home mgmt, cognitive training, energy conservation,dysphagia tx, speech/language/communication therapy, group therapy, and patient/family education. In addition, dietician/nutritionist may monitor calorie count as well as intake and collaboratively work with SLP on dietary upgrades. Neuropsychology/Psychology may evaluate and provide necessary support. Group therapy as appropriate to facilitate improved endurance, STR, COORD, function, safety, transfers, awareness and insight into deficits, problem solving, memory, and social interaction and engagement. Medical issues being managed closely and that require 24 hour availability of a physician:   [x] Swallowing Precautions                                     [x] Weight bearing precautions   [x] Wound Care                             [x] Infection Prevention   [x] DVT Prophylaxis/assessment              [x] Monitoring for complications    [x] Fall Precautions/Prevention                         [x] Fluid/Electrolyte/Nutrition Balance   [x] Voice Protection                           [x] Medication Management   [x] Respiratory                   [x] Pain Mgmt   [x] Bowel/Bladder Fx    Medical Prognosis: [] Good  [x] Fair    [] Guarded   Total expected IRF days 18                                            Physician anticipated functional outcomes:  FWW and HHC RN/OT/PT and supervision.   Rehab Goals:   [] Return to premorbid function of_______________________________.    [] Independent   [] Mod I  [x] Supervision  [] CGA   [] Min A   [] Mod A  Level for ambulation []without assistive device  [x] with assistive device        [] Independent   [] Mod I  [x] Supervision [] CGA   [] Min A   [] Mod A  Level for transfers []without assistive device  [x] with assistive device         [] Independent   [] Mod I  [x] Supervision [] CGA   [] Min A   [] Mod A Level with ADL's []without assistive device   [x] with assistive device ___________________     Level with cognitive skills requiring [] No assist [x] Supervision  [] Active Assist/Cues     [] Maximize level of mobility and ADL's to decrease burden on caregiver    IPOC brief synthesis of Preadmission Screen, Post-Admission Evaluation, and Therapy Evaluations: Acute inpatient rehabilitation with occupational and physical therapy 180 minutes 5 out of every 7 days. Will address basic and  advancing mobility with self-care instruction and adaptive equipment training. Caregiver education will be offered. Expected length of stay  prior to a supervised level of function for discharge home with a walker and [facility] OT/PT is 2 weeks.     Additional recommendation:     1. Ischemic stroke with right hemiparesis and homonymous hemianopsia: Patient requires daily occupational and physical therapy with speech-language pathology. She requires antiplatelet therapy with a baby aspirin and Plavix. She is on a statin. We must control swings of blood pressure. She is on Foltx due to elevated homocystine level. She requires adaptive equipment training while following sternal precautions. Caregiver education will be planned. Aggressive pulmonary hygiene and DVT prophylaxis. 2. DVT prophylaxis: The surgical team discourages oral anticoagulants. SCDs when in bed. Weightbearing activities will be pursued daily. Monitoring her for signs of thrombosis. 3. Ischemic cardiomyopathy: Coreg for afterload reduction and ProAmatine to help support blood pressure. She is on a diuretic with Lasix. Monitoring her weights daily. Antiplatelet therapy. 4. Homonymous hemianopsia: Ophthalmology consultant saw her on the surgical floor. She will follow up with them as an outpatient. Our treatment program must take into account her visual field loss. 5. Uncontrolled pain: Acetaminophen and hydrocodone as needed. Bowel intervention while on the pain medications.   Anticipated discharge destination:    [] Home Independently   [x] Home with supervision    []SNF     [] Other       This plan has been reviewed with me in a language I understand. I have had the opportunity to include my input with my therapy team.    ________________________________________________   ______________________  Patient/Significant Other      Date    I have reviewed this initial plan of care and agree with its contents:    Title   Name    Date    Time    Physician: Magdalene Best MD 8/11/2021 1:03 PM    Case Mgmt:  [place] Rick 8/11/2021 [id number]    OT: Joan Paez OTR/L 8/10/2021    PT: Cindy Mckenna PT     08/10/2021   15:20    RN: Sunshine Merino RN 8/9/21    ST: Misha Mitchell.  [place], [place], [address] [place] [address] 8/10/2021  4:12 PM    Dietician: Aarti Bejarano RD, MANJEET 08/10/2021  15:24

## 2021-08-09 NOTE — H&P
Aurora Sandra    : 1940  Acct #: [de-identified]  MRN: [id number]              History and physical      Admitting diagnosis: Acute CVA ( [facility] [address])    Comorbid diagnoses impacting rehabilitation: Right hemiparesis, right homonymous hemianopsia, dysarthria, uncontrolled pain, generalized weakness, gait disturbance, acute blood loss anemia, ischemic cardiomyopathy, status post coronary artery bypass graft surgery    Chief complaint: \"My vision is the worst thing\". Also complains of left wrist pain. History of present illness: Patient is an 80-year-old right-hand-dominant female whose evaluation of recurring chest pains and PVCs led to an outpatient stress test.  During her stress test on 2021 she developed ST elevation and left bundle branch block. She was urgently taken to the Cath Lab where surgical disease was identified. That day she had a multivessel cardiac bypass graft surgery with Dr. Agustín Álvarez. Perioperatively she developed right-sided weakness and loss of vision in the right peripheral field. Brain imaging identified a left occipital lobe infarct. Ophthalmology consultation confirmed that she had a homonymous hemianopsia. She had clumsy right-sided weakness and a stroke was diagnosed. She has struggled with doing her own personal care, toileting and transfers and cannot return directly home at this time. She requires inpatient rehabilitation to address these issues. Review of systems: Left wrist pain (probable phlebitis). She has chest wall pain as expected. Intermittent occasional cough. Fair sleep. Infrequent bowel movements. No dysuria. Lack of vision to her right. The remainder of their review of systems was negative except as mentioned in the history of present illness.     Social History: Lives With: Spouse  Type of Home: House  Home Layout: One level  Home Access: Stairs to enter without rails  Entrance Stairs - Number of Steps: 2  Entrance Stairs - Rails: BILATERAL ENDOHARVEST OF SAPHENOUS VEINS performed by Maria Guadalupe Guardado MD at [facility] 939      TUMOR REMOVAL         Current Medications:     Current Facility-Administered Medications:     [START ON 8/10/2021] aspirin EC tablet 81 mg, 81 mg, Oral, Daily, Kareen Dejesus PA-C    atorvastatin (LIPITOR) tablet 20 mg, 20 mg, Oral, Nightly, Kareen Dejesus PA-C    carvedilol (COREG) tablet 3.125 mg, 3.125 mg, Oral, BID, Kareen Dejesus PA-C    ALPRAZolam Stew Kaitlyn) tablet 0.25 mg, 0.25 mg, Oral, Nightly PRN, Kareen Dejesus PA-C    [START ON 8/10/2021] clopidogrel (PLAVIX) tablet 75 mg, 75 mg, Oral, Daily, Kareen Dejesus PA-C    furosemide (LASIX) tablet 20 mg, 20 mg, Oral, Daily, Kareen Dejesus PA-C, 20 mg at 08/09/21 1735    [START ON 8/10/2021] levothyroxine (SYNTHROID) tablet 50 mcg, 50 mcg, Oral, Daily, Kareen Dejesus PA-C    midodrine (PROAMATINE) tablet 10 mg, 10 mg, Oral, TID WC, Kareen Dejesus PA-C, 10 mg at 08/09/21 1735    [START ON 8/65/8855] folic acid-pyridoxine-cyancobalamin (FOLTX) 1.13-25-2 MG TABS 1 tablet, 1 tablet, Oral, Daily, MD David Hernandez ON 8/10/2021] multivitamin 1 tablet, 1 tablet, Oral, Daily, MD David Hernandez ON 8/10/2021] pantoprazole (PROTONIX) tablet 40 mg, 40 mg, Oral, QAM AC, GUILLERMO Lopez MD    senna (SENOKOT) tablet 8.6 mg, 1 tablet, Oral, Nightly, GUILLERMO Lopez MD    HYDROcodone-acetaminophen (NORCO)  MG per tablet 1 tablet, 1 tablet, Oral, Q6H PRN, GUILLERMO Lopez MD    melatonin tablet 3 mg, 3 mg, Oral, Nightly PRN, GUILLERMO Lopez MD    ondansetron (ZOFRAN-ODT) disintegrating tablet 4 mg, 4 mg, Oral, 4x Daily PRN, MD aDvid Hernandez  Wesley Jimbo ON 8/10/2021] docusate sodium (COLACE) capsule 100 mg, 100 mg, Oral, Daily, GUILLERMO Lopez MD    acetaminophen (TYLENOL) tablet 650 mg, 650 mg, Oral, Q4H PRN, GUILLERMO Lopez MD    polyethylene glycol [facility]) packet 17 g, 17 g, Oral, Daily PRN, GUILLERMO Echeverria MD    magnesium hydroxide (MILK OF MAGNESIA) 400 MG/5ML suspension 30 mL, 30 mL, Oral, Daily PRN, GUILLERMO Echeverria MD    Family History:   Family History   Problem Relation Age of Onset    Cancer Father        Exam:    Blood pressure (!) 132/57, pulse 77, temperature 98.2 °F (36.8 °C), temperature source Oral, resp. rate 19, height 5' 2\" (1.575 m), weight 156 lb 8.4 oz (71 kg), SpO2 97 %. General: Patient was seen semirecumbent in bed. She was alert and talkative. In no distress. HEENT: Decreased tracking to the right visual field. No nystagmus. No signs of trauma to her head or neck. Mild right facial droop. Minimal dysarthria. Tongue midline and moist.  No neck vein distention or adenopathy. Pulmonary: Unlabored breathing with blunted breath sounds in the bases. No wheezes or rales. Cardiac: Regular rate and rhythm. Sternal incision is mildly swollen and tender but well approximated and dry. Abdomen: Patient's abdomen was soft and nondistended. Bowel sounds were present throughout. There was no rebound, guarding or masses noted. Spinal exam: No percussion tenderness or skin breakdown. Guarded trunk rotation with her pain and weakness. Upper extremities: Clumsy movements of both upper limbs with weaker  on the right than the left. No significant bruising. Tender cord palpable along the radial border of the left forearm. Lower extremities: 12+ edema bilaterally. Heels are clear. Clumsy heel tap bilaterally with give way with ankle dorsiflexion strength testing on the right. Sitting balance was fair. Standing balance was poor.     Lab Results   Component Value Date    WBC 9.9 08/08/2021    HGB 10.1 (L) 08/08/2021    HCT 31.6 (L) 08/08/2021    MCV 99.4 08/08/2021     08/08/2021     Lab Results   Component Value Date    INR 1.19 08/03/2021    INR 1.40 08/02/2021    INR 0.88 07/30/2021    PROTIME 15.4 (H) 08/03/2021    PROTIME 18.1 (H) 08/02/2021    PROTIME 11.3 (L) 07/30/2021     Lab Results   Component Value Date    CREATININE 0.5 (L) 08/09/2021    BUN 12 08/09/2021     08/09/2021    K 3.7 08/09/2021     08/09/2021    CO2 29 08/09/2021     Lab Results   Component Value Date    ALT 74 (H) 08/03/2021     (H) 08/03/2021    ALKPHOS 21 (L) 08/03/2021    BILITOT 0.6 08/03/2021         Impression: 49-year-old female with a history of angina who has required coronary artery bypass graft surgery complicated by a stroke with right hemiparesis and homonymous hemianopsia. She has had poor control of her pain, acute blood loss anemia and congestive heart failure associated with her ischemic cardiomyopathy and recent surgery. Strengths for the patient: Independent habits prior to admission, alertness and supportive spouse. Limitations/barriers for the patient: The visual field loss and weakness complicating her recovery from her open heart surgery. She is unfamiliar with sternal precautions and she has poor pain control. Recommendation: Acute inpatient rehabilitation with occupational and physical therapy 180 minutes 5 out of every 7 days. Will address basic and  advancing mobility with self-care instruction and adaptive equipment training. Caregiver education will be offered. Expected length of stay  prior to a supervised level of function for discharge home with a walker and [facility] OT/PT is 2 weeks. Additional recommendation:    1. Ischemic stroke with right hemiparesis and homonymous hemianopsia: Patient requires daily occupational and physical therapy with speech-language pathology. She requires antiplatelet therapy with a baby aspirin and Plavix. She is on a statin. We must control swings of blood pressure. She is on Foltx due to elevated homocystine level. She requires adaptive equipment training while following sternal precautions. Caregiver education will be planned.   Aggressive pulmonary hygiene and DVT prophylaxis. 2. DVT prophylaxis: The surgical team discourages oral anticoagulants. SCDs when in bed. Weightbearing activities will be pursued daily. Monitoring her for signs of thrombosis. 3. Ischemic cardiomyopathy: Coreg for afterload reduction and ProAmatine to help support blood pressure. She is on a diuretic with Lasix. Monitoring her weights daily. Antiplatelet therapy. 4. Homonymous hemianopsia: Ophthalmology consultant saw her on the surgical floor. She will follow up with them as an outpatient. Our treatment program must take into account her visual field loss. 5. Uncontrolled pain: Acetaminophen and hydrocodone as needed. Bowel intervention while on the pain medications. I personally performed a history and physical on this patient within 24 hours of admission to the rehab unit. I have reviewed the preadmission screening and concur with its findings without change. A detailed plan of care will be established by hospital day 4 and I attest the patient is appropriate for inpatient rehabilitation at this time. I have compared the patient's current functional status noted during my history and physical with that of the preadmission screen and I have found no significant differences.

## 2021-08-09 NOTE — DISCHARGE SUMMARY
Discharge Summary     Patient ID  Vicente Cervantes   1940  [id number]      Primary Care Doctor:  No primary care provider on file. Admit date: 7/29/2021   Discharge date: 8/9/2021      Admitting Physician: No admitting provider for patient encounter. Discharge Physician: Tracy Eduardo PA-C    Discharge Diagnoses: Active Hospital Problems    Diagnosis Date Noted    Acute on chronic combined systolic and diastolic heart failure (HCC) [I50.43] 08/02/2021    Unstable angina ([address] [I20.0] 07/29/2021    Essential hypertension [I10] 07/29/2021    CAD in native artery [I25.10] 07/29/2021    HORACE (obstructive sleep apnea) [G47.33] 07/28/2021    PVC (premature ventricular contraction) [I49.3] 07/15/2021     Discharged Condition: stable. Hospital Course:  Patient was admitted for [facility] for chest pain. She was found to have severe 3 vessel CAD. Underwent surgery of CABG x 5 after discussion and preparation. Post op ; monitored rhythm, O2 sat, I/O, Labs, CXRs serially  Neuro status , BP and vital signs monitored  Pt had right sided peripheral vision loss post op and CTA was positive for occlusion of the distal P2 segment of the left posterior cerebral artery. Her symptoms remained stable. she had low EF preop, required multiple pressors post op. Slowly weaned from dobutamine. Started on diet and activity. At discharge, pt ambulating short distances, on RA. Special concerns: CVA  Further plans after discharge: f/u with Dr. Alize Kraft 2 weeks after DC from ARU.      VS at discharge   Vitals:    08/09/21 0925   BP:    Pulse:    Resp: 18   Temp:    SpO2: 96%       Consults: cardiology, pulmonary/intensive care and neurology    Disposition: ARU    Patient Instructions:      Medication List      START taking these medications    aspirin EC 81 MG EC tablet  Take 1 tablet by mouth daily        CONTINUE taking these medications    acetaminophen 325 MG tablet  Commonly known as: TYLENOL     ALPRAZolam 0.25 MG tablet  Commonly known as: XANAX     dicyclomine 10 MG capsule  Commonly known as: BENTYL     dilTIAZem 120 MG extended release capsule  Commonly known as: Cardizem CD  Take 1 capsule by mouth daily     estradiol 0.1 MG/GM vaginal cream  Commonly known as: ESTRACE     Hyoscyamine Sulfate SL 0.125 MG Subl     levothyroxine 50 MCG tablet  Commonly known as: SYNTHROID     lisinopril 10 MG tablet  Commonly known as: PRINIVIL;ZESTRIL     magnesium oxide 400 MG tablet  Commonly known as: MAG-OX  Take 1 tablet by mouth daily     meclizine 25 MG tablet  Commonly known as: ANTIVERT     multivitamin capsule     omeprazole 20 MG delayed release capsule  Commonly known as: PRILOSEC        ASK your doctor about these medications    loperamide 2 MG capsule  Commonly known as: IMODIUM           Where to Get Your Medications      Information about where to get these medications is not yet available    Ask your nurse or doctor about these medications  · aspirin EC 81 MG EC tablet       Activity: activity as tolerated and no lifting, Driving, or Strenuous exercise until seen by physician in office  Diet: cardiac diet  Wound Care: as directed    Follow-up as directed at discharge    Signed: Micaela Thornton PA-C    Time spent on discharge 35 minutes

## 2021-08-09 NOTE — CARE COORDINATION
Follow up visit with pt. Pt remains in agreement with ARU at discharge. Pt states that she is still having vision problems. CM spoke with Luz Maria ECHEVERRIA who anticipates pt being ready today for transfer to Rehab. Voice mail message left for ARU admissions re; anticipation of pt being ready for transfer to ARU and requesting return call to confirm that ARU is ready for pt. Cm to follow.

## 2021-08-09 NOTE — PROGRESS NOTES
Phone report received from [facility]. All questions answered at this time. Advised we are ready for the Patient in room 1010 at this time.

## 2021-08-09 NOTE — PROGRESS NOTES
Removed CVC from left chest per order, site clean and dry. Sternal incision is clean, dry and intact. Incisions to bilateral lower extremities remain clean, dry and intact. Reviewed discharge instructions with patient and sister with voiced understanding.

## 2021-08-09 NOTE — PROGRESS NOTES
Patient very anxious, C/O increase pain, harder to breath, sats 99, lung souds clear, dem, bases,  wants O2 back on, O2 reapplied 2 l nc, prn pain meds taken.  Had already taken HS prn Xanax, RSR/VVS, emotional support, reasurance given

## 2021-08-09 NOTE — PROGRESS NOTES
Physical Therapy    Physical Therapy Treatment Note  Name: Brett Mar MRN: [id number] :   1940   Date:  2021   Admission Date: 2021 Room:  A   Restrictions/Precautions:        Sternal, no pushing, pulling or lifting more than 5 pounds for the first 2 weeks and then 10 pounds up to 3 months,   Arms are to support chest when changing position, coughing or sneezing. No lifting arms above 90 degrees except with the ex's  Communication with other providers:  Sarah Murray RN states pt is ok to see for therapy  Subjective:  Patient states:  She hopes she is getting better  Pain:   Location, Type, Intensity (0/10 to 10/10):  L shoulder 3/10  Objective:    Observation:  Pt was sitting up in the chair  Treatment, including education/measures:  Vital Signs  HR: 78, B/P 120/53, O2 96% on room air  Education:  Pt was instructed in Sternal Precautions, Purpose of Exercise Program, Zaki Scale and Signs and Symptoms of Exercise Intolerance per Cardiac Protocol  Pt was instructed in Intermediate Cardiac ex program:sitting  Overhead Side Stretch x 10  Ankle Pumps/ Heel Raises x 10  Marching Seated x 10  Forward Arm Raises x 10  Side Arm Raises x 10  Arm Crosses x 10  Arm Circles Forwards and Backwards x 10  SittingTrunkTwist x 10  Transfers with line management of tele  Scooting :SBA with good sternal precautions  Sit to stand :min A of 1 with good sternal precautions  Stand to sit :min A of 1 with good sternal precautions  Gait:  Pt amb with no AD for 15 ft and 45 ft with mod a of 1, with balance loss with R turns  Pt needed VC's for pathway and PLB  Safety  Patient left safely in the chair, with call light/phone in reach with alarm applied. Gait belt and mask were used for transfers and gait.   Assessment / Impression:    Patient's tolerance of treatment:  Good, pt willing to try to advance gait   Adverse Reaction: none  Significant change in status and impact:  none  Barriers to improvement:  R sided weakness, balance, endurance  Plan for Next Session:    Will cont to progress ex's and gt per cardiac protocol and educate pt on sternal precautions, S & S of ex intolerance, purpose of ex's, progression of ex's and gt at home. Time in:  1035  Time out:  1120  Timed treatment minutes:  45  Total treatment time:  39  Previously filed items:  Social/Functional History  Lives With: Spouse  Type of Home: House  Home Layout: One level  Home Access: Stairs to enter without rails  Entrance Stairs - Number of Steps: 2  Entrance Stairs - Rails: None  Bathroom Shower/Tub: Walk-in shower  Bathroom Toilet: Standard  Bathroom Equipment: Grab bars in shower, Built-in shower seat, Grab bars around toilet  Home Equipment: Asa Held ( uses cane)  ADL Assistance: Independent  Homemaking Assistance: Independent  Homemaking Responsibilities: Yes  Ambulation Assistance: Independent  Transfer Assistance: Independent  Active : Yes  Occupation: Retired  Short term goals  Time Frame for [employer] term goals: 1 week  Short term goal 1: Pt to complete all bed mobility mod A  Short term goal 2: Pt to complete all STS transfers to/from bed, commode, and chair CGA  Short term goal 3: Pt to ambulate 25' with LRAD CGA     Electronically signed by:     Ashley Ludwig PTA  8/9/2021, 11:11 AM

## 2021-08-10 PROCEDURE — 97166 OT EVAL MOD COMPLEX 45 MIN: CPT

## 2021-08-10 PROCEDURE — 97116 GAIT TRAINING THERAPY: CPT

## 2021-08-10 PROCEDURE — 97163 PT EVAL HIGH COMPLEX 45 MIN: CPT

## 2021-08-10 PROCEDURE — 1280000000 HC REHAB R&B

## 2021-08-10 PROCEDURE — 94150 VITAL CAPACITY TEST: CPT

## 2021-08-10 PROCEDURE — 94664 DEMO&/EVAL PT USE INHALER: CPT

## 2021-08-10 PROCEDURE — 97535 SELF CARE MNGMENT TRAINING: CPT

## 2021-08-10 PROCEDURE — 6370000000 HC RX 637 (ALT 250 FOR IP): Performed by: PHYSICAL MEDICINE & REHABILITATION

## 2021-08-10 PROCEDURE — 6370000000 HC RX 637 (ALT 250 FOR IP): Performed by: PHYSICIAN ASSISTANT

## 2021-08-10 PROCEDURE — 97530 THERAPEUTIC ACTIVITIES: CPT

## 2021-08-10 PROCEDURE — 94761 N-INVAS EAR/PLS OXIMETRY MLT: CPT

## 2021-08-10 PROCEDURE — 92523 SPEECH SOUND LANG COMPREHEN: CPT

## 2021-08-10 PROCEDURE — 99232 SBSQ HOSP IP/OBS MODERATE 35: CPT | Performed by: PHYSICAL MEDICINE & REHABILITATION

## 2021-08-10 RX ADMIN — Medication 1 TABLET: at 08:45

## 2021-08-10 RX ADMIN — ACETAMINOPHEN 650 MG: 325 TABLET ORAL at 06:24

## 2021-08-10 RX ADMIN — MIDODRINE HYDROCHLORIDE 10 MG: 5 TABLET ORAL at 17:12

## 2021-08-10 RX ADMIN — ALPRAZOLAM 0.25 MG: 0.25 TABLET ORAL at 01:00

## 2021-08-10 RX ADMIN — ASPIRIN 81 MG: 81 TABLET, COATED ORAL at 08:45

## 2021-08-10 RX ADMIN — SENNOSIDES 8.6 MG: 8.6 TABLET, COATED ORAL at 20:44

## 2021-08-10 RX ADMIN — ACETAMINOPHEN 650 MG: 325 TABLET ORAL at 13:25

## 2021-08-10 RX ADMIN — THERA TABS 1 TABLET: TAB at 08:45

## 2021-08-10 RX ADMIN — PANTOPRAZOLE SODIUM 40 MG: 40 TABLET, DELAYED RELEASE ORAL at 06:25

## 2021-08-10 RX ADMIN — MIDODRINE HYDROCHLORIDE 10 MG: 5 TABLET ORAL at 08:46

## 2021-08-10 RX ADMIN — ACETAMINOPHEN 650 MG: 325 TABLET ORAL at 20:44

## 2021-08-10 RX ADMIN — ALPRAZOLAM 0.25 MG: 0.25 TABLET ORAL at 20:44

## 2021-08-10 RX ADMIN — LEVOTHYROXINE SODIUM 50 MCG: 0.05 TABLET ORAL at 06:24

## 2021-08-10 RX ADMIN — MIDODRINE HYDROCHLORIDE 10 MG: 5 TABLET ORAL at 12:04

## 2021-08-10 RX ADMIN — ACETAMINOPHEN 650 MG: 325 TABLET ORAL at 00:55

## 2021-08-10 RX ADMIN — ATORVASTATIN CALCIUM 20 MG: 20 TABLET, FILM COATED ORAL at 20:44

## 2021-08-10 RX ADMIN — FUROSEMIDE 20 MG: 20 TABLET ORAL at 08:46

## 2021-08-10 RX ADMIN — CARVEDILOL 3.12 MG: 6.25 TABLET, FILM COATED ORAL at 20:44

## 2021-08-10 RX ADMIN — DOCUSATE SODIUM 100 MG: 100 CAPSULE ORAL at 08:46

## 2021-08-10 RX ADMIN — CARVEDILOL 3.12 MG: 6.25 TABLET, FILM COATED ORAL at 08:46

## 2021-08-10 RX ADMIN — CLOPIDOGREL BISULFATE 75 MG: 75 TABLET ORAL at 08:46

## 2021-08-10 ASSESSMENT — PAIN SCALES - GENERAL
PAINLEVEL_OUTOF10: 5
PAINLEVEL_OUTOF10: 1
PAINLEVEL_OUTOF10: 6
PAINLEVEL_OUTOF10: 1
PAINLEVEL_OUTOF10: 6
PAINLEVEL_OUTOF10: 7
PAINLEVEL_OUTOF10: 4
PAINLEVEL_OUTOF10: 1

## 2021-08-10 ASSESSMENT — PAIN DESCRIPTION - ORIENTATION
ORIENTATION: MID
ORIENTATION: MID

## 2021-08-10 ASSESSMENT — PAIN DESCRIPTION - PAIN TYPE
TYPE: SURGICAL PAIN
TYPE: SURGICAL PAIN

## 2021-08-10 ASSESSMENT — PAIN DESCRIPTION - LOCATION
LOCATION: CHEST
LOCATION: CHEST

## 2021-08-10 ASSESSMENT — PAIN DESCRIPTION - FREQUENCY: FREQUENCY: INTERMITTENT

## 2021-08-10 ASSESSMENT — PAIN DESCRIPTION - ONSET: ONSET: GRADUAL

## 2021-08-10 ASSESSMENT — PAIN - FUNCTIONAL ASSESSMENT: PAIN_FUNCTIONAL_ASSESSMENT: PREVENTS OR INTERFERES SOME ACTIVE ACTIVITIES AND ADLS

## 2021-08-10 ASSESSMENT — PAIN DESCRIPTION - DESCRIPTORS
DESCRIPTORS: ACHING;DULL
DESCRIPTORS: ACHING;DULL

## 2021-08-10 NOTE — PLAN OF CARE
Problem: Infection:  Goal: Will remain free from infection  Description: Will remain free from infection  8/9/2021 2338 by Bubba Mccurdy RN  Outcome: Ongoing  8/9/2021 1819 by Narda Zarate RN  Outcome: Ongoing     Problem: Safety:  Goal: Free from accidental physical injury  Description: Free from accidental physical injury  8/9/2021 2338 by Bubba Mccurdy RN  Outcome: Ongoing  8/9/2021 1819 by Narda Zarate RN  Outcome: Ongoing  Goal: Free from intentional harm  Description: Free from intentional harm  8/9/2021 2338 by Bubba Mccurdy RN  Outcome: Ongoing  8/9/2021 1819 by Narda Zarate RN  Outcome: Ongoing     Problem: Daily Care:  Goal: Daily care needs are met  Description: Daily care needs are met  8/9/2021 2338 by Bubba Mccurdy RN  Outcome: Ongoing  8/9/2021 1819 by Narda Zarate RN  Outcome: Ongoing     Problem: Pain:  Goal: Patient's pain/discomfort is manageable  Description: Patient's pain/discomfort is manageable  8/9/2021 2338 by Bubba Mccurdy RN  Outcome: Ongoing  8/9/2021 1819 by Narda Zarate RN  Outcome: Ongoing     Problem: Skin Integrity:  Goal: Skin integrity will stabilize  Description: Skin integrity will stabilize  8/9/2021 2338 by Bubba Mccurdy RN  Outcome: Ongoing  8/9/2021 1819 by Narda Zarate RN  Outcome: Ongoing     Problem: Discharge Planning:  Goal: Patients continuum of care needs are met  Description: Patients continuum of care needs are met  8/9/2021 2338 by Bubba Mccurdy RN  Outcome: Ongoing  8/9/2021 1819 by Narda Zarate RN  Outcome: Ongoing     Problem: Falls - Risk of:  Goal: Will remain free from falls  Description: Will remain free from falls  Outcome: Ongoing  Goal: Absence of physical injury  Description: Absence of physical injury  Outcome: Ongoing     Problem: Skin Integrity:  Goal: Will show no infection signs and symptoms  Description: Will show no infection signs and symptoms  Outcome: Ongoing  Goal: Absence of new skin breakdown  Description: Absence of new skin breakdown  Outcome: Ongoing

## 2021-08-10 NOTE — PROGRESS NOTES
General: 4-/5  RUE Strength Comment: observed in function 2* pt's sternal precautions. Pt c decreased BUE strength noted    Quality of Movement: Tone RUE  RUE Tone: Normotonic  Tone LUE  LUE Tone: Normotonic  Coordination  Movements Are Fluid And Coordinated: No  Coordination and Movement description: Fine motor impairments       Sensation:    Sensation  Overall Sensation Status: Impaired (intermittent tingling on hands)     ADLs:  Eating: Eating  Assistance Needed: Setup or clean-up assistance  Comment: Setup A c some containers and packages, pt able to demo hand<>mouth excursions and swallow  CARE Score: 5  Discharge Goal: Independent       Oral Hygiene: Oral Hygiene  Assistance Needed: Supervision or touching assistance  Comment: SBA when in stance completing sink-side, v/c for hand/body placement for safety and compliance of sternal precautions  CARE Score: 4  Discharge Goal: Independent    UB/LB Bathing: Shower/Bathe Self  Assistance Needed: Partial/moderate assistance  Comment: Mod A c LB washing/drying in seated position; completed total body bathing sink-side  CARE Score: 3  Discharge Goal: Independent    UB Dressing: Upper Body Dressing  Assistance Needed: Substantial/maximal assistance  Comment: Max A to thread BUE, don over head and down back and v/c c compliance of sternal precautions  CARE Score: 2  Discharge Goal: Independent         LB Dressing:   Lower Body Dressing  Assistance Needed: Substantial/maximal assistance  Comment: Max A to thread BLE and assistance c donning over hips in stance  CARE Score: 2  Discharge Goal: Independent    Donning and [place] Footwear: Putting On/Taking Off Footwear  Assistance Needed: Dependent  Comment: Total A c donning/doffing socks and shoes  CARE Score: 1  Discharge Goal: Independent      Toileting: Toileting Hygiene  Assistance Needed: Partial/moderate assistance  Comment: Min A during CM, pt able to wipe self using front to back technique post urination.   CARE Score: 3  Discharge Goal: Independent      Bed Mobility:    Bed mobility  Supine to Sit: Contact guard assistance  Scooting: Stand by assistance    Transfers: Toilet Transfer  Assistance Needed: Partial/moderate assistance  Comment: MIN A patient able to transfer to toilet using gait belt sternal precautions  CARE Score: 3  Discharge Goal: Independent    Functional Mobility:    Balance  Sitting Balance: Stand by assistance  Standing Balance: Contact guard assistance  Standing Balance  Activity: Standing sink-side for oral care and LB washing  Functional Mobility  Functional - Mobility Device: Rolling Walker  Activity: To/from bathroom  Assist Level: Minimal assistance  Functional Mobility Comments: Min A c FWW management     Cognition:  Cognition  Overall Cognitive Status: Exceptions  Arousal/Alertness: Appropriate responses to stimuli  Following Commands: Follows all commands without difficulty  Attention Span: Appears intact  Memory: Appears intact  Safety Judgement: Decreased awareness of need for assistance  Problem Solving: Assistance required to identify errors made, Decreased awareness of errors  Insights: Decreased awareness of deficits  Initiation: Does not require cues  Sequencing: Requires cues for some    Perception:  Perception  Overall Perceptual Status: WFL      Assessment:     Performance deficits / Impairments: Decreased functional mobility , Decreased strength, Decreased endurance, Decreased vision/visual deficit, Decreased coordination, Decreased ADL status, Decreased high-level IADLs, Decreased balance, Decreased fine motor control, Decreased ROM    The patient is a 80year old female admitted onto ARU after hospitalization for Acute CVA. Per MD notes, \" Pt c evaluation of recurring chest pains and PVCs led to an outpatient stress test.  During her stress test on 7/29/2021 she developed ST elevation and left bundle branch block.   She was urgently taken to the Cath Lab where surgical disease was identified. That day she had a multivessel cardiac bypass graft surgery with Dr. Ruben Ricks. Perioperatively she developed right-sided weakness and loss of vision in the right peripheral field. Brain imaging identified a left occipital lobe infarct. Ophthalmology consultation confirmed that she had a homonymous hemianopsia. She had clumsy right-sided weakness and a stroke was diagnosed. \" Pt reports performing ADL/IADL tasks Independ[facility] at [facility]. Today pt presents c sternal precautions and required Min-Total A for completion of ADLs as a result of above performance deficits/impairments. Pt was unable to recall sternal precautions this date, however was able to recall maintaining BUE over chest. Pt demo CGA-Min A for functional mobility and transfers this date. Pt demo increased pain in chest area, fatigue, labored breathing, and required pacing of ax. The QI, MMT, and ROM standardized assessments were used this date to determine the above performance deficits, which compromise pt's ability to safely complete ADLs/IADLs/mobility. Pt will benefit from ARU OT services to increase functional performance, safety, and achieve the highest level of independence with ADLs. Decision Making: Medium Complexity  Clinical Presentation:  Evolving c changing characteristics  Patient education:   ARU procotol, Role of O.T., O.T. plan of care,   [x]   Patient goal was established and reviewed in [facility] with patient and/or family this date. REQUIRES OT FOLLOW UP: Yes  Discharge Recommendations:  Anticipating home c family and [address] OT  Equipment Recommendations:  TBD    Goals:  Patient Goals   Patient goals : \"I want to be able to dress myself and do everything like I did before. \"  Short term goals  Time Frame for Short term goals: STG=LTG  Long term goals  Time Frame for Long term goals : 7-10 Days or until d/c  Long term goal 1: Pt will complete feeding/grooming/oral care task c MOD I by d/c  Long term goal 2: Pt will complete total body bathing c MOD I c use of AE by d/c  Long term goal 3: Pt will complete total body dressing (UB/LB/Footwear) c MOD I c use of AE PRN by d/c  Long term goal 4: Pt will complete toileting c MOD I by d/c  Long term goal 5: Pt will complete functional transfer (toilet, tub, shower) c MOD I by d/c. Long term goals 6: Pt will perform therex/therax to facilitate increased strength/endurance/ax tolerance (c emphasis on dynamic standing balance/tolerance >8 mins and BUE endurance c compliance of sternal precautions) c MOD I in order to complete ADLs.   Long term goal 7: Pt will IND demo use of compensatory strategies in order to visually scan environment required for ADL/IADL tasks by d/c.    Plan:    Pt will be seen at least 60 minutes per day for a minimum of 5 days per week, plus group therapy as appropriate  Current Treatment Recommendations: Strengthening, Endurance Training, Balance Training, Patient/Caregiver Education & Training, Home Management Training, ROM, Functional Mobility Training, Safety Education & Training, Equipment Evaluation, Education, & procurement, Self-Care / ADL, Cognitive/Perceptual Training, Pain Management    OT Individual Minutes  Time In: 2065  Time Out: 0909  Minutes: 75                Number of Minutes/Billable Intervention      OT Evaluation 20   Therapeutic Exercise    ADL Self-care 55   Neuro Re-Ed    Therapeutic Activity    Group    Other:    TOTAL 75     Electronically signed by:    Luz Maria Heredia OT,   8/10/2021, 3:14 PM

## 2021-08-10 NOTE — PROGRESS NOTES
Physical Therapy    [facility] ARU PHYSICAL THERAPY EVALUATION    Chart Review:  Past Medical History:   Diagnosis Date    Acute CVA (cerebrovascular accident) ([address] 8/9/2021    Arthritis     CAD in native artery 07/29/2021    Essential hypertension 07/29/2021    H/O cardiac catheterization 07/27/2021    Several vessel serious  stenosis, Urgent CABG is recommended.  H/O cardiovascular stress test 07/29/2021    Abnormal Stress EKG results and patient was transferred to [facility] cath lab per Dr. Belvie Goldberg.  H/O echocardiogram 07/29/2021    Left ventricular function and size is normal, EF is estimated at 55-60%. Mild mitral ,tricuspid and moderate aortic regurgitation is present.  History of Holter monitoring 06/15/2021    Frequent PVCs. 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     Thyroid disease      Past Surgical History:   Procedure Laterality Date    APPENDECTOMY      BACK SURGERY      COLONOSCOPY      CORONARY ARTERY BYPASS GRAFT  08/02/2021    CABG X 5 vessel with Dr Trey Pierce N/A 8/2/2021    CABG CORONARY ARTERY BYPASS X 5, INTRAOPERATIVE [place], INDUCED HYPOTHERMIA AND BILATERAL ENDOHARVEST OF SAPHENOUS VEINS performed by Kamila Hartley MD at [facility]      HYSTERECTOMY      TUMOR REMOVAL       Fall History: 1 without injuries   Social History:  Social/Functional History  Lives With: Spouse, Daughter Angelica Mathur (has health limitations))  Type of Home: House  Home Layout: One level  Home Access: Stairs to enter without rails  Entrance Stairs - Number of Steps: 2  Entrance Stairs - Rails: None  Bathroom Shower/Tub: Walk-in shower  Bathroom Toilet: Standard  Bathroom Equipment: Grab bars in shower, Built-in shower seat, Grab bars around toilet  Home Equipment: [address]: [address]: Independent  Homemaking Responsibilities: Yes  Meal Prep Responsibility: Primary  Laundry Responsibility: Primary  Cleaning Responsibility: Primary  Bill Paying/Finance Responsibility: Primary  Shopping Responsibility: Primary  Dependent Care Responsibility: Primary  Health Care Management: Primary  Ambulation Assistance: Independent  Transfer Assistance: Independent  Active : Yes  Occupation: Retired  Additional Comments: sleeps in regular, flat bed; 1 recent fall without significant injury    Restrictions:  Restrictions/Precautions  Restrictions/Precautions: General Precautions, Fall Risk (R vision deficit due to CVA)  Position Activity Restriction  Sternal Precautions: No Pushing, No Pulling, 5# Lifting Restrictions    Subjective: Pt awakens with auditory cues, once alert is pleasant and cooperative, states concern about her breathing. Pain Level: 7/10  Pain Location: Chest    Objective:  Orientation  Overall Orientation Status: Within Functional Limits  Orientation Level: Oriented X4        Vision  Vision: Impaired  Vision Exceptions: Visual field cut (Pt appears to demo R visual field cut of R eye, pt describes \"not seeing anything\")  Hearing  Hearing: Within functional limits    ROM:      AROM RLE (degrees)  RLE AROM: WFL     AROM LLE (degrees)  LLE AROM : WFL                 Strength:    Strength RLE  Strength RLE: WFL  Comment: grossly 4/5  Strength LLE  Strength LLE: WFL  Comment: grossly 4/5              Bed Mobility:   Lying to Sitting on Side of Bed  Assistance Needed: Supervision or touching assistance  Physical Assistance Level: No physical assistance  Comment: Sup. per OT assessment  CARE Score: 4  Discharge Goal: Independent  Roll Left and Right  Assistance Needed: Partial/moderate assistance  Comment: Mod A to L (limited by L shoulder arthritis), Sup towards R side; pt was compliant with sternal precautions  CARE Score: 3  Discharge Goal: Supervision or touching assistance  Sit to Lying  Assistance Needed: Partial/moderate assistance  Comment:  Mod A (required assist on BLE), pt was compliant with sternal precautions  CARE Score: 3  Discharge Goal: Supervision or touching assistance    Transfers:    Sit to Stand  Assistance Needed: Supervision or touching assistance  Comment: SBA with/without use of RW; compliant with sternal precautions; able to stand without use of trunk momentum  CARE Score: 4  Discharge Goal: Independent  Chair/Bed-to-Chair Transfer  Assistance Needed: Supervision or touching assistance  Comment: CGA without AD  CARE Score: 4  Discharge Goal: Independent     Car Transfer  Assistance Needed: Supervision or touching assistance  Comment: CGA without AD  CARE Score: 4  Discharge Goal: Independent    Ambulation:   Device used PTA: none    Walking Ability  Does the Patient Walk?: Yes     Walk 10 Feet  Assistance Needed: Supervision or touching assistance  Comment: CGA using RW  CARE Score: 4  Discharge Goal: Independent     Walk 50 Feet with Two Turns  Assistance Needed: Partial/moderate assistance  Comment: Min A using RW  CARE Score: 3  Discharge Goal: Independent     Walk 150 Feet  Comment: pt was only able to ambulate 66 ft today using RW with Min A (pt limited by fatigue, SOB, and mild dizziness)  Reason if not Attempted: Not attempted due to medical condition or safety concerns  CARE Score: 88  Discharge Goal: Supervision or touching assistance     Walking 10 Feet on Uneven Surfaces  Comment: pt limited by increased weakness/fatigue  Reason if not Attempted: Not attempted due to medical condition or safety concerns  CARE Score: 88  Discharge Goal: Supervision or touching assistance     1 Step (Curb)  Comment: pt limited by increased weakness/fatigue  Discharge Goal: Supervision or touching assistance     4 Steps  Comment: activity attempted and was only able to ascend/descend 3 steps; pt had an episode of dizziness with brief, delayed processing of instruction; pt appeared to have decreased focus on required task requiring termination of activity for safety  Reason if not Attempted: Not attempted due to medical condition or safety concerns  CARE Score: 88  Discharge Goal: Supervision or touching assistance   Vital signs assessment after significant event: XD=787/80 (MAP=88), HR= 74, SpO2 in room air=93%     12 Steps  Reason if not Attempted: Not attempted due to medical condition or safety concerns  CARE Score: 88  Discharge Goal: Supervision or touching assistance    Gait Deviations: []None [x]Slow donald  [] Increased MARIEL  [] Staggers []Deviated Path  [] Decreased step length  [] Decreased step height  []Decreased arm swing  [] Shuffles  [] Decreased head and trunk rotation  []other:        Wheelchair:  w/c Ability: Wheelchair Ability  Uses a Wheelchair and/or Scooter?: No                Balance:        Object: Picking Up Object  Assistance Needed: Supervision or touching assistance  Comment: CGA using reacher in unsupported stance  CARE Score: 4  Discharge Goal: Independent    Assessment:   The patient is an [de-identified]year old female admitted onto ARU after hospitalization for S/P CABG x 5 due to CAD. Pt also had R vision loss and R sided weakness after surgery. CT was negative but was unable to undergo MRI. Pt with Hx of arthritis, arrhythmia, and CAD. Pt had pain on chest incision site at rest and worsens with coughing and movement. SpO2 in room air was WNL but expressed dyspnea on exertion. She fatigued quickly with activities and will benefit from activity pacing and extended sitting breaks to be nimesh to tolerate ARU program. She expressed frustration when unable to complete required task with PT today and tends to want to push herself way beyond her physical limitations that can be both a barrier or a positive factor to her progress. She is compliant with her sternal precautions and is able to stand from regular seat heights without much difficulty as observed today.  She had active movements on RUE/RLE today, however reports decreased peripheral vision on her R eye and can likely affect her depth perception and environmental awareness that can affect her safety during mobility. She will then have to be trained with compensatory strategies if this R vision deficit is persistent. Pt is expected to make progress towards established PT goals provided her medical/neurologic status remains stable. Body structures, Functions, Activity limitations: Decreased functional mobility , Decreased high-level IADLs, Decreased ADL status, Decreased endurance, Decreased balance, Increased pain, Decreased vision/visual deficit, Decreased strength     Prognosis: Good  Decision Making: High Complexity  Clinical Presentation: unstable/unpredictable characteristics      Patient education:   ARU schedule, ARU expectations for participation, plan of care, sternal precautions, splinting chest when coughing/sneezing  Treatment Initiated:  Functional mobility training, gait training, patient education  Barriers to Improvement:  Pain, dyspnea on exertion, R vision deficit, neurologic status   Discharge Recommendations:  [facility] PT   Equipment Recommendations:  RW    Goals:  Patient Goals   Patient goals : to increase activity tolerance and go home  Short term goals  Time Frame for Short term goals: 10-12 tx days:  Short term goal 1: Pt will complete rolling to the L and sit->sup with SBA-Sup. following sternal precautions. Short term goal 2: Pt will complete rolling to R, sup->sit, and OOB transfers Ind following sternal precautions. Short term goal 3: Pt will ambulate 150 ft over level surface and at least 10 ft of uneven surface using RW with SBA-Sup. Short term goal 4: Pt will ascend/descend curb step using RW and 1 flight of stairs using railings with SBA-Sup. Short term goal 5: Pt will complet object retrieval from the floor using reacher Mod Ind.      Plan:    REQUIRES PT FOLLOW UP: Yes  Pt will be seen at least 60 minutes per day for a minimum of 5 days per week, plus group therapy as appropriate  Plan  Times per day: Daily  Current Treatment Recommendations: Strengthening, Gait Training, Patient/Caregiver Education & Training, Stair training, Equipment Evaluation, Education, & procurement, Balance Training, Pain Management, Functional Mobility Training, Endurance Training, Home Exercise Program, Transfer Training, Safety Education & Training, Neuromuscular Re-education    PT Individual Minutes  Time In: 1330  Time Out: 6601  Minutes: 75        Timed Code Treatment Minutes: 60 Minutes    Number of Minutes/Billable Intervention    PT Evaluation 15   Gait Training 30   Therapeutic Exercise    Neuro Re-Ed    Therapeutic Activity 30   Wheelchair Propulsion    Group    Other:    TOTAL 75       Electronically signed by:    Luis Mott, PT   8/10/2021, 15:14

## 2021-08-10 NOTE — PROGRESS NOTES
NEUROLOGY NOTE  DR. Nicholas Shaffer MD.  -------------------------------------------------  Subjective:    Doing better. Denies any new symptoms. Denies headache nausea vomiting dizziness    Denies numbness weakness extremities    Denies blurring of vision double vision    Objective:    /69   Pulse 71   Temp 97.5 °F (36.4 °C) (Oral)   Resp 21   Ht 5' 2.01\" (1.575 m)   Wt 154 lb 12.8 oz (70.2 kg)   SpO2 95%   BMI 28.31 kg/m²   HEENT nl      Neuro exam    Alert Oriented  X 3  Follow simple commands  EOMI Pupils 3 mm jean  5/5 all 4 extremities      RADIOLOGY  -----------------    ECHO Complete 2D W Doppler W Color    Result Date: 7/29/2021  Transthoracic Echocardiography Report (TTE)  Demographics   Patient Name       Rupinder RAINEY Date of Study       07/29/2021   Date of Birth      1940         Gender              Female   Age                [de-identified] year(s)         Race                   Patient Number     [id number]           Room Number   Visit Number       [id number]   Corporate ID       [id number]   Accession Number   [id number]         Macho Licea                                                            CRT   Ordering Physician Emanuel Perez MD                 Physician           Renae FRANKLIN  Procedure Type of Study   TTE procedure:ECHOCARDIOGRAM COMPLETE 2D W DOPPLER W COLOR. Procedure Date Date: 07/29/2021 Start: 09:33 AM Study Location: [address] [place] [address] Technical Quality: Fair visualization Indications:Palpitations. Patient Status: Routine Height: 62 inches Weight: 142 pounds BSA: 1.65 m2 BMI: 25.97 kg/m2 Rhythm: Sinus rhythm HR: 78 bpm BP: 122/68 mmHg  Conclusions   Summary  Left ventricular function and size is normal, EF is estimated at 55-60%. Mild left ventricular hypertrophy. E/A reversal; indeterminate diastolic function.   Hypokineses of the basal anterior lateral and mid anterior lateral segments. Aneurysmal interatrial septum. Mild mitral ,tricuspid and moderate aortic regurgitation is present. RVSP is 25 mmHg. No evidence of pericardial effusion. Signature   ------------------------------------------------------------------  Electronically signed by Melina Iglesias MD  (Interpreting physician) on 07/29/2021 at 02:45 PM  ------------------------------------------------------------------   Findings   Left Ventricle  Left ventricular function and size is normal, EF is estimated at 55-60%. Mild left ventricular hypertrophy. E/A reversal; indeterminate diastolic function. Hypokineses of the basal anterior lateral and mid anterior lateral segments. Left Atrium  Essentially normal left atrium. Aneurysmal interatrial septum. Right Atrium  Normal right atrium size and structure. Right Ventricle  Essentially normal right ventricle. Aortic Valve  Normal aortic valve structure and function. Moderate aortic regurgitation; PHT: 261msec. Mitral Valve  Normal mitral valve structure and function. Mild mitral regurgitation is present. Tricuspid Valve  Normal tricuspid valve structure and function. Mild tricuspid regurgitation; RVSP is 25 mmHg. Pulmonic Valve  The pulmonic valve was not well visualized. Pericardial Effusion  No evidence of pericardial effusion. Pleural Effusion  No evidence of pleural effusion. Miscellaneous  No abnormalities were noted in the IVC, abdominal aorta, or aortic root.   M-Mode/2D Measurements & Calculations   LV Diastolic Dimension:  LV Systolic Dimension:  LA Dimension: 3.8 cmAO Root  4.74 cm                  3.46 cm                 Dimension: 3.2 cmLA Area:  LV FS:27 %               LV Volume Diastolic: 89 7.66 cm2  LV PW Diastolic: 2.43 cm ml  LV PW Systolic: 6.64 cm  LV Volume Systolic: 35  Septum Diastolic: 8.77   ml  cm                       LV EDV/LV EDV Index: 89 RV Diastolic Dimension:  Septum Systolic: 2.29 cm TU/20 P1UT ESV/LV ESV   2.76 cm  CO: 6.78 l/min           Index: 35 ml/21 m2  CI: 4.11 l/m*m2          EF Calculated (A4C):    LA/Aorta: 1.19                           60.7 %  LV Area Diastolic: 75.5  EF Calculated (2D):     LA volume/Index: 14 ml /8m2  cm2                      52.4 %  LV Area Systolic: 72.0  cm2                      LV Length: 7.15 cm                            LVOT: 2 cm  Doppler Measurements & Calculations   MV Peak E-Wave: 47.3  AV Peak Velocity: 155 cm/s   LVOT Mean Velocity: 80.3  cm/s                  AV Peak Gradient: 9.61 mmHg  cm/s  MV Peak A-Wave: 95.3  AV Mean Velocity: 100 cm/s   LVOT Mean Gradient: 3  cm/s                  AV Mean Gradient: 5 mmHg     mmHg  MV E/A Ratio: 0.5     AV VTI: 31.3 cm              Estimated RVSP: 25 mmHg  MV Peak Gradient:     AV Area (Continuity):2.78    Estimated RAP:3 mmHg  0.89 mmHg             cm2   MV P1/2t: 55 msec     LVOT VTI: 27.7 cm            TR Velocity:157 cm/s  MVA by PHT:4 cm2      AV P1/2t: 261 msec           TR Gradient:9.86 mmHg                        Estimated PASP: 12.86 mmHg   MV E' Lateral  Velocity: 6.82 cm/s  MV A' Lateral  Velocity: 16.3 cm/s  MV E/E' lateral: 6.94      CT HEAD WO CONTRAST    Result Date: 8/3/2021  EXAMINATION: CT OF THE HEAD WITHOUT CONTRAST  8/3/2021 9:03 am TECHNIQUE: CT of the head was performed without the administration of intravenous contrast. Dose modulation, iterative reconstruction, and/or weight based adjustment of the mA/kV was utilized to reduce the radiation dose to as low as reasonably achievable. COMPARISON: None. HISTORY: ORDERING SYSTEM PROVIDED HISTORY: right peripheral vision loss TECHNOLOGIST PROVIDED HISTORY: Reason for exam:->right peripheral vision loss Has a \"code stroke\" or \"stroke alert\" been called? ->Yes Reason for Exam: right peripheral vision loss Acuity: Acute Type of Exam: Initial FINDINGS: BRAIN/VENTRICLES: There is no acute intracranial hemorrhage, mass effect or midline shift.   No abnormal extra-axial fluid collection. The gray-white differentiation is maintained without evidence of an acute infarct. There is no evidence of hydrocephalus. The cerebral sulci and ventricles are enlarged. There is low-attenuation within the periventricular white matter. ORBITS: The visualized portion of the orbits demonstrate no acute abnormality. SINUSES: The visualized paranasal sinuses and mastoid air cells demonstrate no acute abnormality. SOFT TISSUES/SKULL:  No acute abnormality of the visualized skull or soft tissues. 1. No acute intracranial abnormality. These findings were conveyed to Jovi Kelly RN at 9:39 a.m. 08/03/2021. 2. Diffuse cerebral atrophy with chronic small vessel ischemic disease. CT chest without contrast    Result Date: 7/30/2021  EXAMINATION: CT OF THE CHEST WITHOUT CONTRAST 7/30/2021 12:30 pm TECHNIQUE: CT of the chest was performed without the administration of intravenous contrast. Multiplanar reformatted images are provided for review. Dose modulation, iterative reconstruction, and/or weight based adjustment of the mA/kV was utilized to reduce the radiation dose to as low as reasonably achievable. COMPARISON: None. HISTORY: ORDERING SYSTEM PROVIDED HISTORY: planning cabg TECHNOLOGIST PROVIDED HISTORY: Reason for exam:->planning cabg Reason for Exam: planning cabg Acuity: Acute Type of Exam: Initial Additional signs and symptoms: no Relevant Medical/Surgical History: none FINDINGS: Mediastinum: Visualized thyroid is atrophic but otherwise unremarkable. No mediastinal or hilar lymphadenopathy is seen. Esophagus is unremarkable. The thoracic aorta is normal in caliber, measuring 3.6 cm in its ascending portion. There is mild aortic calcinosis, seen mainly at the aortic arch and within the descending portion. Cardiac chambers are unremarkable. Coronary calcifications noted. No pericardial effusion. Lungs/pleura: Mild dependent atelectasis noted bilaterally.   The lungs are otherwise clear. Airways are patent. Upper Abdomen: Unremarkable. Soft Tissues/Bones: Visualized extra thoracic soft tissues are unremarkable. No osteolytic or osteoblastic bone lesions are identified. Chronic appearing compression deformity is seen involving the superior endplate of L1. Mild aortic calcinosis, mainly involving the aortic arch and the descending thoracic aorta. Otherwise unremarkable noncontrast chest CT. XR CHEST PORTABLE    Result Date: 8/5/2021  EXAMINATION: ONE XRAY VIEW OF THE CHEST 8/5/2021 5:04 am COMPARISON: 08/04/2021 HISTORY: ORDERING SYSTEM PROVIDED HISTORY: Post op open heart surgery TECHNOLOGIST PROVIDED HISTORY: Reason for exam:->Post op open heart surgery Reason for Exam: Post op open heart surgery Acuity: Acute Type of Exam: Subsequent/Follow-up FINDINGS: Postoperative changes are seen in the chest.  Left subclavian central venous catheter terminates in the SVC, with a [place]-Raissa catheter noted in the right pulmonary arterial branches in the region of the right middle/lower lobe region. Cardiac size is mildly enlarged. Bibasilar airspace disease, right greater than left with a small right effusion. Minimal increased left basilar atelectasis. Osteopenia. Similar left trace apical pneumothorax. Minimal increased left basilar atelectasis, with similar right basilar airspace disease and small effusion. Similar trace left apical pneumothorax. XR CHEST PORTABLE    Result Date: 8/4/2021  EXAMINATION: ONE XRAY VIEW OF THE CHEST 8/4/2021 3:49 pm COMPARISON: Radiograph performed earlier the same day HISTORY: ORDERING SYSTEM PROVIDED HISTORY: s/p chest tube removal TECHNOLOGIST PROVIDED HISTORY: Reason for exam:->s/p chest tube removal Reason for Exam: s/p chest tube removal Acuity: Acute Type of Exam: Initial Additional signs and symptoms: na Relevant Medical/Surgical History: cad FINDINGS: Prior sternotomy. Cardiomediastinal silhouette is unchanged in size.  M[place]-Raissa catheter is unchanged in position. There has been interval chest tube removal.  Small right pleural effusion. Suspected trace left apical pneumothorax. There are bibasilar opacities, unchanged. No acute osseous abnormality. 1. Left chest tube removal with suspected trace left apical pneumothorax. 2. Bibasilar opacities and small right pleural effusion are unchanged. XR CHEST PORTABLE    Result Date: 8/4/2021  EXAMINATION: ONE XRAY VIEW OF THE CHEST 8/4/2021 5:47 am COMPARISON: August 3, 2021 HISTORY: ORDERING SYSTEM PROVIDED HISTORY: Post op open heart surgery TECHNOLOGIST PROVIDED HISTORY: Reason for exam:->Post op open heart surgery Reason for Exam: Post op open heart surgery Acuity: Acute Type of Exam: Subsequent/Follow-up FINDINGS: Len[place]-Raissa catheter tip overlies the right pulmonary artery. Left subclavian central catheter tip overlies the mid SVC. Stable position of mediastinal catheters. Cardiac silhouette is mildly enlarged but stable. Mild elevation of the right hemidiaphragm. There are small pleural effusions bilaterally. Airspace opacity in the right base probably represents atelectasis. No visible pneumothorax. 1. Lenox-Raissa catheter has been advanced. The tip now overlies the mid to distal portion of the right pulmonary artery. 2. Low lung volumes with the right base airspace opacity, likely atelectasis. 3. Stable mild enlargement of the cardiac silhouette. XR CHEST PORTABLE    Result Date: 8/3/2021  EXAMINATION: ONE XRAY VIEW OF THE CHEST 8/3/2021 4:10 am COMPARISON: Chest x-ray the previous day. HISTORY: ORDERING SYSTEM PROVIDED HISTORY: Post op open heart surgery TECHNOLOGIST PROVIDED HISTORY: Reason for exam:->Post op open heart surgery Reason for Exam: Post op open heart surgery Acuity: Acute Type of Exam: Subsequent/Follow-up FINDINGS: Postoperative changes are seen related to open heart surgery. The patient has been extubated. The patient is also rotated.   A left-sided subclavian central venous catheter is well as Bell[place]-Raissa catheter again identified, similar in position. The nasogastric tube is been removed. Cardiac mediastinal silhouettes appear similar given rotation. Mild improved aeration noted at the lung bases. Osseous structures appear stable. Interval removal of the endotracheal tube and nasogastric tube, with mild improved aeration at the lung bases. XR CHEST PORTABLE    Result Date: 8/2/2021  EXAMINATION: ONE XRAY VIEW OF THE CHEST 8/2/2021 5:27 pm COMPARISON: Chest 06/08/2018 HISTORY: ORDERING SYSTEM PROVIDED HISTORY: Post op open heart surgery Acuity: Acute Type of Exam: Initial Additional signs and symptoms: unknown Relevant Medical/Surgical History: CAD FINDINGS: The cardiomediastinal and hilar silhouettes appear unremarkable. Left basilar opacity partially obscures the left hemidiaphragm and there is evidence of left pleural effusion. The right lung appears clear. No right pleural effusion evident. No pneumothorax is seen. No acute osseous abnormality is identified. Median sternotomy wires in keeping with open heart surgery. 2 left pleural catheters. Left IJ CVC tip is at the proximal superior vena cava and left Bell[place]-Raissa catheter via introducer sheath has its tip at the pulmonary outflow tract level. Endotracheal tube tip projects over the trachea, tip at the level of the aortic knob, 3 cm superior to the weston. NG tube is coiled on itself at the distal esophagus, tip extending superiorly within the mid esophagus. 1. Malpositioned NG tube, coiled at the distal esophagus, tip extending superiorly at the mid esophagus. 2. Other life-support appliances appear properly positioned. 3. Left basilar opacity and pleural effusion not unusual following open-heart surgery. Pneumonia is a consideration. 4. No pneumothorax evident. Results were called by Dr. Nirmala Larson to Nirali Ferraro RN on 8/2/2021 at 18:29.   I stressed that nothing should be administered via the NG tube until it has been appropriately repositioned. She repeated the finding back to me on the phone. Vascular carotid duplex bilateral    Result Date: 7/30/2021  EXAMINATION: ULTRASOUND EVALUATION OF THE CAROTID ARTERIES 7/30/2021 COMPARISON: None. HISTORY: ORDERING SYSTEM PROVIDED HISTORY: preop cabg TECHNOLOGIST PROVIDED HISTORY: Reason for exam:->preop cabg Reason for Exam: Pre op CABG on 08/02/21 Acuity: Acute Type of Exam: Initial FINDINGS: RIGHT: The right common carotid artery demonstrates peak systolic velocities of 78, 45 cm/sec in the proximal and distal segments respectively. The right internal carotid artery demonstrates the systolic velocities of 53, 109, 51 cm/sec in the proximal, mid and distal segments respectively. The external carotid artery is patent. The vertebral artery demonstrates normal antegrade flow. Mild plaque. ICA/CCA ratio of 1.4. LEFT: The left common carotid artery demonstrates peak systolic velocities of 67, 54 cm/sec in the proximal and distal segments respectively. The left internal carotid artery demonstrates the systolic velocities of 59, 90, 95 cm/sec in the proximal, mid and distal segments respectively. The external carotid artery is patent. The vertebral artery demonstrates normal antegrade flow. Mild plaque. ICA/CCA ratio of 1.4. The right internal carotid artery demonstrates 0-50% stenosis. The left internal carotid artery demonstrates 0-50% stenosis. Bilateral vertebral arteries are patent with flow in the normal direction. US LOWER EXTREMITY UNILATERAL VEIN MAPPING    Result Date: 7/30/2021  EXAMINATION: ULTRASOUND OF THE LEFT LOWER EXTREMITY FOR VEIN MAPPING, 7/30/2021 3:25 pm TECHNIQUE: Sonographic evaluation of the left greater saphenous vein was performed. COMPARISON: None.  HISTORY: ORDERING SYSTEM PROVIDED HISTORY: preop cabg TECHNOLOGIST PROVIDED HISTORY: Reason for exam:->preop cabg Reason for Exam: Pre op CABG on 08/02/21 Acuity: Acute Type of Exam: Initial FINDINGS: Sapheno-femoral junction: 4mm. Proximal thigh:  2.4mm. Mid thigh:  3.2mm. Distal thigh:  2.8mm. Knee:  3.4mm. Proximal calf: 1.8mm. Mid calf:  1.6mm. Ankle:  1.7mm. Left greater saphenous vein measurements provided above. CTA HEAD NECK W CONTRAST    Result Date: 8/3/2021  EXAMINATION: CTA OF THE HEAD AND NECK WITH CONTRAST 8/3/2021 9:04 am: TECHNIQUE: CTA of the head and neck was performed with the administration of intravenous contrast. Multiplanar reformatted images are provided for review. MIP images are provided for review. Stenosis of the internal carotid arteries measured using NASCET criteria. Dose modulation, iterative reconstruction, and/or weight based adjustment of the mA/kV was utilized to reduce the radiation dose to as low as reasonably achievable. COMPARISON: CT head 08/03/2021 HISTORY: ORDERING SYSTEM PROVIDED HISTORY: stroke alert TECHNOLOGIST PROVIDED HISTORY: Reason for exam:->stroke alert Reason for Exam: STROKE Type of Exam: Initial Relevant Medical/Surgical History: 80 ML ISOVUE 56 FINDINGS: CTA NECK: AORTIC ARCH/ARCH VESSELS: No dissection or arterial injury. No significant stenosis of the brachiocephalic or subclavian arteries. CAROTID ARTERIES: No dissection, arterial injury, or hemodynamically significant stenosis by NASCET criteria. VERTEBRAL ARTERIES: No dissection, arterial injury, or significant stenosis. SOFT TISSUES: Small bilateral apical pneumothoraces are identified. Subcutaneous air is identified within the soft tissues of the neck bilaterally. BONES: No acute osseous abnormality. CTA HEAD: ANTERIOR CIRCULATION: No significant stenosis of the intracranial internal carotid, anterior cerebral, or middle cerebral arteries. No aneurysm. POSTERIOR CIRCULATION: There is occlusion of the distal P2 segment of the left posterior cerebral artery. OTHER: No dural venous sinus thrombosis on this non-dedicated study.  BRAIN: There may be some subtle low-attenuation changes within the left occipital lobe. Occlusion of the distal P2 segment of the left posterior cerebral artery. There may be some subtle low-attenuation changes within the left occipital lobe which is concordant with the patient's right visual field defect. MRI may be precluded given history of recent surgery Small bilateral apical pneumothorax with associated subcutaneous emphysema within the neck. These changes are compatible with CABG 1 day prior Critical results were called by Dr. Buddy Calloway MD to Dr. Armen Ojeda On 8/3/2021 at 09:57. NM MYOCARDIAL SPECT REST EXERCISE OR RX    Result Date: 8/1/2021  Cardiac Perfusion Imaging   Demographics   Patient Name      Niki Comes S Date of study        07/29/2021   Date of Birth     1940         Gender               Female   Age               [de-identified] year(s)         Race                    Patient Number    [id number]           Room Number   Visit Number      [id number]          Height               62 inches   Corporate ID      [id number]           Weight               142 pounds   Accession Number  [id number]                                        NM Technologist      [facility]in[facility]   Ordering          Julissa Perez MD                 Cardiologist         Renae FRANKLIN   The procedure was explained in detail to the patient. Risks,  complications and alternative treatments were reviewed. Written consent  was obtained. Conclusions   Summary  Abnormal Stress EKG results and patient was transferred to [facility] cath lab per  Dr. Beatrice Newell. No images were done.    Signatures   ------------------------------------------------------------------  Electronically signed by Briana De Paz MD  (Interpreting cardiologist) on 08/01/2021 at 14:55  ------------------------------------------------------------------  Procedure Procedure Type:   Limited Nuclear Stress Test  Indications: Chest pain. Stress Protocols   Resting HR:66 bpm  Resting BP:136/78 mmHg  Stress Protocol:Pharmacologic - Lexiscan  Peak HR:99 bpm                   HR response: Appropriate  Peak BP:132/80 mmHg              BP response: Normal resting BP -  Predicted HR: 140 bpm            appropriate response  % of predicted HR: 71            HR/BP product:[id number]   Exercise duration: 01:00 min  Reason for termination:Completed   Symptoms  Chest pain 6/10. Chest pain was resolved in recovery. Procedure Medications   - Lexiscan I.V. bolus (over 15sec.) 0.4 mg admininstered @ 07/29/2021 10:30.   - Aminophylline I.V. 50 mg admininstered @ 07/29/2021 10:32. Imaging Protocols           Stress           Isotope: Sestamibi 99mTc          Isotope dose:10.2 mCi          Administration route: I.V. Lt. arm          Injection Date:07/29/2021 10:31  Medical History   Accession#:  [id number]  Admission Data Admission date: 07/29/2021 Admission Time: 10:09 Hospital Status: Outpatient.       LAB RESULTS  --------------------    Recent Results (from the past 24 hour(s))   APTT    Collection Time: 08/09/21  5:40 AM   Result Value Ref Range    aPTT 26.2 25.1 - 37.1 SECONDS   Basic Metabolic Panel    Collection Time: 08/09/21  5:40 AM   Result Value Ref Range    Sodium 138 135 - 145 MMOL/L    Potassium 3.7 3.5 - 5.1 MMOL/L    Chloride 103 99 - 110 mMol/L    CO2 29 21 - 32 MMOL/L    Anion Gap 6 4 - 16    BUN 12 6 - 23 MG/DL    CREATININE 0.5 (L) 0.6 - 1.1 MG/DL    Glucose 95 70 - 99 MG/DL    Calcium 8.1 (L) 8.3 - 10.6 MG/DL    GFR Non-African American >60 >60 mL/min/1.73m2    GFR African American >60 >60 mL/min/1.73m2   COVID-19, Rapid    Collection Time: 08/09/21 10:46 AM    Specimen: Nasopharyngeal   Result Value Ref Range    Source UNKNOWN     SARS-CoV-2, NAAT NOT DETECTED NOT DETECTED         Medical problems    Patient Active Problem List:     PVC (premature ventricular contraction)     Fatigue     Chest pressure     HORACE (obstructive sleep apnea)     Hypersomnia     Unstable angina (HCC)     Essential hypertension     CAD in native artery     Acute on chronic combined systolic and diastolic heart failure (HCC)      ASSESSMENT:  ---------------------    Left occipital infarct     Left PCA occlusion     PLAN:     CT brain as above     CTA head neck as above     Mri brain if can be done post-CABG     Echo positive for aneurysmal interatrial septum     Homocysteine level high add foltx     Continue asa plavix     Discussed dx prognosis meds side effects and above with pt and family and answered all questions        Electronically signed by Baldo Trujillo MD on 8/9/2021 at 9:03 PM

## 2021-08-10 NOTE — PROGRESS NOTES
Comprehensive Nutrition Assessment    Type and Reason for Visit:  Initial, Consult (oral nutrition supplement)    Nutrition Recommendations/Plan:   Continue low sodium diet at this time   Continue to offer clear oral nutrition supplement  Assist with meals as needed   Will continue to follow up during stay     Nutrition Assessment:  Admit to rehab with hx CABG and CVA, vision changes. Currently on low sodium diet with clear liquid supplement, dislikes others. Meal intake improving, recent meals %. Moderate nutrition risk at this time. Malnutrition Assessment:  Malnutrition Status:  Insufficient data    Context:  Acute Illness       Estimated Daily Nutrient Needs:  Energy (kcal):  9373-8820 (22-25 guru/kg); Weight Used for Energy Requirements:  Current     Protein (g):  60-70 (1.2-1.4 g/kg); Weight Used for Protein Requirements:           Fluid (ml/day):  1700; Method Used for Fluid Requirements:  1 ml/kcal      Nutrition Related Findings:  not available on attempted visits today-with therapy      Wounds:  Surgical Incision       Current Nutrition Therapies:    ADULT DIET; Regular; Low Sodium (2 gm)  Adult Oral Nutrition Supplement; Clear Liquid Oral Supplement    Anthropometric Measures:  · Height: 5' 2\" (157.5 cm)  · Current Body Weight: 156 lb 8.4 oz (71 kg)   · Usual Body Weight: 146 lb (66.2 kg) (per MD office in May)     · Ideal Body Weight: 110 lbs; % Ideal Body Weight 142.3 %   · BMI: 28.6  · Adjusted Body Weight:  ; No Adjustment   · BMI Categories: Overweight (BMI 25.0-29. 9)       Nutrition Diagnosis:   · Predicted inadequate energy intake related to increase demand for energy/nutrients as evidenced by poor intake prior to admission, other (comment) (recent cardiac surgery)      Nutrition Interventions:   Food and/or Nutrient Delivery:  Continue Current Diet, Continue Oral Nutrition Supplement, Snacks (Comment)  Nutrition Education/Counseling:  No recommendation at this time   Coordination of Nutrition Care:  Continue to monitor while inpatient    Goals:  Patient will consume at least 50-75% at meals during stay       Nutrition Monitoring and Evaluation:   Behavioral-Environmental Outcomes:  None Identified   Food/Nutrient Intake Outcomes:  Food and Nutrient Intake, Supplement Intake, Diet Advancement/Tolerance  Physical Signs/Symptoms Outcomes:  Biochemical Data, Meal Time Behavior, Skin, Weight     Discharge Planning:    Continue current diet     Electronically signed by Alexx Page RD, LD on 8/10/21 at 3:22 PM EDT    Contact: [phone]

## 2021-08-10 NOTE — PROGRESS NOTES
Leilani Templeton    : 1940  Acct #: [de-identified]  MRN: [id number]              PM&R Progress Note      Admitting diagnosis: Acute CVA ( [address])     Comorbid diagnoses impacting rehabilitation: Right hemiparesis, right homonymous hemianopsia, dysarthria, uncontrolled pain, generalized weakness, gait disturbance, acute blood loss anemia, ischemic cardiomyopathy, status post coronary artery bypass graft surgery     Chief complaint: Left wrist pain and decreased vision to her right side. Prior (baseline) level of function: Independent. Current level of function:         Current  IRF-JOSE and Goals:   Occupational Therapy:      :     :                                       Eating: Eating  Assistance Needed: Setup or clean-up assistance  Comment: Setup A c some containers and packages, pt able to demo hand<>mouth excursions and swallow  CARE Score: 5  Discharge Goal: Independent       Oral Hygiene: Oral Hygiene  Assistance Needed: Supervision or touching assistance  Comment: SBA when in stance completing sink-side, v/c for hand/body placement for safety and compliance of sternal precautions  CARE Score: 4  Discharge Goal: Independent    UB/LB Bathing: Shower/Bathe Self  Assistance Needed: Partial/moderate assistance  Comment:  Mod A c LB washing/drying in seated position; completed total body bathing sink-side  CARE Score: 3  Discharge Goal: Independent    UB Dressing: Upper Body Dressing  Assistance Needed: Partial/moderate assistance  Comment: Min A to don over head and down back and v/c c compliance of sternal precautions  CARE Score: 3  Discharge Goal: Independent         LB Dressing: Lower Body Dressing  Assistance Needed: Substantial/maximal assistance  Comment: Max A to thread BLE and assistance c donning over hips in stance  CARE Score: 2  Discharge Goal: Independent    Donning and [place] Footwear: Putting On/Taking Off Footwear  Assistance Needed: Dependent  Comment: Total A c donning/doffing socks and shoes  CARE Score: 1  Discharge Goal: Independent      Toileting: Toileting Hygiene  Assistance Needed: Partial/moderate assistance  Comment: Min A during CM, pt able to wipe self using front to back technique post urination. CARE Score: 3  Discharge Goal: Independent      Toilet Transfers: Toilet Transfer  Assistance Needed: Partial/moderate assistance  Comment: Min A for accentric/eccentric control, v/c for hand/body/fww placement in compliance c sternal precautions  CARE Score: 3  Discharge Goal: Independent    Physical Therapy:                Bed Mobility:              Transfers:                  Ambulation:                                                      Wheelchair:  w/c Ability:                  Balance:        Object:      I      Exam:    Blood pressure (!) 129/56, pulse 73, temperature 98.4 °F (36.9 °C), temperature source Oral, resp. rate 16, height 5' 2\" (1.575 m), weight 156 lb 8.4 oz (71 kg), SpO2 92 %. General: Up in a wheelchair and Occupational Therapy. Alert but distracted by the visual loss in her wrist pain. Follows one-step commands. HEENT: Minimal right facial droop and dysarthria. MMM. No JVD. Decreased visual tracking to her right side. Pulmonary: Shallow respirations without wheezes or rales. Cardiac: Regular rate and rhythm. Sternal incision is tender to touch and minimally swollen. No drainage. Abdomen: Patient's abdomen is soft and nondistended. Bowel sounds were present throughout. There was no rebound, guarding or masses noted. Upper extremities: Cautious movements of both upper limbs with weak  on the right compared to left. Tender cord in the left wrist (phlebitis). Lower extremities: 12+ edema both lower limbs. Heels are clear. Coarse movements at the right ankle. Sitting balance was fair+. Standing balance was poor.     Lab Results   Component Value Date    WBC 9.9 08/08/2021    HGB 10.1 (L) 08/08/2021    HCT 31.6 (L) 08/08/2021    MCV 99.4 08/08/2021     08/08/2021     Lab Results   Component Value Date    INR 1.19 08/03/2021    INR 1.40 08/02/2021    INR 0.88 07/30/2021    PROTIME 15.4 (H) 08/03/2021    PROTIME 18.1 (H) 08/02/2021    PROTIME 11.3 (L) 07/30/2021     Lab Results   Component Value Date    CREATININE 0.5 (L) 08/09/2021    BUN 12 08/09/2021     08/09/2021    K 3.7 08/09/2021     08/09/2021    CO2 29 08/09/2021     Lab Results   Component Value Date    ALT 74 (H) 08/03/2021     (H) 08/03/2021    ALKPHOS 21 (L) 08/03/2021    BILITOT 0.6 08/03/2021       Expected length of stay  prior to a supervised level of function for discharge home with a walker and [facility] OT/PT is 2 weeks. Recommendations:    1. Ischemic stroke with right hemiparesis and homonymous hemianopsia: Developing the routine for her daily occupational and physical therapy with speech-language pathology. Providing her antiplatelet therapy with a baby aspirin and Plavix. Continuing with the statin. Working to control swings of blood pressure. She is on Foltx due to elevated homocystine level. She requires adaptive equipment training while following sternal precautions. Caregiver education is planned. Verbal cues and minimum physical assistance for transfers today. Aggressive pulmonary hygiene and DVT prophylaxis. 2. DVT prophylaxis: The surgical team discourages oral anticoagulants. SCDs when in bed. Weightbearing activities are pursued daily. Monitoring her for signs of thrombosis. 3. Ischemic cardiomyopathy: Coreg for afterload reduction and ProAmatine to help support blood pressure. She is on a diuretic with Lasix. Daily weights do not reflect any decompensation of CHF. She seems to tolerate the antiplatelet therapy. 4. Homonymous hemianopsia: Ophthalmology consultant saw her on the surgical floor. She will follow up with them as an outpatient.   Our treatment program must take into account her visual field loss. 5. Uncontrolled pain: Acetaminophen and hydrocodone as needed. Bowel intervention while on the pain medications.

## 2021-08-10 NOTE — PROGRESS NOTES
Facility/Department: [facility] ARU  Initial Speech/Language/Cognitive Assessment    NAME: Moo Brito  : 1940   MRN: [id number]  ADMISSION DATE: 2021  ADMITTING DIAGNOSIS: has PVC (premature ventricular contraction); Fatigue; Chest pressure; HORACE (obstructive sleep apnea); Hypersomnia; Unstable angina ([address] Essential hypertension; CAD in native artery; Acute on chronic combined systolic and diastolic heart failure ([address] Acute CVA (cerebrovascular accident) ([address] Hemiparesis of right dominant side as late effect of cerebral infarction ([address] Right homonymous hemianopsia; Gait disturbance; Acute blood loss anemia; Uncontrolled pain; and Ischemic cardiomyopathy on their problem list.  DATE ONSET: 21    Date of Eval: 8/10/2021   Evaluating Therapist: CHIO Burr    RECENT RESULTS  CT OF HEAD/MRI: 8/3 CT of head: negative for acute findings.  Chest Xray: Impression Minimal increased left basilar atelectasis, with similar right basilar airspace disease and small effusion. Similar trace left apical pneumothorax. Primary Complaint: My vision was affected in my right eye. Pain:  Pain Assessment  Pain Assessment: 0-10  Pain Level: 7  Patient's Stated Pain Goal: No pain  Pain Type: Surgical pain  Pain Location: Chest  Pain Orientation: Mid  Pain Descriptors: Aching, Dull  Response to Pain Intervention: Patient Satisfied    Assessment: Per PAS: Nithya Rodriguez, [de-identified] y.o. female with h/o arthritis and CAD who presented on  with history of cardiac symptoms including chest pain irregular heartbeats and shortness of breath. Patient was sent from doctor office after having an abnormal EKG. Patient was sent to [facility] for an emergent cardiac catheter, patient was found to have severe CAD. On  patient underwent CABGx5. Post op stroke alert called d/t vision lost. CT was obtained and was negative, per CT surgeon patient not to have MRI obtained.  Patient still experiencing right side vision lost and right sided weakness. Ophthalmology and neurology consult pending at this time. Patient despite her age claims to be very active and independent and her family members confirm her active status. Patient and her  live together and goal is to return home at discharge from ARU while daughter stays with patient. Currently patient is Burak-maxA with ADL's and Burak with CW for transfers and ambulation. COVID negative test on 7/29 and 8/9. Medical/Surgical History   Arthritis    History of Holter monitoring 06/15/2021   Frequent PVCs. 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  Thyroid disease      Cognitive Diagnosis: Mild working memory deficits that improve with practical application and repetition  Communication Diagnosis: WFL        Recommendations:  Requires SLP Intervention: No  Duration/Frequency of Treatment: No ST at this time . D/C Recommendations: To be determined       Plan:   Goals:  Short-term Goals  Timeframe for Short-term Goals: No ST recommended--pt will work with PT/OT for visual compensatory strategies in fx. Good recall of safety, good recall and application of sternal precautions. Pt demonstrates appropriate awareness and insight into limitations   Patient/family involved in developing goals and treatment plan: pt in agreement    Subjective:   Previous level of function and limitations: Ju Grimes was independent with activities of daily living prior to admit. 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 Meds set up in pill box and pill bottles by pt. Pt manages finances with dtr assisting at this time.   Vision  Vision Exceptions: Visual field cut (while walking back from bathroom says \"I can see out the corner of my eye and there a place where it just stops and I can't see anything else. \")  Hearing  Hearing: Within functional limits           Objective:  BDAE administered     Oral/Motor  Oral Motor: Within functional limits    Auditory Comprehension  Comprehension: Within Functional Limits    Reading Comprehension  Reading Status: Within functional limits (paragraph level and visual tracking tasks via pen and paper--intact)    Expression  Primary Mode of Expression: Verbal    Verbal Expression  Verbal Expression: Within functional limits    Written Expression  Dominant Hand: Right  Written Expression: Within Functional Limits    Motor Speech  Motor Speech: Within Functional Limits    Pragmatics/Social Functioning  Pragmatics: Within functional limits    Cognition:      Orientation  Overall Orientation Status: Within Functional Limits  Attention  Attention: Within Functional Limits  Memory  Memory: Exceptions to [facility]  Working Memory: Mild (improves with repetition and practical application. Pt recalling sternal precautions and shows good awareness and insight with visual deficits and limitations.)  Problem Solving  Problem Solving: Within Functional Limits (Verbal intact--will need auditory and tactile awareness in fx due to visual deficits)  Safety/Judgement  Safety/Judgement: Within Functional Limits (Good awareness and insight--compensatory strategies will be needed in fx)    Additional Assessments: BIMS  Understanding Verbal and Non-Verbal Content: Understands  Expression of Ideas and Wants: Without difficulty  Cognitive Pattern Assessment Used: BIMS  BIMS Summary Score: 11 out of 15 cognitive score               Prognosis:  Speech Therapy Prognosis  Prognosis: Good  Prognosis Considerations: Participation Level; Potential;Previous Level of Function  Additional Comments: visual deficits  Individuals consulted  Consulted and agree with results and recommendations: Patient (PT/OT)    Education:  Patient Education: Results and recommendations; rehab expectations  Patient Education Response: Demonstrated understanding  Safety Devices in place: Yes  Type of devices: Call light within reach; Chair alarm in place; Left in chair    Therapy Time:   Individual Concurrent Group Co-treatment   Time In 1045         Time Out 1115         Minutes Shanna Javier , [place], CCC-SLP  8/10/2021 4:05 PM

## 2021-08-11 LAB
ABO/RH: NORMAL
ANTIBODY SCREEN: NEGATIVE
COMMENT: NORMAL
COMPONENT: NORMAL
COMPONENT: NORMAL
CROSSMATCH RESULT: NORMAL
CROSSMATCH RESULT: NORMAL
STATUS: NORMAL
STATUS: NORMAL
THERMAL AMPLITUDE STUDY: NORMAL
TRANSFUSION STATUS: NORMAL
TRANSFUSION STATUS: NORMAL
UNIT DIVISION: 0
UNIT DIVISION: 0
UNIT NUMBER: NORMAL
UNIT NUMBER: NORMAL

## 2021-08-11 PROCEDURE — 97530 THERAPEUTIC ACTIVITIES: CPT

## 2021-08-11 PROCEDURE — 6370000000 HC RX 637 (ALT 250 FOR IP): Performed by: PHYSICIAN ASSISTANT

## 2021-08-11 PROCEDURE — 97110 THERAPEUTIC EXERCISES: CPT

## 2021-08-11 PROCEDURE — 1280000000 HC REHAB R&B

## 2021-08-11 PROCEDURE — 97116 GAIT TRAINING THERAPY: CPT

## 2021-08-11 PROCEDURE — 97535 SELF CARE MNGMENT TRAINING: CPT

## 2021-08-11 PROCEDURE — 6370000000 HC RX 637 (ALT 250 FOR IP): Performed by: PHYSICAL MEDICINE & REHABILITATION

## 2021-08-11 PROCEDURE — 99232 SBSQ HOSP IP/OBS MODERATE 35: CPT | Performed by: PHYSICAL MEDICINE & REHABILITATION

## 2021-08-11 RX ADMIN — Medication 3 MG: at 02:49

## 2021-08-11 RX ADMIN — ACETAMINOPHEN 650 MG: 325 TABLET ORAL at 05:43

## 2021-08-11 RX ADMIN — ATORVASTATIN CALCIUM 20 MG: 20 TABLET, FILM COATED ORAL at 21:18

## 2021-08-11 RX ADMIN — ACETAMINOPHEN 650 MG: 325 TABLET ORAL at 21:18

## 2021-08-11 RX ADMIN — CARVEDILOL 3.12 MG: 6.25 TABLET, FILM COATED ORAL at 09:22

## 2021-08-11 RX ADMIN — THERA TABS 1 TABLET: TAB at 09:22

## 2021-08-11 RX ADMIN — MIDODRINE HYDROCHLORIDE 10 MG: 5 TABLET ORAL at 12:17

## 2021-08-11 RX ADMIN — DOCUSATE SODIUM 100 MG: 100 CAPSULE ORAL at 09:22

## 2021-08-11 RX ADMIN — Medication 3 MG: at 21:18

## 2021-08-11 RX ADMIN — Medication 1 TABLET: at 09:22

## 2021-08-11 RX ADMIN — MIDODRINE HYDROCHLORIDE 10 MG: 5 TABLET ORAL at 09:22

## 2021-08-11 RX ADMIN — MIDODRINE HYDROCHLORIDE 10 MG: 5 TABLET ORAL at 16:52

## 2021-08-11 RX ADMIN — ACETAMINOPHEN 650 MG: 325 TABLET ORAL at 01:23

## 2021-08-11 RX ADMIN — LEVOTHYROXINE SODIUM 50 MCG: 0.05 TABLET ORAL at 05:43

## 2021-08-11 RX ADMIN — FUROSEMIDE 20 MG: 20 TABLET ORAL at 09:22

## 2021-08-11 RX ADMIN — ALPRAZOLAM 0.25 MG: 0.25 TABLET ORAL at 21:18

## 2021-08-11 RX ADMIN — SENNOSIDES 8.6 MG: 8.6 TABLET, COATED ORAL at 21:18

## 2021-08-11 RX ADMIN — PANTOPRAZOLE SODIUM 40 MG: 40 TABLET, DELAYED RELEASE ORAL at 05:43

## 2021-08-11 RX ADMIN — ASPIRIN 81 MG: 81 TABLET, COATED ORAL at 09:22

## 2021-08-11 RX ADMIN — CARVEDILOL 3.12 MG: 6.25 TABLET, FILM COATED ORAL at 21:18

## 2021-08-11 RX ADMIN — CLOPIDOGREL BISULFATE 75 MG: 75 TABLET ORAL at 09:22

## 2021-08-11 ASSESSMENT — PAIN SCALES - GENERAL
PAINLEVEL_OUTOF10: 1
PAINLEVEL_OUTOF10: 0
PAINLEVEL_OUTOF10: 1
PAINLEVEL_OUTOF10: 4
PAINLEVEL_OUTOF10: 0
PAINLEVEL_OUTOF10: 5

## 2021-08-11 NOTE — PROGRESS NOTES
Physical Therapy    [x] daily progress note       [] discharge       Patient Name:  Jonh Berman   :  1940 MRN: [id number]  Room:  [address] [place] [address] Date of Admission: 2021  Rehabilitation Diagnosis:   Cerebral infarction, unspecified [I63.9]  Acute CVA (cerebrovascular accident) ([facility] [address] [I63.9]       Date 2021       Day of ARU Week:  3   Time IN/OUT 1445/1545   Individual Tx Minutes 60   Group Tx Minutes    Co-Treat Minutes    Concurrent Tx Minutes    TOTAL Tx Time Mins 60   Variance Time    Variance Time []   Refusal due to:     []   Medical hold/reason:    []   Illness   []   Off Unit for test/procedure  []   Extra time needed to complete task  []   Therapeutic need  []   Other (specify):   Restrictions Restrictions/Precautions  Restrictions/Precautions: General Precautions, Fall Risk (R vision deficit due to CVA)  Position Activity Restriction  Sternal Precautions: No Pushing, No Pulling, 5# Lifting Restrictions   Interdisciplinary communication [x]   Cleared for therapy per nursing     []   RN notified about issues during session  []   RN updated on pt performance  []   Spoke with   []   Spoke with OT  []   Spoke with MD  []   Other:    Subjective observations and cognitive status: Pt in recliner, awakens when name is called, states likely having \"phlebitis\" in her L hand, has persistent peripheral vision loss in R eye, states feeling anxious last night.        Pain level/location:   6/10       Location: chest incision site, L hand    Discharge recommendations  Anticipated discharge date:  TBD  Destination: []home alone   []home alone with assist PRN     [] home w/ family      [] Continuous supervision  []SNF    [] Assisted living     [] Other:  Continued therapy: [x]HHC PT  []OUTPATIENT PT   [] No Further PT  []SNF PT  Caregiver training recommended: []Yes  [] No   Equipment needs: CRISTO Berman requires the assistance of a front-wheeled walker to successfully ambulate from room to room at home to allow completion of daily living tasks such as: bathing, toileting, dressing and grooming. A wheeled walker is necessary due to the patient's unsteady gait, upper body weakness, inability to  a standard walker. This patient can ambulate only by pushing a walker instead of using a lesser assistive device such as a cane or crutch. Bed Mobility:           [x]   Pt received out of bed     Transfers:    Sit--> Stand:  SBA   Stand --> Sit:   CGA   Chair-->Chair:   CGA   Toilet Transfer (if applicable): [facility]   Assistive device used for transfer:  RW /none     Gait:    Distance:  20 ft with turns (submax) + 45 ft (max; required 1 standing break to complete this distance; limited by fatigue)    Assistance:  SBA->CGA   Device:  RW  Gait Quality: step-through, slow donald    Stairs   # Completed:  5 (max; limited by fatigue)   Assistance:  Min A  Supportive Device:  Railings   Pattern: step-through on ascent, step-to on descent     Curb   Height:  4\"  Assistance:  Min A  Device:  RW     Uneven Surfaces:       Assistance:   Min A  Device:    RW   Surfaces Completed:   [x]  carpet with bean bags beneath      []  Throw rugs       []  Ramp       []  Outdoor pavements        []  Grass             []  Loose gravel        []  Other:  carpet/transitional surface     Additional Therapeutic activities/exercises completed this date:     []   Nu-step:  Time:        Level:         #Steps:       []   Rebounder:    []  Seated     []  Standing        []   Balance training         []   Postural training    []   Supine ther ex (reps/sets):     []   Seated ther ex (reps/sets):     []   Standing ther ex (reps/sets):     [x]   Other:  Toileting activity completed with CGA    []   Other:    Comments:      Patient/Caregiver Education and Training:   []   Role of PT  [x]   Education about Dx  []   Use of call light for assist   []   HEP provided and explained   [x]   Treatment plan reviewed  []   Home safety  [] Wheelchair mobility/management   []   Body mechanics  [x]   Bed Mobility/Transfer technique  []   Gait technique/sequencing  []   Proper use of assistive device/adaptive equipment  [x]   Stair training/Advanced mobility safety and technique  []   Reinforced patient's precautions/mobility while maintaining precautions  []   Postural awareness  []   Family/caregiver training  []   Progress was updated and reviewed in Rehabtracker with patient and/or family this date. [x]   Other: energy conservation techniques     Treatment Plan for Next Session: increase ambulation distance over level surface, stairs, cardiac exercises       Assessment: This pt demonstrated a positive response to today's treatment as evidenced by ability to complete training stairs and advanced ambulation but ambulation tolerance on level surprise was much decreased when her endurance has been challenged. The patient is making slow progress toward established goals as evidenced by QI scores.      Treatment/Activity Tolerance:   [] Tolerated treatment with no adverse effects    [x] Patient limited by fatigue  [] Patient limited by pain   [] Patient limited by medical complications:    [] Adverse reaction to Tx:   [] Significant change in status    Safety:       []  bed alarm set    [x]  chair alarm set    []  Pt refused alarms                []  Telesitter activated      [x]  Gait belt used during tx session      []other:       Number of Minutes/Billable Intervention  Gait Training 30   Therapeutic Exercise    Neuro Re-Ed    Therapeutic Activity 30   Wheelchair Propulsion    Group    Other:    TOTAL 60       Social History  Social/Functional History  Lives With: Spouse, Daughter Gracie Salas (has health limitations))  Type of Home: House  Home Layout: One level  Home Access: Stairs to enter without rails  Entrance Stairs - Number of Steps: 2  Entrance Stairs - Rails: None  Bathroom Shower/Tub: Walk-in shower  Bathroom Toilet: Standard  Bathroom Equipment: Grab bars in shower, Built-in shower seat, Grab bars around toilet  Home Equipment: [place]  ADL Assistance: [address]: Independent  Homemaking Responsibilities: Yes  Meal Prep Responsibility: Primary  Laundry Responsibility: Primary  Cleaning Responsibility: Primary  Bill Paying/Finance Responsibility: Primary  Shopping Responsibility: Primary  Dependent Care Responsibility: Primary  Health Care Management: Primary  Ambulation Assistance: Independent  Transfer Assistance: Independent  Active : Yes  Occupation: Retired  Type of occupation: [employer], [place]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 Meds set up in pill box and pill bottles by pt. Pt manages finances with dtr assisting at this time. Additional Comments: sleeps in regular, flat bed; 1 recent fall without significant injury    Objective                                                                                    Goals:  (Update in navigator)  Short term goals  Time Frame for Short term goals: 10-12 tx days:  Short term goal 1: Pt will complete rolling to the L and sit->sup with SBA-Sup. following sternal precautions. Short term goal 2: Pt will complete rolling to R, sup->sit, and OOB transfers Ind following sternal precautions. Short term goal 3: Pt will ambulate 150 ft over level surface and at least 10 ft of uneven surface using RW with SBA-Sup. Short term goal 4: Pt will ascend/descend curb step using RW and 1 flight of stairs using railings with SBA-Sup. Short term goal 5: Pt will complet object retrieval from the floor using reacher Mod Ind.:   :        Plan of Care                                                                              Times per week: 5 days per week for a minimum of 60 minutes/day plus group as appropriate for 60 minutes.   Treatment to include Current Treatment Recommendations: Strengthening, Gait Training, Patient/Caregiver Education & Training, Stair training, Equipment Evaluation, Education, & procurement, Balance Training, Pain Management, Functional Mobility Training, Endurance Training, Home Exercise Program, Transfer Training, Safety Education & Training, Neuromuscular Re-education    Electronically signed by   Gerhardt Stake, PT  8/11/2021, 2:39 PM

## 2021-08-11 NOTE — PATIENT CARE CONFERENCE
ACUTE REHAB TEAM CONFERENCE SUMMARY   [facility]    NAME: Kacey Quinn  : 1940 ADMIT DATE: 2021    Rehab Admitting Dx:Cerebral infarction, unspecified [I63.9]  Acute CVA (cerebrovascular accident) ([facility] [address] [I63.9]  Patient Comorbid Conditions: Active Hospital Problems    Diagnosis Date Noted    Hemiparesis of right dominant side as late effect of cerebral infarction (HCC) [I69.351]     Right homonymous hemianopsia [H53.461]     Gait disturbance [R26.9]     Acute blood loss anemia [D62]     Uncontrolled pain [R52]     Ischemic cardiomyopathy [I25.5]     Acute CVA (cerebrovascular accident) ([facility] [address] [I63.9] 2021     Date: 2021    CASE MANAGEMENT  Current issues/needs regarding patient and family discharge status:   Patient plans d/c 1 level home with spouse. Spouse isn't a caregiver per the patient & family. Marsha CASAREZ PTA. PHYSICAL THERAPY (Updated in QI)  Short term goals  Time Frame for Short term goals: 10-12 tx days:  Short term goal 1: Pt will complete rolling to the L and sit->sup with SBA-Sup. following sternal precautions. Short term goal 2: Pt will complete rolling to R, sup->sit, and OOB transfers Ind following sternal precautions. Short term goal 3: Pt will ambulate 150 ft over level surface and at least 10 ft of uneven surface using RW with SBA-Sup. Short term goal 4: Pt will ascend/descend curb step using RW and 1 flight of stairs using railings with SBA-Sup. Short term goal 5: Pt will complet object retrieval from the floor using reacher Mod Ind. Impairments/deficits, barriers:     Body structures, Functions, Activity limitations: Decreased functional mobility , Decreased high-level IADLs, Decreased ADL status, Decreased endurance, Decreased balance, Increased pain, Decreased vision/visual deficit, Decreased strength     Prognosis: Good  Decision Making: High Complexity  Clinical Presentation: unstable/unpredictable characteristics  Equipment needed at discharge: RW       PT IRF-JOSE scores since initial assessment  Bed Mobility:   Sit to Lying  Assistance Needed: Partial/moderate assistance  Comment: Mod A (required assist on BLE), pt was compliant with sternal precautions  CARE Score: 3  Discharge Goal: Supervision or touching assistance    Roll Left and Right  Assistance Needed: Partial/moderate assistance  Comment:  Mod A to L (limited by L shoulder arthritis), Sup towards R side; pt was compliant with sternal precautions  CARE Score: 3  Discharge Goal: Supervision or touching assistance    Lying to Sitting on Side of Bed  Comment: Sup. per OT assessment  Discharge Goal: Independent    Transfers:    Sit to Stand  Assistance Needed: Supervision or touching assistance  Comment: SBA with/without use of RW; compliant with sternal precautions; able to stand without use of trunk momentum  CARE Score: 4  Discharge Goal: Independent    Chair/Bed-to-Chair Transfer  Assistance Needed: Supervision or touching assistance  Comment: CGA without AD  CARE Score: 4  Discharge Goal: Independent         Car Transfer  Assistance Needed: Supervision or touching assistance  Comment: CGA without AD  CARE Score: 4  Discharge Goal: Independent    Ambulation:    Walking Ability  Does the Patient Walk?: Yes     Walk 10 Feet  Assistance Needed: Supervision or touching assistance  Comment: CGA using RW  CARE Score: 4  Discharge Goal: Independent     Walk 50 Feet with Two Turns  Assistance Needed: Partial/moderate assistance; pt's endurance to ambulate this distance is still inconsistent due to decreased endurance  Comment: Min A using RW  CARE Score: 3  Discharge Goal: Independent     Walk 150 Feet  Comment: pt was only able to ambulate 66 ft today using RW with Min A (pt limited by fatigue, SOB, and mild dizziness)  Reason if not Attempted: Not attempted due to medical condition or safety concerns  CARE Score: 88  Discharge Goal: Supervision or touching assistance     Walking 10 Feet on Uneven Surfaces  Assistance Needed: Partial/moderate assistance  Comment: Min A using RW today  Reason if not Attempted: Not attempted due to medical condition or safety concerns  CARE Score: 3  Discharge Goal: Supervision or touching assistance     1 Step (Curb)  Comment: Min A using RW today  Discharge Goal: Supervision or touching assistance     4 Steps  Assistance Needed: Partial/moderate assistance  Comment: Min A using railings today  Reason if not Attempted: Not attempted due to medical condition or safety concerns  CARE Score: 3  Discharge Goal: Supervision or touching assistance     12 Steps  Comment: pt's max tolerance today was 5 steps (limited by fatigue)  Reason if not Attempted: Not attempted due to medical condition or safety concerns  CARE Score: 88  Discharge Goal: Supervision or touching assistance    Gait Deviations: []None []Slow donald  [] Increased MARIEL  [] Staggers []Deviated Path  [] Decreased step length  [] Decreased step height  []Decreased arm swing  [] Shuffles  [] Decreased head and trunk rotation  []other:        Wheelchair:  w/c Ability: Wheelchair Ability  Uses a Wheelchair and/or Scooter?: No                        Balance:        Object: Picking Up Object  Assistance Needed: Supervision or touching assistance  Comment: CGA using reacher in unsupported stance  CARE Score: 4  Discharge Goal: Independent    Fall Risk: []  Yes  []  No    OCCUPATIONAL THERAPY  (Updated in QI)  Short term goals  Time Frame for Short term goals: STG=LTG :   Long term goals  Time Frame for Long term goals : 7-10 Days or until d/c  Long term goal 1: Pt will complete feeding/grooming/oral care task c MOD I by d/c  Long term goal 2: Pt will complete total body bathing c MOD I c use of AE by d/c  Long term goal 3: Pt will complete total body dressing (UB/LB/Footwear) c MOD I c use of AE PRN by d/c  Long term goal 4: Pt will complete toileting c MOD I by d/c  Long term goal 5: Pt will complete functional transfer (toilet, tub, shower) c MOD I by d/c. Long term goals 6: Pt will perform therex/therax to facilitate increased strength/endurance/ax tolerance (c emphasis on dynamic standing balance/tolerance >8 mins and BUE endurance c compliance of sternal precautions) c MOD I in order to complete ADLs. Long term goal 7: Pt will IND demo use of compensatory strategies in order to visually scan environment required for ADL/IADL tasks by d/c. :                                       OT IRF-JOSE scores and goals since initial assessment:    ADLs:    Eating: Eating  Assistance Needed: Setup or clean-up assistance  Comment: Setup A c some containers and packages, pt able to demo hand<>mouth excursions and swallow  CARE Score: 5  Discharge Goal: Independent       Oral Hygiene: Oral Hygiene  Assistance Needed: Supervision or touching assistance  Comment: SBA in stance at sink  CARE Score: 4  Discharge Goal: Independent    UB/LB Bathing: Shower/Bathe Self  Assistance Needed: Partial/moderate assistance  Comment: Min A to bathe feet; CGA in stance to perform posterior/anterior nancy bathing  CARE Score: 3  Discharge Goal: Independent    UB Dressing: Upper Body Dressing  Assistance Needed: Substantial/maximal assistance  Comment: Max A to thread BUE and around back to comply with sternal precautions; pt able to button  CARE Score: 2  Discharge Goal: Independent         LB Dressing: Lower Body Dressing  Assistance Needed: Partial/moderate assistance  Comment: min A to thread BLE into brief and pants c use of reacher, CGA to don over hips in stance  CARE Score: 3  Discharge Goal: Independent    Donning and [place] Footwear: Putting On/Taking Off Footwear  Assistance Needed: Substantial/maximal assistance  Comment: Pt able to doff socks; max A to don EUGENE hose and hospital socks  CARE Score: 2  Discharge Goal: Independent      Toileting:  Toileting Hygiene  Assistance Needed: Supervision or touching assistance  Comment: SBA for clothing management and hygiene  CARE Score: 4  Discharge Goal: Independent      Toilet Transfers: Toilet Transfer  Assistance Needed: Supervision or touching assistance  Comment: SBA  CARE Score: 4  Discharge Goal: Independent      Impairments/deficits, barriers: Decreased balance, endurance, cognition, R homonymous hemianopsia, sternal precautions  Assessment  Performance deficits / Impairments: Decreased functional mobility , Decreased strength, Decreased endurance, Decreased vision/visual deficit, Decreased coordination, Decreased ADL status, Decreased high-level IADLs, Decreased balance, Decreased fine motor control, Decreased ROM  Decision Making: Medium Complexity  REQUIRES OT FOLLOW UP: Yes  Equipment needed at discharge: Possible BSC      COGNITIVE FUNCTION/SPEECH THERAPY (AS INDICATED)  LTG         Duration/Frequency of Treatment  Duration/Frequency of Treatment: No ST at this time . Short-term Goals  Timeframe for Short-term Goals: No ST recommended--pt will work with PT/OT for visual compensatory strategies in fx. Good recall of safety, good recall and application of sternal precautions.   Pt demonstrates appropriate awareness and insight into limitations                          Nursing Current Medical Status:   [x] Is continent of bowel and bladder     [] Is incontinent of bowel and bladder    [x] Has had an adequate number of bowel movements   [x] Urinates with no urinary retention >300ml in bladder   [] Targeting bladder protocol with quintana removal   [x] Maintaining O2 SATs at 92% or greater   [x] Has pain managed while on ARU         [x] Has had no skin breakdown while on ARU   [] Has improved skin integrity via wound measurements   [] Has no signs/symptoms of infection at the wound site   [] Pressure wounds Stage/Location:    [] Arrived on unit with pressure wound  [x] Has been free from injury during hospitalization   [] Has experienced a fall during hospitalization  [x] Ongoing education with patient/family with understanding demonstrated for:  [] Receives IV Fluids  [x] Right eye vision impairment, bilateral harvest site incisions have steri strips and a mepilex barrier in place        NUTRITION  Weight: 150 lb 12.7 oz (68.4 kg) (Retaken after pt laid down) / Body mass index is 27.58 kg/m². Current diet: ADULT DIET; Regular; Low Sodium (2 gm)  Adult Oral Nutrition Supplement; Clear Liquid Oral Supplement  Intake: Improving at recent meals, % with oral nutrition supplement        Medical improvements/barriers: visual deficits with good awareness on R, arthritis, fatigues quickly, SOB, sternal precautions        Team goals for next treatment period/Intervention for current barriers:   [x] Pt will increase activity tolerance for daily tasks. [] Pt will improve bed mobility with reduced assist.  [x] Pt will improve safety in fx tasks with reduced cues/assist  [x] Pt will improve transfers with reduced assist  [x] Pt will improve toileting with reduced assist  [x] Pt will improve ADL's with use of adaptive equipment with reduced assist  [] Pt will improve pain mgmt for maximum participation in tx program  [] Pt will improve communication to get basic needs met on unit  [] Pt will improve swallowing for safe diet advancement with use of strategies  []  Plan for discharge to home.      Patient Strengths: Motivated, Cooperative and Pleasant    Justification for Continued Stay  Based on my medical assessment of the patient and review of information from the interdisciplinary team as part of this weekly team conference, the patient continues to meet the following criteria for IRF level of care:   The patient requires active and ongoing intervention of multiple therapy disciplines   The patient requires and intensive rehabilitation therapy program   The patient requires continued physician supervision by a rehabilitation physician   The patient requires 24 hours rehab nursing care   The patient requires an intensive and coordinated interdisciplinary team approach to the delivery of rehabilitative care. Assessment/Plan   [x]  The patient is making good progression towards their long term goals and is actively participating in and has a reasonable expectation to continue to benefit from the intensive rehabilitation therapy program   []  The estimated discharge date has been changed from initial team conference due to:   []  The estimated discharge destination has been changed from initial team conference due to:         Ongoing tx following discharge: [id number]  Neuro Opthamologist      []OUTPATIENT     [] No Further Treatment     [x] Family/Caregiver Training  []  Transitional Living Arrangement   [] Home Assessment (date  )     [] Family Conference   []  Therapeutic Pass       []  Other: (specify)    Estimated Discharge Date: 8/21/21    Estimated Discharge Destination: []home alone   []home alone with assist prn  []Continuous supervision [x]Return home with spouse/family   [] Assisted living  []SNF     Team members participating in today's conference. [x] Mini العلي, Medical Director  [] Elsie Lundy,    [x] Cherylene Grieves, Nurse Mgr    [x]  Roge Quick, PT   [] Gerald Glynn, OT   []  Ania Maradiaga, PT  [x] Chrystal Wagner, OT      [x]  Robby Sommers, SLP    []  Russel Coe, SLP   [] Pily Cosme, CHIO   []  Mekhi Alejandro RD     [] Deb Graham,     [x]Leanna Montgomery,     [] Jagdish Choudhury RN[] Nilsa Fall RN     [] Cristina Quiles RN    [x] Kole Schneider RN    [] Govind Mason RN  [] Velia Nathan RN    []     I have led this Team Conference and agree with the plan, Arben Carlos MD, 8/12/2021, 12:44 PM  Goals have been updated to reflect recent status.     Team conference note transcribed this date by: Neftali Tejada MA, Alton Oreilly, Therapy Coordinator

## 2021-08-11 NOTE — PROGRESS NOTES
Occupational Therapy    Physical Rehabilitation: OCCUPATIONAL THERAPY     [x] daily progress note       [] discharge       Patient Name:  Viki Conteh   :  1940 MRN: [id number]  Room:  [address] [place] [address] Date of Admission: 2021  Rehabilitation Diagnosis:   Cerebral infarction, unspecified [I63.9]  Acute CVA (cerebrovascular accident) [facility]) [I63.9]       Date 2021       Day of ARU Week:  3   Time IN/OUT [id number]  [id number]   Individual Tx Minutes 66+54   Group Tx Minutes    Co-Treat Minutes    Concurrent Tx Minutes    TOTAL Tx Time Mins 120   Variance Time    Variance Time []   Refusal due to:     []   Medical hold/reason:    []   Illness   []   Off Unit for test/procedure  []   Extra time needed to complete task  []   Therapeutic need  []   Other (specify):   Restrictions Restrictions/Precautions: General Precautions, Fall Risk (R vision deficit due to CVA)         Communication with other providers: [x]   OK to see per nursing:     []   Spoke with team member regarding:      Subjective observations and cognitive status: AM:Pt sitting up in chair upon arrival;  Pt stated she did not feel well this AM. Pt reported \"not so much pain as I just feel crummy. \"  Vitals taken BP: 121/55, 97% O2 HR 72. Pt agreeable to therapy session. PM: Pt sitting in chair resting; pleasant and agreeable to therapy.     Pain level/location:    10       Location:    Discharge recommendations  Anticipated discharge date:  TBD  Destination: []home alone   []home alone w assist prn   [] home w/ family    [] Continuous supervision       []SNF    [] Assisted living     [] Other:   Continued therapy: []HHC OT  []OUTPATIENT  OT   [] No Further OT  Equipment needs: TBD        ADLs:    Eating: Eating  Assistance Needed: Setup or clean-up assistance  Comment: Setup A c some containers and packages, pt able to demo hand<>mouth excursions and swallow  CARE Score: 5  Discharge Goal: Independent       Oral Hygiene: Oral Hygiene  Assistance Needed: Supervision or touching assistance  Comment: SBA in stance at sink  CARE Score: 4  Discharge Goal: Independent    UB/LB Bathing: Shower/Bathe Self  Assistance Needed: Partial/moderate assistance  Comment: Min A to bathe feet; CGA in stance to perform posterior/anterior nancy bathing  CARE Score: 3  Discharge Goal: Independent    UB Dressing: Upper Body Dressing  Assistance Needed: Substantial/maximal assistance  Comment: Max A to thread BUE and around back to comply with sternal precautions; pt able to button  CARE Score: 2  Discharge Goal: Independent         LB Dressing: Lower Body Dressing  Assistance Needed: Partial/moderate assistance  Comment: min A to thread BLE into brief and pants c use of reacher, CGA to don over hips in stance  CARE Score: 3  Discharge Goal: Independent    Donning and [place] Footwear: Putting On/Taking Off Footwear  Assistance Needed: Substantial/maximal assistance  Comment: Pt able to doff socks; max A to don EUGENE hose and hospital socks  CARE Score: 2  Discharge Goal: Independent      Toileting:  Toileting Hygiene  Assistance Needed: Supervision or touching assistance  Comment: SBA for clothing management and hygiene  CARE Score: 4  Discharge Goal: Independent      Toilet Transfers:   AM: CGA Toilet Transfer            PM: SBA   Assistance Needed: Supervision or touching assistance  Comment: CGA  CARE Score: 4  Discharge Goal: Independent  Device Used:    [x]   Standard Toilet         []   Grab Bars           []  Bedside Commode       []   Elevated Toilet          []   Other:        Bed Mobility:           [x]   Pt received out of bed     Transfers:    Sit--> Stand:  AM and PM: CGA c minimal cues for sternal precautions  Stand --> Sit:   AM and PM: CGA c minimal cues for sternal precautions  Stand-Pivot:   AM and PM: CGA c minimal cues for sternal precautions  Other:  Shower transfer: CGA c minimal cues for sternal precautions  Assistive device required for transfer:   RW      Functional Mobility:  AM: From chair to bathroom      PM: 55ft   Assistance:  CGA  Device:   [x]   Rolling Walker     []   Standard Walker []   Wheelchair        []   Jonita Adan       []   4-Wheeled Zachary Cat         []   Cardiac Walker       []   Other:          Additional Therapeutic activities/exercises completed this date:     [x]   ADL Training   [x]   Balance/Postural training: Facilitated dynamic standing therax, Pt able to standfor ~4 mins, scanning to R to find card, reaching to the R and scanning to find the match. Pt required min cues to scan to the R. Pt able to find matching card while scanning. [x]   Bed/Transfer Training   []   Endurance Training   []   Neuromuscular Re-ed   []   Nu-step:  Time:        Level:         #Steps:       []   Rebounder:    []  Seated     []  Standing        [x]   Supine Ther Ex (reps/sets):     [x]   Seated Ther Ex (reps/sets):  Pt was instructed in Intermediate Cardiac ex program:  Overhead Side Stretch x 10  Ankle Pumps/ Heel Raises x 10  Marching Seated x 10  Forward Arm Raises x 10  Side Arm Raises x 10  Arm Crosses x 10  Arm Circles Forwards and Backwards x 10  SittingTrunkTwist x 10       []   Standing Ther Ex (reps/sets):     []   Other:      Comments:      Patient/Caregiver Education and Training:   []   [employer] Equipment Use  []   Bed Mobility/Transfer Technique/Safety  []   Energy Conservation Tips  []   Family training  []   Postural Awareness  []   Safety During Functional Activities  [x]   Reinforced Patient's Precautions   []   Progress was updated and reviewed in Rehabtracker with patient and/or family this         date. Treatment Plan for Next Session: Continue OT POC       Assessment: This pt demonstrated a positive response to today's treatment as evidenced by actively engaged in therapy session. The patient is making progress toward established goals as evidenced by QI scores.  Ongoing deficits are observed in the areas of balance, activity toelrance and continued focus on this is recommended. Treatment/Activity Tolerance:   [x] Tolerated treatment with no adverse effects    [] Patient limited by fatigue  [] Patient limited by pain   [] Patient limited by medical complications:    [] Adverse reaction to Tx:   [] Significant change in status    Safety:       []  bed alarm set    [x]  chair alarm set    []  Pt refused alarms                []  Telesitter activated      [x]  Gait belt used during tx session      []other:       Number of Minutes/Billable Intervention  Therapeutic Exercise 15   ADL Self-care 81   Neuro Re-Ed    Therapeutic Activity 24   Group    Other:    TOTAL 120       Social History  Social/Functional History  Lives With: Spouse, Daughter Italia Goetz (has health limitations))  Type of Home: House  Home Layout: One level  Home Access: Stairs to enter without rails  Entrance Stairs - Number of Steps: 2  Entrance Stairs - Rails: None  Bathroom Shower/Tub: Walk-in shower  Bathroom Toilet: Standard  Bathroom Equipment: Grab bars in shower, Built-in shower seat, Grab bars around toilet  Home Equipment: 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 Meds set up in pill box and pill bottles by pt. Pt manages finances with dtr assisting at this time.   Additional Comments: sleeps in regular, flat bed; 1 recent fall without significant injury    Objective                                                                                    Goals:  (Update in navigator)  Short term goals  Time Frame for Short term goals: STG=LTG:  Long term goals  Time Frame for Long term goals : 7-10 Days or until d/c  Long term goal 1: Pt will complete feeding/grooming/oral care task c MOD I by d/c  Long term goal 2: Pt will complete total body bathing c MOD I c use of AE by d/c  Long term goal 3: Pt will complete total body dressing (UB/LB/Footwear) c MOD I c use of AE PRN by d/c  Long term goal 4: Pt will complete toileting c MOD I by d/c  Long term goal 5: Pt will complete functional transfer (toilet, tub, shower) c MOD I by d/c. Long term goals 6: Pt will perform therex/therax to facilitate increased strength/endurance/ax tolerance (c emphasis on dynamic standing balance/tolerance >8 mins and BUE endurance c compliance of sternal precautions) c MOD I in order to complete ADLs. Long term goal 7: Pt will IND demo use of compensatory strategies in order to visually scan environment required for ADL/IADL tasks by d/c.:        Plan of Care                                                                              Times per week: 5 days per week for a minimum of 60 minutes/day plus group as appropriate for 60 minutes.   Treatment to include Plan  Times per day: Daily  Current Treatment Recommendations: Strengthening, Endurance Training, Balance Training, Patient/Caregiver Education & Training, Home Management Training, ROM, Functional Mobility Training, Safety Education & Training, Equipment Evaluation, Education, & procurement, Self-Care / ADL, Cognitive/Perceptual Training, Pain Management    Electronically signed by   MIK Liz,  8/11/2021, 10:56 AM

## 2021-08-11 NOTE — PROGRESS NOTES
Willy Burt    : 1940  Acct #: [de-identified]  MRN: [id number]              PM&R Progress Note      Admitting diagnosis: Acute CVA ( [address])     Comorbid diagnoses impacting rehabilitation: Right hemiparesis, right homonymous hemianopsia, dysarthria, uncontrolled pain, generalized weakness, gait disturbance, acute blood loss anemia, ischemic cardiomyopathy, status post coronary artery bypass graft surgery     Chief complaint: Too tired to do all of her therapy. Prior (baseline) level of function: Independent. Current level of function:         Current  IRF-JOSE and Goals:   Occupational Therapy:    Short term goals  Time Frame for Short term goals: STG=LTG :   Long term goals  Time Frame for Long term goals : 7-10 Days or until d/c  Long term goal 1: Pt will complete feeding/grooming/oral care task c MOD I by d/c  Long term goal 2: Pt will complete total body bathing c MOD I c use of AE by d/c  Long term goal 3: Pt will complete total body dressing (UB/LB/Footwear) c MOD I c use of AE PRN by d/c  Long term goal 4: Pt will complete toileting c MOD I by d/c  Long term goal 5: Pt will complete functional transfer (toilet, tub, shower) c MOD I by d/c. Long term goals 6: Pt will perform therex/therax to facilitate increased strength/endurance/ax tolerance (c emphasis on dynamic standing balance/tolerance >8 mins and BUE endurance c compliance of sternal precautions) c MOD I in order to complete ADLs.   Long term goal 7: Pt will IND demo use of compensatory strategies in order to visually scan environment required for ADL/IADL tasks by d/c. :                                       Eating: Eating  Assistance Needed: Setup or clean-up assistance  Comment: Setup A c some containers and packages, pt able to demo hand<>mouth excursions and swallow  CARE Score: 5  Discharge Goal: Independent       Oral Hygiene: Oral Hygiene  Assistance Needed: Supervision or touching assistance  Comment: SBA in stance at sink  CARE Score: 4  Discharge Goal: Independent    UB/LB Bathing: Shower/Bathe Self  Assistance Needed: Partial/moderate assistance  Comment: Min A to bathe feet; CGA in stance to perform posterior/anterior nancy bathing  CARE Score: 3  Discharge Goal: Independent    UB Dressing: Upper Body Dressing  Assistance Needed: Substantial/maximal assistance  Comment: Max A to thread BUE and around back to comply with sternal precautions; pt able to button  CARE Score: 2  Discharge Goal: Independent         LB Dressing: Lower Body Dressing  Assistance Needed: Partial/moderate assistance  Comment: min A to thread BLE into brief and pants c use of reacher, CGA to don over hips in stance  CARE Score: 3  Discharge Goal: Independent    Donning and [place] Footwear: Putting On/Taking Off Footwear  Assistance Needed: Substantial/maximal assistance  Comment: Pt able to doff socks; max A to don EUGENE hose and hospital socks  CARE Score: 2  Discharge Goal: Independent      Toileting: Toileting Hygiene  Assistance Needed: Supervision or touching assistance  Comment: SBA for clothing management and hygiene  CARE Score: 4  Discharge Goal: Independent      Toilet Transfers: Toilet Transfer  Assistance Needed: Supervision or touching assistance  Comment: SBA  CARE Score: 4  Discharge Goal: Independent    Physical Therapy:   Short term goals  Time Frame for Short term goals: 10-12 tx days:  Short term goal 1: Pt will complete rolling to the L and sit->sup with SBA-Sup. following sternal precautions. Short term goal 2: Pt will complete rolling to R, sup->sit, and OOB transfers Ind following sternal precautions. Short term goal 3: Pt will ambulate 150 ft over level surface and at least 10 ft of uneven surface using RW with SBA-Sup. Short term goal 4: Pt will ascend/descend curb step using RW and 1 flight of stairs using railings with SBA-Sup.   Short term goal 5: Pt will complet object retrieval from the floor using reacher Mod Ind. Bed Mobility:   Sit to Lying  Assistance Needed: Partial/moderate assistance  Comment: Mod A (required assist on BLE), pt was compliant with sternal precautions  CARE Score: 3  Discharge Goal: Supervision or touching assistance  Roll Left and Right  Assistance Needed: Partial/moderate assistance  Comment:  Mod A to L (limited by L shoulder arthritis), Sup towards R side; pt was compliant with sternal precautions  CARE Score: 3  Discharge Goal: Supervision or touching assistance  Lying to Sitting on Side of Bed  Comment: Sup. per OT assessment  Discharge Goal: Independent    Transfers:    Sit to Stand  Assistance Needed: Supervision or touching assistance  Comment: SBA with/without use of RW; compliant with sternal precautions; able to stand without use of trunk momentum  CARE Score: 4  Discharge Goal: Independent  Chair/Bed-to-Chair Transfer  Assistance Needed: Supervision or touching assistance  Comment: CGA without AD  CARE Score: 4  Discharge Goal: Independent     Car Transfer  Assistance Needed: Supervision or touching assistance  Comment: CGA without AD  CARE Score: 4  Discharge Goal: Independent    Ambulation:    Walking Ability  Does the Patient Walk?: Yes     Walk 10 Feet  Assistance Needed: Supervision or touching assistance  Comment: CGA using RW  CARE Score: 4  Discharge Goal: Independent     Walk 50 Feet with Two Turns  Assistance Needed: Partial/moderate assistance  Comment: Min A using RW  CARE Score: 3  Discharge Goal: Independent     Walk 150 Feet  Comment: pt was only able to ambulate 66 ft today using RW with Min A (pt limited by fatigue, SOB, and mild dizziness)  Reason if not Attempted: Not attempted due to medical condition or safety concerns  CARE Score: 88  Discharge Goal: Supervision or touching assistance     Walking 10 Feet on Uneven Surfaces  Assistance Needed: Partial/moderate assistance  Comment: Min A using RW today  Reason if not Attempted: Not attempted due to medical condition or safety concerns  CARE Score: 3  Discharge Goal: Supervision or touching assistance     1 Step (Curb)  Comment: Min A using RW today  Discharge Goal: Supervision or touching assistance     4 Steps  Assistance Needed: Partial/moderate assistance  Comment: Min A using railings today  Reason if not Attempted: Not attempted due to medical condition or safety concerns  CARE Score: 3  Discharge Goal: Supervision or touching assistance     12 Steps  Comment: pt's max tolerance today was 5 steps (limited by fatigue)  Reason if not Attempted: Not attempted due to medical condition or safety concerns  CARE Score: 88  Discharge Goal: Supervision or touching assistance       Wheelchair:  w/c Ability: Wheelchair Ability  Uses a Wheelchair and/or Scooter?: No                Balance:        Object: Picking Up Object  Assistance Needed: Supervision or touching assistance  Comment: CGA using reacher in unsupported stance  CARE Score: 4  Discharge Goal: Independent    I      Exam:    Blood pressure (!) 118/54, pulse 69, temperature 97.7 °F (36.5 °C), temperature source Axillary, resp. rate 18, height 5' 2\" (1.575 m), weight 153 lb 14.1 oz (69.8 kg), SpO2 95 %. General: Up in a bedside chair with legs elevated. Oxygen per nasal cannula. Quiet. Soft-spoken. HEENT: Decreased attention to the right visual field. Mild right facial droop with dysarthria. MMM. No JVD. Pulmonary: Unlabored breathing with diminished breath sounds in the bases. Cardiac: Regular rate and rhythm. Sternal incision clean and dry. Some tenderness to palpation, however. Abdomen: Patient's abdomen is soft and nondistended. Bowel sounds were present throughout. There was no rebound, guarding or masses noted. Upper extremities: Clumsy movements of the right upper limb with increased tone and poor . Lower extremities: No signs of DVT. Heels clear. Sitting balance was fair. Standing balance was poor.     Lab Results Component Value Date    WBC 9.9 08/08/2021    HGB 10.1 (L) 08/08/2021    HCT 31.6 (L) 08/08/2021    MCV 99.4 08/08/2021     08/08/2021     Lab Results   Component Value Date    INR 1.19 08/03/2021    INR 1.40 08/02/2021    INR 0.88 07/30/2021    PROTIME 15.4 (H) 08/03/2021    PROTIME 18.1 (H) 08/02/2021    PROTIME 11.3 (L) 07/30/2021     Lab Results   Component Value Date    CREATININE 0.5 (L) 08/09/2021    BUN 12 08/09/2021     08/09/2021    K 3.7 08/09/2021     08/09/2021    CO2 29 08/09/2021     Lab Results   Component Value Date    ALT 74 (H) 08/03/2021     (H) 08/03/2021    ALKPHOS 21 (L) 08/03/2021    BILITOT 0.6 08/03/2021       Expected length of stay  prior to a supervised level of function for discharge home with a walker and [facility] OT/PT is 2 weeks. Recommendations:    1. Ischemic stroke with right hemiparesis and homonymous hemianopsia:  Frequent rest breaks and significant verbal cueing are needed to get her to engage in the daily occupational and physical therapy with speech-language pathology.     Seems to be tolerating the her antiplatelet therapy with a baby aspirin and Plavix.    Continuing with the statin.    Working to control swings of blood pressure.  Foltx due to elevated homocystine level.  She requires adaptive equipment training while following sternal precautions.  Caregiver education is planned. Verbal cues and minimum physical assistance for transfers again today. Aggressive pulmonary hygiene and DVT prophylaxis. 2. DVT prophylaxis: The surgical team discourages oral anticoagulants.  SCDs when in bed.  Weightbearing activities are pursued daily.  No current signs of thrombosis. 3. Ischemic cardiomyopathy: Coreg for afterload reduction and ProAmatine to help support blood pressure.  She is on a diuretic with Lasix.    Daily weights do not reflect any decompensation of CHF. She seems to tolerate the antiplatelet therapy.   4. Homonymous hemianopsia: Ophthalmology consultant saw her on the surgical floor.  She will follow up with them as an outpatient.  Our treatment program must take into account her visual field loss. 5. Uncontrolled pain: Acetaminophen and hydrocodone as needed.  Bowel intervention while on the pain medications.   Some interference with activities due to pain.

## 2021-08-11 NOTE — CARE COORDINATION
Case Management Admission Note      Patient:Chrystal Pollack      :1940  EGP:[id number]  Rehab Dx/Hx: Cerebral infarction, unspecified [I63.9]  Acute CVA (cerebrovascular accident) [facility]) [I63.9]    Chief Complaint:   Past Medical History:   Diagnosis Date    Acute CVA (cerebrovascular accident) ([address] 2021    Arthritis     CAD in native artery 2021    Essential hypertension 2021    H/O cardiac catheterization 2021    Several vessel serious  stenosis, Urgent CABG is recommended.  H/O cardiovascular stress test 2021    Abnormal Stress EKG results and patient was transferred to [address] [place] [address] cath lab per Dr. Eddi Barrientos.  H/O echocardiogram 2021    Left ventricular function and size is normal, EF is estimated at 55-60%. Mild mitral ,tricuspid and moderate aortic regurgitation is present.  History of Holter monitoring 06/15/2021    Frequent PVCs. 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     Thyroid disease      Past Surgical History:   Procedure Laterality Date    APPENDECTOMY      BACK SURGERY      COLONOSCOPY      CORONARY ARTERY BYPASS GRAFT  2021    CABG X 5 vessel with Dr Guanakito Ventura N/A 2021    CABG CORONARY ARTERY BYPASS X 5, INTRAOPERATIVE [place], INDUCED HYPOTHERMIA AND BILATERAL ENDOHARVEST OF SAPHENOUS VEINS performed by Maryana Mosqueda MD at [address] [place] [address]      HYSTERECTOMY      TUMOR REMOVAL       Allergies   Allergen Reactions    Iodine Hives     Topical only per pt; sts  she had sx and broke out in hives after application of topical iodine only; denies knowledge of allergy to contrast dyes     Pcn [Penicillins] Hives     Precautions: falls and cardiac    Date of Admit: 2021  Room #: 1010/1010-A      Current functional status at time of admit:        Home Living/DME Available:      Type of Home: House  Home Access: Stairs to enter without rails  Bathroom Shower/Tub: Walk-in shower  Bathroom Toilet: Standard  Bathroom Equipment: Grab bars in shower, Built-in shower seat, Grab bars around toilet  Home Equipment: Foster Doctor       IADL Hx:   Homemaking Responsibilities: Yes  Active : Yes     Occupation: Retired  Leisure & Hobbies: word searches, games on phone, reading, simple yard care (picking up sticks), [employer], grocery. Meds set up in pill box and pill bottles by pt. Pt manages finances with dtr assisting at this time. Spouse: patient is   Family:  Local supportive children    Comments:  Patient plans dc home with spouse. Patient's son was present during interview and described the spouse as \"worthless\" as a caregiver. Patient reports her son and daughter are local and supportive. Patient reports she's feeling worse today with increased SOB, feels shaky, feels like eyes are shaky too, left hand pain. She reports the hand pain is worse today than her surgical pain. Patient c/o SOB but isn't on o2 at this time. Case mgt reminded patient to focus on breathing - she was able to take one deep breath which she thought felt good. Case mgt reviewed role in dc planning. Whiteboard updated.     Liz Maldonado, 8/11/2021, 9:23 AM

## 2021-08-11 NOTE — PLAN OF CARE
Problem: Infection:  Goal: Will remain free from infection  Description: Will remain free from infection  8/11/2021 0016 by Tremayne Vasquez RN  Outcome: Ongoing  8/10/2021 1743 by Andrew Barton RN  Outcome: Ongoing     Problem: Safety:  Goal: Free from accidental physical injury  Description: Free from accidental physical injury  8/11/2021 0016 by Tremayne Vasquez RN  Outcome: Ongoing  8/10/2021 1743 by Andrew Barton RN  Outcome: Ongoing  Goal: Free from intentional harm  Description: Free from intentional harm  8/11/2021 0016 by Tremayne Vasquez RN  Outcome: Ongoing  8/10/2021 1743 by Andrew Barton RN  Outcome: Ongoing     Problem: Daily Care:  Goal: Daily care needs are met  Description: Daily care needs are met  8/11/2021 0016 by Tremayne Vasquez RN  Outcome: Ongoing  8/10/2021 1743 by Andrew Barton RN  Outcome: Ongoing     Problem: Pain:  Goal: Patient's pain/discomfort is manageable  Description: Patient's pain/discomfort is manageable  8/11/2021 0016 by Tremayne Vasquez RN  Outcome: Ongoing  8/10/2021 1743 by Andrew Barton RN  Outcome: Ongoing  Goal: Pain level will decrease  Description: Pain level will decrease  8/11/2021 0016 by Tremayne Vasquez RN  Outcome: Ongoing  8/10/2021 1743 by Andrew Barton RN  Outcome: Ongoing  Goal: Control of acute pain  Description: Control of acute pain  8/11/2021 0016 by Tremayne Vasquez RN  Outcome: Ongoing  8/10/2021 1743 by Andrew Barton RN  Outcome: Ongoing  Goal: Control of chronic pain  Description: Control of chronic pain  8/11/2021 0016 by Tremayne Vasquez RN  Outcome: Ongoing  8/10/2021 1743 by Andrew Barton RN  Outcome: Ongoing     Problem: Skin Integrity:  Goal: Skin integrity will stabilize  Description: Skin integrity will stabilize  8/11/2021 0016 by Tremayne Vasquez RN  Outcome: Ongoing  8/10/2021 1743 by Andrew Barton RN  Outcome: Ongoing     Problem: Discharge Planning:  Goal: Patients continuum of care needs are met  Description: Patients continuum of care needs are met  8/11/2021 0016 by Tatianna Kumar RN  Outcome: Ongoing  8/10/2021 1743 by Denilson Call RN  Outcome: Ongoing     Problem: Falls - Risk of:  Goal: Will remain free from falls  Description: Will remain free from falls  8/11/2021 0016 by Tatianna Kumar RN  Outcome: Ongoing  8/10/2021 1743 by Denilson Call RN  Outcome: Ongoing  Goal: Absence of physical injury  Description: Absence of physical injury  8/11/2021 0016 by Tatianna Kumar RN  Outcome: Ongoing  8/10/2021 1743 by Denilson Call RN  Outcome: Ongoing     Problem: Skin Integrity:  Goal: Will show no infection signs and symptoms  Description: Will show no infection signs and symptoms  8/11/2021 0016 by Tatianna Kumar RN  Outcome: Ongoing  8/10/2021 1743 by Denilson Call RN  Outcome: Ongoing  Goal: Absence of new skin breakdown  Description: Absence of new skin breakdown  8/11/2021 0016 by Tatianna Kumar RN  Outcome: Ongoing  8/10/2021 1743 by Denilson Call RN  Outcome: Ongoing

## 2021-08-12 PROCEDURE — 94761 N-INVAS EAR/PLS OXIMETRY MLT: CPT

## 2021-08-12 PROCEDURE — 97110 THERAPEUTIC EXERCISES: CPT

## 2021-08-12 PROCEDURE — 1280000000 HC REHAB R&B

## 2021-08-12 PROCEDURE — 6370000000 HC RX 637 (ALT 250 FOR IP): Performed by: PHYSICAL MEDICINE & REHABILITATION

## 2021-08-12 PROCEDURE — 97530 THERAPEUTIC ACTIVITIES: CPT

## 2021-08-12 PROCEDURE — 6370000000 HC RX 637 (ALT 250 FOR IP): Performed by: PHYSICIAN ASSISTANT

## 2021-08-12 PROCEDURE — 99233 SBSQ HOSP IP/OBS HIGH 50: CPT | Performed by: PHYSICAL MEDICINE & REHABILITATION

## 2021-08-12 PROCEDURE — 94150 VITAL CAPACITY TEST: CPT

## 2021-08-12 PROCEDURE — 97535 SELF CARE MNGMENT TRAINING: CPT

## 2021-08-12 RX ADMIN — Medication 1 TABLET: at 10:25

## 2021-08-12 RX ADMIN — FUROSEMIDE 20 MG: 20 TABLET ORAL at 10:25

## 2021-08-12 RX ADMIN — ALPRAZOLAM 0.25 MG: 0.25 TABLET ORAL at 22:16

## 2021-08-12 RX ADMIN — CARVEDILOL 3.12 MG: 6.25 TABLET, FILM COATED ORAL at 10:26

## 2021-08-12 RX ADMIN — LEVOTHYROXINE SODIUM 50 MCG: 0.05 TABLET ORAL at 06:31

## 2021-08-12 RX ADMIN — Medication 3 MG: at 22:16

## 2021-08-12 RX ADMIN — PANTOPRAZOLE SODIUM 40 MG: 40 TABLET, DELAYED RELEASE ORAL at 06:31

## 2021-08-12 RX ADMIN — MIDODRINE HYDROCHLORIDE 10 MG: 5 TABLET ORAL at 14:06

## 2021-08-12 RX ADMIN — ACETAMINOPHEN 650 MG: 325 TABLET ORAL at 06:31

## 2021-08-12 RX ADMIN — THERA TABS 1 TABLET: TAB at 10:26

## 2021-08-12 RX ADMIN — MIDODRINE HYDROCHLORIDE 10 MG: 5 TABLET ORAL at 18:43

## 2021-08-12 RX ADMIN — CARVEDILOL 3.12 MG: 6.25 TABLET, FILM COATED ORAL at 20:31

## 2021-08-12 RX ADMIN — SENNOSIDES 8.6 MG: 8.6 TABLET, COATED ORAL at 20:31

## 2021-08-12 RX ADMIN — ASPIRIN 81 MG: 81 TABLET, COATED ORAL at 10:26

## 2021-08-12 RX ADMIN — ACETAMINOPHEN 650 MG: 325 TABLET ORAL at 18:43

## 2021-08-12 RX ADMIN — MIDODRINE HYDROCHLORIDE 10 MG: 5 TABLET ORAL at 10:26

## 2021-08-12 RX ADMIN — DOCUSATE SODIUM 100 MG: 100 CAPSULE ORAL at 10:26

## 2021-08-12 RX ADMIN — CLOPIDOGREL BISULFATE 75 MG: 75 TABLET ORAL at 10:25

## 2021-08-12 RX ADMIN — ATORVASTATIN CALCIUM 20 MG: 20 TABLET, FILM COATED ORAL at 20:31

## 2021-08-12 RX ADMIN — ACETAMINOPHEN 650 MG: 325 TABLET ORAL at 02:35

## 2021-08-12 ASSESSMENT — PAIN SCALES - GENERAL
PAINLEVEL_OUTOF10: 1
PAINLEVEL_OUTOF10: 4
PAINLEVEL_OUTOF10: 4
PAINLEVEL_OUTOF10: 1
PAINLEVEL_OUTOF10: 3

## 2021-08-12 NOTE — PROGRESS NOTES
Physical Therapy    [x] daily progress note       [] discharge       Patient Name:  Sav Tse   :  1940 MRN: [id number]  Room:  [address] [place] [address] Date of Admission: 2021  Rehabilitation Diagnosis:   Cerebral infarction, unspecified [I63.9]  Acute CVA (cerebrovascular accident) ([facility] [I63.9]       Date 2021       Day of ARU Week:  4   Time IN/OUT 1510/1610   Individual Tx Minutes 60   Group Tx Minutes    Co-Treat Minutes    Concurrent Tx Minutes    TOTAL Tx Time Mins 60   Variance Time    Variance Time []   Refusal due to:     []   Medical hold/reason:    []   Illness   []   Off Unit for test/procedure  []   Extra time needed to complete task  []   Therapeutic need  []   Other (specify):   Restrictions Restrictions/Precautions  Restrictions/Precautions: General Precautions, Fall Risk (R vision deficit due to CVA)  Position Activity Restriction  Sternal Precautions: No Pushing, No Pulling, 5# Lifting Restrictions   Interdisciplinary communication [x]   Cleared for therapy per nursing     []   RN notified about issues during session  []   RN updated on pt performance  []   Spoke with   []   Spoke with OT  []   Spoke with MD  []   Other:    Subjective observations and cognitive status: Pt in w/c, states feeling tired but willing to participate in PT, does not recall meeting with , however expressed concern about the need to see an eye doctor at [facility], spouse and daughter present in room.        Pain level/location:  5 /10       Location: L hand, chest incision site   Discharge recommendations  Anticipated discharge date:  2021  Destination: []home alone   []home alone with assist PRN     [x] home w/ family      [] Continuous supervision  []SNF    [] Assisted living     [] Other:  Continued therapy: [x]HHC PT  []OUTPATIENT PT   [] No Further PT  []SNF PT  Caregiver training recommended: [x]Yes  [] No   Equipment needs: CRISTO Tse requires the assistance of a PT  [x]   Education about Dx  []   Use of call light for assist   []   HEP provided and explained   [x]   Treatment plan reviewed  []   Home safety  []   Wheelchair mobility/management   []   Body mechanics  []   Bed Mobility/Transfer technique  []   Gait technique/sequencing  []   Proper use of assistive device/adaptive equipment  []   Stair training/Advanced mobility safety and technique  []   Reinforced patient's precautions/mobility while maintaining precautions  []   Postural awareness  []   Family/caregiver training  []   Progress was updated and reviewed in Rehabtracker with patient and/or family this date. []   Other:    Treatment Plan for Next Session: gait training in hallway, sup<->sit and rolling to L, stairs      Assessment: This pt demonstrated a positive response to today's treatment as evidenced by ability to stand multiple stands to complete thera ex, toileting activity, and hand washing activity. The patient is making progress toward established goals as evidenced by QI scores.      Treatment/Activity Tolerance:   [] Tolerated treatment with no adverse effects    [x] Patient limited by fatigue  [] Patient limited by pain   [] Patient limited by medical complications:    [] Adverse reaction to Tx:   [] Significant change in status    Safety:       []  bed alarm set    [x]  chair alarm set    []  Pt refused alarms                []  Telesitter activated      [x]  Gait belt used during tx session      []other:       Number of Minutes/Billable Intervention  Gait Training    Therapeutic Exercise 45   Neuro Re-Ed    Therapeutic Activity 15   Wheelchair Propulsion    Group    Other:    TOTAL 60       Social History  Social/Functional History  Lives With: Spouse, Daughter Qiana Hotter (has health limitations))  Type of Home: House  Home Layout: One level  Home Access: Stairs to enter without rails  Entrance Stairs - Number of Steps: 2  Entrance Stairs - Rails: None  Bathroom Shower/Tub: Walk-in shower  Bathroom Toilet: Standard  Bathroom Equipment: Grab bars in shower, Built-in shower seat, Grab bars around toilet  Home Equipment: Karthik Liz  ADL Assistance: [address]: Independent  Homemaking Responsibilities: Yes  Meal Prep Responsibility: Primary  Laundry Responsibility: Primary  Cleaning Responsibility: Primary  Bill Paying/Finance Responsibility: Primary  Shopping Responsibility: Primary  Dependent Care Responsibility: Primary  Health Care Management: Primary  Ambulation Assistance: Independent  Transfer Assistance: Independent  Active : Yes  Occupation: Retired  Type of occupation: [employer], [address]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 Meds set up in pill box and pill bottles by pt. Pt manages finances with dtr assisting at this time. Additional Comments: sleeps in regular, flat bed; 1 recent fall without significant injury    Objective                                                                                    Goals:  (Update in navigator)  Short term goals  Time Frame for Short term goals: 10-12 tx days:  Short term goal 1: Pt will complete rolling to the L and sit->sup with SBA-Sup. following sternal precautions. Short term goal 2: Pt will complete rolling to R, sup->sit, and OOB transfers Ind following sternal precautions. Short term goal 3: Pt will ambulate 150 ft over level surface and at least 10 ft of uneven surface using RW with SBA-Sup. Short term goal 4: Pt will ascend/descend curb step using RW and 1 flight of stairs using railings with SBA-Sup.   Short term goal 5: Pt will complet object retrieval from the floor using reacher Mod Ind.:   :        Plan of Care                                                                              Times per week: 5 days per week for a minimum of 60 minutes/day plus group as appropriate for 60 minutes.   Treatment to include Current Treatment Recommendations: Strengthening, Gait Training, Patient/Caregiver Education & Training, Stair training, Equipment Evaluation, Education, & procurement, Balance Training, Pain Management, Functional Mobility Training, Endurance Training, Home Exercise Program, Transfer Training, Safety Education & Training, Neuromuscular Re-education    Electronically signed by   Alon Muñoz PT  8/12/2021, 3:36 PM

## 2021-08-12 NOTE — PROGRESS NOTES
Occupational Therapy  Physical Rehabilitation: OCCUPATIONAL THERAPY     [x] daily progress note       [] discharge       Patient Name:  Adelina Corado   :  1940 MRN: [id number]  Room:  [address] [place] [address] Date of Admission: 2021  Rehabilitation Diagnosis:   Cerebral infarction, unspecified [I63.9]  Acute CVA (cerebrovascular accident) [facility]) [I63.9]       Date 2021       Day of ARU Week:  4   Time IN/OUT 6658-7223  [id number]   Individual Tx Minutes 60+60   Group Tx Minutes    Co-Treat Minutes    Concurrent Tx Minutes    TOTAL Tx Time Mins 120   Variance Time    Variance Time []   Refusal due to:     []   Medical hold/reason:    []   Illness   []   Off Unit for test/procedure  []   Extra time needed to complete task  []   Therapeutic need  []   Other (specify):   Restrictions Restrictions/Precautions: General Precautions, Fall Risk (R vision deficit due to CVA)         Communication with other providers: [x]   OK to see per nursing:     []   Spoke with team member regarding:      Subjective observations and cognitive status: Pt sitting up in chair. Pt pleasant and agreeable to therapy.     Pain level/location:   5 /10       Location: sternum and L hand    Discharge recommendations  Anticipated discharge date:  TBD  Destination: []home alone   []home alone w assist prn   [] home w/ family    [] Continuous supervision       []SNF    [] Assisted living     [] Other:   Continued therapy: []HHC OT  []OUTPATIENT  OT   [] No Further OT  Equipment needs: TBD      Toileting:   SBA c clothing management and hygiene      Toilet Transfers:   SBA   Device Used:    [x]   Standard Toilet         []   Grab Bars           []  Bedside Commode       []   Elevated Toilet          []   Other:        Bed Mobility:           [x]   Pt received out of bed       Transfers:    Sit--> Stand:  SBA   Stand --> Sit:   CGA cues for slow descent   Stand-Pivot:   CGA   Other:    Assistive device required for transfer:   RW       Functional Mobility:  AM: 73 ft     PM: 71 ft    Assistance:  CGA   Device:   [x]   Rolling Walker     []   Standard Walker []   Wheelchair        []   [place]       []   4-Wheeled Karol Budd         []   Cardiac Walker       []   Other:          Additional Therapeutic activities/exercises completed this date:     [x]   ADL Training: Pt able to doff/don hospital socks with S/U; Pt required assist to don EUGENE hose. [x]   Balance/Postural training: Pt stood for ~1:45 mins x 2, reaching to the R for clothes pins on vertical pole and placing them on horizontal pole to increase R hand strength and standing tolerance needed for improved ADLs. Facilitated dynamic standing therax, Pt able to stand for ~5 mins and ~6mins, scanning to R to find card, reaching to the R and scanning to find the match. Pt required min cues to scan to the R. Pt able to find matching card while scanning c cues. Facilitated standing therax using shoulder arc using R UE, Pt able to stand for ~2:30 mins crossing midline to increase dynamic standing balance for improved ADLs.             []   Bed/Transfer Training   []   Endurance Training   []   Neuromuscular Re-ed   []   Nu-step:  Time:        Level:         #Steps:       []   Rebounder:    []  Seated     []  Standing        []   Supine Ther Ex (reps/sets):     [x]   Seated Ther Ex (reps/sets):  Pt was instructed in Intermediate Cardiac ex program:  Overhead Side Stretch x 10  Ankle Pumps/ Heel Raises x 10  Marching Seated x 10  Forward Arm Raises x 10  Side Arm Raises x 10  Arm Crosses x 10  Arm Circles Forwards and Backwards x 10  SittingTrunkTwist x 10       []   Standing Ther Ex (reps/sets):     []   Other:      Comments:      Patient/Caregiver Education and Training:   []   [employer] Equipment Use  []   Bed Mobility/Transfer Technique/Safety  []   Energy Conservation Tips  []   Family training  []   Postural Awareness  []   Safety During Functional Activities  []   Reinforced Patient's Precautions   [] Progress was updated and reviewed in Rehabtracker with patient and/or family this         date. Treatment Plan for Next Session: Continue OT POC     Assessment: This pt demonstrated a positive response to today's treatment as evidenced by actively engaged in therapy session. The patient is making progress toward established goals as evidenced by QI scores. Ongoing deficits are observed in the areas of strength, R visual field cut and continued focus on this is recommended.        Treatment/Activity Tolerance:   [] Tolerated treatment with no adverse effects    [x] Patient limited by fatigue  [] Patient limited by pain   [] Patient limited by medical complications:    [] Adverse reaction to Tx:   [] Significant change in status    Safety:       []  bed alarm set    [x]  chair alarm set    []  Pt refused alarms                []  Telesitter activated      [x]  Gait belt used during tx session      []other:       Number of Minutes/Billable Intervention  Therapeutic Exercise 25   ADL Self-care 25   Neuro Re-Ed    Therapeutic Activity 10+60   Group    Other:    TOTAL 120       Social History  Social/Functional History  Lives With: Spouse, Daughter Qiana Hassan (has health limitations))  Type of Home: House  Home Layout: One level  Home Access: Stairs to enter without rails  Entrance Stairs - Number of Steps: 2  Entrance Stairs - Rails: None  Bathroom Shower/Tub: Walk-in shower  Bathroom Toilet: Standard  Bathroom Equipment: Grab bars in shower, Built-in shower seat, Grab bars around toilet  Home Equipment: Factual  ADL Assistance: Independent  Homemaking Assistance: Independent  Homemaking Responsibilities: Yes  Meal Prep Responsibility: Primary  Laundry Responsibility: Primary  Cleaning Responsibility: Primary  Bill Paying/Finance Responsibility: Primary  Shopping Responsibility: Primary  Dependent Care Responsibility: Primary  Health Care Management: Primary  Ambulation Assistance: Independent  Transfer Assistance: Independent  Active : Yes  Occupation: Retired  Type of occupation: [employer], [employer], [employer]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 Meds set up in pill box and pill bottles by pt. Pt manages finances with dtr assisting at this time. Additional Comments: sleeps in regular, flat bed; 1 recent fall without significant injury    Objective                                                                                    Goals:  (Update in navigator)  Short term goals  Time Frame for Short term goals: STG=LTG:  Long term goals  Time Frame for Long term goals : 7-10 Days or until d/c  Long term goal 1: Pt will complete feeding/grooming/oral care task c MOD I by d/c  Long term goal 2: Pt will complete total body bathing c MOD I c use of AE by d/c  Long term goal 3: Pt will complete total body dressing (UB/LB/Footwear) c MOD I c use of AE PRN by d/c  Long term goal 4: Pt will complete toileting c MOD I by d/c  Long term goal 5: Pt will complete functional transfer (toilet, tub, shower) c MOD I by d/c. Long term goals 6: Pt will perform therex/therax to facilitate increased strength/endurance/ax tolerance (c emphasis on dynamic standing balance/tolerance >8 mins and BUE endurance c compliance of sternal precautions) c MOD I in order to complete ADLs. Long term goal 7: Pt will IND demo use of compensatory strategies in order to visually scan environment required for ADL/IADL tasks by d/c.:        Plan of Care                                                                              Times per week: 5 days per week for a minimum of 60 minutes/day plus group as appropriate for 60 minutes.   Treatment to include Plan  Times per day: Daily  Current Treatment Recommendations: Strengthening, Endurance Training, Balance Training, Patient/Caregiver Education & Training, Home Management Training, ROM, Functional Mobility Training, Safety Education & Training, Equipment Evaluation, Education, & procurement, Self-Care / ADL, Cognitive/Perceptual Training, Pain Management    Electronically signed by   MIK Clayton,  8/12/2021, 11:19 AM

## 2021-08-12 NOTE — CARE COORDINATION
Patient reviewed at today's care conference. Patient will d/c home with spouse and daughter 8/21. Patient's daughter doesn't live in the home, but is staying with the spouse while the patient is hospitalized. She will continue to stay for a while once the patient discharges. Caregiver training with daughter requested. Neuro opthalmology referral requested. Dr Lauren Hill, neuro opthalmology in [facility] is retiring and not accepting new patients. Office confirmed that patient will need to travel to [facility] or [facility] Robert Aldridge spoke with patient in room. Patient looks and feels weary following today's therapy session. She is agreeable to dc date and plan, but wishes she could dc home earlier. She reports f/u in [place] is better than [place] and has concerns about family arranging transport. Case goldie educated patient that there is usually a wait for this type of appointment, so they will have plenty of time to arrange transport & caregivers. Patient has no concerns at this time. Chsae Munoz MD is accepting new patients  [address] [facility] [address] [facility] [address]  [place], [address]  [phone]  Office won't scheduled with case mgt. Requested facesheet and notes be faxed to office at [phone]. They will then contact the patient directly.

## 2021-08-12 NOTE — PLAN OF CARE
Problem: Infection:  Goal: Will remain free from infection  Description: Will remain free from infection  8/12/2021 0031 by Shauna Engle RN  Outcome: Ongoing  8/11/2021 1435 by Bety Iraheta RN  Outcome: Ongoing     Problem: Safety:  Goal: Free from accidental physical injury  Description: Free from accidental physical injury  8/12/2021 0031 by Shauna Engle RN  Outcome: Ongoing  8/11/2021 1435 by Bety Iraheta RN  Outcome: Ongoing  Goal: Free from intentional harm  Description: Free from intentional harm  8/12/2021 0031 by Shauna Engle RN  Outcome: Ongoing  8/11/2021 1435 by Bety Iraheta RN  Outcome: Ongoing     Problem: Daily Care:  Goal: Daily care needs are met  Description: Daily care needs are met  8/12/2021 0031 by Shauna Engle RN  Outcome: Ongoing  8/11/2021 1435 by Bety Iraheta RN  Outcome: Ongoing     Problem: Pain:  Goal: Patient's pain/discomfort is manageable  Description: Patient's pain/discomfort is manageable  8/12/2021 0031 by Shauna Engle RN  Outcome: Ongoing  8/11/2021 1435 by Bety Iraheta RN  Outcome: Ongoing  Goal: Pain level will decrease  Description: Pain level will decrease  8/12/2021 0031 by Shauna Engle RN  Outcome: Ongoing  8/11/2021 1435 by Bety Iraheta RN  Outcome: Ongoing  Goal: Control of acute pain  Description: Control of acute pain  8/12/2021 0031 by Shauna Engle RN  Outcome: Ongoing  8/11/2021 1435 by Bety Iraheta RN  Outcome: Ongoing  Goal: Control of chronic pain  Description: Control of chronic pain  8/12/2021 0031 by Shauna Engle RN  Outcome: Ongoing  8/11/2021 1435 by Bety Iraheta RN  Outcome: Ongoing     Problem: Skin Integrity:  Goal: Skin integrity will stabilize  Description: Skin integrity will stabilize  8/12/2021 0031 by Shauna Engle RN  Outcome: Ongoing  8/11/2021 1435 by Bety Iraheta RN  Outcome: Ongoing     Problem: Discharge Planning:  Goal: Patients continuum of care needs are met  Description: Patients continuum of care needs are met  8/12/2021 0031 by Tremayne Vasquez RN  Outcome: Ongoing  8/11/2021 1435 by Andrew Barton RN  Outcome: Ongoing     Problem: Falls - Risk of:  Goal: Will remain free from falls  Description: Will remain free from falls  8/12/2021 0031 by Tremayne Vasquez RN  Outcome: Ongoing  8/11/2021 1435 by Andrew Barton RN  Outcome: Ongoing  Goal: Absence of physical injury  Description: Absence of physical injury  8/12/2021 0031 by Tremayne Vasquez RN  Outcome: Ongoing  8/11/2021 1435 by Andrew Barton RN  Outcome: Ongoing     Problem: Skin Integrity:  Goal: Will show no infection signs and symptoms  Description: Will show no infection signs and symptoms  8/12/2021 0031 by Tremayne Vasquez RN  Outcome: Ongoing  8/11/2021 1435 by Andrew Barton RN  Outcome: Ongoing  Goal: Absence of new skin breakdown  Description: Absence of new skin breakdown  8/12/2021 0031 by Tremayne Vasquez RN  Outcome: Ongoing  8/11/2021 1435 by Andrew Barton RN  Outcome: Ongoing

## 2021-08-12 NOTE — PROGRESS NOTES
Sukhi Last    : 1940  Acct #: [de-identified]  MRN: [id number]              PM&R Progress Note      Admitting diagnosis: Acute CVA ( [address])     Comorbid diagnoses impacting rehabilitation: Right hemiparesis, right homonymous hemianopsia, dysarthria, uncontrolled pain, generalized weakness, gait disturbance, acute blood loss anemia, ischemic cardiomyopathy, status post coronary artery bypass graft surgery     Chief complaint: She described episodic \"spells\" of feeling particularly dizzy or lightheaded, generally during activity. No palpitations, chest pain or coughing reported. Prior (baseline) level of function: Independent. Current level of function:         Current  IRF-JOSE and Goals:   Occupational Therapy:    Short term goals  Time Frame for Short term goals: STG=LTG :   Long term goals  Time Frame for Long term goals : 7-10 Days or until d/c  Long term goal 1: Pt will complete feeding/grooming/oral care task c MOD I by d/c  Long term goal 2: Pt will complete total body bathing c MOD I c use of AE by d/c  Long term goal 3: Pt will complete total body dressing (UB/LB/Footwear) c MOD I c use of AE PRN by d/c  Long term goal 4: Pt will complete toileting c MOD I by d/c  Long term goal 5: Pt will complete functional transfer (toilet, tub, shower) c MOD I by d/c. Long term goals 6: Pt will perform therex/therax to facilitate increased strength/endurance/ax tolerance (c emphasis on dynamic standing balance/tolerance >8 mins and BUE endurance c compliance of sternal precautions) c MOD I in order to complete ADLs.   Long term goal 7: Pt will IND demo use of compensatory strategies in order to visually scan environment required for ADL/IADL tasks by d/c. :                                       Eating: Eating  Assistance Needed: Setup or clean-up assistance  Comment: Setup A c some containers and packages, pt able to demo hand<>mouth excursions and swallow  CARE Score: 5  Discharge Goal: Independent Oral Hygiene: Oral Hygiene  Assistance Needed: Supervision or touching assistance  Comment: SBA in stance at sink  CARE Score: 4  Discharge Goal: Independent    UB/LB Bathing: Shower/Bathe Self  Assistance Needed: Partial/moderate assistance  Comment: Min A to bathe feet; CGA in stance to perform posterior/anterior nancy bathing  CARE Score: 3  Discharge Goal: Independent    UB Dressing: Upper Body Dressing  Assistance Needed: Substantial/maximal assistance  Comment: Max A to thread BUE and around back to comply with sternal precautions; pt able to button  CARE Score: 2  Discharge Goal: Independent         LB Dressing: Lower Body Dressing  Assistance Needed: Partial/moderate assistance  Comment: min A to thread BLE into brief and pants c use of reacher, CGA to don over hips in stance  CARE Score: 3  Discharge Goal: Independent    Donning and Olivia Lopez de Gutierrez Footwear: Putting On/Taking Off Footwear  Assistance Needed: Substantial/maximal assistance  Comment: Pt able to doff socks; max A to don EUGENE hose and hospital socks  CARE Score: 2  Discharge Goal: Independent      Toileting: Toileting Hygiene  Assistance Needed: Supervision or touching assistance  Comment: SBA for clothing management and hygiene  CARE Score: 4  Discharge Goal: Independent      Toilet Transfers: Toilet Transfer  Assistance Needed: Supervision or touching assistance  Comment: SBA  CARE Score: 4  Discharge Goal: Independent    Physical Therapy:   Short term goals  Time Frame for Short term goals: 10-12 tx days:  Short term goal 1: Pt will complete rolling to the L and sit->sup with SBA-Sup. following sternal precautions. Short term goal 2: Pt will complete rolling to R, sup->sit, and OOB transfers Ind following sternal precautions. Short term goal 3: Pt will ambulate 150 ft over level surface and at least 10 ft of uneven surface using RW with SBA-Sup.   Short term goal 4: Pt will ascend/descend curb step using RW and 1 flight of stairs using railings Partial/moderate assistance  Comment: Min A using RW today  Reason if not Attempted: Not attempted due to medical condition or safety concerns  CARE Score: 3  Discharge Goal: Supervision or touching assistance     1 Step (Curb)  Comment: Min A using RW today  Discharge Goal: Supervision or touching assistance     4 Steps  Assistance Needed: Partial/moderate assistance  Comment: Min A using railings today  Reason if not Attempted: Not attempted due to medical condition or safety concerns  CARE Score: 3  Discharge Goal: Supervision or touching assistance     12 Steps  Comment: pt's max tolerance today was 5 steps (limited by fatigue)  Reason if not Attempted: Not attempted due to medical condition or safety concerns  CARE Score: 88  Discharge Goal: Supervision or touching assistance       Wheelchair:  w/c Ability: Wheelchair Ability  Uses a Wheelchair and/or Scooter?: No                Balance:        Object: Picking Up Object  Assistance Needed: Supervision or touching assistance  Comment: CGA using reacher in unsupported stance  CARE Score: 4  Discharge Goal: Independent    I      Exam:    Blood pressure 127/72, pulse 69, temperature 97.6 °F (36.4 °C), resp. rate 16, height 5' 2\" (1.575 m), weight 150 lb 12.7 oz (68.4 kg), SpO2 97 %. General: Semirecumbent in bed. Attentive to my present but passive in conversation. No tremor or seizure activity noted. Oriented x3. HEENT: MMM. Mild right facial droop. Decreased attention to her right visual field. Pulmonary: No wheezes or rales. Shallow respirations overall. Cardiac: RRR. Abdomen: Patient's abdomen is soft and nondistended. Bowel sounds were present throughout. There was no rebound, guarding or masses noted. Upper extremities: Same spastic weakness across the right elbow and wrist.    Lower extremities: Coarse right ankle dorsiflexion only. Trace edema bilaterally. Both heels clear. No signs of DVT. Sitting balance was fair. Standing balance was poor. Lab Results   Component Value Date    WBC 9.9 08/08/2021    HGB 10.1 (L) 08/08/2021    HCT 31.6 (L) 08/08/2021    MCV 99.4 08/08/2021     08/08/2021     Lab Results   Component Value Date    INR 1.19 08/03/2021    INR 1.40 08/02/2021    INR 0.88 07/30/2021    PROTIME 15.4 (H) 08/03/2021    PROTIME 18.1 (H) 08/02/2021    PROTIME 11.3 (L) 07/30/2021     Lab Results   Component Value Date    CREATININE 0.5 (L) 08/09/2021    BUN 12 08/09/2021     08/09/2021    K 3.7 08/09/2021     08/09/2021    CO2 29 08/09/2021     Lab Results   Component Value Date    ALT 74 (H) 08/03/2021     (H) 08/03/2021    ALKPHOS 21 (L) 08/03/2021    BILITOT 0.6 08/03/2021       Expected length of stay  prior to a supervised level of function for discharge home with a walker and [facility] OT/PT is 8/21/2021. Recommendations:    1. Ischemic stroke with right hemiparesis and homonymous hemianopsia:   Still requires max cues to sequence and engage in the daily occupational and physical therapy with speech-language pathology.  Her dizziness does not strike me as seizure activity. Perhaps she is having some orthostasis. I will check an EKG to make sure her heart rhythm is regular. Seems to be tolerating the her antiplatelet therapy with a baby aspirin and Plavix.    Continuing with the statin. Foltx due to elevated homocystine level.  She requires adaptive equipment training while following sternal precautions.  Caregiver education is planned.  Verbal cues and minimum physical assistance for transfers again today. Aggressive pulmonary hygiene and DVT prophylaxis. 2. DVT prophylaxis: The surgical team discourages oral anticoagulants.  SCDs when in bed.  Weightbearing activities are pursued daily.  No current signs of thrombosis. 3. Ischemic cardiomyopathy: Coreg for afterload reduction and ProAmatine to help support blood pressure.  She is on a diuretic with Lasix.     Daily weights do not reflect any decompensation of CHF.  She seems to tolerate the antiplatelet therapy. Reasonable blood pressure today. 4. Homonymous hemianopsia: Ophthalmology consultant saw her on the surgical floor.  She will follow up with them as an outpatient.  Our treatment program must take into account her visual field loss. 5. Uncontrolled pain: Acetaminophen and hydrocodone as needed.  Bowel intervention while on the pain medications. Some interference with activities due to pain.     Counseling and Coordination of Care: In care conference today I met with the patient's OT, PT, RN and . We discussed the patient's problems, progress and prognosis. Disposition issues were clarified and plans were established for ongoing rehabilitation efforts beyond the ARU stay. I reviewed this information with the patient during a second distinct visit with the patient. More than half of the total time of 32 minutes spent with the patient involved counseling and coordination of care.

## 2021-08-13 PROCEDURE — 97116 GAIT TRAINING THERAPY: CPT

## 2021-08-13 PROCEDURE — 6370000000 HC RX 637 (ALT 250 FOR IP): Performed by: PHYSICIAN ASSISTANT

## 2021-08-13 PROCEDURE — 6370000000 HC RX 637 (ALT 250 FOR IP): Performed by: PHYSICAL MEDICINE & REHABILITATION

## 2021-08-13 PROCEDURE — 93005 ELECTROCARDIOGRAM TRACING: CPT | Performed by: PHYSICAL MEDICINE & REHABILITATION

## 2021-08-13 PROCEDURE — 94761 N-INVAS EAR/PLS OXIMETRY MLT: CPT

## 2021-08-13 PROCEDURE — 99232 SBSQ HOSP IP/OBS MODERATE 35: CPT | Performed by: PHYSICAL MEDICINE & REHABILITATION

## 2021-08-13 PROCEDURE — 97530 THERAPEUTIC ACTIVITIES: CPT

## 2021-08-13 PROCEDURE — 1280000000 HC REHAB R&B

## 2021-08-13 PROCEDURE — 94150 VITAL CAPACITY TEST: CPT

## 2021-08-13 PROCEDURE — 97535 SELF CARE MNGMENT TRAINING: CPT

## 2021-08-13 RX ORDER — LOPERAMIDE HYDROCHLORIDE 2 MG/1
2 CAPSULE ORAL 4 TIMES DAILY PRN
Status: DISCONTINUED | OUTPATIENT
Start: 2021-08-13 | End: 2021-08-21 | Stop reason: HOSPADM

## 2021-08-13 RX ADMIN — ATORVASTATIN CALCIUM 20 MG: 20 TABLET, FILM COATED ORAL at 20:19

## 2021-08-13 RX ADMIN — DOCUSATE SODIUM 100 MG: 100 CAPSULE ORAL at 10:09

## 2021-08-13 RX ADMIN — MIDODRINE HYDROCHLORIDE 10 MG: 5 TABLET ORAL at 13:05

## 2021-08-13 RX ADMIN — Medication 1 TABLET: at 10:08

## 2021-08-13 RX ADMIN — MIDODRINE HYDROCHLORIDE 10 MG: 5 TABLET ORAL at 17:51

## 2021-08-13 RX ADMIN — ACETAMINOPHEN 650 MG: 325 TABLET ORAL at 06:40

## 2021-08-13 RX ADMIN — CLOPIDOGREL BISULFATE 75 MG: 75 TABLET ORAL at 10:09

## 2021-08-13 RX ADMIN — CARVEDILOL 3.12 MG: 6.25 TABLET, FILM COATED ORAL at 20:18

## 2021-08-13 RX ADMIN — CARVEDILOL 3.12 MG: 6.25 TABLET, FILM COATED ORAL at 10:08

## 2021-08-13 RX ADMIN — ASPIRIN 81 MG: 81 TABLET, COATED ORAL at 10:09

## 2021-08-13 RX ADMIN — MIDODRINE HYDROCHLORIDE 10 MG: 5 TABLET ORAL at 10:05

## 2021-08-13 RX ADMIN — THERA TABS 1 TABLET: TAB at 10:09

## 2021-08-13 RX ADMIN — ALPRAZOLAM 0.25 MG: 0.25 TABLET ORAL at 22:00

## 2021-08-13 RX ADMIN — LEVOTHYROXINE SODIUM 50 MCG: 0.05 TABLET ORAL at 06:40

## 2021-08-13 RX ADMIN — FUROSEMIDE 20 MG: 20 TABLET ORAL at 10:08

## 2021-08-13 RX ADMIN — PANTOPRAZOLE SODIUM 40 MG: 40 TABLET, DELAYED RELEASE ORAL at 06:40

## 2021-08-13 RX ADMIN — ACETAMINOPHEN 325 MG: 325 TABLET ORAL at 18:19

## 2021-08-13 ASSESSMENT — PAIN SCALES - GENERAL
PAINLEVEL_OUTOF10: 4
PAINLEVEL_OUTOF10: 0
PAINLEVEL_OUTOF10: 5
PAINLEVEL_OUTOF10: 7

## 2021-08-13 ASSESSMENT — PAIN DESCRIPTION - PAIN TYPE
TYPE: CHRONIC PAIN;SURGICAL PAIN
TYPE: SURGICAL PAIN

## 2021-08-13 ASSESSMENT — PAIN DESCRIPTION - PROGRESSION: CLINICAL_PROGRESSION: NOT CHANGED

## 2021-08-13 ASSESSMENT — PAIN - FUNCTIONAL ASSESSMENT: PAIN_FUNCTIONAL_ASSESSMENT: PREVENTS OR INTERFERES SOME ACTIVE ACTIVITIES AND ADLS

## 2021-08-13 ASSESSMENT — PAIN DESCRIPTION - DESCRIPTORS
DESCRIPTORS: DISCOMFORT
DESCRIPTORS: DISCOMFORT

## 2021-08-13 ASSESSMENT — PAIN DESCRIPTION - ORIENTATION
ORIENTATION: MID;LEFT
ORIENTATION: LEFT;MID

## 2021-08-13 ASSESSMENT — PAIN DESCRIPTION - LOCATION
LOCATION: SHOULDER;CHEST
LOCATION: CHEST

## 2021-08-13 ASSESSMENT — PAIN DESCRIPTION - FREQUENCY
FREQUENCY: CONTINUOUS
FREQUENCY: CONTINUOUS

## 2021-08-13 ASSESSMENT — PAIN DESCRIPTION - ONSET
ONSET: ON-GOING
ONSET: ON-GOING

## 2021-08-13 NOTE — PROGRESS NOTES
Occupational Therapy    Physical Rehabilitation: OCCUPATIONAL THERAPY     [x] daily progress note       [] discharge       Patient Name:  Jacinto Estrada   :  1940 MRN: [id number]  Room:  [address] [place] [address] Date of Admission: 2021  Rehabilitation Diagnosis:   Cerebral infarction, unspecified [I63.9]  Acute CVA (cerebrovascular accident) [facility]) [I63.9]       Date 2021       Day of ARU Week:  5   Time IN/OUT 3769-9654   Individual Tx Minutes 60   Group Tx Minutes    Co-Treat Minutes    Concurrent Tx Minutes    TOTAL Tx Time Mins 60   Variance Time    Variance Time []   Refusal due to:     []   Medical hold/reason:    []   Illness   []   Off Unit for test/procedure  []   Extra time needed to complete task  []   Therapeutic need  []   Other (specify):   Restrictions Restrictions/Precautions: General Precautions, Fall Risk (R vision deficit due to CVA)         Communication with other providers: [x]   OK to see per nursing:     []   Spoke with team member regarding:      Subjective observations and cognitive status: Pt resting in bed; Son visiting. Pt c/o of pain in shoulder/scapular; biofreeze applied. Pain level/location:    /10       Location:    Discharge recommendations  Anticipated discharge date:   Destination: []home alone   []home alone w assist prn   [x] home w/ family    [] Continuous supervision       []SNF    [] Assisted living     [] Other:   Continued therapy: [x]HHC OT  []OUTPATIENT  OT   [] No Further OT  Equipment needs: possible BSC?         ADLs:    Eating: Eating  Assistance Needed: Setup or clean-up assistance  Comment: Pt reports requiring some help opening packages but has been doing better as per pt  CARE Score: 5  Discharge Goal: Independent       Oral Hygiene: Oral Hygiene  Assistance Needed: Supervision or touching assistance  Comment: SBA in stance at sink  CARE Score: 4  Discharge Goal: Independent    UB/LB Bathing: Shower/Bathe Self  Assistance Needed: Supervision or touching assistance  Comment: SBA in stance  CARE Score: 4  Discharge Goal: Independent    UB Dressing: Upper Body Dressing  Assistance Needed: Supervision or touching assistance  Comment: Pt required verbal cues for stenal precautions; able to thread BUE and pull over head  CARE Score: 4  Discharge Goal: Independent         LB Dressing: Lower Body Dressing  Assistance Needed: Supervision or touching assistance  Comment: Pt able to thread BLEs and manage over hips with SBA  CARE Score: 4  Discharge Goal: Independent    Donning and [place] Footwear: Putting On/Taking Off Footwear  Assistance Needed: Partial/moderate assistance  Comment: Pt required assist to don EUGENE hose but able to doff/don hospital socks  CARE Score: 3  Discharge Goal: Independent      Toileting: Toileting Hygiene  Assistance Needed: Supervision or touching assistance  Comment: Supervision for clothing management and hygiene  CARE Score: 4  Discharge Goal: Independent      Toilet Transfers:   SBA Toilet Transfer  Assistance Needed: Supervision or touching assistance  Comment: SBA  CARE Score: 4  Discharge Goal: Independent  Device Used:    [x]   Standard Toilet         []   Grab Bars           []  Bedside Commode       []   Elevated Toilet          []   Other:        Bed Mobility:           []   Pt received out of bed   Scooting:  SBA to scoot EOB   Supine --> Sit:  SBA       Transfers:    Sit--> Stand:  SBA c occasional cue for sternal precaution   Stand --> Sit:   SBA   Stand-Pivot:   SBA   Other:    Assistive device required for transfer:   RW       Functional Mobility:   To and from bathroom   Assistance:  SBA   Device:   [x]   Rolling Walker     []   Standard Angela Lagos []   Wheelchair        []   [employer]       []   Nik Brine         []   Cardiac Angela Lagos       []   Other:          Additional Therapeutic activities/exercises completed this date:     [x]   ADL Training   [x]   Balance/Postural training     [x]   Bed/Transfer Training   [] Endurance Training   []   Neuromuscular Re-ed   []   Nu-step:  Time:        Level:         #Steps:       []   Rebounder:    []  Seated     []  Standing        []   Supine Ther Ex (reps/sets):     []   Seated Ther Ex (reps/sets):     []   Standing Ther Ex (reps/sets):     []   Other:      Comments:      Patient/Caregiver Education and Training:   []   [employer] Equipment Use  []   Bed Mobility/Transfer Technique/Safety  []   Energy Conservation Tips  []   Family training  []   Postural Awareness  []   Safety During Functional Activities  []   Reinforced Patient's Precautions   []   Progress was updated and reviewed in Rehabtracker with patient and/or family this         date. Treatment Plan for Next Session: Continue OT POC       Assessment: This pt demonstrated a positive response to today's treatment as evidenced by improved ADLs. The patient is making progress toward established goals as evidenced by QI scores. Ongoing deficits are observed in the areas of R side neglect and [facility] and continued focus on this is recommended.        Treatment/Activity Tolerance:   [x] Tolerated treatment with no adverse effects    [] Patient limited by fatigue  [] Patient limited by pain   [] Patient limited by medical complications:    [] Adverse reaction to Tx:   [] Significant change in status    Safety:       []  bed alarm set    [x]  chair alarm set    []  Pt refused alarms                []  Telesitter activated      [x]  Gait belt used during tx session      []other:       Number of Minutes/Billable Intervention  Therapeutic Exercise    ADL Self-care 60   Neuro Re-Ed    Therapeutic Activity    Group    Other:    TOTAL 60       Social History  Social/Functional History  Lives With: Spouse, Daughter Vidhi Robles (has health limitations))  Type of Home: House  Home Layout: One level  Home Access: Stairs to enter without rails  Entrance Stairs - Number of Steps: 2  Entrance Stairs - Rails: None  Bathroom Shower/Tub: Walk-in shower  Bathroom Toilet: Standard  Bathroom Equipment: Grab bars in shower, Built-in shower seat, Grab bars around toilet  Home Equipment: 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 Meds set up in pill box and pill bottles by pt. Pt manages finances with dtr assisting at this time. Additional Comments: sleeps in regular, flat bed; 1 recent fall without significant injury    Objective                                                                                    Goals:  (Update in navigator)  Short term goals  Time Frame for Short term goals: STG=LTG:  Long term goals  Time Frame for Long term goals : 7-10 Days or until d/c  Long term goal 1: Pt will complete feeding/grooming/oral care task c MOD I by d/c  Long term goal 2: Pt will complete total body bathing c MOD I c use of AE by d/c  Long term goal 3: Pt will complete total body dressing (UB/LB/Footwear) c MOD I c use of AE PRN by d/c  Long term goal 4: Pt will complete toileting c MOD I by d/c  Long term goal 5: Pt will complete functional transfer (toilet, tub, shower) c MOD I by d/c. Long term goals 6: Pt will perform therex/therax to facilitate increased strength/endurance/ax tolerance (c emphasis on dynamic standing balance/tolerance >8 mins and BUE endurance c compliance of sternal precautions) c MOD I in order to complete ADLs.   Long term goal 7: Pt

## 2021-08-13 NOTE — PROGRESS NOTES
Physical Therapy    [x] daily progress note       [] discharge       Patient Name:  Aurora Sandra   :  1940 MRN: [id number]  Room:  [address] [place], [place] [address] Date of Admission: 2021  Rehabilitation Diagnosis:   Cerebral infarction, unspecified [I63.9]  Acute CVA (cerebrovascular accident) ([facility] [address] [I63.9]       Date 2021       Day of ARU Week:  5   Time IN/OUT 1000/1100  1300/1340  1350-x   Individual Tx Minutes 60+40   Group Tx Minutes    Co-Treat Minutes    Concurrent Tx Minutes    TOTAL Tx Time Mins 100   Variance Time -20   Variance Time [x]   Refusal due to not feeling well      []   Medical hold/reason:    []   Illness   []   Off Unit for test/procedure  []   Extra time needed to complete task  []   Therapeutic need  []   Other (specify):   Restrictions Restrictions/Precautions  Restrictions/Precautions: General Precautions, Fall Risk (R vision deficit due to CVA)  Position Activity Restriction  Sternal Precautions: No Pushing, No Pulling, 5# Lifting Restrictions   Interdisciplinary communication [x]   Cleared for therapy per nursing     [x]   RN notified about issues during session (Sx observed); (PM) Pt's GI issues and that she used Imodium at baseline and Sx observed after initial gait training    []   RN updated on pt performance  []   Spoke with   []   Spoke with OT  []   Spoke with MD  []   Other:    Subjective observations and cognitive status: (AM) Pt asleep in recliner, awakens when name is called, reports sleeping well last night, had a shower this AM during OT session, persistent R vision deficit. (PM1) Pt resting in recliner, reports feeling better, \"Too much medicine makes me feel groggy/dopey\", expressed frustration of not being able to do what she's supposed to do and required emotional support. (PM2) Pt in bed as she recently underwent EKG. Pt declined to participate with more activities due to not feeling well.      Pain level/location: (AM) 510       Location: chest incision site   Discharge recommendations  Anticipated discharge date:   08/21/2021  Destination: []?home alone   []?home alone with assist PRN     [x]? home w/ family      []? Continuous supervision  []? SNF    []? Assisted living     []? Other:  Continued therapy: [x]? [facility] PT  []? OUTPATIENT PT   []? No Further PT  []? SNF PT  Caregiver training recommended: [x]? Yes  []? No   Equipment needs: RW  Viki Conteh requires the assistance of a front-wheeled walker to successfully ambulate from room to room at home to allow completion of daily living tasks such as: bathing, toileting, dressing and grooming. A wheeled walker is necessary due to the patient's unsteady gait, upper body weakness, inability to  a standard walker. This patient can ambulate only by pushing a walker instead of using a lesser assistive device such as a cane or crutch. Bed Mobility:           [x]   Pt received out of bed   Sit --> lying: Mod A (required assist on BLE) (PM) Max A  Bed features used: [] Yes    [] HOB elevated      [] Bed rail                                    [] No     Transfers:    Sit--> Stand:  Mod Ind and then became CGA towards end of tx session when pt was weaker and had Sx  (PM) SBA->CGA   Stand --> Sit:   Sup. (usual)->Min A when pt had Sx   (PM) CGA   Chair-->Bed/Bed --> Chair:   CGA   Toilet Transfer (if applicable): CGA   Assistive device used for transfer:   RW, none     Gait:    Distance:  (AM) 20 ft with turns (submax) + 122 ft with turns (max; required 3 standing breaks to complete; limited by fatigue and SOB)  (PM) 42 ft with turns (max; limited by increased weakness/wobbliness)  Assistance:  SBA (PM) CGA   Device:  RW  Gait Quality:  Step-through, slow donald     Stairs   # Completed:  5 (max; limited by lightheadedness and increased BLE weakness)   Assistance:  CGA-Min A  Supportive Device:  Railings   Pattern: step-through on ascent, step-to on descent       Additional Therapeutic activities/exercises completed this date:     []   Nu-step:  Time:        Level:         #Steps:       []   Rebounder:    []  Seated     []  Standing        [x]   Balance training: static standing as pt performed visual discrimination, L->R scanning, and copying a pattern activity x 1 trial. Pt required SBA until pt became symptomatic requiring Min A and termination of activity for pt to sit for safety and monitoring of Sx. Pt was able to accurately match 6/8 colored objects and initiated correction on the 2 objects when prompted. (PM) Static standing with 1UE support on countertop with CGA x 2 min (standing tolerance limited by BM urgency)          []   Postural training    []   Supine ther ex (reps/sets):     []   Seated ther ex (reps/sets):     []   Standing ther ex (reps/sets):     [x]   Other: Toileting activity completed with CGA (observed following urination and BM episodes)     []   Other:    Comments:      Patient/Caregiver Education and Training:   []   Role of PT  []   Education about Dx  []   Use of call light for assist   []   HEP provided and explained   [x]   Treatment plan reviewed  []   Home safety  []   Wheelchair mobility/management   []   Body mechanics  []   Bed Mobility/Transfer technique  []   Gait technique/sequencing  []   Proper use of assistive device/adaptive equipment  [x]   Stair training/Advanced mobility safety and technique  []   Reinforced patient's precautions/mobility while maintaining precautions  []   Postural awareness  []   Family/caregiver training  []   Progress was updated and reviewed in Rehabtracker with patient and/or family this date.   [x]   Other: self-monitoring of Sx and importance of communicating it efficiently to staff members    Treatment Plan for Next Session:  increase ambulation distance over level surface with close monitoring of Sx, thera ex, bed mobility training        Assessment:  (AM) This pt demonstrated a positive  response to today's treatment as evidenced by increased ambulation tolerance over level surface. Pt had an event during static standing activity wherein pt appeared to have \"zoned out\" briefly with delayed/slowed responses, increased weakness, and some lightheadedness. RN assessed pt's vital signs in sitting and standing. This event was similar when observed on initial PT eval during stair assessment and will need ongoing monitoring due to its unpredictable occurrence that may decrease pt's safety especially in the bathroom. (PM) Pt's ambulation tolerance was significantly limited by increased BLE weakness and overall activity tolerance was limited by GI issues (diarrhea). Progress in her mobility was inconsistent today.        Treatment/Activity Tolerance:   [] Tolerated treatment with no adverse effects    [x] Patient limited by fatigue  [] Patient limited by pain   [] Patient limited by medical complications:    [x] Adverse reaction to Tx: increased weakness/lightheadedness in standing    [] Significant change in status    Safety:       [x]  bed alarm set    []  chair alarm set    []  Pt refused alarms                []  Telesitter activated      [x]  Gait belt used during tx session      []other:       Number of Minutes/Billable Intervention  Gait Training 30   Therapeutic Exercise    Neuro Re-Ed    Therapeutic Activity 70   Wheelchair Propulsion    Group    Other:    TOTAL 100       Social History  Social/Functional History  Lives With: Spouse, Daughter Mehnaz Ruiz (has health limitations))  Type of Home: House  Home Layout: One level  Home Access: Stairs to enter without rails  Entrance Stairs - Number of Steps: 2  Entrance Stairs - Rails: None  Bathroom Shower/Tub: Walk-in shower  Bathroom Toilet: Standard  Bathroom Equipment: Grab bars in shower, Built-in shower seat, Grab bars around toilet  Home Equipment: [address]: [address]: Independent  Homemaking Responsibilities: Yes  Meal Prep Responsibility: Primary  Laundry Responsibility: Primary  Cleaning Responsibility: Primary  Bill Paying/Finance Responsibility: Primary  Shopping Responsibility: Primary  Dependent Care Responsibility: Primary  Health Care Management: Primary  Ambulation Assistance: Independent  Transfer Assistance: Independent  Active : Yes  Occupation: Retired  Type of occupation: [employer], [place]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 Meds set up in pill box and pill bottles by pt. Pt manages finances with dtr assisting at this time. Additional Comments: sleeps in regular, flat bed; 1 recent fall without significant injury    Objective                                                                                    Goals:  (Update in navigator)  Short term goals  Time Frame for Short term goals: 10-12 tx days:  Short term goal 1: Pt will complete rolling to the L and sit->sup with SBA-Sup. following sternal precautions. Short term goal 2: Pt will complete rolling to R, sup->sit, and OOB transfers Ind following sternal precautions. Short term goal 3: Pt will ambulate 150 ft over level surface and at least 10 ft of uneven surface using RW with SBA-Sup. Short term goal 4: Pt will ascend/descend curb step using RW and 1 flight of stairs using railings with SBA-Sup. Short term goal 5: Pt will complet object retrieval from the floor using reacher Mod Ind.:   :        Plan of Care                                                                              Times per week: 5 days per week for a minimum of 60 minutes/day plus group as appropriate for 60 minutes.   Treatment to include Current Treatment Recommendations: Strengthening, Gait Training, Patient/Caregiver Education & Training, Stair training, Equipment Evaluation, Education, & procurement, Balance Training, Pain Management, Functional Mobility Training, Endurance Training, Home Exercise Program, Transfer Training, Safety Education & Training, Neuromuscular Re-education    Electronically signed by   Sandy Reis, PT  8/13/2021, 8:26 AM

## 2021-08-13 NOTE — PROGRESS NOTES
Pt feeling fatigued during therapy and Laurel did not feel it was safe to push ambulation. Pt assisted back to bed. EKG done in chart. Pt reports IBS and takes prn imodium to control symptoms. Leelee served Dr. Chema Tejeda to see if pt can have order for Imodium.

## 2021-08-13 NOTE — PROGRESS NOTES
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 .  If possible, please document in progress notes and discharge   summary:  fa  The medical record reflects the following:  Risk Factors: s/p CABG  Clinical Indicators: Posterior cerebral artery occlusion documented and seen   on imaging, visual loss rt eye, weakness  Treatment: labs, imaging, plavix, neuro checks, PT    Thank you,  Rosy Arcos RN CDS  [phone]  Options provided:  -- Posterior cerebral artery occlusion w/ infarction as a postoperative   complication  -- Posterior cerebral artery occlusion w/ infarction as an expected/inherent   condition that occurred postoperatively and not a complication  -- Posterior cerebral artery occlusion w/ infarction elated to other   incidental risk factor (please specify) occurring following surgery and not a   complication, Please document incidental risk factor. -- Other - I will add my own diagnosis  -- Disagree - Not applicable / Not valid  -- Disagree - Clinically unable to determine / Unknown  -- Refer to Clinical Documentation Reviewer    PROVIDER RESPONSE TEXT:    Posterior cerebral artery occlusion w/ infarction is related to other   incidental risk factor occurring following surgery and not a complication.  HTN    Query created by: Britany Kaufman on 2021 11:13 AM      Electronically signed by:  Angelica Michelle MD 2021 5:44 PM

## 2021-08-13 NOTE — PROGRESS NOTES
Laurel,PT has noticed twice during therapy on different days that pt has had brief episode where pt has kind of zoned out. Still responding but delayed. Pt stated she felt fatigued and was a little lightheaded when she stood for orthostatics Sitting-135/63-90 standing-130/69-96. Leelee served Dr. Brendan Panda to inform him.

## 2021-08-14 PROCEDURE — 97530 THERAPEUTIC ACTIVITIES: CPT

## 2021-08-14 PROCEDURE — 6370000000 HC RX 637 (ALT 250 FOR IP): Performed by: PHYSICIAN ASSISTANT

## 2021-08-14 PROCEDURE — 94150 VITAL CAPACITY TEST: CPT

## 2021-08-14 PROCEDURE — 97116 GAIT TRAINING THERAPY: CPT

## 2021-08-14 PROCEDURE — 6370000000 HC RX 637 (ALT 250 FOR IP): Performed by: PHYSICAL MEDICINE & REHABILITATION

## 2021-08-14 PROCEDURE — 97110 THERAPEUTIC EXERCISES: CPT

## 2021-08-14 PROCEDURE — 94761 N-INVAS EAR/PLS OXIMETRY MLT: CPT

## 2021-08-14 PROCEDURE — 97535 SELF CARE MNGMENT TRAINING: CPT

## 2021-08-14 PROCEDURE — 1280000000 HC REHAB R&B

## 2021-08-14 RX ADMIN — MIDODRINE HYDROCHLORIDE 10 MG: 5 TABLET ORAL at 12:21

## 2021-08-14 RX ADMIN — ACETAMINOPHEN 650 MG: 325 TABLET ORAL at 07:25

## 2021-08-14 RX ADMIN — MIDODRINE HYDROCHLORIDE 10 MG: 5 TABLET ORAL at 09:43

## 2021-08-14 RX ADMIN — CLOPIDOGREL BISULFATE 75 MG: 75 TABLET ORAL at 09:43

## 2021-08-14 RX ADMIN — FUROSEMIDE 20 MG: 20 TABLET ORAL at 09:43

## 2021-08-14 RX ADMIN — DOCUSATE SODIUM 100 MG: 100 CAPSULE ORAL at 09:43

## 2021-08-14 RX ADMIN — ATORVASTATIN CALCIUM 20 MG: 20 TABLET, FILM COATED ORAL at 21:10

## 2021-08-14 RX ADMIN — CARVEDILOL 3.12 MG: 6.25 TABLET, FILM COATED ORAL at 09:44

## 2021-08-14 RX ADMIN — CARVEDILOL 3.12 MG: 6.25 TABLET, FILM COATED ORAL at 21:10

## 2021-08-14 RX ADMIN — ASPIRIN 81 MG: 81 TABLET, COATED ORAL at 09:44

## 2021-08-14 RX ADMIN — ACETAMINOPHEN 650 MG: 325 TABLET ORAL at 00:11

## 2021-08-14 RX ADMIN — ALPRAZOLAM 0.25 MG: 0.25 TABLET ORAL at 21:14

## 2021-08-14 RX ADMIN — MIDODRINE HYDROCHLORIDE 10 MG: 5 TABLET ORAL at 19:14

## 2021-08-14 RX ADMIN — THERA TABS 1 TABLET: TAB at 09:49

## 2021-08-14 RX ADMIN — SENNOSIDES 8.6 MG: 8.6 TABLET, COATED ORAL at 21:10

## 2021-08-14 RX ADMIN — Medication 1 TABLET: at 09:44

## 2021-08-14 RX ADMIN — PANTOPRAZOLE SODIUM 40 MG: 40 TABLET, DELAYED RELEASE ORAL at 07:25

## 2021-08-14 RX ADMIN — LEVOTHYROXINE SODIUM 50 MCG: 0.05 TABLET ORAL at 07:25

## 2021-08-14 ASSESSMENT — PAIN SCALES - GENERAL
PAINLEVEL_OUTOF10: 5
PAINLEVEL_OUTOF10: 1
PAINLEVEL_OUTOF10: 5
PAINLEVEL_OUTOF10: 5
PAINLEVEL_OUTOF10: 0

## 2021-08-14 ASSESSMENT — PAIN DESCRIPTION - DESCRIPTORS: DESCRIPTORS: DISCOMFORT

## 2021-08-14 ASSESSMENT — PAIN DESCRIPTION - LOCATION: LOCATION: CHEST

## 2021-08-14 ASSESSMENT — PAIN DESCRIPTION - PAIN TYPE: TYPE: SURGICAL PAIN

## 2021-08-14 NOTE — PROGRESS NOTES
Gilford Lightning    : 1940  Acct #: [de-identified]  MRN: [id number]              PM&R Progress Note      Admitting diagnosis: Acute CVA ( [address])     Comorbid diagnoses impacting rehabilitation: Right hemiparesis, right homonymous hemianopsia, dysarthria, uncontrolled pain, generalized weakness, gait disturbance, acute blood loss anemia, ischemic cardiomyopathy, status post coronary artery bypass graft surgery     Chief complaint: No \"spells\". Sternal pain as expected. No new cough. Prior (baseline) level of function: Independent. Current level of function:         Current  IRF-JOSE and Goals:   Occupational Therapy:    Short term goals  Time Frame for Short term goals: STG=LTG :   Long term goals  Time Frame for Long term goals : 7-10 Days or until d/c  Long term goal 1: Pt will complete feeding/grooming/oral care task c MOD I by d/c  Long term goal 2: Pt will complete total body bathing c MOD I c use of AE by d/c  Long term goal 3: Pt will complete total body dressing (UB/LB/Footwear) c MOD I c use of AE PRN by d/c  Long term goal 4: Pt will complete toileting c MOD I by d/c  Long term goal 5: Pt will complete functional transfer (toilet, tub, shower) c MOD I by d/c. Long term goals 6: Pt will perform therex/therax to facilitate increased strength/endurance/ax tolerance (c emphasis on dynamic standing balance/tolerance >8 mins and BUE endurance c compliance of sternal precautions) c MOD I in order to complete ADLs.   Long term goal 7: Pt will IND demo use of compensatory strategies in order to visually scan environment required for ADL/IADL tasks by d/c. :                                       Eating: Eating  Assistance Needed: Setup or clean-up assistance  Comment: Pt reports requiring some help opening packages but has been doing better as per pt  CARE Score: 5  Discharge Goal: Independent       Oral Hygiene: Oral Hygiene  Assistance Needed: Supervision or touching assistance  Comment: SBA in stance at sink  CARE Score: 4  Discharge Goal: Independent    UB/LB Bathing: Shower/Bathe Self  Assistance Needed: Supervision or touching assistance  Comment: SBA in stance  CARE Score: 4  Discharge Goal: Independent    UB Dressing: Upper Body Dressing  Assistance Needed: Supervision or touching assistance  Comment: Pt required verbal cues for stenal precautions; able to thread BUE and pull over head  CARE Score: 4  Discharge Goal: Independent         LB Dressing: Lower Body Dressing  Assistance Needed: Supervision or touching assistance  Comment: Pt able to thread BLEs and manage over hips with SBA  CARE Score: 4  Discharge Goal: Independent    Donning and [place] Footwear: Putting On/Taking Off Footwear  Assistance Needed: Partial/moderate assistance  Comment: Pt required assist to don EUGENE hose but able to doff/don hospital socks  CARE Score: 3  Discharge Goal: Independent      Toileting: Toileting Hygiene  Assistance Needed: Supervision or touching assistance  Comment: Supervision for clothing management and hygiene  CARE Score: 4  Discharge Goal: Independent      Toilet Transfers: Toilet Transfer  Assistance Needed: Supervision or touching assistance  Comment: SBA  CARE Score: 4  Discharge Goal: Independent    Physical Therapy:   Short term goals  Time Frame for Short term goals: 10-12 tx days:  Short term goal 1: Pt will complete rolling to the L and sit->sup with SBA-Sup. following sternal precautions. Short term goal 2: Pt will complete rolling to R, sup->sit, and OOB transfers Ind following sternal precautions. Short term goal 3: Pt will ambulate 150 ft over level surface and at least 10 ft of uneven surface using RW with SBA-Sup. Short term goal 4: Pt will ascend/descend curb step using RW and 1 flight of stairs using railings with SBA-Sup. Short term goal 5: Pt will complet object retrieval from the floor using reacher Mod Ind.             Bed Mobility:   Sit to Lying  Assistance Needed: Partial/moderate assistance  Comment: Mod A (required assist on BLE), pt was compliant with sternal precautions  CARE Score: 3  Discharge Goal: Supervision or touching assistance  Roll Left and Right  Assistance Needed: Partial/moderate assistance  Comment:  Mod A to L (limited by L shoulder arthritis), Sup towards R side; pt was compliant with sternal precautions  CARE Score: 3  Discharge Goal: Supervision or touching assistance  Lying to Sitting on Side of Bed  Comment: Sup. per OT assessment  Discharge Goal: Independent    Transfers:    Sit to Stand  Assistance Needed: Supervision or touching assistance  Comment: SBA with/without use of RW; compliant with sternal precautions; able to stand without use of trunk momentum  CARE Score: 4  Discharge Goal: Independent  Chair/Bed-to-Chair Transfer  Assistance Needed: Supervision or touching assistance  Comment: CGA without AD  CARE Score: 4  Discharge Goal: Independent     Car Transfer  Assistance Needed: Supervision or touching assistance  Comment: CGA without AD  CARE Score: 4  Discharge Goal: Independent    Ambulation:    Walking Ability  Does the Patient Walk?: Yes     Walk 10 Feet  Assistance Needed: Supervision or touching assistance  Comment: CGA using RW  CARE Score: 4  Discharge Goal: Independent     Walk 50 Feet with Two Turns  Assistance Needed: Partial/moderate assistance  Comment: Min A using RW  CARE Score: 3  Discharge Goal: Independent     Walk 150 Feet  Comment: pt was only able to ambulate 66 ft today using RW with Min A (pt limited by fatigue, SOB, and mild dizziness)  Reason if not Attempted: Not attempted due to medical condition or safety concerns  CARE Score: 88  Discharge Goal: Supervision or touching assistance     Walking 10 Feet on Uneven Surfaces  Assistance Needed: Partial/moderate assistance  Comment: Min A using RW today  Reason if not Attempted: Not attempted due to medical condition or safety concerns  CARE Score: 3  Discharge Goal: Supervision or touching assistance     1 Step (Curb)  Comment: Min A using RW today  Discharge Goal: Supervision or touching assistance     4 Steps  Assistance Needed: Partial/moderate assistance  Comment: Min A using railings today  Reason if not Attempted: Not attempted due to medical condition or safety concerns  CARE Score: 3  Discharge Goal: Supervision or touching assistance     12 Steps  Comment: pt's max tolerance today was 5 steps (limited by fatigue)  Reason if not Attempted: Not attempted due to medical condition or safety concerns  CARE Score: 88  Discharge Goal: Supervision or touching assistance       Wheelchair:  w/c Ability: Wheelchair Ability  Uses a Wheelchair and/or Scooter?: No                Balance:        Object: Picking Up Object  Assistance Needed: Supervision or touching assistance  Comment: CGA using reacher in unsupported stance  CARE Score: 4  Discharge Goal: Independent    I      Exam:    Blood pressure (!) 126/54, pulse 75, temperature 98.2 °F (36.8 °C), temperature source Oral, resp. rate 18, height 5' 2\" (1.575 m), weight 151 lb 0.2 oz (68.5 kg), SpO2 95 %. General: Up in a bedside chair with legs elevated. Oxygen per nasal cannula. Little better tracking to the right visual field. HEENT: Dysarthric with mild right facial droop. MMM. No JVD. Pulmonary: Clearing without coughing. Cardiac: RRR. No premature beats. Abdomen: Patient's abdomen is soft and nondistended. Bowel sounds were present throughout. There was no rebound, guarding or masses noted. Upper extremities: Very coarse movements of the right elbow and wrist with weak . Poor relaxation of . Lower extremities: Trace edema. Calf soft. No signs of DVT. Sitting balance was fair+. Standing balance was poor.     Lab Results   Component Value Date    WBC 9.9 08/08/2021    HGB 10.1 (L) 08/08/2021    HCT 31.6 (L) 08/08/2021    MCV 99.4 08/08/2021     08/08/2021     Lab Results Component Value Date    INR 1.19 08/03/2021    INR 1.40 08/02/2021    INR 0.88 07/30/2021    PROTIME 15.4 (H) 08/03/2021    PROTIME 18.1 (H) 08/02/2021    PROTIME 11.3 (L) 07/30/2021     Lab Results   Component Value Date    CREATININE 0.5 (L) 08/09/2021    BUN 12 08/09/2021     08/09/2021    K 3.7 08/09/2021     08/09/2021    CO2 29 08/09/2021     Lab Results   Component Value Date    ALT 74 (H) 08/03/2021     (H) 08/03/2021    ALKPHOS 21 (L) 08/03/2021    BILITOT 0.6 08/03/2021       Expected length of stay  prior to a supervised level of function for discharge home with a walker and [address] OT/PT is 8/21/2021. Recommendations:    1. Ischemic stroke with right hemiparesis and homonymous hemianopsia: Starting to show some benefit from participating in the daily occupational and physical therapy with speech-language pathology.  Intermittent complaints of dizziness. No documented orthostasis. EKG showed a mild bundle branch block with sinus rhythm. Seems to be tolerating the her antiplatelet therapy with a baby aspirin and Plavix.    Continuing with the statin. Foltx due to elevated homocystine level.  Repetition of instructions to teach adaptive equipment use while following sternal precautions.  Caregiver education is planned.  Verbal cues and CGAminimum physical assistance for transfers today. Aggressive pulmonary hygiene and DVT prophylaxis. 2. DVT prophylaxis: The surgical team discourages oral anticoagulants.  SCDs when in bed.  Weightbearing activities are pursued daily.  No current signs of thrombosis. 3. Ischemic cardiomyopathy: Coreg for afterload reduction and ProAmatine to help support blood pressure.  She is on a diuretic with Lasix.     Daily weights do not reflect any decompensation of CHF.  She seems to tolerate the antiplatelet therapy. Reasonable blood pressure today.   4. Homonymous hemianopsia: Ophthalmology consultant saw her on the surgical floor.  She will follow up with them as an outpatient.  Our treatment program must take into account her visual field loss. 5. Uncontrolled pain: Acetaminophen and hydrocodone as needed.  Bowel intervention while on the pain medications.  Some interference with activities due to pain. 6. Diarrhea: Imodium as needed.   Reducing scheduled stool softeners.

## 2021-08-14 NOTE — PROGRESS NOTES
Physical Therapy  [x] daily progress note       [] discharge       Patient Name:  Gabriella Pino   :  1940 MRN: [id number]  Room:  [address] [place] [address] Date of Admission: 2021  Rehabilitation Diagnosis:   Cerebral infarction, unspecified [I63.9]  Acute CVA (cerebrovascular accident) ([facility] [address] [I63.9]       Date 2021       Day of ARU Week:  6   Time IN/-1115   Individual Tx Minutes 80   Group Tx Minutes    Co-Treat Minutes    Concurrent Tx Minutes    TOTAL Tx Time Mins 80   Variance Time    Variance Time []   Refusal due to:     []   Medical hold/reason:    []   Illness   []   Off Unit for test/procedure  []   Extra time needed to complete task  []   Therapeutic need  []   Other (specify):   Restrictions Restrictions/Precautions  Restrictions/Precautions: General Precautions, Fall Risk (R vision deficit due to CVA)  Position Activity Restriction  Sternal Precautions: No Pushing, No Pulling, 5# Lifting Restrictions   Communication with other providers: [x]   OK to see per nursing:     []   Spoke with team member regarding:      Subjective observations and cognitive status: Patient up in wc, states she is feeling a little better than yesterday. Pain level/location:  4  /10       Location: across chest, left hand, left shoulder, states pain still at 4/10 at end of tx   Discharge recommendations  Anticipated discharge date: 21  Destination: []home alone   []home alone with assist PRN     [x] home w/ family      [] Continuous supervision  []SNF    [] Assisted living     [] Other:   Continued therapy: [x]HHC PT  []OUTPATIENT  PT   [] No Further PT  Equipment needs: fww         Bed Mobility:           [x]   Pt received out of bed   Rolling R/L:    Scooting:   Lying --> Sit:    Sit --> lying:      Transfers:    Sit--> Stand:  sba  Stand --> Sit:   sba  Chair-->Bed/Bed --> Chair:   NT  Car Transfers:  NT  Toilet Transfer (if applicable):    Other:    Assistive device required for transfer:   Stood at fww     Gait:    Distance:  51'+74'+648'  Assistance: cga to sba  Device:  fww  Gait Quality:  Even stride, donald, cueing on plb , req sitting rest breaks of 3-5 min in between gait due to fatigue . Additional Therapeutic activities/exercises completed this date:     []   Nu-step:  Time:        Level:         #Steps:       []   Rebounder:    []  Seated     []  Standing        []   Balance training         []   Postural training    [x]   Supine ther ex (reps/sets): Ankle pumps, heel slides, hip abd, slr, quad sets, glut sets 15 reps   [x]   Seated ther ex (reps/sets): Ankle pumps, laq, marching, hip add with pillow , hip abd x20 reps, sit to stand x 5 from wc to walker   []   Standing ther ex (reps/sets):     []   Picking up object from floor (standing):                   []   Reacher used   []   Other:   [x]   Other:transfer training from , recliner, and commode with sba. Standing tolerance at walker statically x 3 min without rest break. Comments:      Patient/Caregiver Education and Training:   []   Bed Mobility/Transfer technique/safety  [x]   Gait technique/sequencing  [x]   Proper use of assistive device  []   Advanced mobility safety and technique  []   Reinforced patient's precautions/mobility while maintaining precautions  []   Postural awareness  []   Family training  []   Progress was updated and reviewed in Rehabtracker with patient and/or family this date. Treatment Plan for Next Session: strengthening and endurance training. Assessment: This pt demonstrated a positive response to today's treatment as evidenced by willingness to participate in therapy despite fatigue  . The patient is making slow progress toward established goals as evidenced by QI scores. Ongoing deficits are observed in the areas of endurance and strength and continued focus on gait distance, jean le strengthening is recommended.      Treatment/Activity Tolerance:   [] Tolerated treatment with no adverse effects [x] Patient limited by fatigue  [] Patient limited by pain   [] Patient limited by medical complications:    [] Adverse reaction to Tx:   [] Significant change in status    Safety:       []  bed alarm set    [x]  chair alarm set    []  Pt refused alarms                []  Telesitter activated      [x]  Gait belt used during tx session      []other:         Number of Minutes/Billable Intervention  Gait Training 30   Therapeutic Exercise 30   Neuro Re-Ed    Therapeutic Activity 20   Wheelchair Propulsion    Group    Other:    TOTAL          Social History  Social/Functional History  Lives With: Spouse, Daughter Micheal Rush (has health limitations))  Type of Home: House  Home Layout: One level  Home Access: Stairs to enter without rails  Entrance Stairs - Number of Steps: 2  Entrance Stairs - Rails: None  Bathroom Shower/Tub: Walk-in shower  Bathroom Toilet: Standard  Bathroom Equipment: Grab bars in shower, Built-in shower seat, Grab bars around toilet  Home Equipment: [address]  ADL Assistance: [address]: Independent  Homemaking Responsibilities: Yes  Meal Prep Responsibility: Primary  Laundry Responsibility: Primary  Cleaning Responsibility: Primary  Bill Paying/Finance Responsibility: Primary  Shopping Responsibility: Primary  Dependent Care Responsibility: Primary  Health Care Management: Primary  Ambulation Assistance: Independent  Transfer Assistance: Independent  Active : Yes  Occupation: Retired  Type of occupation: [employer], [employer], [employer]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 Meds set up in pill box and pill bottles by pt. Pt manages finances with dtr assisting at this time.   Additional Comments: sleeps in regular, flat bed; 1 recent fall without significant injury    Objective                                                                                    Goals:  (Update in navigator)  Short term goals  Time Frame for Short term goals: 10-12 tx days:  Short term goal 1: Pt will complete rolling to the L and sit->sup with SBA-Sup. following sternal precautions. Short term goal 2: Pt will complete rolling to R, sup->sit, and OOB transfers Ind following sternal precautions. Short term goal 3: Pt will ambulate 150 ft over level surface and at least 10 ft of uneven surface using RW with SBA-Sup. Short term goal 4: Pt will ascend/descend curb step using RW and 1 flight of stairs using railings with SBA-Sup. Short term goal 5: Pt will complet object retrieval from the floor using reacher Mod Ind.:   :        Plan of Care                                                                              Times per week: 5 days per week for a minimum of 60 minutes/day plus group as appropriate for 60 minutes.   Treatment to include Current Treatment Recommendations: Strengthening, Gait Training, Patient/Caregiver Education & Training, Stair training, Equipment Evaluation, Education, & procurement, Balance Training, Pain Management, Functional Mobility Training, Endurance Training, Home Exercise Program, Transfer Training, Safety Education & Training, Neuromuscular Re-education    Electronically signed by   Sarai Colon PTA,  8/14/2021, 8:16 AM

## 2021-08-14 NOTE — PROGRESS NOTES
[]   Other:        Bed Mobility:           []   Pt received out of bed   Rolling R/L:    Scooting:  SBA  Supine --> Sit:  SBA- HOB elevated to almost 90  Sit --> Supine:      Transfers:    Sit--> Stand:  SBA  Stand --> Sit:   SBA  Stand-Pivot:     Other:    Assistive device required for transfer:   RW    Overall min vc's for SP's      Functional Mobility:    Assistance:  SBA within room  Device:   [x]   Rolling Walker     []   Standard Walker []   Wheelchair        []   [employer]       []   4-Wheeled Donnamae Marcelo         []   Cardiac Walker       []   Other:        Homemaking Tasks:   Arranged items on countertop in stance ~2 min; PM session pt educated on walker safety in the kitchen, pt completed simple simulated meal prep activity using microwave and demod good carryover of techniques      Additional Therapeutic activities/exercises completed this date:     []   ADL Training   []   Balance/Postural training     []   Bed/Transfer Training   [x]   Endurance Training   []   Neuromuscular Re-ed   []   Nu-step:  Time:        Level:         #Steps:       []   Rebounder:    []  Seated     []  Standing        []   Supine Ther Ex (reps/sets):     [x]   Seated Ther Ex (reps/sets):   B UR therex with 1 lb weight x 10 reps slight shoulder flexion, wrist flexion/extention, forearm rotation, bicep curl, seated crunches; ~2 min UB ergometer w/o resist,    [x]   Standing Ther Ex (reps/sets): ~2 min static stand; in Pm session ~8 min static stand to complete taletop ax with emphasis on standing tolerance/endurance   []   Other:      Comments: pt extremely fatigued, multiple rest breaks throughout every ax    Patient/Caregiver Education and Training:   []   Adaptive Equipment Use  []   Bed Mobility/Transfer Technique/Safety  [x]   Energy Conservation Tips  []   Family training  []   Postural Awareness  [x]   Safety During Functional Activities  []   Reinforced Patient's Precautions   []   Progress was updated and reviewed in Rehabtracker with patient and/or family this         date. Treatment Plan for Next Session: follow OT POC     Assessment: This pt demonstrated a negative response to today's treatment as evidenced by increased fatigue and diff with participation. The patient is making some progress toward established goals as evidenced by QI scores. Ongoing deficits are observed in the areas of endurance and continued focus on increasing strength/endurance is recommended.        Treatment/Activity Tolerance:   [] Tolerated treatment with no adverse effects    [x] Patient limited by fatigue  [] Patient limited by pain   [] Patient limited by medical complications:    [] Adverse reaction to Tx:   [] Significant change in status    Safety:       []  bed alarm set    [x]  chair alarm set    []  Pt refused alarms                []  Telesitter activated      []  Gait belt used during tx session      []other:       Number of Minutes/Billable Intervention  Therapeutic Exercise 90   ADL Self-care 30   Neuro Re-Ed    Therapeutic Activity    Group    Other:    TOTAL 120       Social History  Social/Functional History  Lives With: Spouse, Daughter Angelica Mathur (has health limitations))  Type of Home: House  Home Layout: One level  Home Access: Stairs to enter without rails  Entrance Stairs - Number of Steps: 2  Entrance Stairs - Rails: None  Bathroom Shower/Tub: Walk-in shower  Bathroom Toilet: Standard  Bathroom Equipment: Grab bars in shower, Built-in shower seat, Grab bars around toilet  Home Equipment: [employer]  ADL Assistance: Independent  Homemaking Assistance: Independent  Homemaking Responsibilities: Yes  Meal Prep Responsibility: Primary  Laundry Responsibility: Primary  Cleaning Responsibility: Primary  Bill Paying/Finance Responsibility: Primary  Shopping Responsibility: Primary  Dependent Care Responsibility: Primary  Health Care Management: Primary  Ambulation Assistance: Independent  Transfer Assistance: Independent  Active : Training, Equipment Evaluation, Education, & procurement, Self-Care / ADL, Cognitive/Perceptual Training, Pain Management    Electronically signed by   MIK Yu,  8/14/2021, 8:17 AM

## 2021-08-15 LAB
EKG ATRIAL RATE: 69 BPM
EKG DIAGNOSIS: NORMAL
EKG P AXIS: 57 DEGREES
EKG P-R INTERVAL: 172 MS
EKG Q-T INTERVAL: 530 MS
EKG QRS DURATION: 146 MS
EKG QTC CALCULATION (BAZETT): 567 MS
EKG R AXIS: 20 DEGREES
EKG T AXIS: 195 DEGREES
EKG VENTRICULAR RATE: 69 BPM

## 2021-08-15 PROCEDURE — 94761 N-INVAS EAR/PLS OXIMETRY MLT: CPT

## 2021-08-15 PROCEDURE — 93010 ELECTROCARDIOGRAM REPORT: CPT | Performed by: INTERNAL MEDICINE

## 2021-08-15 PROCEDURE — 6370000000 HC RX 637 (ALT 250 FOR IP): Performed by: PHYSICAL MEDICINE & REHABILITATION

## 2021-08-15 PROCEDURE — 1280000000 HC REHAB R&B

## 2021-08-15 PROCEDURE — 6370000000 HC RX 637 (ALT 250 FOR IP): Performed by: PHYSICIAN ASSISTANT

## 2021-08-15 PROCEDURE — 94150 VITAL CAPACITY TEST: CPT

## 2021-08-15 RX ADMIN — Medication 1 TABLET: at 08:43

## 2021-08-15 RX ADMIN — CARVEDILOL 3.12 MG: 6.25 TABLET, FILM COATED ORAL at 08:43

## 2021-08-15 RX ADMIN — THERA TABS 1 TABLET: TAB at 08:43

## 2021-08-15 RX ADMIN — DOCUSATE SODIUM 100 MG: 100 CAPSULE ORAL at 08:43

## 2021-08-15 RX ADMIN — LEVOTHYROXINE SODIUM 50 MCG: 0.05 TABLET ORAL at 05:46

## 2021-08-15 RX ADMIN — ACETAMINOPHEN 650 MG: 325 TABLET ORAL at 21:56

## 2021-08-15 RX ADMIN — CLOPIDOGREL BISULFATE 75 MG: 75 TABLET ORAL at 08:43

## 2021-08-15 RX ADMIN — CARVEDILOL 3.12 MG: 6.25 TABLET, FILM COATED ORAL at 20:28

## 2021-08-15 RX ADMIN — ASPIRIN 81 MG: 81 TABLET, COATED ORAL at 08:43

## 2021-08-15 RX ADMIN — FUROSEMIDE 20 MG: 20 TABLET ORAL at 08:43

## 2021-08-15 RX ADMIN — Medication 3 MG: at 21:56

## 2021-08-15 RX ADMIN — MIDODRINE HYDROCHLORIDE 10 MG: 5 TABLET ORAL at 16:30

## 2021-08-15 RX ADMIN — MIDODRINE HYDROCHLORIDE 10 MG: 5 TABLET ORAL at 08:43

## 2021-08-15 RX ADMIN — ACETAMINOPHEN 650 MG: 325 TABLET ORAL at 05:48

## 2021-08-15 RX ADMIN — ATORVASTATIN CALCIUM 20 MG: 20 TABLET, FILM COATED ORAL at 20:28

## 2021-08-15 RX ADMIN — PANTOPRAZOLE SODIUM 40 MG: 40 TABLET, DELAYED RELEASE ORAL at 05:45

## 2021-08-15 RX ADMIN — MIDODRINE HYDROCHLORIDE 10 MG: 5 TABLET ORAL at 12:25

## 2021-08-15 RX ADMIN — SENNOSIDES 8.6 MG: 8.6 TABLET, COATED ORAL at 20:28

## 2021-08-15 ASSESSMENT — PAIN DESCRIPTION - PAIN TYPE
TYPE: SURGICAL PAIN
TYPE: SURGICAL PAIN

## 2021-08-15 ASSESSMENT — PAIN SCALES - GENERAL
PAINLEVEL_OUTOF10: 0
PAINLEVEL_OUTOF10: 3
PAINLEVEL_OUTOF10: 0
PAINLEVEL_OUTOF10: 0
PAINLEVEL_OUTOF10: 5

## 2021-08-15 ASSESSMENT — PAIN DESCRIPTION - LOCATION
LOCATION: CHEST
LOCATION: STERNUM

## 2021-08-15 ASSESSMENT — PAIN DESCRIPTION - FREQUENCY
FREQUENCY: INTERMITTENT
FREQUENCY: INTERMITTENT

## 2021-08-15 ASSESSMENT — PAIN DESCRIPTION - ORIENTATION: ORIENTATION: MID;ANTERIOR

## 2021-08-15 ASSESSMENT — PAIN DESCRIPTION - DESCRIPTORS
DESCRIPTORS: DISCOMFORT
DESCRIPTORS: DISCOMFORT

## 2021-08-15 ASSESSMENT — PAIN DESCRIPTION - ONSET
ONSET: ON-GOING
ONSET: ON-GOING

## 2021-08-15 NOTE — PLAN OF CARE
Problem: Infection:  Goal: Will remain free from infection  Description: Will remain free from infection  8/15/2021 0847 by Tahir Maxwell RN  Outcome: Ongoing  8/14/2021 2128 by Anjelica Guajardo  Outcome: Ongoing     Problem: Safety:  Goal: Free from accidental physical injury  Description: Free from accidental physical injury  8/15/2021 0847 by Tahir Maxwell RN  Outcome: Ongoing  8/14/2021 2128 by Anjelica Guajardo  Outcome: Ongoing  Goal: Free from intentional harm  Description: Free from intentional harm  8/15/2021 0847 by Tahir Maxwell RN  Outcome: Ongoing  8/14/2021 2128 by Anjelica Guajardo  Outcome: Ongoing     Problem: Daily Care:  Goal: Daily care needs are met  Description: Daily care needs are met  8/15/2021 0847 by Tahir Maxwell RN  Outcome: Ongoing  8/14/2021 2128 by Anjelica Guajardo  Outcome: Ongoing

## 2021-08-16 PROCEDURE — 99232 SBSQ HOSP IP/OBS MODERATE 35: CPT | Performed by: PHYSICAL MEDICINE & REHABILITATION

## 2021-08-16 PROCEDURE — 97530 THERAPEUTIC ACTIVITIES: CPT

## 2021-08-16 PROCEDURE — 94761 N-INVAS EAR/PLS OXIMETRY MLT: CPT

## 2021-08-16 PROCEDURE — 6370000000 HC RX 637 (ALT 250 FOR IP): Performed by: PHYSICAL MEDICINE & REHABILITATION

## 2021-08-16 PROCEDURE — 97116 GAIT TRAINING THERAPY: CPT

## 2021-08-16 PROCEDURE — 97110 THERAPEUTIC EXERCISES: CPT

## 2021-08-16 PROCEDURE — 94150 VITAL CAPACITY TEST: CPT

## 2021-08-16 PROCEDURE — 1280000000 HC REHAB R&B

## 2021-08-16 PROCEDURE — 6370000000 HC RX 637 (ALT 250 FOR IP): Performed by: PHYSICIAN ASSISTANT

## 2021-08-16 PROCEDURE — 97535 SELF CARE MNGMENT TRAINING: CPT

## 2021-08-16 RX ADMIN — ACETAMINOPHEN 650 MG: 325 TABLET ORAL at 06:46

## 2021-08-16 RX ADMIN — MIDODRINE HYDROCHLORIDE 10 MG: 5 TABLET ORAL at 18:03

## 2021-08-16 RX ADMIN — DOCUSATE SODIUM 100 MG: 100 CAPSULE ORAL at 09:14

## 2021-08-16 RX ADMIN — CLOPIDOGREL BISULFATE 75 MG: 75 TABLET ORAL at 09:14

## 2021-08-16 RX ADMIN — CARVEDILOL 3.12 MG: 6.25 TABLET, FILM COATED ORAL at 21:01

## 2021-08-16 RX ADMIN — ACETAMINOPHEN 650 MG: 325 TABLET ORAL at 21:01

## 2021-08-16 RX ADMIN — PANTOPRAZOLE SODIUM 40 MG: 40 TABLET, DELAYED RELEASE ORAL at 05:32

## 2021-08-16 RX ADMIN — MIDODRINE HYDROCHLORIDE 10 MG: 5 TABLET ORAL at 09:14

## 2021-08-16 RX ADMIN — LOPERAMIDE HYDROCHLORIDE 2 MG: 2 CAPSULE ORAL at 21:02

## 2021-08-16 RX ADMIN — LEVOTHYROXINE SODIUM 50 MCG: 0.05 TABLET ORAL at 05:32

## 2021-08-16 RX ADMIN — Medication 3 MG: at 21:02

## 2021-08-16 RX ADMIN — Medication 1 TABLET: at 09:14

## 2021-08-16 RX ADMIN — THERA TABS 1 TABLET: TAB at 09:14

## 2021-08-16 RX ADMIN — FUROSEMIDE 20 MG: 20 TABLET ORAL at 09:14

## 2021-08-16 RX ADMIN — ALPRAZOLAM 0.25 MG: 0.25 TABLET ORAL at 21:01

## 2021-08-16 RX ADMIN — CARVEDILOL 3.12 MG: 6.25 TABLET, FILM COATED ORAL at 09:14

## 2021-08-16 RX ADMIN — ASPIRIN 81 MG: 81 TABLET, COATED ORAL at 09:15

## 2021-08-16 RX ADMIN — ATORVASTATIN CALCIUM 20 MG: 20 TABLET, FILM COATED ORAL at 21:02

## 2021-08-16 RX ADMIN — MIDODRINE HYDROCHLORIDE 10 MG: 5 TABLET ORAL at 12:38

## 2021-08-16 ASSESSMENT — PAIN SCALES - GENERAL
PAINLEVEL_OUTOF10: 3
PAINLEVEL_OUTOF10: 5
PAINLEVEL_OUTOF10: 1
PAINLEVEL_OUTOF10: 0
PAINLEVEL_OUTOF10: 5

## 2021-08-16 ASSESSMENT — PAIN SCALES - WONG BAKER: WONGBAKER_NUMERICALRESPONSE: 0

## 2021-08-16 ASSESSMENT — PAIN DESCRIPTION - ONSET: ONSET: ON-GOING

## 2021-08-16 ASSESSMENT — PAIN DESCRIPTION - DESCRIPTORS
DESCRIPTORS: DISCOMFORT
DESCRIPTORS: DISCOMFORT

## 2021-08-16 ASSESSMENT — PAIN DESCRIPTION - LOCATION
LOCATION: STERNUM
LOCATION: STERNUM

## 2021-08-16 ASSESSMENT — PAIN DESCRIPTION - FREQUENCY
FREQUENCY: INTERMITTENT
FREQUENCY: INTERMITTENT

## 2021-08-16 ASSESSMENT — PAIN DESCRIPTION - PAIN TYPE
TYPE: SURGICAL PAIN
TYPE: SURGICAL PAIN

## 2021-08-16 ASSESSMENT — PAIN DESCRIPTION - ORIENTATION: ORIENTATION: MID;ANTERIOR

## 2021-08-16 NOTE — FLOWSHEET NOTE
[x] daily progress note       [] discharge       Patient Name:  John Berman   :  1940 MRN: [id number]  Room:  [address] [place] [address] Date of Admission: 2021  Rehabilitation Diagnosis:   Cerebral infarction, unspecified [I63.9]  Acute CVA (cerebrovascular accident) ([facility] [address] [I63.9]       Date 2021       Day of ARU Week:  1   Time IN/OUT 8100-0289  4075-1694   Individual Tx Minutes 124   TOTAL Tx Time Mins 124   Variance Time +4 minutes   Variance Time []   Refusal due to:     []   Medical hold/reason:    []   Illness   []   Off Unit for test/procedure  [x]   Extra time needed to complete task  []   Therapeutic need  []   Other (specify):   Restrictions Restrictions/Precautions  Restrictions/Precautions: General Precautions, Fall Risk (R vision deficit due to CVA)  Position Activity Restriction  Sternal Precautions: No Pushing, No Pulling, 5# Lifting Restrictions   Communication with other providers: [x]   OK to see per nursing:     []   Spoke with team member regarding:      Subjective observations and cognitive status: AM session: pt seen sitting up in recliner at beginning of treatment. Agreeable to therapy. BP was 132/65. PM session: pt seen sitting up in recliner at beginning of treatment. Agreeable to therapy. BP was 124/59. Pain level/location: No complaints of pain during sessions. Discharge recommendations  Anticipated discharge date:   2021  Destination: []??home alone   []? ?home alone with assist PRN     [x]? ? home w/ family      []? ? Continuous supervision  []? ?SNF    []? ? Assisted living     []? ? Other:  Continued therapy: [x]? ?C PT  []??OUTPATIENT PT   []? ? No Further PT  []? ?SNF PT  Caregiver training recommended: [x]? ? Yes  []? ? No   Equipment needs: [address] [place] the assistance of a front-wheeled walker to successfully ambulate from room to room at home to allow completion of daily living tasks such as: bathing, toileting, dressing and grooming.  A wheeled walker is necessary due to the patient's unsteady gait, upper body weakness, inability to  a standard walker.  This patient can ambulate only by pushing a walker instead of using a lesser assistive device such as a cane or crutch. [x]   Pt received out of bed     Transfers:    Sit--> Stand: Mod I/Supervision (min vcs at times for sternal precautions and for proper hand placement)   Stand --> Sit:   Mod I/Supervision (min vcs at times for sternal precautions and for proper hand placement)   Chair-->Bed/Bed --> Chair:   Mod I/Supervision (min vcs for sternal precautions and for proper hand placement)   Assistive device required for transfer:   RW    Gait:    Distance:  AM session: 165'+59; PM session: 274'   Assistance:  Supervision   Device:  RW  Gait Quality:  Reciprocal pattern; vcs for pursed lip breathing d/t SOB. Pt's O2 sats 94% after gait training. Stairs (PM session)  # Completed: 5   Assistance:  Supervision   Supportive Device:  2 rails  Limited by fatigue and SOB. Vcs for pursed lip breathing. Uneven Surfaces: (PM session)     Assistance:  Supervision   Device:    RW  Surfaces Completed:   [x]  Carpeted Surface with bean bags beneath      []  Throw rugs       []  Ramp       []  Outdoor pavements        []  Grass             []  Loose gravel        []  Other:       Pt amb up/down 4\" curb step supervision with RW. Vcs for proper positioning and walker safety.      Additional Therapeutic activities/exercises completed this date:     []   Nu-step:  Time:        Level:         #Steps:       []   Rebounder:    []  Seated     []  Standing        []   Balance training         []   Postural training    []   Supine ther ex (reps/sets):     [x]   Seated ther ex (reps/sets): AM session: Cardiac exercises: Overhead side stretch, heel raises/toe raises, marching, fwd arm raises, side arm raises, arm circles (fwd/bwd) x 10 reps each for strengthening; PM session: arm crosses, trunk twists, LAQs x 10 reps each for strengthening. []   Standing ther ex (reps/sets):     []   Picking up object from floor (standing):                   []   Reacher used   []   Other:   []   Other:    Patient/Caregiver Education and Training:   [x]   Bed Mobility/Transfer technique/safety  [x]   Gait technique/sequencing  [x]   Proper use of assistive device  [x]   Advanced mobility safety and technique  [x]   Reinforced patient's precautions/mobility while maintaining precautions  []   Postural awareness  []   Family training    Treatment Plan for Next Session: gait; transfers; advanced gait; stair training; balance training     Assessment: This pt demonstrated a positive response to today's treatment as evidenced by improved mobility. The patient is making progress toward established goals as evidenced by QI scores. Ongoing deficits are observed in the areas of strength, balance, and endurance and continued focus on strengthening, balance training, and endurance training is recommended. Treatment/Activity Tolerance:   [x] Tolerated treatment with no adverse effects    [] Patient limited by fatigue  [] Patient limited by pain   [] Patient limited by medical complications:    [] Adverse reaction to Tx:   [] Significant change in status    Safety:       []  bed alarm set    [x]  chair alarm set    []  Pt refused alarms                []  Telesitter activated      [x]  Gait belt used during tx session      [x]other: pt left sitting up in recliner with call light at end of treatment.           Number of Minutes/Billable Intervention  Gait Training 75   Therapeutic Exercise 30   Neuro Re-Ed    Therapeutic Activity 19   Wheelchair Propulsion    Group    Other:    TOTAL 124         Social History  Social/Functional History  Lives With: Spouse, Daughter Sandrine Mendieta (has health limitations))  Type of Home: House  Home Layout: One level  Home Access: Stairs to enter without rails  Entrance Stairs - Number of Steps: 2  Entrance Stairs - Rails: None  Bathroom Shower/Tub: Walk-in shower  Bathroom Toilet: Standard  Bathroom Equipment: Grab bars in shower, Built-in shower seat, Grab bars around toilet  Home Equipment: [employer]  ADL Assistance: [address]: Independent  Homemaking Responsibilities: Yes  Meal Prep Responsibility: Primary  Laundry Responsibility: Primary  Cleaning Responsibility: Primary  Bill Paying/Finance Responsibility: Primary  Shopping Responsibility: Primary  Dependent Care Responsibility: Primary  Health Care Management: Primary  Ambulation Assistance: Independent  Transfer Assistance: Independent  Active : Yes  Occupation: Retired  Type of occupation: 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 Meds set up in pill box and pill bottles by pt. Pt manages finances with dtr assisting at this time. Additional Comments: sleeps in regular, flat bed; 1 recent fall without significant injury    Objective                                                                                    Goals:  (Update in navigator)  Short term goals  Time Frame for Short term goals: 10-12 tx days:  Short term goal 1: Pt will complete rolling to the L and sit->sup with SBA-Sup. following sternal precautions. Short term goal 2: Pt will complete rolling to R, sup->sit, and OOB transfers Ind following sternal precautions. Short term goal 3: Pt will ambulate 150 ft over level surface and at least 10 ft of uneven surface using RW with SBA-Sup. Short term goal 4: Pt will ascend/descend curb step using RW and 1 flight of stairs using railings with SBA-Sup.   Short term goal 5: Pt will complet object retrieval from the floor using reacher Mod Ind.:   :        Plan of Care                                                                              Times per week: 5 days per week for a minimum of 60 minutes/day plus group as appropriate for 60 minutes.   Treatment to include Current Treatment Recommendations: Strengthening, Gait Training, Patient/Caregiver Education & Training, Stair training, Equipment Evaluation, Education, & procurement, Balance Training, Pain Management, Functional Mobility Training, Endurance Training, Home Exercise Program, Transfer Training, Safety Education & Training, Neuromuscular Re-education    Electronically signed by   Isadora Laughlin, [id number]  8/16/2021, 5:25 PM

## 2021-08-16 NOTE — PROGRESS NOTES
Comprehensive Nutrition Assessment    Type and Reason for Visit:  Reassess    Nutrition Recommendations/Plan:   · Encourage pt to drink clear liquid supplements  · Modify oral nutrition supplement from TID to BID   · Continue current diet order   · Please assist feed to encourage adequate po intake as needed     Nutrition Assessment:  Pt currently on low sodium diet with clear liquid supplements. Observed at least untouched 10 clear liquids supplements lined on countertop. Meal intake documented between % yet pt states having poor appetite and loss of appetite. Denies n/v/p. Able to drink 1-2 supplements per day. Discussed ways to eat smaller meals or food alternatives. Last BM today. Follow as high nutrition risk d/t suboptimal intake for increased needs    Malnutrition Assessment:  Malnutrition Status: At risk for malnutrition (Comment)    Context:  Acute Illness     Findings of the 6 clinical characteristics of malnutrition:  Energy Intake:  Mild decrease in energy intake (Comment)  Weight Loss:  No significant weight loss     Body Fat Loss:  1 - Mild body fat loss Orbital   Muscle Mass Loss:  1 - Mild muscle mass loss Temples (temporalis) (lower body was covered by blankets, denied NFPE at the time)  Fluid Accumulation:  1 - Mild Extremities   Strength:  Not Performed    Estimated Daily Nutrient Needs:  Energy (kcal):  0663-2637 (22-25 guru/kg); Weight Used for Energy Requirements:  Current     Protein (g):  60-70 (1.2-1.4 g/kg); Weight Used for Protein Requirements:  Current        Fluid (ml/day):  1700; Method Used for Fluid Requirements:  1 ml/kcal      Nutrition Related Findings:  rx: senokot, protonix, lasix, +1 BLE edema noted      Wounds:  Surgical Incision       Current Nutrition Therapies:    ADULT DIET; Regular;  Low Sodium (2 gm)  Adult Oral Nutrition Supplement; Clear Liquid Oral Supplement    Anthropometric Measures:  · Height: 5' 2\" (157.5 cm)  · Current Body Weight: 145 lb (65.8 kg) · Admission Body Weight:      · Usual Body Weight: 146 lb (66.2 kg) (per MD office in May)     · Ideal Body Weight: 110 lbs; % Ideal Body Weight 142.3 %   · BMI: 26.5  · BMI Categories: Overweight (BMI 25.0-29. 9)       Nutrition Diagnosis:   · Inadequate protein-energy intake related to increase demand for energy/nutrients (loss of appetite) as evidenced by wounds, poor intake prior to admission, intake 26-50% (recent cardiac surgery)    Nutrition Interventions:   Food and/or Nutrient Delivery:  Continue Current Diet, Continue Oral Nutrition Supplement, Snacks (Comment)  Nutrition Education/Counseling:  No recommendation at this time   Coordination of Nutrition Care:  Continue to monitor while inpatient, Coordination of Community Care    Goals:  Patient will consume at least 50-75% at meals during stay       Nutrition Monitoring and Evaluation:   Behavioral-Environmental Outcomes:  None Identified   Food/Nutrient Intake Outcomes:  Food and Nutrient Intake, Supplement Intake  Physical Signs/Symptoms Outcomes:  Biochemical Data, Meal Time Behavior, Skin, Weight, Fluid Status or Edema     Discharge Planning:    Continue current diet     Electronically signed by Merle Mendieta RD, LD on 8/16/21 at 1:58 PM EDT    Contact: [phone]

## 2021-08-16 NOTE — PROGRESS NOTES
Occupational Therapy    Physical Rehabilitation: OCCUPATIONAL THERAPY     [x] daily progress note       [] discharge       Patient Name:  Harleen Moore   :  1940 MRN: [id number]  Room:  [address] [place] [address] Date of Admission: 2021  Rehabilitation Diagnosis:   Cerebral infarction, unspecified [I63.9]  Acute CVA (cerebrovascular accident) ([facility] [address] [I63.9]       Date 2021       Day of ARU Week:  1   Time IN/OUT 4127-2615   Individual Tx Minutes 60   Group Tx Minutes    Co-Treat Minutes    Concurrent Tx Minutes    TOTAL Tx Time Mins 60   Variance Time    Variance Time []   Refusal due to:     []   Medical hold/reason:    []   Illness   []   Off Unit for test/procedure  []   Extra time needed to complete task  []   Therapeutic need  []   Other (specify):   Restrictions Restrictions/Precautions: General Precautions, Fall Risk (R vision deficit due to CVA)         Communication with other providers: [x]   OK to see per nursing:     []   Spoke with team member regarding:      Subjective observations and cognitive status: Pt sitting in [facility] upon arrival. Pt reporting having to use the bathroom. Pt also c/o stomach pain. Pt agreeable to therapy. Pain level/location:    /10       Location:    Discharge recommendations  Anticipated discharge date:   Destination: []?home alone   []?home alone w assist prn   [x]? home w/ family    []? Continuous supervision       []? SNF    []? Assisted living     []? Other:   Continued therapy: [x]? Jason Diez OT  []? OUTPATIENT  OT   []? No Further OT  Equipment needs: possible BSC? ADLs:    Eating: Eating  Assistance Needed: Independent  Comment: Pt reports being able to open packages/containers  CARE Score: 6  Discharge Goal: Independent       Oral Hygiene: Oral Hygiene  Assistance Needed: Independent  Comment:  Mod I standing at sink  CARE Score: 6  Discharge Goal: Independent    UB/LB Bathing: Shower/Bathe Self  Assistance Needed: Supervision or touching assistance  Comment: #Steps:       []   Rebounder:    []  Seated     []  Standing        []   Supine Ther Ex (reps/sets):     []   Seated Ther Ex (reps/sets):     []   Standing Ther Ex (reps/sets):     []   Other:      Comments:      Patient/Caregiver Education and Training:   []   [employer] Equipment Use  []   Bed Mobility/Transfer Technique/Safety  []   Energy Conservation Tips  []   Family training  []   Postural Awareness  []   Safety During Functional Activities  []   Reinforced Patient's Precautions   []   Progress was updated and reviewed in Rehabtracker with patient and/or family this         date. Treatment Plan for Next Session: Continue OT POC      Assessment: This pt demonstrated a positive response to today's treatment as evidenced by engaging in therapy session. The patient is making good progress toward established goals as evidenced by QI scores. Ongoing deficits are observed in the areas of sternal precautions and balance and continued focus on this is recommended.        Treatment/Activity Tolerance:   [x] Tolerated treatment with no adverse effects    [] Patient limited by fatigue  [] Patient limited by pain   [] Patient limited by medical complications:    [] Adverse reaction to Tx:   [] Significant change in status    Safety:       []  bed alarm set    [x]  chair alarm set    []  Pt refused alarms                []  Telesitter activated      [x]  Gait belt used during tx session      []other:       Number of Minutes/Billable Intervention  Therapeutic Exercise    ADL Self-care 60   Neuro Re-Ed    Therapeutic Activity    Group    Other:    TOTAL 60       Social History  Social/Functional History  Lives With: Spouse, Daughter Akira Tapia (has health limitations))  Type of Home: House  Home Layout: One level  Home Access: Stairs to enter without rails  Entrance Stairs - Number of Steps: 2  Entrance Stairs - Rails: None  Bathroom Shower/Tub: Walk-in shower  Bathroom Toilet: Standard  Bathroom Equipment: Grab bars in shower, Built-in shower seat, Grab bars around toilet  Home Equipment: Jonita Adan  ADL Assistance: Independent  Homemaking Assistance: Independent  Homemaking Responsibilities: Yes  Meal Prep Responsibility: Primary  Laundry Responsibility: Primary  Cleaning Responsibility: Primary  Bill Paying/Finance Responsibility: Primary  Shopping Responsibility: Primary  Dependent Care Responsibility: Primary  Health Care Management: Primary  Ambulation Assistance: Independent  Transfer Assistance: Independent  Active : Yes  Occupation: Retired  Type of occupation: [employer], [employer]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 Meds set up in pill box and pill bottles by pt. Pt manages finances with dtr assisting at this time. Additional Comments: sleeps in regular, flat bed; 1 recent fall without significant injury    Objective                                                                                    Goals:  (Update in navigator)  Short term goals  Time Frame for Short term goals: STG=LTG:  Long term goals  Time Frame for Long term goals : 7-10 Days or until d/c  Long term goal 1: Pt will complete feeding/grooming/oral care task c MOD I by d/c  Long term goal 2: Pt will complete total body bathing c MOD I c use of AE by d/c  Long term goal 3: Pt will complete total body dressing (UB/LB/Footwear) c MOD I c use of AE PRN by d/c  Long term goal 4: Pt will complete toileting c MOD I by d/c  Long term goal 5: Pt will complete functional transfer (toilet, tub, shower) c MOD I by d/c. Long term goals 6: Pt will perform therex/therax to facilitate increased strength/endurance/ax tolerance (c emphasis on dynamic standing balance/tolerance >8 mins and BUE endurance c compliance of sternal precautions) c MOD I in order to complete ADLs.   Long term goal 7: Pt will IND demo use of compensatory strategies in order to visually scan environment required for ADL/IADL tasks by d/c.:        Plan of Care                                                                              Times per week: 5 days per week for a minimum of 60 minutes/day plus group as appropriate for 60 minutes.   Treatment to include Plan  Times per day: Daily  Current Treatment Recommendations: Strengthening, Endurance Training, Balance Training, Patient/Caregiver Education & Training, Home Management Training, ROM, Functional Mobility Training, Safety Education & Training, Equipment Evaluation, Education, & procurement, Self-Care / ADL, Cognitive/Perceptual Training, Pain Management    Electronically signed by   MIK Dc,  8/16/2021, 12:02 PM

## 2021-08-17 PROCEDURE — 97116 GAIT TRAINING THERAPY: CPT

## 2021-08-17 PROCEDURE — 97530 THERAPEUTIC ACTIVITIES: CPT

## 2021-08-17 PROCEDURE — 99232 SBSQ HOSP IP/OBS MODERATE 35: CPT | Performed by: PHYSICAL MEDICINE & REHABILITATION

## 2021-08-17 PROCEDURE — 94150 VITAL CAPACITY TEST: CPT

## 2021-08-17 PROCEDURE — 6370000000 HC RX 637 (ALT 250 FOR IP): Performed by: PHYSICIAN ASSISTANT

## 2021-08-17 PROCEDURE — 94761 N-INVAS EAR/PLS OXIMETRY MLT: CPT

## 2021-08-17 PROCEDURE — 1280000000 HC REHAB R&B

## 2021-08-17 PROCEDURE — 6370000000 HC RX 637 (ALT 250 FOR IP): Performed by: PHYSICAL MEDICINE & REHABILITATION

## 2021-08-17 PROCEDURE — 97535 SELF CARE MNGMENT TRAINING: CPT

## 2021-08-17 RX ADMIN — Medication 3 MG: at 21:00

## 2021-08-17 RX ADMIN — THERA TABS 1 TABLET: TAB at 08:23

## 2021-08-17 RX ADMIN — MIDODRINE HYDROCHLORIDE 10 MG: 5 TABLET ORAL at 12:16

## 2021-08-17 RX ADMIN — CARVEDILOL 3.12 MG: 6.25 TABLET, FILM COATED ORAL at 08:23

## 2021-08-17 RX ADMIN — LEVOTHYROXINE SODIUM 50 MCG: 0.05 TABLET ORAL at 06:38

## 2021-08-17 RX ADMIN — ATORVASTATIN CALCIUM 20 MG: 20 TABLET, FILM COATED ORAL at 21:00

## 2021-08-17 RX ADMIN — ACETAMINOPHEN 650 MG: 325 TABLET ORAL at 06:38

## 2021-08-17 RX ADMIN — SENNOSIDES 8.6 MG: 8.6 TABLET, COATED ORAL at 21:00

## 2021-08-17 RX ADMIN — MIDODRINE HYDROCHLORIDE 10 MG: 5 TABLET ORAL at 17:27

## 2021-08-17 RX ADMIN — PANTOPRAZOLE SODIUM 40 MG: 40 TABLET, DELAYED RELEASE ORAL at 06:38

## 2021-08-17 RX ADMIN — CARVEDILOL 3.12 MG: 6.25 TABLET, FILM COATED ORAL at 21:00

## 2021-08-17 RX ADMIN — FUROSEMIDE 20 MG: 20 TABLET ORAL at 08:23

## 2021-08-17 RX ADMIN — ACETAMINOPHEN 650 MG: 325 TABLET ORAL at 02:02

## 2021-08-17 RX ADMIN — MIDODRINE HYDROCHLORIDE 10 MG: 5 TABLET ORAL at 08:23

## 2021-08-17 RX ADMIN — ASPIRIN 81 MG: 81 TABLET, COATED ORAL at 08:23

## 2021-08-17 RX ADMIN — ALPRAZOLAM 0.25 MG: 0.25 TABLET ORAL at 21:00

## 2021-08-17 RX ADMIN — CLOPIDOGREL BISULFATE 75 MG: 75 TABLET ORAL at 08:23

## 2021-08-17 RX ADMIN — Medication 1 TABLET: at 08:23

## 2021-08-17 ASSESSMENT — PAIN DESCRIPTION - FREQUENCY: FREQUENCY: CONTINUOUS

## 2021-08-17 ASSESSMENT — PAIN SCALES - GENERAL
PAINLEVEL_OUTOF10: 5
PAINLEVEL_OUTOF10: 2
PAINLEVEL_OUTOF10: 0
PAINLEVEL_OUTOF10: 2

## 2021-08-17 ASSESSMENT — PAIN DESCRIPTION - DESCRIPTORS: DESCRIPTORS: ACHING;DISCOMFORT

## 2021-08-17 ASSESSMENT — PAIN DESCRIPTION - ORIENTATION: ORIENTATION: LEFT

## 2021-08-17 ASSESSMENT — PAIN DESCRIPTION - PAIN TYPE: TYPE: ACUTE PAIN

## 2021-08-17 ASSESSMENT — PAIN DESCRIPTION - ONSET: ONSET: ON-GOING

## 2021-08-17 ASSESSMENT — PAIN DESCRIPTION - LOCATION: LOCATION: SHOULDER

## 2021-08-17 NOTE — PROGRESS NOTES
Elana [facility]    : 1940  Acct #: [de-identified]  MRN: [id number]              PM&R Progress Note      Admitting diagnosis: Acute CVA ( [address])     Comorbid diagnoses impacting rehabilitation: Right hemiparesis, right homonymous hemianopsia, dysarthria, uncontrolled pain, generalized weakness, gait disturbance, acute blood loss anemia, ischemic cardiomyopathy, status post coronary artery bypass graft surgery     Chief complaint: Eating better. Fatigue is still a problem. Less chest wall pain. Prior (baseline) level of function: Independent. Current level of function:         Current  IRF-JOSE and Goals:   Occupational Therapy:    Short term goals  Time Frame for Short term goals: STG=LTG :   Long term goals  Time Frame for Long term goals : 7-10 Days or until d/c  Long term goal 1: Pt will complete feeding/grooming/oral care task c MOD I by d/c  Long term goal 2: Pt will complete total body bathing c MOD I c use of AE by d/c  Long term goal 3: Pt will complete total body dressing (UB/LB/Footwear) c MOD I c use of AE PRN by d/c  Long term goal 4: Pt will complete toileting c MOD I by d/c  Long term goal 5: Pt will complete functional transfer (toilet, tub, shower) c MOD I by d/c. Long term goals 6: Pt will perform therex/therax to facilitate increased strength/endurance/ax tolerance (c emphasis on dynamic standing balance/tolerance >8 mins and BUE endurance c compliance of sternal precautions) c MOD I in order to complete ADLs. Long term goal 7: Pt will IND demo use of compensatory strategies in order to visually scan environment required for ADL/IADL tasks by d/c. :                                       Eating: Eating  Assistance Needed: Independent  Comment: Pt reports being able to open packages/containers  CARE Score: 6  Discharge Goal: Independent       Oral Hygiene: Oral Hygiene  Assistance Needed: Independent  Comment:  Mod I standing at sink  CARE Score: 6  Discharge Goal: Independent    UB/LB Bathing: Shower/Bathe Self  Assistance Needed: Supervision or touching assistance  Comment: Supervision in stance c cues for sternal precautions  CARE Score: 4  Discharge Goal: Independent    UB Dressing: Upper Body Dressing  Assistance Needed: Supervision or touching assistance  Comment: Pt required verbal cues for sternal precautions; able to thread BUE and pull over head  CARE Score: 4  Discharge Goal: Independent         LB Dressing: Lower Body Dressing  Assistance Needed: Supervision or touching assistance  Comment: Supervision to manage over hips  CARE Score: 4  Discharge Goal: Independent    Donning and [place] Footwear: Putting On/Taking Off Footwear  Assistance Needed: Partial/moderate assistance  Comment: assist to don EUGENE hose; able to don/doff hospital socks  CARE Score: 3  Discharge Goal: Independent      Toileting: Toileting Hygiene  Assistance Needed: Supervision or touching assistance  Comment: Supervision for clothing management and hygiene  CARE Score: 4  Discharge Goal: Independent      Toilet Transfers: Toilet Transfer  Assistance Needed: Supervision or touching assistance  Comment: Supervision  CARE Score: 4  Discharge Goal: Independent    Physical Therapy:   Short term goals  Time Frame for Short term goals: 10-12 tx days:  Short term goal 1: Pt will complete rolling to the L and sit->sup with SBA-Sup. following sternal precautions. Short term goal 2: Pt will complete rolling to R, sup->sit, and OOB transfers Ind following sternal precautions. Short term goal 3: Pt will ambulate 150 ft over level surface and at least 10 ft of uneven surface using RW with SBA-Sup. Short term goal 4: Pt will ascend/descend curb step using RW and 1 flight of stairs using railings with SBA-Sup. Short term goal 5: Pt will complet object retrieval from the floor using reacher Mod Ind. Bed Mobility:   Sit to Lying  Assistance Needed: Partial/moderate assistance  Comment:  Mod A (required assist on BLE), pt was compliant with sternal precautions  CARE Score: 3  Discharge Goal: Supervision or touching assistance  Roll Left and Right  Assistance Needed: Partial/moderate assistance  Comment:  Mod A to L (limited by L shoulder arthritis), Sup towards R side; pt was compliant with sternal precautions  CARE Score: 3  Discharge Goal: Supervision or touching assistance  Lying to Sitting on Side of Bed  Comment: Sup. per OT assessment  Discharge Goal: Independent    Transfers:    Sit to Stand  Assistance Needed: Supervision or touching assistance  Comment: SBA with/without use of RW; compliant with sternal precautions; able to stand without use of trunk momentum  CARE Score: 4  Discharge Goal: Independent  Chair/Bed-to-Chair Transfer  Assistance Needed: Supervision or touching assistance  Comment: CGA without AD  CARE Score: 4  Discharge Goal: Independent     Car Transfer  Assistance Needed: Supervision or touching assistance  Comment: CGA without AD  CARE Score: 4  Discharge Goal: Independent    Ambulation:    Walking Ability  Does the Patient Walk?: Yes     Walk 10 Feet  Assistance Needed: Supervision or touching assistance  Comment: CGA using RW  CARE Score: 4  Discharge Goal: Independent     Walk 50 Feet with Two Turns  Assistance Needed: Partial/moderate assistance  Comment: Min A using RW  CARE Score: 3  Discharge Goal: Independent     Walk 150 Feet  Comment: pt was only able to ambulate 66 ft today using RW with Min A (pt limited by fatigue, SOB, and mild dizziness)  Reason if not Attempted: Not attempted due to medical condition or safety concerns  CARE Score: 88  Discharge Goal: Supervision or touching assistance     Walking 10 Feet on Uneven Surfaces  Assistance Needed: Partial/moderate assistance  Comment: Min A using RW today  Reason if not Attempted: Not attempted due to medical condition or safety concerns  CARE Score: 3  Discharge Goal: Supervision or touching assistance     1 Step (Curb)  Comment: Min A using RW today  Discharge Goal: Supervision or touching assistance     4 Steps  Assistance Needed: Partial/moderate assistance  Comment: Min A using railings today  Reason if not Attempted: Not attempted due to medical condition or safety concerns  CARE Score: 3  Discharge Goal: Supervision or touching assistance     12 Steps  Comment: pt's max tolerance today was 5 steps (limited by fatigue)  Reason if not Attempted: Not attempted due to medical condition or safety concerns  CARE Score: 88  Discharge Goal: Supervision or touching assistance       Wheelchair:  w/c Ability: Wheelchair Ability  Uses a Wheelchair and/or Scooter?: No                Balance:        Object: Picking Up Object  Assistance Needed: Supervision or touching assistance  Comment: CGA using reacher in unsupported stance  CARE Score: 4  Discharge Goal: Independent    I      Exam:    Blood pressure 131/64, pulse 83, temperature 97.9 °F (36.6 °C), temperature source Oral, resp. rate 16, height 5' 2\" (1.575 m), weight 148 lb 13 oz (67.5 kg), SpO2 91 %. General: Up in a wheelchair. Struggling to turn it with her left foot. Fair upright posture. HEENT: Self cueing to gaze to the right some. Dysarthric. MMM. No JVD. Pulmonary: No wheezes or rales. Guarded respirations at times. Cardiac: Incision is clean and dry. Regular rate and rhythm. Abdomen: Patient's abdomen is soft and nondistended. Bowel sounds were present throughout. There was no rebound, guarding or masses noted. Upper extremities: Increased tone at the right wrist and elbow with very weak right . Poor dexterity. Lower extremities: No signs of DVT. Sitting balance was good. Standing balance was poor.     Lab Results   Component Value Date    WBC 9.9 08/08/2021    HGB 10.1 (L) 08/08/2021    HCT 31.6 (L) 08/08/2021    MCV 99.4 08/08/2021     08/08/2021     Lab Results   Component Value Date    INR 1.19 08/03/2021    INR 1.40 08/02/2021    INR 0.88 07/30/2021    PROTIME 15.4 (H) 08/03/2021    PROTIME 18.1 (H) 08/02/2021    PROTIME 11.3 (L) 07/30/2021     Lab Results   Component Value Date    CREATININE 0.5 (L) 08/09/2021    BUN 12 08/09/2021     08/09/2021    K 3.7 08/09/2021     08/09/2021    CO2 29 08/09/2021     Lab Results   Component Value Date    ALT 74 (H) 08/03/2021     (H) 08/03/2021    ALKPHOS 21 (L) 08/03/2021    BILITOT 0.6 08/03/2021       Expected length of stay  prior to a supervised level of function for discharge home with a walker and [address] OT/PT is 8/21/2021. Recommendations:    1. Ischemic stroke with right hemiparesis and homonymous hemianopsia:   More consistent carryover of techniques during the daily occupational and physical therapy with speech-language pathology.      Tolerating the antiplatelet therapy with a baby aspirin and Plavix as well as the statin.    Working to control swings of blood pressure.  She is on Foltx due to elevated homocystine level.  She requires adaptive equipment training while following sternal precautions.  Caregiver education is planned before discharge.  Verbal cues and CGA for transfers again today. Aggressive pulmonary hygiene and DVT prophylaxis. 2. DVT prophylaxis: The surgical team discourages oral anticoagulants.  SCDs when in bed.  Weightbearing activities are steadily improving daily.  No current signs of thrombosis. 3. Ischemic cardiomyopathy: Coreg for afterload reduction and ProAmatine to help support blood pressure.  She is on a diuretic with Lasix.     Daily weights do not reflect any decompensation of CHF. Good GI tolerance for the antiplatelet therapy. 4. Homonymous hemianopsia: Ophthalmology consultant saw her on the surgical floor.  She will follow up with them as an outpatient.  Our treatment program has been adjusting to her visual field loss. 5. Uncontrolled pain: Good pain control using primarily acetaminophen.   Effective bowel

## 2021-08-17 NOTE — PROGRESS NOTES
Physical Rehabilitation: OCCUPATIONAL THERAPY     [x] daily progress note       [] discharge       Patient Name:  Nayana Toney   :  1940 MRN: [id number]  Room:  [address] [place] [address] Date of Admission: 2021  Rehabilitation Diagnosis:   Cerebral infarction, unspecified [I63.9]  Acute CVA (cerebrovascular accident) ([facility] [address] [I63.9]       Date 2021       Day of ARU Week:  2   Time IN/OUT 1105/1205   Individual Tx Minutes 60   Group Tx Minutes    Co-Treat Minutes    Concurrent Tx Minutes    TOTAL Tx Time Mins 60   Variance Time    Variance Time []   Refusal due to:     []   Medical hold/reason:    []   Illness   []   Off Unit for test/procedure  []   Extra time needed to complete task  []   Therapeutic need  []   Other (specify):   Restrictions Restrictions/Precautions: General Precautions, Fall Risk (R vision deficit due to CVA)         Communication with other providers: [x]   OK to see per nursing:     []   Spoke with team member regarding:      Subjective observations and cognitive status: Patient up in recliner, requiring encouragement to participate, however willing to try     Pain level/location:    10       Location: no pain noted per patient    Discharge recommendations  Anticipated discharge date:    Destination: []home alone   []home alone w assist prn   [x] home w/ family    [] Continuous supervision       []SNF    [] Assisted living     [] Other:   Continued therapy: [x]HHC OT  []OUTPATIENT  OT   [] No Further OT  Equipment needs: Possible BSC      Toileting: Mod I       Toilet Transfers:  Sup    Device Used:    [x]   Standard Toilet         []   Grab Bars           []  Bedside Commode       []   Elevated Toilet          []   Other:        Bed Mobility:           [x]   Pt received out of bed   Rolling R/L:    Scooting:    Supine --> Sit:    Sit --> Supine:      Transfers:    Sit--> Stand: Mod I   Stand --> Sit:   Mod I   Stand-Pivot:    Mod I   Other:    Assistive device required for transfer:   None, RW       Functional Mobility: To and from room to therapy gym    Assistance:  Sup/SB  Device:   [x]   Rolling Walker     []   Standard Walker []   Wheelchair        []   [employer]       []   4-Wheeled Charna Jimena         []   Cardiac Walker       []   Other:            Additional Therapeutic activities/exercises completed this date:     [x]   ADL Training   [x]   Balance/Postural training Patient instructed in standing balance and standing tolerance activity with frisbee toss  And ring toss patient stands for approx 4 minutes times three to facilitate ADL and mobility tasks    [x]   Bed/Transfer Training patient instructed in transfer training tasks while maintaining  precautions  With 10 trials, with lengthy rest break at 5  []   Endurance Training   []   Neuromuscular Re-ed   []   Nu-step:  Time:        Level:         #Steps:       []   Rebounder:    []  Seated     []  Standing        []   Supine Ther Ex (reps/sets):     []   Seated Ther Ex (reps/sets):     []   Standing Ther Ex (reps/sets):     []   Other:      Comments:  Patient complains of feeling \"zapped\" and having no energy and breathing issues, vitals taken,   /55                             95% O2                             72 MA  Spoke with Terry Hamilton, patients nurse about breathing issues and if this was something new Terry Hamilton advises this therapist of patients anxiety and that breathing techniques usually assist       Patient/Caregiver Education and Training:   [x]   Adaptive Equipment Use  [x]   Bed Mobility/Transfer Technique/Safety  [x]   Energy Conservation Tips  []   Family training  [x]   Postural Awareness  []   Safety During Functional Activities  [x]   Reinforced Patient's Precautions   []   Progress was updated and reviewed in Rehabtracker with patient and/or family this         date.     Treatment Plan for Next Session: POC to continue as tolerated           Treatment/Activity Tolerance:   [x] Tolerated treatment with no adverse effects [] Patient limited by fatigue  [] Patient limited by pain   [] Patient limited by medical complications:    [] Adverse reaction to Tx:   [] Significant change in status    Safety:       []  bed alarm set    []  chair alarm set    []  Pt refused alarms                []  Telesitter activated      [x]  Gait belt used during tx session      []other:       Number of Minutes/Billable Intervention  Therapeutic Exercise    ADL Self-care 15   Neuro Re-Ed    Therapeutic Activity 45   Group    Other:    TOTAL 60       Social History  Social/Functional History  Lives With: Spouse, Daughter Anselmo Morales (has health limitations))  Type of Home: House  Home Layout: One level  Home Access: Stairs to enter without rails  Entrance Stairs - Number of Steps: 2  Entrance Stairs - Rails: None  Bathroom Shower/Tub: Walk-in shower  Bathroom Toilet: Standard  Bathroom Equipment: Grab bars in shower, Built-in shower seat, Grab bars around toilet  Home Equipment: [employer]  ADL Assistance: Independent  Homemaking Assistance: Independent  Homemaking Responsibilities: Yes  Meal Prep Responsibility: Primary  Laundry Responsibility: Primary  Cleaning Responsibility: Primary  Bill Paying/Finance Responsibility: Primary  Shopping Responsibility: Primary  Dependent Care Responsibility: Primary  Health Care Management: Primary  Ambulation Assistance: Independent  Transfer Assistance: Independent  Active : Yes  Occupation: Retired  Type of occupation: [employer], [employer]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 Meds set up in pill box and pill bottles by pt. Pt manages finances with dtr assisting at this time.   Additional Comments: sleeps in regular, flat bed; 1 recent fall without significant injury    Objective                                                                                    Goals:  (Update in navigator)  Short term goals  Time Frame for Short term goals: STG=LTG:  Long term goals  Time Frame for Long term goals : 7-10 Days or until d/c  Long term goal 1: Pt will complete feeding/grooming/oral care task c MOD I by d/c  Long term goal 2: Pt will complete total body bathing c MOD I c use of AE by d/c  Long term goal 3: Pt will complete total body dressing (UB/LB/Footwear) c MOD I c use of AE PRN by d/c  Long term goal 4: Pt will complete toileting c MOD I by d/c  Long term goal 5: Pt will complete functional transfer (toilet, tub, shower) c MOD I by d/c. Long term goals 6: Pt will perform therex/therax to facilitate increased strength/endurance/ax tolerance (c emphasis on dynamic standing balance/tolerance >8 mins and BUE endurance c compliance of sternal precautions) c MOD I in order to complete ADLs. Long term goal 7: Pt will IND demo use of compensatory strategies in order to visually scan environment required for ADL/IADL tasks by d/c.:        Plan of Care                                                                              Times per week: 5 days per week for a minimum of 60 minutes/day plus group as appropriate for 60 minutes.   Treatment to include Plan  Times per day: Daily  Current Treatment Recommendations: Strengthening, Endurance Training, Balance Training, Patient/Caregiver Education & Training, Home Management Training, ROM, Functional Mobility Training, Safety Education & Training, Equipment Evaluation, Education, & procurement, Self-Care / ADL, Cognitive/Perceptual Training, Pain Management    Electronically signed by   MIK Lara,  8/17/2021, 7:37 AM

## 2021-08-17 NOTE — PLAN OF CARE
Problem: Infection:  Goal: Will remain free from infection  Description: Will remain free from infection  8/17/2021 0106 by Khoi Mcdonough RN  Outcome: Ongoing  8/16/2021 1132 by Tessa New RN  Outcome: Ongoing     Problem: Safety:  Goal: Free from accidental physical injury  Description: Free from accidental physical injury  8/17/2021 0106 by Khoi Mcdonough RN  Outcome: Ongoing  8/16/2021 1132 by Tessa New RN  Outcome: Ongoing  Goal: Free from intentional harm  Description: Free from intentional harm  8/17/2021 0106 by Khoi Mcdonough RN  Outcome: Ongoing  8/16/2021 1132 by Tessa New RN  Outcome: Ongoing     Problem: Daily Care:  Goal: Daily care needs are met  Description: Daily care needs are met  8/17/2021 0106 by Khoi Mcdonough RN  Outcome: Ongoing  8/16/2021 1132 by Tessa New RN  Outcome: Ongoing     Problem: Pain:  Goal: Patient's pain/discomfort is manageable  Description: Patient's pain/discomfort is manageable  8/17/2021 0106 by Khoi Mcdonough RN  Outcome: Ongoing  8/16/2021 1132 by Tessa New RN  Outcome: Ongoing  Goal: Pain level will decrease  Description: Pain level will decrease  8/17/2021 0106 by Khoi Mcdonough RN  Outcome: Ongoing  8/16/2021 1132 by Tessa New RN  Outcome: Ongoing  Goal: Control of acute pain  Description: Control of acute pain  8/17/2021 0106 by Khoi Mcdonough RN  Outcome: Ongoing  8/16/2021 1132 by Tessa New RN  Outcome: Ongoing  Goal: Control of chronic pain  Description: Control of chronic pain  8/17/2021 0106 by Khoi Mcdonough RN  Outcome: Ongoing  8/16/2021 1132 by Tessa New RN  Outcome: Ongoing     Problem: Skin Integrity:  Goal: Skin integrity will stabilize  Description: Skin integrity will stabilize  8/17/2021 0106 by Khoi Mcdonough RN  Outcome: Ongoing  8/16/2021 1132 by Tessa New RN  Outcome: Ongoing     Problem: Discharge

## 2021-08-17 NOTE — PROGRESS NOTES
Physical Therapy    [x] daily progress note       [] discharge       Patient Name:  Layo Arnold   :  1940 MRN: [id number]  Room:  [address] [place] [address] Date of Admission: 2021  Rehabilitation Diagnosis:   Cerebral infarction, unspecified [I63.9]  Acute CVA (cerebrovascular accident) ([facility] [address] [I63.9]       Date 2021       Day of ARU Week:  2   Time IN//930  1305/1410   Individual Tx Minutes 60+65   Group Tx Minutes    Co-Treat Minutes    Concurrent Tx Minutes    TOTAL Tx Time Mins 125   Variance Time +5   Variance Time []   Refusal due to:     []   Medical hold/reason:    []   Illness   []   Off Unit for test/procedure  [x]   Extra time needed to complete tasks  []   Therapeutic need  []   Other (specify):   Restrictions Restrictions/Precautions  Restrictions/Precautions: General Precautions, Fall Risk (R vision deficit due to CVA)  Position Activity Restriction  Sternal Precautions: No Pushing, No Pulling, 5# Lifting Restrictions   Interdisciplinary communication [x]   Cleared for therapy per nursing     []   RN notified about issues during session  []   RN updated on pt performance  []   Spoke with   []   Spoke with OT  []   Spoke with MD  []   Other:    Subjective observations and cognitive status: (AM) Pt resting in bed, alert, states having a terrible night and feels that it is due to all the multiple medications given to her, R vision deficit persistent up to this time. \"They're getting me a liftchair and I don't know if I'd be sleeping in my bed. \"    (PM) Pt asleep in recliner, awakens when name is called, states feeling tired but willing to participate in PT. Pain level/location: 2/10       Location: chest incision site and L hand    Discharge recommendations  Anticipated discharge date:  2021  Destination: []???home alone   []? ??home alone with assist PRN     [x]? ?? home w/ family      []? ?? Continuous supervision  []? ??SNF    []??? Assisted living     []? ?? Other:  Continued therapy: [x]? ? ? HHC PT  []???OUTPATIENT PT   []??? No Further PT  []???SNF PT  Caregiver training recommended: [x]? ? ? Yes  []??? No   Equipment needs: RW  Ora Pollack requires the assistance of a front-wheeled walker to successfully ambulate from room to room at home to allow completion of daily living tasks such as: bathing, toileting, dressing and grooming.  A wheeled walker is necessary due to the patient's unsteady gait, upper body weakness, inability to  a standard walker.  This patient can ambulate only by pushing a walker instead of using a lesser assistive device such as a cane or crutch.      Bed Mobility:           []   Pt received out of bed   Rolling R: Ind   Rolling L:  Sup. (effortful and required multiple attempts to complete; required sequential cues on technique to complete; aggravated L shoulder pain due to Hx of arthritis)   Scooting:  Sup. Lying --> Sit: SBA-Sup  Bed features used: [] Yes    [] HOB elevated      [] Bed rail                                    [x] No     Transfers:    Sit--> Stand:   Mod Ind   Stand --> Sit:  Sup->Mod Ind    Toilet Transfer (if applicable): SBA->Mod Ind   Assistive device used for transfer:  RW/none      Gait:    Distance:  160 ft (max; required 1 standing break to complete; limited by increased fatigue and dyspnea on exertion)  (PM) 20 ft with turns (submax) + 198 ft with turns (max; required 2 standing breaks to complete; limited by fatigue and dyspnea on exertion)   Assistance:  SBA   Device:  RW  Gait Quality:  Step-through  (AM) SpO2 in room air after maximal exertion: 96%    Stairs   # Completed:  10 (max; limited by weakness, dyspnea, and lightheadedness)  Assistance:  SBA-CGA   Supportive Device:  Railings   Pattern: step-through on ascent/descent     Additional Therapeutic activities/exercises completed this date:     []   Nu-step:  Time:        Level:         #Steps:       []   Rebounder:    []  Seated     [] Standing        [x]   Balance training: (AM/PM)Pt performed grooming tasks in unsupported standing with SBA-Ind (PM) Level surface ambulation (~10 ft forward path ambulation with 1 turn) without AD with Min A x 5 trials with VCs on reciprocal arm swing for balance training to normalize gait quality; randomized ambulation over level surface without AD with visual scanning, quick turns, and reactive balance training. Pt reuired Min A and was able to locate appropriate visual target as instructed with 100% accuracy. []   Postural training    []   Supine ther ex (reps/sets):     []   Seated ther ex (reps/sets):     []   Standing ther ex (reps/sets):     [x]   Other: Toileting activity completed with SBA->Mod Ind      []   Other:    Comments:      Patient/Caregiver Education and Training:   []   Role of PT  []   Education about Dx  []   Use of call light for assist   []   HEP provided and explained   []   Treatment plan reviewed  []   Home safety  []   Wheelchair mobility/management   []   Body mechanics  []   Bed Mobility/Transfer technique  []   Gait technique/sequencing  []   Proper use of assistive device/adaptive equipment  []   Stair training/Advanced mobility safety and technique  [x]   Reinforced patient's precautions/mobility while maintaining precautions  []   Postural awareness  []   Family/caregiver training  []   Progress was updated and reviewed in Rehabtracker with patient and/or family this date. [x]   Other: Pt educated about discharge date     Treatment Plan for Next Session: stairs, advanced gait training, dynamic standing balance training including object retrieval activity      Assessment:   This pt demonstrated a positive response to today's treatment as evidenced by consistent ability to stand on 1st attempt from regular seat height following sternal precautions and increased tolerance during stair training and ability to consistently ambulate >150 ft at a time during AM and PM PT sessions. The patient is making good progress toward established goals as evidenced by QI scores. Treatment/Activity Tolerance:   [] Tolerated treatment with no adverse effects    [x] Patient limited by fatigue/dyspnea on exertion, intermittent lightheadedness  [] Patient limited by pain   [] Patient limited by medical complications:    [] Adverse reaction to Tx:   [] Significant change in status    Safety:       []  bed alarm set    []  chair alarm set    []  Pt refused alarms                []  Telesitter activated      [x]  Gait belt used during tx session      []other:       Number of Minutes/Billable Intervention  Gait Training 60   Therapeutic Exercise    Neuro Re-Ed    Therapeutic Activity 65   Wheelchair Propulsion    Group    Other:    TOTAL 125       Social History  Social/Functional History  Lives With: Spouse, Daughter Sandrine Mendieta (has health limitations))  Type of Home: House  Home Layout: One level  Home Access: Stairs to enter without rails  Entrance Stairs - Number of Steps: 2  Entrance Stairs - Rails: None  Bathroom Shower/Tub: Walk-in shower  Bathroom Toilet: Standard  Bathroom Equipment: Grab bars in shower, Built-in shower seat, Grab bars around toilet  Home Equipment: 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 Meds set up in pill box and pill bottles by pt.   Pt manages finances with dtr assisting at this time. Additional Comments: sleeps in regular, flat bed; 1 recent fall without significant injury    Objective                                                                                    Goals:  (Update in navigator)  Short term goals  Time Frame for Short term goals: 10-12 tx days:  Short term goal 1: Pt will complete rolling to the L and sit->sup with SBA-Sup. following sternal precautions. Short term goal 2: Pt will complete rolling to R, sup->sit, and OOB transfers Ind following sternal precautions. Short term goal 3: Pt will ambulate 150 ft over level surface and at least 10 ft of uneven surface using RW with SBA-Sup. Short term goal 4: Pt will ascend/descend curb step using RW and 1 flight of stairs using railings with SBA-Sup. Short term goal 5: Pt will complet object retrieval from the floor using reacher Mod Ind.:   :        Plan of Care                                                                              Times per week: 5 days per week for a minimum of 60 minutes/day plus group as appropriate for 60 minutes.   Treatment to include Current Treatment Recommendations: Strengthening, Gait Training, Patient/Caregiver Education & Training, Stair training, Equipment Evaluation, Education, & procurement, Balance Training, Pain Management, Functional Mobility Training, Endurance Training, Home Exercise Program, Transfer Training, Safety Education & Training, Neuromuscular Re-education    Electronically signed by   Amy Trinidad PT  8/17/2021, 8:29 AM

## 2021-08-17 NOTE — PROGRESS NOTES
at sink  CARE Score: 6  Discharge Goal: Independent    UB/LB Bathing: Shower/Bathe Self  Assistance Needed: Supervision or touching assistance  Comment: Supervision in stance c cues for sternal precautions  CARE Score: 4  Discharge Goal: Independent    UB Dressing: Upper Body Dressing  Assistance Needed: Supervision or touching assistance  Comment: Pt required verbal cues for sternal precautions; able to thread BUE and pull over head  CARE Score: 4  Discharge Goal: Independent         LB Dressing: Lower Body Dressing  Assistance Needed: Supervision or touching assistance  Comment: Supervision to manage over hips  CARE Score: 4  Discharge Goal: Independent    Donning and [place] Footwear: Putting On/Taking Off Footwear  Assistance Needed: Partial/moderate assistance  Comment: assist to don EUGENE hose; able to don/doff hospital socks  CARE Score: 3  Discharge Goal: Independent      Toileting: Toileting Hygiene  Assistance Needed: Supervision or touching assistance  Comment: Supervision for clothing management and hygiene  CARE Score: 4  Discharge Goal: Independent      Toilet Transfers: Toilet Transfer  Assistance Needed: Supervision or touching assistance  Comment: Supervision  CARE Score: 4  Discharge Goal: Independent    Physical Therapy:   Short term goals  Time Frame for Short term goals: 10-12 tx days:  Short term goal 1: Pt will complete rolling to the L and sit->sup with SBA-Sup. following sternal precautions. Short term goal 2: Pt will complete rolling to R, sup->sit, and OOB transfers Ind following sternal precautions. Short term goal 3: Pt will ambulate 150 ft over level surface and at least 10 ft of uneven surface using RW with SBA-Sup. Short term goal 4: Pt will ascend/descend curb step using RW and 1 flight of stairs using railings with SBA-Sup. Short term goal 5: Pt will complet object retrieval from the floor using reacher Mod Ind.             Bed Mobility:   Sit to Lying  Assistance Needed: Partial/moderate assistance  Comment: Mod A (required assist on BLE), pt was compliant with sternal precautions  CARE Score: 3  Discharge Goal: Supervision or touching assistance  Roll Left and Right  Assistance Needed: Partial/moderate assistance  Comment:  Mod A to L (limited by L shoulder arthritis), Sup towards R side; pt was compliant with sternal precautions  CARE Score: 3  Discharge Goal: Supervision or touching assistance  Lying to Sitting on Side of Bed  Comment: Sup. per OT assessment  Discharge Goal: Independent    Transfers:    Sit to Stand  Assistance Needed: Supervision or touching assistance  Comment: SBA with/without use of RW; compliant with sternal precautions; able to stand without use of trunk momentum  CARE Score: 4  Discharge Goal: Independent  Chair/Bed-to-Chair Transfer  Assistance Needed: Supervision or touching assistance  Comment: CGA without AD  CARE Score: 4  Discharge Goal: Independent     Car Transfer  Assistance Needed: Supervision or touching assistance  Comment: CGA without AD  CARE Score: 4  Discharge Goal: Independent    Ambulation:    Walking Ability  Does the Patient Walk?: Yes     Walk 10 Feet  Assistance Needed: Supervision or touching assistance  Comment: CGA using RW  CARE Score: 4  Discharge Goal: Independent     Walk 50 Feet with Two Turns  Assistance Needed: Partial/moderate assistance  Comment: Min A using RW  CARE Score: 3  Discharge Goal: Independent     Walk 150 Feet  Comment: pt was only able to ambulate 66 ft today using RW with Min A (pt limited by fatigue, SOB, and mild dizziness)  Reason if not Attempted: Not attempted due to medical condition or safety concerns  CARE Score: 88  Discharge Goal: Supervision or touching assistance     Walking 10 Feet on Uneven Surfaces  Assistance Needed: Partial/moderate assistance  Comment: Min A using RW today  Reason if not Attempted: Not attempted due to medical condition or safety concerns  CARE Score: 3  Discharge Goal: Supervision or touching assistance     1 Step (Curb)  Comment: Min A using RW today  Discharge Goal: Supervision or touching assistance     4 Steps  Assistance Needed: Partial/moderate assistance  Comment: Min A using railings today  Reason if not Attempted: Not attempted due to medical condition or safety concerns  CARE Score: 3  Discharge Goal: Supervision or touching assistance     12 Steps  Comment: pt's max tolerance today was 5 steps (limited by fatigue)  Reason if not Attempted: Not attempted due to medical condition or safety concerns  CARE Score: 88  Discharge Goal: Supervision or touching assistance       Wheelchair:  w/c Ability: Wheelchair Ability  Uses a Wheelchair and/or Scooter?: No                Balance:        Object: Picking Up Object  Assistance Needed: Supervision or touching assistance  Comment: CGA using reacher in unsupported stance  CARE Score: 4  Discharge Goal: Independent    I      Exam:    Blood pressure 126/64, pulse 86, temperature 98.1 °F (36.7 °C), temperature source Oral, resp. rate 18, height 5' 2\" (1.575 m), weight 145 lb (65.8 kg), SpO2 94 %. General: Observed sitting up in a wheelchair. Maintaining good body posture. Minimal dysarthria. Self cueing to gaze to the right some. HEENT: Persistent for right visual field loss. Mild dysarthria with right facial droop. MMM. Pulmonary: Unlabored with symmetric air exchange. No wheezes or rales. Cardiac: Sternal incision is minimally tender and well-healed. Regular rate and rhythm with distant heart sounds. Abdomen: Patient's abdomen is soft and nondistended. Bowel sounds were present throughout. There was no rebound, guarding or masses noted. Upper extremities: Clumsy right upper limb movements with fair  strength. Increased tone. No bruising. Lower extremities: Increased tone with coarse movements at the knee and ankle. Minimal edema. Heels clear. No signs of DVT.     Sitting balance was on the surgical floor.  She will follow up with them as an outpatient.  Our treatment program has been adjusting to her visual field loss. 5. Uncontrolled pain: Good pain control using primarily acetaminophen.   Effective bowel intervention while on the pain medications.

## 2021-08-18 PROCEDURE — 97110 THERAPEUTIC EXERCISES: CPT

## 2021-08-18 PROCEDURE — 6370000000 HC RX 637 (ALT 250 FOR IP): Performed by: PHYSICIAN ASSISTANT

## 2021-08-18 PROCEDURE — 94761 N-INVAS EAR/PLS OXIMETRY MLT: CPT

## 2021-08-18 PROCEDURE — 99232 SBSQ HOSP IP/OBS MODERATE 35: CPT | Performed by: PHYSICAL MEDICINE & REHABILITATION

## 2021-08-18 PROCEDURE — 97116 GAIT TRAINING THERAPY: CPT

## 2021-08-18 PROCEDURE — 1280000000 HC REHAB R&B

## 2021-08-18 PROCEDURE — 94150 VITAL CAPACITY TEST: CPT

## 2021-08-18 PROCEDURE — 97530 THERAPEUTIC ACTIVITIES: CPT

## 2021-08-18 PROCEDURE — 6370000000 HC RX 637 (ALT 250 FOR IP): Performed by: PHYSICAL MEDICINE & REHABILITATION

## 2021-08-18 RX ADMIN — CARVEDILOL 3.12 MG: 6.25 TABLET, FILM COATED ORAL at 21:39

## 2021-08-18 RX ADMIN — ACETAMINOPHEN 650 MG: 325 TABLET ORAL at 21:40

## 2021-08-18 RX ADMIN — CLOPIDOGREL BISULFATE 75 MG: 75 TABLET ORAL at 09:40

## 2021-08-18 RX ADMIN — SENNOSIDES 8.6 MG: 8.6 TABLET, COATED ORAL at 21:39

## 2021-08-18 RX ADMIN — Medication 1 TABLET: at 09:40

## 2021-08-18 RX ADMIN — ACETAMINOPHEN 650 MG: 325 TABLET ORAL at 09:25

## 2021-08-18 RX ADMIN — ASPIRIN 81 MG: 81 TABLET, COATED ORAL at 09:40

## 2021-08-18 RX ADMIN — MIDODRINE HYDROCHLORIDE 10 MG: 5 TABLET ORAL at 17:25

## 2021-08-18 RX ADMIN — MIDODRINE HYDROCHLORIDE 10 MG: 5 TABLET ORAL at 09:45

## 2021-08-18 RX ADMIN — THERA TABS 1 TABLET: TAB at 09:40

## 2021-08-18 RX ADMIN — ALPRAZOLAM 0.25 MG: 0.25 TABLET ORAL at 21:40

## 2021-08-18 RX ADMIN — ACETAMINOPHEN 650 MG: 325 TABLET ORAL at 13:05

## 2021-08-18 RX ADMIN — MIDODRINE HYDROCHLORIDE 10 MG: 5 TABLET ORAL at 13:01

## 2021-08-18 RX ADMIN — ACETAMINOPHEN 650 MG: 325 TABLET ORAL at 03:38

## 2021-08-18 RX ADMIN — FUROSEMIDE 20 MG: 20 TABLET ORAL at 09:40

## 2021-08-18 RX ADMIN — ATORVASTATIN CALCIUM 20 MG: 20 TABLET, FILM COATED ORAL at 21:40

## 2021-08-18 RX ADMIN — PANTOPRAZOLE SODIUM 40 MG: 40 TABLET, DELAYED RELEASE ORAL at 06:24

## 2021-08-18 RX ADMIN — DOCUSATE SODIUM 100 MG: 100 CAPSULE ORAL at 09:40

## 2021-08-18 RX ADMIN — LEVOTHYROXINE SODIUM 50 MCG: 0.05 TABLET ORAL at 06:24

## 2021-08-18 RX ADMIN — CARVEDILOL 3.12 MG: 6.25 TABLET, FILM COATED ORAL at 09:40

## 2021-08-18 RX ADMIN — Medication 3 MG: at 21:40

## 2021-08-18 ASSESSMENT — PAIN DESCRIPTION - FREQUENCY
FREQUENCY: INTERMITTENT

## 2021-08-18 ASSESSMENT — PAIN DESCRIPTION - DESCRIPTORS
DESCRIPTORS: DISCOMFORT
DESCRIPTORS: DISCOMFORT

## 2021-08-18 ASSESSMENT — PAIN DESCRIPTION - LOCATION
LOCATION: CHEST

## 2021-08-18 ASSESSMENT — PAIN DESCRIPTION - ORIENTATION
ORIENTATION: ANTERIOR;MID
ORIENTATION: ANTERIOR;MID

## 2021-08-18 ASSESSMENT — PAIN SCALES - GENERAL
PAINLEVEL_OUTOF10: 1
PAINLEVEL_OUTOF10: 4
PAINLEVEL_OUTOF10: 4
PAINLEVEL_OUTOF10: 5
PAINLEVEL_OUTOF10: 4
PAINLEVEL_OUTOF10: 4
PAINLEVEL_OUTOF10: 3
PAINLEVEL_OUTOF10: 2
PAINLEVEL_OUTOF10: 6
PAINLEVEL_OUTOF10: 2

## 2021-08-18 ASSESSMENT — PAIN DESCRIPTION - ONSET
ONSET: GRADUAL
ONSET: ON-GOING
ONSET: GRADUAL

## 2021-08-18 ASSESSMENT — PAIN DESCRIPTION - PAIN TYPE
TYPE: ACUTE PAIN
TYPE: SURGICAL PAIN
TYPE: ACUTE PAIN

## 2021-08-18 ASSESSMENT — PAIN DESCRIPTION - PROGRESSION
CLINICAL_PROGRESSION: NOT CHANGED

## 2021-08-18 NOTE — PATIENT CARE CONFERENCE
ACUTE REHAB TEAM CONFERENCE SUMMARY   [facility]    NAME: Allyson England  : 1940 ADMIT DATE: 2021    Rehab Admitting Dx:Cerebral infarction, unspecified [I63.9]  Acute CVA (cerebrovascular accident) ([facility] [address] [I63.9]  Patient Comorbid Conditions: Active Hospital Problems    Diagnosis Date Noted    Hemiparesis of right dominant side as late effect of cerebral infarction (HCC) [I69.351]     Right homonymous hemianopsia [H53.461]     Gait disturbance [R26.9]     Acute blood loss anemia [D62]     Uncontrolled pain [R52]     Ischemic cardiomyopathy [I25.5]     Acute CVA (cerebrovascular accident) ([facility] [address] [I63.9] 2021     Date: 2021    CASE MANAGEMENT  Current issues/needs regarding patient and family discharge status:   Patient plans dc 1-level home with spouse. Spouse isn't an active caregiver. Patient's daughter will stay in the home at discharge. Patient's family is worried about her depressed mood. Jason Diez pt/ot/RN. Will order RW. Does patient need BSC? PHYSICAL THERAPY (Updated in QI)  Short term goals  Time Frame for Short term goals: 10-12 tx days:  Short term goal 1: Pt will complete rolling to the L and sit->sup with SBA-Sup. following sternal precautions. Short term goal 2: Pt will complete rolling to R, sup->sit, and OOB transfers Ind following sternal precautions. Short term goal 3: Pt will ambulate 150 ft over level surface and at least 10 ft of uneven surface using RW with SBA-Sup. Short term goal 4: Pt will ascend/descend curb step using RW and 1 flight of stairs using railings with SBA-Sup. Short term goal 5: Pt will complet object retrieval from the floor using reacher Mod Ind. Impairments/deficits, barriers:     Body structures, Functions, Activity limitations: Decreased functional mobility , Decreased high-level IADLs, Decreased ADL status, Decreased endurance, Decreased balance, Increased pain, Decreased vision/visual deficit, Decreased strength     Prognosis: Good  Decision Making: High Complexity  Clinical Presentation: unstable/unpredictable characteristics  Equipment needed at discharge: RW      PT IRF-JOSE scores since initial assessment  Bed Mobility:   Sit to Lying  Assistance Needed: Independent  Comment: Practiced with bed flat and no rails  CARE Score: 6  Discharge Goal: Supervision or touching assistance    Roll Left and Right  Assistance Needed: Independent  Comment: Practiced with bed flat and no rails. CARE Score: 6  Discharge Goal: Supervision or touching assistance    Lying to Sitting on Side of Bed  Assistance Needed: Independent  Comment: Practiced with bed flat and no rails  CARE Score: 6  Discharge Goal: Independent    Transfers:    Sit to Stand  Assistance Needed: Independent  Comment: Mod Ind  CARE Score: 6  Discharge Goal: Independent    Chair/Bed-to-Chair Transfer  Assistance Needed: Independent  Comment: Mod Ind  CARE Score: 6  Discharge Goal: Independent         Car Transfer  Assistance Needed: Supervision or touching assistance  Comment: Supervision with RW (Vcs for sternal precautions)  CARE Score: 4  Discharge Goal: Independent    Ambulation:    Walking Ability  Does the Patient Walk?: Yes     Walk 10 Feet  Assistance Needed: Independent  Comment: Mod Ind with RW  CARE Score: 6  Discharge Goal: Independent     Walk 50 Feet with Two Turns  Assistance Needed: Independent  Comment:  Mod Ind with RW  CARE Score: 6  Discharge Goal: Independent     Walk 150 Feet  Assistance Needed: Supervision or touching assistance  Comment: ability to walk 150 ft is incosnsistent; max distance today was 120 ft using RW Mod Ind (limited by dyspnea on exertion and mild lightheadedness)  Reason if not Attempted: Not attempted due to medical condition or safety concerns  CARE Score: 4  Discharge Goal: Supervision or touching assistance     Walking 10 Feet on Uneven Surfaces  Assistance Needed: Supervision or touching assistance  Comment: SBA with RW d/t visual deficit  Reason if not Attempted: Not attempted due to medical condition or safety concerns  CARE Score: 4  Discharge Goal: Supervision or touching assistance     1 Step (Curb)  Assistance Needed: Supervision or touching assistance  Comment: SBA with RW d/t visual deficit  CARE Score: 4  Discharge Goal: Supervision or touching assistance     4 Steps  Assistance Needed: Supervision or touching assistance  Comment: supervision with 2 rails  Reason if not Attempted: Not attempted due to medical condition or safety concerns  CARE Score: 4  Discharge Goal: Supervision or touching assistance     12 Steps  Assistance Needed: Supervision or touching assistance  Comment: Supervision with 2 rails  Reason if not Attempted: Not attempted due to medical condition or safety concerns  CARE Score: 4  Discharge Goal: Supervision or touching assistance           Wheelchair:  w/c Ability: Wheelchair Ability  Uses a Wheelchair and/or Scooter?: No                        Balance:        Object: Picking Up Object  Assistance Needed: Independent  Comment: mod I with reacher and RW  CARE Score: 6  Discharge Goal: Independent    Fall Risk: []  Yes  []  No    OCCUPATIONAL THERAPY  (Updated in QI)  Short term goals  Time Frame for Short term goals: STG=LTG :   Long term goals  Time Frame for Long term goals : 7-10 Days or until d/c  Long term goal 1: Pt will complete feeding/grooming/oral care task c MOD I by d/c  Long term goal 2: Pt will complete total body bathing c MOD I c use of AE by d/c  Long term goal 3: Pt will complete total body dressing (UB/LB/Footwear) c MOD I c use of AE PRN by d/c  Long term goal 4: Pt will complete toileting c MOD I by d/c  Long term goal 5: Pt will complete functional transfer (toilet, tub, shower) c MOD I by d/c.   Long term goals 6: Pt will perform therex/therax to facilitate increased strength/endurance/ax tolerance (c emphasis on dynamic standing balance/tolerance >8 mins and BUE endurance c compliance of sternal precautions) c MOD I in order to complete ADLs. Long term goal 7: Pt will IND demo use of compensatory strategies in order to visually scan environment required for ADL/IADL tasks by d/c. :                                       OT IRF-JOSE scores and goals since initial assessment:    ADLs:    Eating: Eating  Assistance Needed: Independent  Comment: Pt able to open packages and manipulate utensils/swallow food. CARE Score: 6  Discharge Goal: Independent       Oral Hygiene: Oral Hygiene  Assistance Needed: Independent  Comment: Mod I standing at sink  CARE Score: 6  Discharge Goal: Independent    UB/LB Bathing: Shower/Bathe Self  Assistance Needed: Supervision or touching assistance  Comment: SUP- cues for pacing of activity and energy conservation techniques as pt attempts to complete total body bathing in stance  CARE Score: 4  Discharge Goal: Independent    UB Dressing: Upper Body Dressing  Assistance Needed: Independent  Comment: SUP- cues for compensatory strategies and compliance for sternal precautions to don/doff button up shirt, pt demo buttoning shirt prior to donning. CARE Score: 6  Discharge Goal: Independent         LB Dressing: Lower Body Dressing  Assistance Needed: Independent  Comment: MOD I- demo threading BLE in seated position using figure four position, dons over hips c good balance. CARE Score: 6  Discharge Goal: Independent    Donning and [place] Footwear: Putting On/Taking Off Footwear  Assistance Needed: Independent  Comment: MOD I- demo figure four position to don/doff hospital socks  CARE Score: 6  Discharge Goal: Independent      Toileting: Toileting Hygiene  Assistance Needed: Independent  Comment: MOD I- demo use of grab bars for steadying during CM, completes nancy-hygiene post BM with compliance to sternal precautions  CARE Score: 6  Discharge Goal: Independent      Toilet Transfers:     Toilet Transfer  Assistance Needed: Independent  Comment: MOD I- no cues required, pt able to demo good hand/body/fww positioning and maintain sternal precautions  CARE Score: 6  Discharge Goal: Independent      Impairments/deficits, barriers:  Fatigue, sternal precaution compliance  Assessment  Performance deficits / Impairments: Decreased functional mobility , Decreased strength, Decreased endurance, Decreased vision/visual deficit, Decreased coordination, Decreased ADL status, Decreased high-level IADLs, Decreased balance, Decreased fine motor control, Decreased ROM  Decision Making: Medium Complexity  REQUIRES OT FOLLOW UP: Yes  Equipment needed at discharge:BSC      COGNITIVE FUNCTION/SPEECH THERAPY (AS INDICATED)  LTG         Duration/Frequency of Treatment  Duration/Frequency of Treatment: No ST at this time . Short-term Goals  Timeframe for Short-term Goals: No ST recommended--pt will work with PT/OT for visual compensatory strategies in fx. Good recall of safety, good recall and application of sternal precautions.   Pt demonstrates appropriate awareness and insight into limitations                 Nursing Current Medical Status:   [] Is continent of bowel and bladder     [] Is incontinent of bowel and bladder    [] Has had an adequate number of bowel movements   [] Urinates with no urinary retention >300ml in bladder   [] Targeting bladder protocol with quintana removal   [] Maintaining O2 SATs at 92% or greater   [x] Has pain managed while on ARU         [] Has had no skin breakdown while on ARU   [] Has improved skin integrity via wound measurements   [] Has no signs/symptoms of infection at the wound site   [] Pressure wounds Stage/Location:    [] Arrived on unit with pressure wound  [] Has been free from injury during hospitalization   [] Has experienced a fall during hospitalization  [] Ongoing education with patient/family with understanding demonstrated for:  [] Receives IV Fluids  [] Other: NUTRITION  Weight: 142 lb 3.2 oz (64.5 kg) / Body mass index is 26.01 kg/m². Current diet: ADULT DIET; Regular; Low Sodium (2 gm)  Adult Oral Nutrition Supplement; Clear Liquid Oral Supplement  Intake: Intake not yet consistent, 25-75% intake recent meals with clear oral nutrition supplement per patient preference        Medical improvements/barriers: ambulation tolerance is variable, some depression, tailbone pain, adjusting meds        Team goals for next treatment period/Intervention for current barriers:   [] Pt will increase activity tolerance for daily tasks. [] Pt will improve bed mobility with reduced assist.  [x] Pt will improve safety in fx tasks with reduced cues/assist  [] Pt will improve transfers with reduced assist  [] Pt will improve toileting with reduced assist  [x] Pt will improve ADL's with use of adaptive equipment with reduced assist  [] Pt will improve pain mgmt for maximum participation in tx program  [] Pt will improve communication to get basic needs met on unit  [] Pt will improve swallowing for safe diet advancement with use of strategies  [x]  Plan for discharge to home. Patient Strengths: Motivated, Cooperative and Pleasant    Justification for Continued Stay  Based on my medical assessment of the patient and review of information from the interdisciplinary team as part of this weekly team conference, the patient continues to meet the following criteria for IRF level of care:   The patient requires active and ongoing intervention of multiple therapy disciplines   The patient requires and intensive rehabilitation therapy program   The patient requires continued physician supervision by a rehabilitation physician   The patient requires 24 hours rehab nursing care   The patient requires an intensive and coordinated interdisciplinary team approach to the delivery of rehabilitative care.      Assessment/Plan   [x]  The patient is making good progression towards their long term goals and is actively participating in and has a reasonable expectation to continue to benefit from the intensive rehabilitation therapy program   []  The estimated discharge date has been changed from initial team conference due to:   []  The estimated discharge destination has been changed from initial team conference due to:         Ongoing tx following discharge: [x]HHC PT OT NSG    []OUTPATIENT     [] No Further Treatment     [] Family/Caregiver Training  []  Transitional Living Arrangement   [] Home Assessment (date  )     [] Family Conference   []  Therapeutic Pass       []  Other: (specify)    Estimated Discharge Date: 8/21/21    Estimated Discharge Destination: []home alone   []home alone with assist prn  []Continuous supervision [x]Return home with spouse/family   [] Assisted living  []SNF     Team members participating in today's conference. [x] Magdalene Best, Medical Director  [x] Ronald Tai,    [] Carol Loaiza, Nurse Mgr [x] Romain Lopez, Nurse Supervisor   [x]  Cindy Mckenna, PT   [x] Joan Paez, OT   []  Abe Pineda, PT  [] Олег Crowe, OT      [x]  Dillon Briceno, SLP    []  Amy De La Cruz, SLP   [] Yoshi Holland, SLP   []  Nicol Weems, FANNY     [x] Carla Mota,     [x]Leanna Greene,     [] Julio Connell RN[] Omar Boyce RN     [x] Wilton Cotter RN    [] Agnes Curtis RN    [] Elsi Wiley,  ROSI  [] Hebert Escalante RN    []     I have led this Team Conference and agree with the plan, Dylan Beach MD, 8/19/2021, 12:41 PM  Goals have been updated to reflect recent status.     Team conference note transcribed this date by: Mauri Carlson MA, [id number] [facility], [place], operated [facility], Therapy Coordinator

## 2021-08-18 NOTE — FLOWSHEET NOTE
only by pushing a walker instead of using a lesser assistive device such as a cane or crutch.      Bed Mobility:           [x]   Pt received out of bed   Rolling R/L:  Independent   Scooting:  Independent   Lying --> Sit:  Independent   Sit --> lying:  Independent  Practiced with bed flat and with no rails. Transfers:    Sit--> Stand:  Supervision   Stand --> Sit:   Supervision   Chair-->Bed/Bed --> Chair:   Supervision   Car Transfers: Supervision   Assistive device required for transfer:  RW  vcs for proper hand placement with sternal precautions. Gait:    Distance: AM session: 313'  Assistance:  Supervision   Device:  RW  Gait Quality:  Reciprocal pattern; brief standing rest breaks d/t SOB. Stairs   # Completed: 13  Assistance:  Supervision   Supportive Device:  2 rails     Uneven Surfaces:       Assistance: SBA  Device:    RW  Surfaces Completed:   [x]  Carpeted Surface with bean bags beneath      []  Throw rugs       []  Ramp       []  Outdoor pavements        []  Grass             []  Loose gravel        []  Other:       Pt amb up/down 4\" curb step SBA with RW. Pt required vcs and directional cues d/t vision deficit. Additional Therapeutic activities/exercises completed this date:     [x]   Nu-step: PM session: Time: 5 minutes       Level: 1       (BLEs only d/t sternal precautions) (for strengthening and endurance training)    []   Rebounder:    []  Seated     []  Standing        []   Balance training         []   Postural training    []   Supine ther ex (reps/sets):     [x]   Seated ther ex (reps/sets): PM session: Cardiac exercises: overhead side stretch, marching, fwd arm raises, side arm raises, arm circles (fwd/bwd), arm crosses, trunk twists x 10 reps each for strengthening. Ankle pumps x 20 reps (for strengthening); hip adduction isometric squeezes, knee bends, hip abduction (manual resist), LAQs x 10 reps each for strengthening.     []   Standing ther ex (reps/sets):     [x]   Picking up object from floor (standing): mod I with RW                  [x]   Reacher used   []   Other:   []   Other:    Patient/Caregiver Education and Training:   [x]   Bed Mobility/Transfer technique/safety  [x]   Gait technique/sequencing  [x]   Proper use of assistive device  [x]   Advanced mobility safety and technique  [x]   Reinforced patient's precautions/mobility while maintaining precautions  []   Postural awareness  []   Family training    Treatment Plan for Next Session: gait; transfers; exercises; balance training; advanced gait; stair training. Assessment: This pt demonstrated a positive response to today's treatment as evidenced by improved mobility. The patient is making progress toward established goals as evidenced by QI scores. Ongoing deficits are observed in the areas of strength, balance, and endurance and continued focus on strengthening, balance training, and endurance training is recommended. Treatment/Activity Tolerance:   [x] Tolerated treatment with no adverse effects    [] Patient limited by fatigue  [] Patient limited by pain   [] Patient limited by medical complications:    [] Adverse reaction to Tx:   [] Significant change in status    Safety:       []  bed alarm set    [x]  chair alarm set    []  Pt refused alarms                []  Telesitter activated      [x]  Gait belt used during tx session      [x]other: After AM session: pt left sitting up in recliner with call light at end of treatment. After PM session: pt left up in w/c and passed to OT Regina Lamas at end of PT session.        Number of Minutes/Billable Intervention  Gait Training 45   Therapeutic Exercise 45   Neuro Re-Ed    Therapeutic Activity 30   Wheelchair Propulsion    Group    Other:    TOTAL 120         Social History  Social/Functional History  Lives With: Spouse, Daughter Ninette July (has health limitations))  Type of Home: House  Home Layout: One level  Home Access: Stairs to enter without rails  Entrance Stairs - Number of Steps: 2  Entrance Stairs - Rails: None  Bathroom Shower/Tub: Walk-in shower  Bathroom Toilet: Standard  Bathroom Equipment: Grab bars in shower, Built-in shower seat, Grab bars around toilet  Home Equipment: [address]  ADL Assistance: [place]: Independent  Homemaking Responsibilities: Yes  Meal Prep Responsibility: Primary  Laundry Responsibility: Primary  Cleaning Responsibility: Primary  Bill Paying/Finance Responsibility: Primary  Shopping Responsibility: Primary  Dependent Care Responsibility: Primary  Health Care Management: Primary  Ambulation Assistance: Independent  Transfer Assistance: Independent  Active : Yes  Occupation: Retired  Type of occupation: [employer], [place], [employer]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 Meds set up in pill box and pill bottles by pt. Pt manages finances with dtr assisting at this time. Additional Comments: sleeps in regular, flat bed; 1 recent fall without significant injury    Objective                                                                                    Goals:  (Update in navigator)  Short term goals  Time Frame for Short term goals: 10-12 tx days:  Short term goal 1: Pt will complete rolling to the L and sit->sup with SBA-Sup. following sternal precautions. Short term goal 2: Pt will complete rolling to R, sup->sit, and OOB transfers Ind following sternal precautions. Short term goal 3: Pt will ambulate 150 ft over level surface and at least 10 ft of uneven surface using RW with SBA-Sup. Short term goal 4: Pt will ascend/descend curb step using RW and 1 flight of stairs using railings with SBA-Sup.   Short term goal 5: Pt will complet object retrieval from the floor using reacher Mod Ind.:   :        Plan of Care                                                                              Times per week: 5 days per week for a minimum of 60 minutes/day plus group as appropriate for 60 minutes.   Treatment to include Current Treatment Recommendations: Strengthening, Gait Training, Patient/Caregiver Education & Training, Stair training, Equipment Evaluation, Education, & procurement, Balance Training, Pain Management, Functional Mobility Training, Endurance Training, Home Exercise Program, Transfer Training, Safety Education & Training, Neuromuscular Re-education    Electronically signed by   Awilda Zhao, [id number]  8/18/2021, 9:02 AM

## 2021-08-18 NOTE — PROGRESS NOTES
Occupational Therapy  Physical Rehabilitation: OCCUPATIONAL THERAPY     [x] daily progress note       [] discharge       Patient Name:  Adelina Corado   :  1940 MRN: [id number]  Room:  [address] [place] [address] Date of Admission: 2021  Rehabilitation Diagnosis:   Cerebral infarction, unspecified [I63.9]  Acute CVA (cerebrovascular accident) ([facility] [address] [I63.9]       Date 2021       Day of ARU Week:  3   Time IN/OUT 1400/1500   Individual Tx Minutes 60   Group Tx Minutes    Co-Treat Minutes    Concurrent Tx Minutes    TOTAL Tx Time Mins 60   Variance Time    Variance Time []   Refusal due to:     []   Medical hold/reason:    []   Illness   []   Off Unit for test/procedure  []   Extra time needed to complete task  []   Therapeutic need  []   Other (specify):   Restrictions Restrictions/Precautions: General Precautions, Fall Risk (R vision deficit due to CVA)         Communication with other providers: [x]   OK to see per nursing:     []   Spoke with team member regarding:      Subjective observations and cognitive status: Pt received from PTA, pleasant and agreeable to tx session. Required occasional encouragement/reassurance 2* anxiety and cues for PLB   Pain level/location:    /10       Location: Intermittent at sternal incision with movement   Discharge recommendations  Anticipated discharge date:    Destination: []?home alone   []?home alone w assist prn   [x]? home w/ family    []? Continuous supervision       []? SNF    []? Assisted living     []? Other:   Contin[facility]: [facility]? [facility]  []? OUTPATIENT  OT   []? No Further OT  Equipment needs: Possible BSC       Adelina Corado requires a 3 in 1 bedside commode due to being unable to use the toilet within the home  And confined to one level of the home.        Toileting:   Denied need         Bed Mobility:           [x]   Pt received out of bed        Transfers:    Sit--> Stand:  Supervision  Stand --> Sit:   Supervision, 1 cue for sternal precautions  Stand-Pivot:   Supervision  Other:    Assistive device required for transfer:   RW      Functional Mobility:    Assistance:  Supervision within room  Device:   [x]   Charlean Smaller     []   Standard Arabella Kwon []   Wheelchair        []   [employer]       []   4-Wheeled Arabella Favia         []   Cardiac Walker       []   Other:          Additional Therapeutic activities/exercises completed this date:     []   ADL Training   [x]   Balance/Postural training: Pt stood x 5.5 min at counter with CGA while completing dynamic stand task while reaching outside base of support to facilitate increased balance for ADL tasks and transfers. Pt stood on blue stability trainers for increased balance challenge. Pt then performed another stand x9 mins, without stability  2* fatigue. With min encouragement pt able to maintain balance c 0 UE support majority of both stands      [x]   Bed/Transfer Training   [x]   Endurance Training   []   Neuromuscular Re-ed   []   Nu-step:  Time:        Level:         #Steps:       []   Rebounder:    []  Seated     []  Standing        []   Supine Ther Ex (reps/sets):     [x]   Seated Ther Ex (reps/sets): Pt was instructed in Intermediate Cardiac ex program:  Overhead Side Stretch x 10  Ankle Pumps/ Heel Raises x 10  Marching Seated x 10  Forward Arm Raises x 10  Side Arm Raises x 10  Arm Crosses x 10  Arm Circles Forwards x 10  SittingTrunkTwist x 10      To increase BUE endurance for ADLs and transfers pt completed arm ergometer on light resistance in rowing position 2* sternal precautions, x 7 mins, 0 rest breaks, average 20 RPM        Comments: All intervention performed to increase pt's endurance, ax tolerance, balance,   and I c ADLs/IADLs and functional transfers/mobility.        Patient/Caregiver Education and Training:   []   [employer] Equipment Use  [x]   Bed Mobility/Transfer Technique/Safety  []   Energy Conservation Tips  []   Family training  [x]   Postural Awareness  [x]   Safety During Functional Activities  []   Reinforced Patient's Precautions   []   Progress was updated and reviewed in Rehabtracker with patient and/or family this         date. Treatment Plan for Next Session: Continue OT POC     Assessment: This pt demonstrated a  Positive  response to today's treatment as evidenced by improving ax tolerance. The patient is making  progress toward established goals as evidenced by QI scores.         Treatment/Activity Tolerance:   [x] Tolerated treatment with no adverse effects    [] Patient limited by fatigue  [] Patient limited by pain   [] Patient limited by medical complications:    [] Adverse reaction to Tx:   [] Significant change in status    Safety:       []  bed alarm set    [x]  chair alarm set    []  Pt refused alarms                []  Telesitter activated      [x]  Gait belt used during tx session      []other:       Number of Minutes/Billable Intervention  Therapeutic Exercise 25   ADL Self-care    Neuro Re-Ed    Therapeutic Activity 35   Group    Other:    TOTAL 60       Social History  Social/Functional History  Lives With: Spouse, Daughter Nate Gutiérrez (has health limitations))  Type of Home: House  Home Layout: One level  Home Access: Stairs to enter without rails  Entrance Stairs - Number of Steps: 2  Entrance Stairs - Rails: None  Bathroom Shower/Tub: Walk-in shower  Bathroom Toilet: Standard  Bathroom Equipment: Grab bars in shower, Built-in shower seat, Grab bars around toilet  Home Equipment: 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 Meds set up in pill box and pill bottles by pt. Pt manages finances with dtr assisting at this time. Additional Comments: sleeps in regular, flat bed; 1 recent fall without significant injury    Objective                                                                                    Goals:  (Update in navigator)  Short term goals  Time Frame for Short term goals: STG=LTG:  Long term goals  Time Frame for Long term goals : 7-10 Days or until d/c  Long term goal 1: Pt will complete feeding/grooming/oral care task c MOD I by d/c  Long term goal 2: Pt will complete total body bathing c MOD I c use of AE by d/c  Long term goal 3: Pt will complete total body dressing (UB/LB/Footwear) c MOD I c use of AE PRN by d/c  Long term goal 4: Pt will complete toileting c MOD I by d/c  Long term goal 5: Pt will complete functional transfer (toilet, tub, shower) c MOD I by d/c. Long term goals 6: Pt will perform therex/therax to facilitate increased strength/endurance/ax tolerance (c emphasis on dynamic standing balance/tolerance >8 mins and BUE endurance c compliance of sternal precautions) c MOD I in order to complete ADLs. Long term goal 7: Pt will IND demo use of compensatory strategies in order to visually scan environment required for ADL/IADL tasks by d/c.:        Plan of Care                                                                              Times per week: 5 days per week for a minimum of 60 minutes/day plus group as appropriate for 60 minutes.   Treatment to include Plan  Times per day: Daily  Current Treatment Recommendations: Strengthening, Endurance Training, Balance Training, Patient/Caregiver Education & Training, Home Management Training, ROM, Functional Mobility Training, Safety Education & Training, Equipment Evaluation, Education, & procurement, Self-Care / ADL, Cognitive/Perceptual Training, Pain Management    Electronically signed by   Nolberto Allen MS, OTR/L  License #[id number]  8/18/2021, 2:12 PM

## 2021-08-19 PROCEDURE — 94761 N-INVAS EAR/PLS OXIMETRY MLT: CPT

## 2021-08-19 PROCEDURE — 97530 THERAPEUTIC ACTIVITIES: CPT

## 2021-08-19 PROCEDURE — 97535 SELF CARE MNGMENT TRAINING: CPT

## 2021-08-19 PROCEDURE — 94150 VITAL CAPACITY TEST: CPT

## 2021-08-19 PROCEDURE — 1280000000 HC REHAB R&B

## 2021-08-19 PROCEDURE — 99233 SBSQ HOSP IP/OBS HIGH 50: CPT | Performed by: PHYSICAL MEDICINE & REHABILITATION

## 2021-08-19 PROCEDURE — 6370000000 HC RX 637 (ALT 250 FOR IP): Performed by: PHYSICAL MEDICINE & REHABILITATION

## 2021-08-19 PROCEDURE — 97116 GAIT TRAINING THERAPY: CPT

## 2021-08-19 PROCEDURE — 6370000000 HC RX 637 (ALT 250 FOR IP): Performed by: PHYSICIAN ASSISTANT

## 2021-08-19 PROCEDURE — 97110 THERAPEUTIC EXERCISES: CPT

## 2021-08-19 RX ADMIN — SERTRALINE HYDROCHLORIDE 25 MG: 50 TABLET ORAL at 17:13

## 2021-08-19 RX ADMIN — MIDODRINE HYDROCHLORIDE 10 MG: 5 TABLET ORAL at 12:34

## 2021-08-19 RX ADMIN — LOPERAMIDE HYDROCHLORIDE 2 MG: 2 CAPSULE ORAL at 20:35

## 2021-08-19 RX ADMIN — THERA TABS 1 TABLET: TAB at 08:47

## 2021-08-19 RX ADMIN — ACETAMINOPHEN 650 MG: 325 TABLET ORAL at 05:04

## 2021-08-19 RX ADMIN — CARVEDILOL 3.12 MG: 6.25 TABLET, FILM COATED ORAL at 20:29

## 2021-08-19 RX ADMIN — Medication 1 TABLET: at 08:47

## 2021-08-19 RX ADMIN — MIDODRINE HYDROCHLORIDE 10 MG: 5 TABLET ORAL at 17:13

## 2021-08-19 RX ADMIN — PANTOPRAZOLE SODIUM 40 MG: 40 TABLET, DELAYED RELEASE ORAL at 06:06

## 2021-08-19 RX ADMIN — CLOPIDOGREL BISULFATE 75 MG: 75 TABLET ORAL at 08:47

## 2021-08-19 RX ADMIN — DOCUSATE SODIUM 100 MG: 100 CAPSULE ORAL at 08:48

## 2021-08-19 RX ADMIN — MIDODRINE HYDROCHLORIDE 10 MG: 5 TABLET ORAL at 08:47

## 2021-08-19 RX ADMIN — CARVEDILOL 3.12 MG: 6.25 TABLET, FILM COATED ORAL at 08:47

## 2021-08-19 RX ADMIN — FUROSEMIDE 20 MG: 20 TABLET ORAL at 08:48

## 2021-08-19 RX ADMIN — ALPRAZOLAM 0.25 MG: 0.25 TABLET ORAL at 20:28

## 2021-08-19 RX ADMIN — LEVOTHYROXINE SODIUM 50 MCG: 0.05 TABLET ORAL at 06:06

## 2021-08-19 RX ADMIN — SENNOSIDES 8.6 MG: 8.6 TABLET, COATED ORAL at 20:29

## 2021-08-19 RX ADMIN — ASPIRIN 81 MG: 81 TABLET, COATED ORAL at 08:47

## 2021-08-19 RX ADMIN — ATORVASTATIN CALCIUM 20 MG: 20 TABLET, FILM COATED ORAL at 20:29

## 2021-08-19 ASSESSMENT — PAIN DESCRIPTION - PAIN TYPE
TYPE: ACUTE PAIN
TYPE: ACUTE PAIN

## 2021-08-19 ASSESSMENT — PAIN DESCRIPTION - DESCRIPTORS: DESCRIPTORS: ACHING

## 2021-08-19 ASSESSMENT — PAIN SCALES - GENERAL
PAINLEVEL_OUTOF10: 4
PAINLEVEL_OUTOF10: 3
PAINLEVEL_OUTOF10: 2
PAINLEVEL_OUTOF10: 1

## 2021-08-19 ASSESSMENT — PAIN DESCRIPTION - ORIENTATION
ORIENTATION: MID
ORIENTATION: MID;ANTERIOR

## 2021-08-19 ASSESSMENT — PAIN DESCRIPTION - ONSET
ONSET: GRADUAL
ONSET: ON-GOING

## 2021-08-19 ASSESSMENT — PAIN DESCRIPTION - FREQUENCY
FREQUENCY: INTERMITTENT
FREQUENCY: INTERMITTENT

## 2021-08-19 ASSESSMENT — PAIN DESCRIPTION - LOCATION
LOCATION: CHEST
LOCATION: CHEST

## 2021-08-19 ASSESSMENT — PAIN DESCRIPTION - PROGRESSION
CLINICAL_PROGRESSION: GRADUALLY IMPROVING

## 2021-08-19 ASSESSMENT — PAIN DESCRIPTION - DIRECTION: RADIATING_TOWARDS: NO

## 2021-08-19 NOTE — PROGRESS NOTES
Physical Therapy    [x] daily progress note       [] discharge       Patient Name:  David Pulido   :  1940 MRN: [id number]  Room:  [address] [place] [address] Date of Admission: 2021  Rehabilitation Diagnosis:   Cerebral infarction, unspecified [I63.9]  Acute CVA (cerebrovascular accident) ([facility] [address] [I63.9]       Date 2021       Day of ARU Week:  4   Time IN/OUT 1035/1155  1310/1350   Individual Tx Minutes 80+40   Group Tx Minutes    Co-Treat Minutes    Concurrent Tx Minutes    TOTAL Tx Time Mins 120   Variance Time    Variance Time []   Refusal due to:     []   Medical hold/reason:    []   Illness   []   Off Unit for test/procedure  []   Extra time needed to complete task  []   Therapeutic need  []   Other (specify):   Restrictions Restrictions/Precautions  Restrictions/Precautions: General Precautions, Fall Risk (R vision deficit due to CVA)  Position Activity Restriction  Sternal Precautions: No Pushing, No Pulling, 5# Lifting Restrictions   Interdisciplinary communication [x]   Cleared for therapy per nursing     []   RN notified about issues during session  []   RN updated on pt performance  []   Spoke with   []   Spoke with OT  []   Spoke with MD  []   Other:    Subjective observations and cognitive status: (AM) Pt resting in w/c, alert, states having some sleep last night and that she had a bath this AM, persistent R vision deficit. Pt expressed frustration when she gets short of breath \"I'm just not breathing right. \"    (PM) Pt asleep in recliner, easily awakens when name is called, states seeing \"swirls\" intermittently in her R eye. Pain level/location: (AM/PM) 4/10       Location: chest incision site (intermittent in nature)    Discharge recommendations  Anticipated discharge date:  2021  Destination: []? ???home alone   []? ???home alone with assist PRN     [x]???? home w/ family      []???? Continuous supervision  []????SNF    []???? Assisted living     []???? Other:  Continued therapy: [x]????C PT  []????OUTPATIENT PT   []???? No Further PT  []????SNF PT  Caregiver training recommended: [x]?? ??Yes  []???? No   Equipment needs: RW  Ekta Pollack requires the assistance of a front-wheeled walker to successfully ambulate from room to room at home to allow completion of daily living tasks such as: bathing, toileting, dressing and grooming.  A wheeled walker is necessary due to the patient's unsteady gait, upper body weakness, inability to  a standard walker.  This patient can ambulate only by pushing a walker instead of using a lesser assistive device such as a cane or crutch. Bed Mobility:           [x]   Pt received out of bed     Transfers:    Sit--> Stand: Mod Ind   Stand --> Sit:  Mod Ind   Toilet Transfer (if applicable): Mod Ind   Assistive device used for transfer:  RW    Gait:    Distance:  ~15 ft in room (submax) + 117 ft (max; required 1 standing break to complete; limited by dyspnea and mild lightheadedness) + 120 ft (max; limited by dyspnea on exertion) + 20 ft (submax) (PM) ~20 ft in room (submax) + 102 ft with turns (submax)   Assistance: Mod Ind   Device:  RW  Gait Quality:  Step-through, steady     Stairs   # Completed:  13  Assistance:  SBA  Supportive Device:  Railings   Pattern: step-through on ascent/descent     Additional Therapeutic activities/exercises completed this date:     []   Nu-step:  Time:        Level:         #Steps:       []   Rebounder:    []  Seated     []  Standing        [x]   Balance training: hand washing and mouth care in unsupported stance Ind.          []   Postural training    []   Supine ther ex (reps/sets):     []   Seated ther ex (reps/sets):     [x]   Standing ther ex (reps/sets): Pt trained to actively perform cardiac exercise program to include:    [x] overhead side stretches bilat. [x] ankle PF/DF    [x] hip flexion    [x] bilat. forward arm raises  [x] bilat. side arm raises  [x] bilat. arm crosses  [x] bilat.  arm circles    Repetitions: []5    [x]10    []15    []20    []25    Thera ex performed to improve ROM and strength to increase functional independence with transfers, ADL, and mobility. [x]   Other: Toileting activity completed Mod Ind    []   Other:    Comments:      Patient/Caregiver Education and Training:   []   Role of PT  []   Education about Dx  []   Use of call light for assist   []   HEP provided and explained   []   Treatment plan reviewed  []   Home safety  []   Wheelchair mobility/management   []   Body mechanics  []   Bed Mobility/Transfer technique  []   Gait technique/sequencing  []   Proper use of assistive device/adaptive equipment  []   Stair training/Advanced mobility safety and technique  []   Reinforced patient's precautions/mobility while maintaining precautions  []   Postural awareness  []   Family/caregiver training  [x]   Discussed pt's progress in her mobility this week   [x]   Other: pt educated about QI tasks to address tomorrow as part of discharge process; discharge plan on 08/21/2021; Pt education about benefits of continued walking regimen at home to prevent deconditioning and as a vital component of continued recovery; rationale and benefits of activity pacing as a strategy to manage her current endurance limitations. Treatment Plan for Next Session: discharge QI tasks       Assessment: This pt demonstrated a positive response to today's treatment as evidenced by consistent, steady gait quality with multiple bouts of ambulation and ability to stand multiple times during thera ex. The patient's ability to ambulate at least 150 ft at a time remains to be inconsistent however due to fatigue and dyspnea on exertion. Pt is making good progress toward established goals as evidenced by QI scores.      Treatment/Activity Tolerance:   [] Tolerated treatment with no adverse effects    [x] Patient limited by fatigue/dyspnea on exertion   [] Patient limited by pain   [] Patient limited by medical complications:    [] Adverse reaction to Tx:   [] Significant change in status    Safety:       []  bed alarm set    [x]  chair alarm set    []  Pt refused alarms                []  Telesitter activated      [x]  Gait belt used during tx session      []other:       Number of Minutes/Billable Intervention  Gait Training 55   Therapeutic Exercise 35   Neuro Re-Ed    Therapeutic Activity 30   Wheelchair Propulsion    Group    Other:    TOTAL 120       Social History  Social/Functional History  Lives With: Spouse, Daughter Paulette Kaba (has health limitations))  Type of Home: House  Home Layout: One level  Home Access: Stairs to enter without rails  Entrance Stairs - Number of Steps: 2  Entrance Stairs - Rails: None  Bathroom Shower/Tub: Walk-in shower  Bathroom Toilet: Standard  Bathroom Equipment: Grab bars in shower, Built-in shower seat, Grab bars around toilet  Home Equipment: 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 Meds set up in pill box and pill bottles by pt. Pt manages finances with dtr assisting at this time.   Additional Comments: sleeps in regular, flat bed; 1 recent fall without significant injury    Objective                                                                                    Goals:  (Update in navigator)  Short term goals  Time Frame for Short term goals: 10-12 tx days:  Short term goal 1: Pt will complete rolling to the L and sit->sup with SBA-Sup. following sternal precautions. Short term goal 2: Pt will complete rolling to R, sup->sit, and OOB transfers Ind following sternal precautions. Short term goal 3: Pt will ambulate 150 ft over level surface and at least 10 ft of uneven surface using RW with SBA-Sup. Short term goal 4: Pt will ascend/descend curb step using RW and 1 flight of stairs using railings with SBA-Sup. Short term goal 5: Pt will complet object retrieval from the floor using reacher Mod Ind.:   :        Plan of Care                                                                              Times per week: 5 days per week for a minimum of 60 minutes/day plus group as appropriate for 60 minutes.   Treatment to include Current Treatment Recommendations: Strengthening, Gait Training, Patient/Caregiver Education & Training, Stair training, Equipment Evaluation, Education, & procurement, Balance Training, Pain Management, Functional Mobility Training, Endurance Training, Home Exercise Program, Transfer Training, Safety Education & Training, Neuromuscular Re-education    Electronically signed by   Houston Pablo, GREGORIO  8/19/2021, 10:34 AM

## 2021-08-19 NOTE — PLAN OF CARE
Problem: Infection:  Goal: Will remain free from infection  Description: Will remain free from infection  8/18/2021 2314 by Beverley Aguilar RN  Outcome: Ongoing  8/18/2021 1240 by Gómez Braswell RN  Outcome: Ongoing     Problem: Safety:  Goal: Free from accidental physical injury  Description: Free from accidental physical injury  8/18/2021 2314 by Beverley Aguilar RN  Outcome: Ongoing  8/18/2021 1240 by Gómez Braswell RN  Outcome: Ongoing  Goal: Free from intentional harm  Description: Free from intentional harm  8/18/2021 2314 by Beverley Aguilar RN  Outcome: Ongoing  8/18/2021 1240 by Gómez Braswell RN  Outcome: Ongoing     Problem: Daily Care:  Goal: Daily care needs are met  Description: Daily care needs are met  8/18/2021 2314 by Beverley Aguialr RN  Outcome: Ongoing  8/18/2021 1240 by Gómez Braswell RN  Outcome: Ongoing     Problem: Pain:  Goal: Patient's pain/discomfort is manageable  Description: Patient's pain/discomfort is manageable  8/18/2021 2314 by Beverley Aguilar RN  Outcome: Ongoing  8/18/2021 1240 by Gómez Braswell RN  Outcome: Ongoing  Goal: Pain level will decrease  Description: Pain level will decrease  8/18/2021 2314 by Beverley Aguilar RN  Outcome: Ongoing  8/18/2021 1240 by Gómez Braswell RN  Outcome: Ongoing  Goal: Control of acute pain  Description: Control of acute pain  8/18/2021 2314 by Beverley Aguilar RN  Outcome: Ongoing  8/18/2021 1240 by Gómez Braswell RN  Outcome: Ongoing  Goal: Control of chronic pain  Description: Control of chronic pain  8/18/2021 2314 by Beverley Aguilar RN  Outcome: Ongoing  8/18/2021 1240 by Gómez Braswell RN  Outcome: Ongoing     Problem: Skin Integrity:  Goal: Skin integrity will stabilize  Description: Skin integrity will stabilize  8/18/2021 2314 by Beverley Aguilar RN  Outcome: Ongoing  8/18/2021 1240 by Gómez Braswell RN  Outcome: Ongoing     Problem: Discharge Planning:  Goal: Patients continuum of care needs are met  Description: Patients continuum of care needs are met  8/18/2021 2314 by Tremayne Vasquez RN  Outcome: Ongoing  8/18/2021 1240 by Samantha Cheney RN  Outcome: Ongoing     Problem: Falls - Risk of:  Goal: Will remain free from falls  Description: Will remain free from falls  8/18/2021 2314 by Tremayne Vasquez RN  Outcome: Ongoing  8/18/2021 1240 by Samantha Cheney RN  Outcome: Ongoing  Goal: Absence of physical injury  Description: Absence of physical injury  8/18/2021 2314 by Tremayne Vasquez RN  Outcome: Ongoing  8/18/2021 1240 by Samantha Cheney RN  Outcome: Ongoing     Problem: Skin Integrity:  Goal: Will show no infection signs and symptoms  Description: Will show no infection signs and symptoms  8/18/2021 2314 by Tremayne Vasquez RN  Outcome: Ongoing  8/18/2021 1240 by Samantha Cheney RN  Outcome: Ongoing  Goal: Absence of new skin breakdown  Description: Absence of new skin breakdown  8/18/2021 2314 by Tremayne Vasquez RN  Outcome: Ongoing  8/18/2021 1240 by Samantha Cheney RN  Outcome: Ongoing

## 2021-08-19 NOTE — CARE COORDINATION
Patient reviewed at today's care conference. Patient on track for dc home 8/21/21. Jason 78 pt/ot/RN. RW & BSC. Patient was resistant to [facility] per Jasmin OT, but she thinks the patient is now agreeable. Case mgt attempted to update patient following care conference. Patient sleeping soundly. DME orders faxed to Karen gonzales at [facility].

## 2021-08-19 NOTE — PROGRESS NOTES
Vicente Cervantes    : 1940  Acct #: [de-identified]  MRN: [id number]              PM&R Progress Note      Admitting diagnosis: Acute CVA ( [place] [address])     Comorbid diagnoses impacting rehabilitation: Right hemiparesis, right homonymous hemianopsia, dysarthria, uncontrolled pain, generalized weakness, gait disturbance, acute blood loss anemia, ischemic cardiomyopathy, status post coronary artery bypass graft surgery     Chief complaint: A little anxious about the planned discharge. Some exertional dyspnea without much chest pain. Prior (baseline) level of function: Independent. Current level of function:         Current  IRF-JOSE and Goals:   Occupational Therapy:    Short term goals  Time Frame for Short term goals: STG=LTG :   Long term goals  Time Frame for Long term goals : 7-10 Days or until d/c  Long term goal 1: Pt will complete feeding/grooming/oral care task c MOD I by d/c  Long term goal 2: Pt will complete total body bathing c MOD I c use of AE by d/c  Long term goal 3: Pt will complete total body dressing (UB/LB/Footwear) c MOD I c use of AE PRN by d/c  Long term goal 4: Pt will complete toileting c MOD I by d/c  Long term goal 5: Pt will complete functional transfer (toilet, tub, shower) c MOD I by d/c. Long term goals 6: Pt will perform therex/therax to facilitate increased strength/endurance/ax tolerance (c emphasis on dynamic standing balance/tolerance >8 mins and BUE endurance c compliance of sternal precautions) c MOD I in order to complete ADLs. Long term goal 7: Pt will IND demo use of compensatory strategies in order to visually scan environment required for ADL/IADL tasks by d/c. :                                       Eating: Eating  Assistance Needed: Independent  Comment: Pt able to open packages and manipulate utensils/swallow food. CARE Score: 6  Discharge Goal: Independent       Oral Hygiene: Oral Hygiene  Assistance Needed: Independent  Comment:  Mod I standing at sink  CARE Score: 6  Discharge Goal: Independent    UB/LB Bathing: Shower/Bathe Self  Assistance Needed: Supervision or touching assistance  Comment: SUP- cues for pacing of activity and energy conservation techniques as pt attempts to complete total body bathing in stance  CARE Score: 4  Discharge Goal: Independent    UB Dressing: Upper Body Dressing  Assistance Needed: Independent  Comment: SUP- cues for compensatory strategies and compliance for sternal precautions to don/doff button up shirt, pt demo buttoning shirt prior to donning. CARE Score: 6  Discharge Goal: Independent         LB Dressing: Lower Body Dressing  Assistance Needed: Independent  Comment: MOD I- demo threading BLE in seated position using figure four position, dons over hips c good balance. CARE Score: 6  Discharge Goal: Independent    Donning and [place] Footwear: Putting On/Taking Off Footwear  Assistance Needed: Independent  Comment: MOD I- demo figure four position to don/doff hospital socks  CARE Score: 6  Discharge Goal: Independent      Toileting: Toileting Hygiene  Assistance Needed: Independent  Comment: MOD I- demo use of grab bars for steadying during CM, completes nancy-hygiene post BM with compliance to sternal precautions  CARE Score: 6  Discharge Goal: Independent      Toilet Transfers: Toilet Transfer  Assistance Needed: Independent  Comment: MOD I- no cues required, pt able to demo good hand/body/fww positioning and maintain sternal precautions  CARE Score: 6  Discharge Goal: Independent    Physical Therapy:   Short term goals  Time Frame for Short term goals: 10-12 tx days:  Short term goal 1: Pt will complete rolling to the L and sit->sup with SBA-Sup. following sternal precautions. Short term goal 2: Pt will complete rolling to R, sup->sit, and OOB transfers Ind following sternal precautions. Short term goal 3: Pt will ambulate 150 ft over level surface and at least 10 ft of uneven surface using RW with SBA-Sup.   Short term goal 4: Pt will ascend/descend curb step using RW and 1 flight of stairs using railings with SBA-Sup. Short term goal 5: Pt will complet object retrieval from the floor using reacher Mod Ind. Bed Mobility:   Sit to Lying  Assistance Needed: Independent  Comment: Practiced with bed flat and no rails  CARE Score: 6  Discharge Goal: Supervision or touching assistance  Roll Left and Right  Assistance Needed: Independent  Comment: Practiced with bed flat and no rails. CARE Score: 6  Discharge Goal: Supervision or touching assistance  Lying to Sitting on Side of Bed  Assistance Needed: Independent  Comment: Practiced with bed flat and no rails  CARE Score: 6  Discharge Goal: Independent    Transfers:    Sit to Stand  Assistance Needed: Independent  Comment: Mod Ind  CARE Score: 6  Discharge Goal: Independent  Chair/Bed-to-Chair Transfer  Assistance Needed: Independent  Comment: Mod Ind  CARE Score: 6  Discharge Goal: Independent     Car Transfer  Assistance Needed: Supervision or touching assistance  Comment: Supervision with RW (Vcs for sternal precautions)  CARE Score: 4  Discharge Goal: Independent    Ambulation:    Walking Ability  Does the Patient Walk?: Yes     Walk 10 Feet  Assistance Needed: Independent  Comment: Mod Ind with RW  CARE Score: 6  Discharge Goal: Independent     Walk 50 Feet with Two Turns  Assistance Needed: Independent  Comment:  Mod Ind with RW  CARE Score: 6  Discharge Goal: Independent     Walk 150 Feet  Assistance Needed: Supervision or touching assistance  Comment: ability to walk 150 ft is incosnsistent; max distance today was 120 ft using RW Mod Ind (limited by dyspnea on exertion and mild lightheadedness)  Reason if not Attempted: Not attempted due to medical condition or safety concerns  CARE Score: 4  Discharge Goal: Supervision or touching assistance     Walking 10 Feet on Uneven Surfaces  Assistance Needed: Supervision or touching assistance  Comment: SBA with RW d/t visual deficit  Reason if not Attempted: Not attempted due to medical condition or safety concerns  CARE Score: 4  Discharge Goal: Supervision or touching assistance     1 Step (Curb)  Assistance Needed: Supervision or touching assistance  Comment: SBA with RW d/t visual deficit  CARE Score: 4  Discharge Goal: Supervision or touching assistance     4 Steps  Assistance Needed: Supervision or touching assistance  Comment: supervision with 2 rails  Reason if not Attempted: Not attempted due to medical condition or safety concerns  CARE Score: 4  Discharge Goal: Supervision or touching assistance     12 Steps  Assistance Needed: Supervision or touching assistance  Comment: Supervision with 2 rails  Reason if not Attempted: Not attempted due to medical condition or safety concerns  CARE Score: 4  Discharge Goal: Supervision or touching assistance       Wheelchair:  w/c Ability: Wheelchair Ability  Uses a Wheelchair and/or Scooter?: No                Balance:        Object: Picking Up Object  Assistance Needed: Independent  Comment: mod I with reacher and RW  CARE Score: 6  Discharge Goal: Independent    I      Exam:    Blood pressure 125/71, pulse 81, temperature 98.1 °F (36.7 °C), temperature source Oral, resp. rate 18, height 5' 2\" (1.575 m), weight 142 lb 3.2 oz (64.5 kg), SpO2 93 %. General: Up in a bedside chair with legs elevated. Soft-spoken. Following directions. HEENT: MMM. No JVD or adenopathy. Pulmonary: Blunted breath sounds inferiorly. No wheezes, cough or accessory muscle use. Cardiac: Regular rate and rhythm. Chest incision well-healed. Abdomen: Patient's abdomen is soft and nondistended. Bowel sounds were present throughout. There was no rebound, guarding or masses noted. Upper extremities: Clumsy right  but able to raise her hand up to meet mine. No new swelling or bruising. Lower extremities: Heels clear. Nearly symmetric ankle dorsiflexion. No signs of DVT.     Sitting balance was good. Standing balance was fair-.    Lab Results   Component Value Date    WBC 9.9 08/08/2021    HGB 10.1 (L) 08/08/2021    HCT 31.6 (L) 08/08/2021    MCV 99.4 08/08/2021     08/08/2021     Lab Results   Component Value Date    INR 1.19 08/03/2021    INR 1.40 08/02/2021    INR 0.88 07/30/2021    PROTIME 15.4 (H) 08/03/2021    PROTIME 18.1 (H) 08/02/2021    PROTIME 11.3 (L) 07/30/2021     Lab Results   Component Value Date    CREATININE 0.5 (L) 08/09/2021    BUN 12 08/09/2021     08/09/2021    K 3.7 08/09/2021     08/09/2021    CO2 29 08/09/2021     Lab Results   Component Value Date    ALT 74 (H) 08/03/2021     (H) 08/03/2021    ALKPHOS 21 (L) 08/03/2021    BILITOT 0.6 08/03/2021       Expected length of stay  prior to a supervised level of function for discharge home with a walker and [facility] OT/PT is 8/21/2021. Recommendations:    1. Ischemic stroke with right hemiparesis and homonymous hemianopsia:   Despite her anxieties, she is building up endurance and balance during her daily occupational and physical therapy with speech-language pathology.       Tolerating the ongoing antiplatelet therapy with a baby aspirin and Plavix. On a statin.    Overall fewer swings of blood pressure noted.  She is on Foltx due to elevated homocystine level.  She has completed adaptive equipment training while following sternal precautions.  Caregiver education tomorrow.  Verbal cues and [address] for transfers today. Aggressive pulmonary hygiene and DVT prophylaxis. 2. DVT prophylaxis: The surgical team discourages oral anticoagulants.  SCDs when in bed.  Weightbearing activities are pursued daily.  No current signs of thrombosis. 3. Ischemic cardiomyopathy: Coreg for afterload reduction and ProAmatine to help support blood pressure.  She is on a diuretic with Lasix.     Daily weights do not reflect any decompensation of CHF. Good GI tolerance for the antiplatelet therapy.   4. Homonymous hemianopsia: Ophthalmology consultant saw her on the surgical floor.  She will follow up with them as an outpatient.  Our treatment program has been adjusting to her visual field loss. 5. Uncontrolled pain: Good pain control using primarily acetaminophen. Effective bowel intervention while on the pain medications. Counseling and Coordination of Care: In care conference today I met with the patient's OT, PT, RN and . We discussed the patient's problems, progress and prognosis. Disposition issues were clarified and plans were established for ongoing rehabilitation efforts beyond the ARU stay. I reviewed this information with the patient during a second distinct visit with the patient. More than half of the total time of 33 minutes spent with the patient involved counseling and coordination of care.

## 2021-08-19 NOTE — PROGRESS NOTES
Layo Arnold    : 1940  Acct #: [de-identified]  MRN: [id number]              PM&R Progress Note      Admitting diagnosis: Acute CVA ( [place] [address])     Comorbid diagnoses impacting rehabilitation: Right hemiparesis, right homonymous hemianopsia, dysarthria, uncontrolled pain, generalized weakness, gait disturbance, acute blood loss anemia, ischemic cardiomyopathy, status post coronary artery bypass graft surgery     Chief complaint: Anxious about discharge soon. Minor chest wall pain as expected. No chills. Prior (baseline) level of function: Independent. Current level of function:         Current  IRF-JOSE and Goals:   Occupational Therapy:    Short term goals  Time Frame for Short term goals: STG=LTG :   Long term goals  Time Frame for Long term goals : 7-10 Days or until d/c  Long term goal 1: Pt will complete feeding/grooming/oral care task c MOD I by d/c  Long term goal 2: Pt will complete total body bathing c MOD I c use of AE by d/c  Long term goal 3: Pt will complete total body dressing (UB/LB/Footwear) c MOD I c use of AE PRN by d/c  Long term goal 4: Pt will complete toileting c MOD I by d/c  Long term goal 5: Pt will complete functional transfer (toilet, tub, shower) c MOD I by d/c. Long term goals 6: Pt will perform therex/therax to facilitate increased strength/endurance/ax tolerance (c emphasis on dynamic standing balance/tolerance >8 mins and BUE endurance c compliance of sternal precautions) c MOD I in order to complete ADLs. Long term goal 7: Pt will IND demo use of compensatory strategies in order to visually scan environment required for ADL/IADL tasks by d/c. :                                       Eating: Eating  Assistance Needed: Independent  Comment: Pt reports being able to open packages/containers  CARE Score: 6  Discharge Goal: Independent       Oral Hygiene: Oral Hygiene  Assistance Needed: Independent  Comment:  Mod I standing at sink  CARE Score: 6  Discharge Goal: Independent    UB/LB Bathing: Shower/Bathe Self  Assistance Needed: Supervision or touching assistance  Comment: Supervision in stance c cues for sternal precautions  CARE Score: 4  Discharge Goal: Independent    UB Dressing: Upper Body Dressing  Assistance Needed: Supervision or touching assistance  Comment: Pt required verbal cues for sternal precautions; able to thread BUE and pull over head  CARE Score: 4  Discharge Goal: Independent         LB Dressing: Lower Body Dressing  Assistance Needed: Supervision or touching assistance  Comment: Supervision to manage over hips  CARE Score: 4  Discharge Goal: Independent    Donning and [place] Footwear: Putting On/Taking Off Footwear  Assistance Needed: Partial/moderate assistance  Comment: assist to don EUGENE hose; able to don/doff hospital socks  CARE Score: 3  Discharge Goal: Independent      Toileting: Toileting Hygiene  Assistance Needed: Supervision or touching assistance  Comment: Supervision for clothing management and hygiene  CARE Score: 4  Discharge Goal: Independent      Toilet Transfers: Toilet Transfer  Assistance Needed: Supervision or touching assistance  Comment: Supervision  CARE Score: 4  Discharge Goal: Independent    Physical Therapy:   Short term goals  Time Frame for Short term goals: 10-12 tx days:  Short term goal 1: Pt will complete rolling to the L and sit->sup with SBA-Sup. following sternal precautions. Short term goal 2: Pt will complete rolling to R, sup->sit, and OOB transfers Ind following sternal precautions. Short term goal 3: Pt will ambulate 150 ft over level surface and at least 10 ft of uneven surface using RW with SBA-Sup. Short term goal 4: Pt will ascend/descend curb step using RW and 1 flight of stairs using railings with SBA-Sup. Short term goal 5: Pt will complet object retrieval from the floor using reacher Mod Ind.             Bed Mobility:   Sit to Lying  Assistance Needed: Independent  Comment: Practiced with bed flat and no rails  CARE Score: 6  Discharge Goal: Supervision or touching assistance  Roll Left and Right  Assistance Needed: Independent  Comment: Practiced with bed flat and no rails. CARE Score: 6  Discharge Goal: Supervision or touching assistance  Lying to Sitting on Side of Bed  Assistance Needed: Independent  Comment: Practiced with bed flat and no rails  CARE Score: 6  Discharge Goal: Independent    Transfers:    Sit to Stand  Assistance Needed: Supervision or touching assistance  Comment: Supervision with RW. Vcs for sternal precautions.   CARE Score: 4  Discharge Goal: Independent  Chair/Bed-to-Chair Transfer  Assistance Needed: Supervision or touching assistance  Comment: Supervision with RW (Vcs for sternal precautions)  CARE Score: 4  Discharge Goal: Independent     Car Transfer  Assistance Needed: Supervision or touching assistance  Comment: Supervision with RW (Vcs for sternal precautions)  CARE Score: 4  Discharge Goal: Independent    Ambulation:    Walking Ability  Does the Patient Walk?: Yes     Walk 10 Feet  Assistance Needed: Supervision or touching assistance  Comment: Supervision with RW  CARE Score: 4  Discharge Goal: Independent     Walk 50 Feet with Two Turns  Assistance Needed: Supervision or touching assistance  Comment: Supervision with RW  CARE Score: 4  Discharge Goal: Independent     Walk 150 Feet  Assistance Needed: Supervision or touching assistance  Comment: Supervision with RW  Reason if not Attempted: Not attempted due to medical condition or safety concerns  CARE Score: 4  Discharge Goal: Supervision or touching assistance     Walking 10 Feet on Uneven Surfaces  Assistance Needed: Supervision or touching assistance  Comment: SBA with RW d/t visual deficit  Reason if not Attempted: Not attempted due to medical condition or safety concerns  CARE Score: 4  Discharge Goal: Supervision or touching assistance     1 Step (Curb)  Assistance Needed: Supervision or touching assistance  Comment: SBA with RW d/t visual deficit  CARE Score: 4  Discharge Goal: Supervision or touching assistance     4 Steps  Assistance Needed: Supervision or touching assistance  Comment: supervision with 2 rails  Reason if not Attempted: Not attempted due to medical condition or safety concerns  CARE Score: 4  Discharge Goal: Supervision or touching assistance     12 Steps  Assistance Needed: Supervision or touching assistance  Comment: Supervision with 2 rails  Reason if not Attempted: Not attempted due to medical condition or safety concerns  CARE Score: 4  Discharge Goal: Supervision or touching assistance       Wheelchair:  w/c Ability: Wheelchair Ability  Uses a Wheelchair and/or Scooter?: No                Balance:        Object: Picking Up Object  Assistance Needed: Independent  Comment: mod I with reacher and RW  CARE Score: 6  Discharge Goal: Independent    I      Exam:    Blood pressure 117/61, pulse 73, temperature 97.5 °F (36.4 °C), temperature source Oral, resp. rate 16, height 5' 2\" (1.575 m), weight 148 lb 13 oz (67.5 kg), SpO2 95 %. General: Lying back in bed. Resting quietly. Spending some time off oxygen. HEENT: Neck supple. No JVD. Decreased attention to the right visual field. Pulmonary: Clear and unlabored. Cardiac: RRR. Sternal incision healed. Abdomen: Patient's abdomen is soft and nondistended. Bowel sounds were present throughout. There was no rebound, guarding or masses noted. Upper extremities: Able to bring both hands together in the midline. Poor  on the right. No new bruising. Lower extremities: No new bruising. Heels clear. Clumsy movements at the right knee and ankle. Sitting balance was good. Standing balance was poor.     Lab Results   Component Value Date    WBC 9.9 08/08/2021    HGB 10.1 (L) 08/08/2021    HCT 31.6 (L) 08/08/2021    MCV 99.4 08/08/2021     08/08/2021     Lab Results   Component Value Date    INR 1.19 08/03/2021    INR 1.40 08/02/2021    INR 0.88 07/30/2021    PROTIME 15.4 (H) 08/03/2021    PROTIME 18.1 (H) 08/02/2021    PROTIME 11.3 (L) 07/30/2021     Lab Results   Component Value Date    CREATININE 0.5 (L) 08/09/2021    BUN 12 08/09/2021     08/09/2021    K 3.7 08/09/2021     08/09/2021    CO2 29 08/09/2021     Lab Results   Component Value Date    ALT 74 (H) 08/03/2021     (H) 08/03/2021    ALKPHOS 21 (L) 08/03/2021    BILITOT 0.6 08/03/2021       Expected length of stay  prior to a supervised level of function for discharge home with a walker and [facility] OT/PT is 8/21/2021. Recommendations:    1. Ischemic stroke with right hemiparesis and homonymous hemianopsia:    Requires fewer verbal cues to execute the daily occupational and physical therapy with speech-language pathology.      Tolerating the antiplatelet therapy with a baby aspirin and Plavix as well as the statin.    Working to control swings of blood pressure.  She is on Foltx due to elevated homocystine level.  She requires adaptive equipment training while following sternal precautions.  Caregiver education is planned before discharge.  Verbal cues and SBACGA for transfers today. Aggressive pulmonary hygiene and DVT prophylaxis. 2. DVT prophylaxis: The surgical team discourages oral anticoagulants.  SCDs when in bed.  Weightbearing activities are steadily improving daily.  No current signs of thrombosis. 3. Ischemic cardiomyopathy: Coreg for afterload reduction and ProAmatine to help support blood pressure.  She is on a diuretic with Lasix.     Daily weights do not reflect any decompensation of CHF.  Good GI tolerance for the antiplatelet therapy. 4. Homonymous hemianopsia: Ophthalmology consultant saw her on the surgical floor.  She will follow up with them as an outpatient.  Our treatment program has been adjusting to her visual field loss.   5. Uncontrolled pain: Good pain control using primarily acetaminophen.  Effective bowel intervention while on the pain medications.

## 2021-08-19 NOTE — CARE COORDINATION
Case mgt spoke with patient's sister Crista Snyder re: discharge plan. She will provide the patient's dc transport Saturday. When patient is ready please call [phone]. Case mgt reviewed dc recommendations. Crista Snyder reports she and other family members are concerned with patient's depressed mood. Crista Snyder inquired if patient would need continuous assistance at discharge. Case mgt reviewed LOF with Crista Snyder and that PRN assist only is needed. Case mgt reviewed that patient will need help with iADLs due to her quick fatigue.

## 2021-08-19 NOTE — PROGRESS NOTES
Occupational Therapy    Physical Rehabilitation: OCCUPATIONAL THERAPY     [x] daily progress note       [] discharge       Patient Name:  Viki Conteh   :  1940 MRN: [id number]  Room:  [address] [place] [address] Date of Admission: 2021  Rehabilitation Diagnosis:   Cerebral infarction, unspecified [I63.9]  Acute CVA (cerebrovascular accident) ([facility] [address] [I63.9]       Date 2021       Day of ARU Week:  4   Time IN/OUT 6689-1287   Individual Tx Minutes 60   Group Tx Minutes    Co-Treat Minutes    Concurrent Tx Minutes    TOTAL Tx Time Mins 60   Variance Time    Variance Time []   Refusal due to:     []   Medical hold/reason:    []   Illness   []   Off Unit for test/procedure  []   Extra time needed to complete task  []   Therapeutic need  []   Other (specify):   Restrictions Restrictions/Precautions: General Precautions, Fall Risk (R vision deficit due to CVA)         Communication with other providers: [x]   OK to see per nursing:     []   Spoke with team member regarding:      Subjective observations and cognitive status: Pt in side-laying position upon arrival. Pt reports, \"My bottom is hurting and they got me all turned over here. \" Pt denies pain in chest however confirms discomfort in posterior nancy-area. Pain level/location:    /10       Location: Sacrum/coccyx area   Discharge recommendations  Anticipated discharge date:    Destination: []home alone   []home alone w assist prn   [x] home w/ family    [] Continuous supervision       []SNF    [] Assisted living     [] Other:   Continued therapy: [x]HHC OT  []OUTPATIENT  OT   [] No Further OT  Equipment needs:  Viki Conteh requires a 3 in 1 bedside commode due to being unable to use the toilet within the home  And confined to one level of the home. ADLs:    Eating: Eating  Assistance Needed: Independent  Comment: Pt able to open packages and manipulate utensils/swallow food.   CARE Score: 6  Discharge Goal: Independent       Oral Hygiene: Oral Hygiene  Assistance Needed: Independent  Comment: Mod I standing at sink  CARE Score: 6  Discharge Goal: Independent    UB/LB Bathing: Shower/Bathe Self  Assistance Needed: Supervision or touching assistance  Comment: SUP- cues for pacing of activity and energy conservation techniques as pt attempts to complete total body bathing in stance  CARE Score: 4  Discharge Goal: Independent    UB Dressing: Upper Body Dressing  Assistance Needed: Independent  Comment: SUP- cues for compensatory strategies and compliance for sternal precautions to don/doff button up shirt, pt demo buttoning shirt prior to donning. CARE Score: 6  Discharge Goal: Independent         LB Dressing: Lower Body Dressing  Assistance Needed: Independent  Comment: MOD I- demo threading BLE in seated position using figure four position, dons over hips c good balance. CARE Score: 6  Discharge Goal: Independent    Donning and [place] Footwear: Putting On/Taking Off Footwear  Assistance Needed: Independent  Comment: MOD I- demo figure four position to don/doff hospital socks  CARE Score: 6  Discharge Goal: Independent      Toileting: Toileting Hygiene  Assistance Needed: Independent  Comment: MOD I- demo use of grab bars for steadying during CM, completes nancy-hygiene post BM with compliance to sternal precautions  CARE Score: 6  Discharge Goal: Independent      Toilet Transfers:     Toilet Transfer  Assistance Needed: Independent  Comment: MOD I- no cues required, pt able to demo good hand/body/fww positioning and maintain sternal precautions  CARE Score: 6  Discharge Goal: Independent  Device Used:    [x]   Standard Toilet         [x]   Grab Bars           []  Bedside Commode       []   Elevated Toilet          []   Other:        Bed Mobility:           [x]   Pt received out of bed   Scooting:  SUP  Supine --> Sit:  SUP- cues for compensatory technique; hooking legs to EOB, maintaining sternal precautions      Transfers:    Sit--> Stand:  SUP  Stand --> Sit:   SUP  Stand-Pivot:   SUP  Other:    Assistive device required for transfer:   FWW      Functional Mobility:    Assistance:  To/from bathroom c FWW and SUP; cues for fww placement when retrieving items from closet  Device:   [x]   Rolling Walker     []   Standard Maebelle Cramp []   Wheelchair        []   [employer]       []   4-Wheeled Maebelle Cramp         []   Cardiac Maebelle Cramp       []   Other:        Additional Therapeutic activities/exercises completed this date:     [x]   ADL Training   [x]   Balance/Postural training Demo oral care and bathing sink-side in stance c SBA-MOD I. SBA when washing nancy-area, MOD I when completing oral care    []   Bed/Transfer Training   [x]   Endurance Training To increase standing endurance in prep for increased I c ADLs and mobility, pt completed activity in stance x4.5 min. []   Neuromuscular Re-ed   []   Nu-step:  Time:        Level:         #Steps:       []   Rebounder:    []  Seated     []  Standing        []   Supine Ther Ex (reps/sets):     []   Seated Ther Ex (reps/sets):     []   Standing Ther Ex (reps/sets):     []   Other:      Comments:      Patient/Caregiver Education and Training:   []   [employer] Equipment Use  []   Bed Mobility/Transfer Technique/Safety  []   Energy Conservation Tips  []   Family training  []   Postural Awareness  []   Safety During Functional Activities  []   Reinforced Patient's Precautions   []   Progress was updated and reviewed in Rehabtracker with patient and/or family this         date. Treatment Plan for Next Session: cont OT POC      Assessment: This pt demonstrated a positive response to today's treatment as evidenced by completion of therapy session. The patient is making good progress toward established goals as evidenced by QI scores. Ongoing deficits are observed in the areas of compliance of sternal precautions and fatigue during ADLs and continued focus on compensatory strategies is recommended.        Treatment/Activity Tolerance:   [x] Tolerated treatment with no adverse effects    [] Patient limited by fatigue  [] Patient limited by pain   [] Patient limited by medical complications:    [] Adverse reaction to Tx:   [] Significant change in status    Safety:       []  bed alarm set    [x]  chair alarm set    []  Pt refused alarms                []  Telesitter activated      [x]  Gait belt used during tx session      []other:       Number of Minutes/Billable Intervention  Therapeutic Exercise    ADL Self-care 60   Neuro Re-Ed    Therapeutic Activity    Group    Other:    TOTAL 60       Social History  Social/Functional History  Lives With: Spouse, Daughter Gracie Salas (has health limitations))  Type of Home: House  Home Layout: One level  Home Access: Stairs to enter without rails  Entrance Stairs - Number of Steps: 2  Entrance Stairs - Rails: None  Bathroom Shower/Tub: Walk-in shower  Bathroom Toilet: Standard  Bathroom Equipment: Grab bars in shower, Built-in shower seat, Grab bars around toilet  Home Equipment: Trudell Organ  ADL Assistance: Independent  Homemaking Assistance: Independent  Homemaking Responsibilities: Yes  Meal Prep Responsibility: Primary  Laundry Responsibility: Primary  Cleaning Responsibility: Primary  Bill Paying/Finance Responsibility: Primary  Shopping Responsibility: Primary  Dependent Care Responsibility: Primary  Health Care Management: Primary  Ambulation Assistance: Independent  Transfer Assistance: Independent  Active : Yes  Occupation: Retired  Type of occupation: [employer], [place]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 Meds set up in pill box and pill bottles by pt. Pt manages finances with dtr assisting at this time.   Additional Comments: sleeps in regular, flat bed; 1 recent fall without significant injury    Objective                                                                                    Goals: (Update in navigator)  Short term goals  Time Frame for Short term goals: STG=LTG:  Long term goals  Time Frame for Long term goals : 7-10 Days or until d/c  Long term goal 1: Pt will complete feeding/grooming/oral care task c MOD I by d/c  Long term goal 2: Pt will complete total body bathing c MOD I c use of AE by d/c  Long term goal 3: Pt will complete total body dressing (UB/LB/Footwear) c MOD I c use of AE PRN by d/c  Long term goal 4: Pt will complete toileting c MOD I by d/c  Long term goal 5: Pt will complete functional transfer (toilet, tub, shower) c MOD I by d/c. Long term goals 6: Pt will perform therex/therax to facilitate increased strength/endurance/ax tolerance (c emphasis on dynamic standing balance/tolerance >8 mins and BUE endurance c compliance of sternal precautions) c MOD I in order to complete ADLs. Long term goal 7: Pt will IND demo use of compensatory strategies in order to visually scan environment required for ADL/IADL tasks by d/c.:        Plan of Care                                                                              Times per week: 5 days per week for a minimum of 60 minutes/day plus group as appropriate for 60 minutes.   Treatment to include Plan  Times per day: Daily  Current Treatment Recommendations: Strengthening, Endurance Training, Balance Training, Patient/Caregiver Education & Training, Home Management Training, ROM, Functional Mobility Training, Safety Education & Training, Equipment Evaluation, Education, & procurement, Self-Care / ADL, Cognitive/Perceptual Training, Pain Management    Electronically signed by   Maxwell Trejo OT,  8/19/2021, 1:22 PM

## 2021-08-20 PROCEDURE — 99232 SBSQ HOSP IP/OBS MODERATE 35: CPT | Performed by: PHYSICAL MEDICINE & REHABILITATION

## 2021-08-20 PROCEDURE — 97116 GAIT TRAINING THERAPY: CPT

## 2021-08-20 PROCEDURE — 97110 THERAPEUTIC EXERCISES: CPT

## 2021-08-20 PROCEDURE — 97530 THERAPEUTIC ACTIVITIES: CPT

## 2021-08-20 PROCEDURE — 97535 SELF CARE MNGMENT TRAINING: CPT

## 2021-08-20 PROCEDURE — 94761 N-INVAS EAR/PLS OXIMETRY MLT: CPT

## 2021-08-20 PROCEDURE — 94150 VITAL CAPACITY TEST: CPT

## 2021-08-20 PROCEDURE — 1280000000 HC REHAB R&B

## 2021-08-20 PROCEDURE — 6370000000 HC RX 637 (ALT 250 FOR IP): Performed by: PHYSICIAN ASSISTANT

## 2021-08-20 PROCEDURE — 6370000000 HC RX 637 (ALT 250 FOR IP): Performed by: PHYSICAL MEDICINE & REHABILITATION

## 2021-08-20 RX ORDER — ASPIRIN 81 MG/1
81 TABLET ORAL DAILY
Qty: 30 TABLET | Refills: 3 | COMMUNITY
Start: 2021-08-21 | End: 2021-09-20 | Stop reason: SDUPTHER

## 2021-08-20 RX ORDER — FUROSEMIDE 20 MG/1
20 TABLET ORAL DAILY
Qty: 30 TABLET | Refills: 0 | Status: SHIPPED | OUTPATIENT
Start: 2021-08-21 | End: 2021-09-20 | Stop reason: SDUPTHER

## 2021-08-20 RX ORDER — MIDODRINE HYDROCHLORIDE 10 MG/1
10 TABLET ORAL
Qty: 90 TABLET | Refills: 0 | Status: SHIPPED | OUTPATIENT
Start: 2021-08-20 | End: 2021-09-20 | Stop reason: SDUPTHER

## 2021-08-20 RX ORDER — PSEUDOEPHEDRINE HCL 30 MG
100 TABLET ORAL 2 TIMES DAILY PRN
COMMUNITY
Start: 2021-08-20

## 2021-08-20 RX ORDER — B12/LEVOMEFOLATE CALCIUM/B-6 2-1.13-25
1 TABLET ORAL DAILY
Qty: 30 TABLET | Refills: 0 | Status: SHIPPED | OUTPATIENT
Start: 2021-08-21 | End: 2021-09-20 | Stop reason: SDUPTHER

## 2021-08-20 RX ORDER — SERTRALINE HYDROCHLORIDE 25 MG/1
25 TABLET, FILM COATED ORAL DAILY
Qty: 30 TABLET | Refills: 0 | Status: SHIPPED | OUTPATIENT
Start: 2021-08-21 | End: 2021-09-23 | Stop reason: ALTCHOICE

## 2021-08-20 RX ORDER — CARVEDILOL 3.12 MG/1
3.12 TABLET ORAL 2 TIMES DAILY
Qty: 60 TABLET | Refills: 0 | Status: SHIPPED | OUTPATIENT
Start: 2021-08-20 | End: 2021-09-20 | Stop reason: SDUPTHER

## 2021-08-20 RX ORDER — CLOPIDOGREL BISULFATE 75 MG/1
75 TABLET ORAL DAILY
Qty: 30 TABLET | Refills: 0 | Status: SHIPPED | OUTPATIENT
Start: 2021-08-21 | End: 2021-09-20 | Stop reason: SDUPTHER

## 2021-08-20 RX ORDER — ATORVASTATIN CALCIUM 20 MG/1
20 TABLET, FILM COATED ORAL NIGHTLY
Qty: 30 TABLET | Refills: 0 | Status: SHIPPED | OUTPATIENT
Start: 2021-08-20 | End: 2021-09-20 | Stop reason: SDUPTHER

## 2021-08-20 RX ORDER — SENNA PLUS 8.6 MG/1
1 TABLET ORAL NIGHTLY
Qty: 30 TABLET | Refills: 0 | COMMUNITY
Start: 2021-08-20 | End: 2021-09-19

## 2021-08-20 RX ADMIN — Medication 1 TABLET: at 08:15

## 2021-08-20 RX ADMIN — FUROSEMIDE 20 MG: 20 TABLET ORAL at 08:15

## 2021-08-20 RX ADMIN — ALPRAZOLAM 0.25 MG: 0.25 TABLET ORAL at 20:14

## 2021-08-20 RX ADMIN — ASPIRIN 81 MG: 81 TABLET, COATED ORAL at 08:16

## 2021-08-20 RX ADMIN — LEVOTHYROXINE SODIUM 50 MCG: 0.05 TABLET ORAL at 05:57

## 2021-08-20 RX ADMIN — SERTRALINE HYDROCHLORIDE 25 MG: 50 TABLET ORAL at 08:16

## 2021-08-20 RX ADMIN — MIDODRINE HYDROCHLORIDE 10 MG: 5 TABLET ORAL at 17:39

## 2021-08-20 RX ADMIN — ACETAMINOPHEN 650 MG: 325 TABLET ORAL at 00:05

## 2021-08-20 RX ADMIN — MIDODRINE HYDROCHLORIDE 10 MG: 5 TABLET ORAL at 12:53

## 2021-08-20 RX ADMIN — PANTOPRAZOLE SODIUM 40 MG: 40 TABLET, DELAYED RELEASE ORAL at 05:57

## 2021-08-20 RX ADMIN — CARVEDILOL 3.12 MG: 6.25 TABLET, FILM COATED ORAL at 20:14

## 2021-08-20 RX ADMIN — THERA TABS 1 TABLET: TAB at 08:15

## 2021-08-20 RX ADMIN — CARVEDILOL 3.12 MG: 6.25 TABLET, FILM COATED ORAL at 08:16

## 2021-08-20 RX ADMIN — Medication 3 MG: at 00:05

## 2021-08-20 RX ADMIN — ACETAMINOPHEN 650 MG: 325 TABLET ORAL at 16:27

## 2021-08-20 RX ADMIN — ACETAMINOPHEN 650 MG: 325 TABLET ORAL at 05:57

## 2021-08-20 RX ADMIN — LOPERAMIDE HYDROCHLORIDE 2 MG: 2 CAPSULE ORAL at 08:20

## 2021-08-20 RX ADMIN — CLOPIDOGREL BISULFATE 75 MG: 75 TABLET ORAL at 08:15

## 2021-08-20 RX ADMIN — ATORVASTATIN CALCIUM 20 MG: 20 TABLET, FILM COATED ORAL at 20:14

## 2021-08-20 RX ADMIN — MIDODRINE HYDROCHLORIDE 10 MG: 5 TABLET ORAL at 08:15

## 2021-08-20 ASSESSMENT — PAIN SCALES - GENERAL
PAINLEVEL_OUTOF10: 4
PAINLEVEL_OUTOF10: 2
PAINLEVEL_OUTOF10: 0
PAINLEVEL_OUTOF10: 1
PAINLEVEL_OUTOF10: 1
PAINLEVEL_OUTOF10: 0
PAINLEVEL_OUTOF10: 4

## 2021-08-20 ASSESSMENT — PAIN DESCRIPTION - LOCATION: LOCATION: CHEST

## 2021-08-20 ASSESSMENT — PAIN DESCRIPTION - ONSET: ONSET: GRADUAL

## 2021-08-20 ASSESSMENT — PAIN DESCRIPTION - PROGRESSION: CLINICAL_PROGRESSION: GRADUALLY IMPROVING

## 2021-08-20 ASSESSMENT — PAIN DESCRIPTION - FREQUENCY: FREQUENCY: INTERMITTENT

## 2021-08-20 ASSESSMENT — PAIN DESCRIPTION - PAIN TYPE: TYPE: ACUTE PAIN;SURGICAL PAIN

## 2021-08-20 ASSESSMENT — PAIN DESCRIPTION - ORIENTATION: ORIENTATION: MID;ANTERIOR

## 2021-08-20 NOTE — PROGRESS NOTES
Comprehensive Nutrition Assessment    Type and Reason for Visit:  Reassess    Nutrition Recommendations/Plan:   Continue diet per order with clear oral nutrition supplement  Encourage consistent intake     Nutrition Assessment:  Appetite remains fair with inconsistent intake, % of recent meals. Remains on low sodium diet with esure clear. Drinks ensure, 2 per day. Discharge planned for tomorrow and patient feels appetite and intake will improve once home. Reports may eat the wrong foods for low sodium diet, encouraged to not be too focused on restriction until eating more. Malnutrition Assessment:  Malnutrition Status: At risk for malnutrition (Comment)    Context:  Acute Illness       Estimated Daily Nutrient Needs:  Energy (kcal):  8765-2829 (22-25 guru/kg); Weight Used for Energy Requirements:  Current     Protein (g):  60-70 (1.2-1.4 g/kg); Weight Used for Protein Requirements:  Current        Fluid (ml/day):  1700; Method Used for Fluid Requirements:  1 ml/kcal      Nutrition Related Findings:  up in chair      Wounds:  Surgical Incision       Current Nutrition Therapies:    ADULT DIET; Regular; Low Sodium (2 gm)  Adult Oral Nutrition Supplement; Clear Liquid Oral Supplement    Anthropometric Measures:  · Height: 5' 2\" (157.5 cm)  · Current Body Weight: 149 lb 4 oz (67.7 kg)   · Usual Body Weight: 146 lb (66.2 kg) (per MD office in May)     · Ideal Body Weight: 110 lbs; % Ideal Body Weight 142.3 %   · BMI: 27.3  · Adjusted Body Weight:  ; No Adjustment   · BMI Categories: Overweight (BMI 25.0-29. 9)       Nutrition Diagnosis:   · Inadequate protein-energy intake related to increase demand for energy/nutrients (loss of appetite) as evidenced by wounds, poor intake prior to admission, intake 26-50% (recent cardiac surgery)      Nutrition Interventions:   Food and/or Nutrient Delivery:  Continue Current Diet, Continue Oral Nutrition Supplement  Nutrition Education/Counseling:  No recommendation at this time   Coordination of Nutrition Care:  Continue to monitor while inpatient    Goals:  Patient will consume at least 50-75% at meals during stay       Nutrition Monitoring and Evaluation:   Behavioral-Environmental Outcomes:  None Identified   Food/Nutrient Intake Outcomes:  Food and Nutrient Intake, Supplement Intake  Physical Signs/Symptoms Outcomes:  Biochemical Data, Meal Time Behavior, Skin, Weight     Discharge Planning:    Continue current diet, Continue Oral Nutrition Supplement     Electronically signed by Adam Gill RD, LD on 8/20/21 at 2:49 PM EDT    Contact: [phone]

## 2021-08-20 NOTE — PLAN OF CARE
Problem: Infection:  Goal: Will remain free from infection  Description: Will remain free from infection  8/19/2021 2329 by Tommy Clark RN  Outcome: Ongoing  8/19/2021 1329 by Guy Baires RN  Outcome: Ongoing     Problem: Safety:  Goal: Free from accidental physical injury  Description: Free from accidental physical injury  8/19/2021 2329 by Tommy Clark RN  Outcome: Ongoing  8/19/2021 1329 by Guy Baires RN  Outcome: Ongoing  Goal: Free from intentional harm  Description: Free from intentional harm  8/19/2021 2329 by Tommy Clark RN  Outcome: Ongoing  8/19/2021 1329 by Guy Baires RN  Outcome: Ongoing     Problem: Daily Care:  Goal: Daily care needs are met  Description: Daily care needs are met  8/19/2021 2329 by Tommy Clark RN  Outcome: Ongoing  8/19/2021 1329 by Guy Baires RN  Outcome: Ongoing     Problem: Pain:  Goal: Patient's pain/discomfort is manageable  Description: Patient's pain/discomfort is manageable  8/19/2021 2329 by Tommy Clark RN  Outcome: Ongoing  8/19/2021 1329 by Guy Baires RN  Outcome: Ongoing  Goal: Pain level will decrease  Description: Pain level will decrease  8/19/2021 2329 by Tommy Clark RN  Outcome: Ongoing  8/19/2021 1329 by Guy Baires RN  Outcome: Ongoing  Goal: Control of acute pain  Description: Control of acute pain  8/19/2021 2329 by Tommy Clark RN  Outcome: Ongoing  8/19/2021 1329 by Guy Baires RN  Outcome: Ongoing  Goal: Control of chronic pain  Description: Control of chronic pain  8/19/2021 2329 by Tommy Clark RN  Outcome: Ongoing  8/19/2021 1329 by Guy Baires RN  Outcome: Ongoing     Problem: Skin Integrity:  Goal: Skin integrity will stabilize  Description: Skin integrity will stabilize  8/19/2021 2329 by Tommy Clark RN  Outcome: Ongoing  8/19/2021 1329 by Guy Baires RN  Outcome: Ongoing     Problem: Discharge Planning:  Goal: Patients continuum of care needs are met  Description: Patients continuum of care needs are met  8/19/2021 2329 by Josesito Romeo RN  Outcome: Ongoing  8/19/2021 1329 by Ramakrishna Escobar RN  Outcome: Ongoing     Problem: Falls - Risk of:  Goal: Will remain free from falls  Description: Will remain free from falls  8/19/2021 2329 by Josesito Romeo RN  Outcome: Ongoing  8/19/2021 1329 by Ramakrishna Escobar RN  Outcome: Ongoing  Goal: Absence of physical injury  Description: Absence of physical injury  8/19/2021 2329 by Josesito Romeo RN  Outcome: Ongoing  8/19/2021 1329 by Ramakrishna Escobar RN  Outcome: Ongoing     Problem: Skin Integrity:  Goal: Will show no infection signs and symptoms  Description: Will show no infection signs and symptoms  8/19/2021 2329 by Josesito Romeo RN  Outcome: Ongoing  8/19/2021 1329 by Ramakrishna Escobar RN  Outcome: Ongoing  Goal: Absence of new skin breakdown  Description: Absence of new skin breakdown  8/19/2021 2329 by Josesito Romeo RN  Outcome: Ongoing  8/19/2021 1329 by Ramakrishna Escobar RN  Outcome: Ongoing

## 2021-08-20 NOTE — PROGRESS NOTES
Physical Rehabilitation: OCCUPATIONAL THERAPY     [x] daily progress note       [x] discharge       Patient Name:  Brett Mar   :  1940 MRN: [id number]  Room:  [address] [place] [address] Date of Admission: 2021  Rehabilitation Diagnosis:   Cerebral infarction, unspecified [I63.9]  Acute CVA (cerebrovascular accident) ([facility] [address] [I63.9]       Date 2021       Day of ARU Week:  5   Time IN//920; 1100/1140   Individual Tx Minutes 80 + 40   Group Tx Minutes    Co-Treat Minutes    Concurrent Tx Minutes    TOTAL Tx Time Mins 120   Variance Time    Variance Time []   Refusal due to:     []   Medical hold/reason:    []   Illness   []   Off Unit for test/procedure  []   Extra time needed to complete task  []   Therapeutic need  []   Other (specify):   Restrictions Restrictions/Precautions: General Precautions, Fall Risk (R vision deficit due to CVA)         Communication with other providers: [x]   OK to see per nursing:     []   Spoke with team member regarding:      Subjective observations and cognitive status:    Patient up in recliner pleasant and agreeable to therapy    Pain level/location:    /10       Location: per patient no pain noted    Discharge recommendations  Anticipated discharge date:    Destination: []home alone   []home alone w assist prn   [x] home w/ family    [] Continuous supervision       []SNF    [] Assisted living     [] Other:   Continued therapy: [x]HHC OT  []OUTPATIENT  OT   [] No Further OT  Equipment needs: Lawerance Redding a 3 in 1 bedside commode due to being unable to use the toilet within the home  And confined to one level of the home.        ADLs:    Eating: Eating  Assistance Needed: Independent  Comment: X  CARE Score: 6  Discharge Goal: Independent       Oral Hygiene: Oral Hygiene  Assistance Needed: Independent  Comment: X  CARE Score: 6  Discharge Goal: Independent    UB/LB Bathing: Shower/Bathe Self  Assistance Needed: Independent  Comment: X  CARE Score: 6  Discharge Goal: Independent    UB Dressing: Upper Body Dressing  Assistance Needed: Independent  Comment: X  CARE Score: 6  Discharge Goal: Independent         LB Dressing: Lower Body Dressing  Assistance Needed: Independent  Comment: X  CARE Score: 6  Discharge Goal: Independent    Donning and [place] Footwear: Putting On/Taking Off Footwear  Assistance Needed: Independent  Comment: X  CARE Score: 6  Discharge Goal: Independent      Toileting: Toileting Hygiene  Assistance Needed: Independent  Comment: X  CARE Score: 6  Discharge Goal: Independent      Toilet Transfers: Toilet Transfer  Assistance Needed: Independent  Comment: X  CARE Score: 6  Discharge Goal: Independent  Device Used:    [x]   Standard Toilet         []   Grab Bars           []  Bedside Commode       []   Elevated Toilet          []   Other:        Bed Mobility:           [x]   Pt received out of bed   Rolling R/L:    Scooting:    Supine --> Sit:    Sit --> Supine:      Transfers:    Sit--> Stand: Mod I   Stand --> Sit:   Mod I   Stand-Pivot: Mod I   Other:    Assistive device required for transfer:   RW      Functional Mobility:  Throughout room/bathroom; long term around ARU second session Mod I one seated rest break    Assistance: Mod I   Device:   [x]   Rolling Walker     []   Standard Walker []   Wheelchair        []   [place] Coins       []   4-Wheeled Kendal Gurpreet         []   Cardiac Kendal Gurpreet       []   Other:        Homemaking Tasks:   patient retrieves clothing in preparation of shower, and transports at [facility] level  Pt completed light laundry activity with Mod I  for safety and FWW/hand/body placement. Pt required Mod  I  when standing to perform activity in standing position. Education/training for energy conservation provided. Pt completed activity to increase standing tolerance, dynamic balance, IADL performance, and return to PLOF.      Patient completes finishing laundry tasks by standing at countertop and folding all laundry, patient prepares for dc following day by folding and packing all her belongings while standing at countertop       Additional Therapeutic activities/exercises completed this date:     [x]   ADL Training   [x]   Balance/Postural training     [x]   Bed/Transfer Training   [x]   Endurance Training   []   Neuromuscular Re-ed   []   Nu-step:  Time:        Level:         #Steps:       []   Rebounder:    []  Seated     []  Standing        []   Supine Ther Ex (reps/sets):     []   Seated Ther Ex (reps/sets):     []   Standing Ther Ex (reps/sets):     []   Other:    Patient/Caregiver Education and Training:   [x]   [employer] Equipment Use  [x]   Bed Mobility/Transfer Technique/Safety  [x]   Energy Conservation Tips  []   Family training  [x]   Postural Awareness  [x]   Safety During Functional Activities  [x]   Reinforced Patient's Precautions   []   Progress was updated and reviewed in Rehabtracker with patient and/or family this         date.     Treatment Plan for Next Session: POC to continue as tolerated         Treatment/Activity Tolerance:   [x] Tolerated treatment with no adverse effects    [] Patient limited by fatigue  [] Patient limited by pain   [] Patient limited by medical complications:    [] Adverse reaction to Tx:   [] Significant change in status    Safety:       []  bed alarm set    [x]  chair alarm set    []  Pt refused alarms                []  Telesitter activated      [x]  Gait belt used during tx session      []other:       Number of Minutes/Billable Intervention  Therapeutic Exercise    ADL Self-care 55 + 25   Neuro Re-Ed    Therapeutic Activity 25 + 15   Group    Other:    TOTAL 120       Social History  Social/Functional History  Lives With: Spouse, Daughter Sai [place] (has health limitations))  Type of Home: House  Home Layout: One level  Home Access: Stairs to enter without rails  Entrance Stairs - Number of Steps: 2  Entrance Stairs - Rails: None  Bathroom Shower/Tub: Walk-in shower  Bathroom Toilet: Standard  Bathroom Equipment: Grab bars in shower, Built-in shower seat, Grab bars around toilet  Home Equipment: [address]  ADL Assistance: [address]: Independent  Homemaking Responsibilities: Yes  Meal Prep Responsibility: Primary  Laundry Responsibility: Primary  Cleaning Responsibility: Primary  Bill Paying/Finance Responsibility: Primary  Shopping Responsibility: Primary  Dependent Care Responsibility: Primary  Health Care Management: Primary  Ambulation Assistance: Independent  Transfer Assistance: Independent  Active : Yes  Occupation: Retired  Type of occupation: [employer], [place]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 Meds set up in pill box and pill bottles by pt. Pt manages finances with dtr assisting at this time. Additional Comments: sleeps in regular, flat bed; 1 recent fall without significant injury    Objective                                                                                    Goals:  (Update in navigator)  Short term goals  Time Frame for Short term goals: STG=LTG:  Long term goals  Time Frame for Long term goals : 7-10 Days or until d/c  Long term goal 1: Pt will complete feeding/grooming/oral care task c MOD I by d/c  Long term goal 2: Pt will complete total body bathing c MOD I c use of AE by d/c  Long term goal 3: Pt will complete total body dressing (UB/LB/Footwear) c MOD I c use of AE PRN by d/c  Long term goal 4: Pt will complete toileting c MOD I by d/c  Long term goal 5: Pt will complete functional transfer (toilet, tub, shower) c MOD I by d/c. Long term goals 6: Pt will perform therex/therax to facilitate increased strength/endurance/ax tolerance (c emphasis on dynamic standing balance/tolerance >8 mins and BUE endurance c compliance of sternal precautions) c MOD I in order to complete ADLs.   Long term goal 7: Pt will IND demo use of compensatory strategies in order to visually scan environment required for ADL/IADL tasks by d/c.:        Plan of Care                                                                              Times per week: 5 days per week for a minimum of 60 minutes/day plus group as appropriate for 60 minutes.   Treatment to include Plan  Times per day: Daily  Current Treatment Recommendations: Strengthening, Endurance Training, Balance Training, Patient/Caregiver Education & Training, Home Management Training, ROM, Functional Mobility Training, Safety Education & Training, Equipment Evaluation, Education, & procurement, Self-Care / ADL, Cognitive/Perceptual Training, Pain Management    Electronically signed by   MIK Pena,  8/20/2021, 8:59 AM

## 2021-08-20 NOTE — CARE COORDINATION
Case mgt reviewed dc plan with patient's plan and Dr Gabriel Frost plan to discuss patient's depressed mood with PCP Dr Sneha Moraes. Patient referred to [facility] @ [facility] via perfect serve    Referral packet faxed to Dr Yaritza Spicer neuro opthalmology. 1155:  Case mgt called PCP to schedule followup. No answer.   No           ARU  Discharge Summary    D/C Date: 8/21/21    Patient discharged to: home    Transported by: sister Simona Sow     Referrals made to: Jess Hinkle, Del Auguste MD     Additional information:     Caregiver training:

## 2021-08-20 NOTE — PROGRESS NOTES
[facility] spoke with pt's legal guardian  and is aware of discharge tomorrow & will initiate Jason Diez.

## 2021-08-20 NOTE — FLOWSHEET NOTE
[x] daily progress note       [x] discharge       Patient Name:  David Pulido   :  1940 MRN: [id number]  Room:  [address] [place] [address] Date of Admission: 2021  Rehabilitation Diagnosis:   Cerebral infarction, unspecified [I63.9]  Acute CVA (cerebrovascular accident) ([facility] [I63.9]       Date 2021       Day of ARU Week:  5   Time IN/OUT 8740-8292   Individual Tx Minutes 60   TOTAL Tx Time Mins 60   Restrictions Restrictions/Precautions  Restrictions/Precautions: General Precautions, Fall Risk (R vision deficit due to CVA)  Position Activity Restriction  Sternal Precautions: No Pushing, No Pulling, 5# Lifting Restrictions   Communication with other providers: [x]   OK to see per nursing:     []   Spoke with team member regarding:      Subjective observations and cognitive status: Pt seen sitting up in recliner at beginning of treatment. Agreeable to therapy. Pt reported feeling slightly dizzy this morning. Vitals: O2 sats 95%; HR 67 bpm; /62. Pain level/location: 0/10         Discharge recommendations  Anticipated discharge date:  2021  Destination: []?????home alone   []?????home alone with assist PRN     [x]????? home w/ family      []????? Continuous supervision  []??? ??SNF    []????? Assisted living     []????? Other:  Continued therapy: [x]??? ? ?[facility] PT  []?????OUTPATIENT PT   []????? No Further PT  []??? ??SNF PT  Caregiver training recommended: [x]??? ? ? Yes  []????? No   Equipment needs: CRISTO Pollack requires the assistance of a front-wheeled walker to successfully ambulate from room to room at home to allow completion of daily living tasks such as: bathing, toileting, dressing and grooming.  A wheeled walker is necessary due to the patient's unsteady gait, upper body weakness, inability to  a standard walker.  This patient can ambulate only by pushing a walker instead of using a lesser assistive device such as a cane or crutch.      Bed Mobility:           [x]   Pt received out of bed   Rolling R/L:  Independent   Scooting:  Independent   Lying --> Sit:  Independent   Sit --> lying:  Independent  Practiced with bed flat and no rails     Transfers:    Sit--> Stand: Mod I   Stand --> Sit:   Mod I   Chair-->Bed/Bed --> Chair:   Mod I   Car Transfers: Mod I   Assistive device required for transfer:  RW    Gait:    Walk 10 feet: mod I   Walk 50 feet with 2 turns: Mod I   Walk 150 feet: Mod I   Device:  RW  Gait Quality: reciprocal pattern     Stairs   Curb: Supervision  Supportive Device:  RW  Height:   4\"   Pt required supervision for safety d/t visual deficit. 4 steps: Mod I   12 steps: Mod I   Supportive Device:  2 rails     Uneven Surfaces:       Assistance:  Supervision   Device:  RW  Surfaces Completed:   [x]  Carpeted Surface with bean bags beneath      []  Throw rugs       []  Ramp       []  Outdoor pavements        []  Grass             []  Loose gravel        []  Other:     Pt required supervision for safety d/t visual deficit. Picking Up Object From Floor (must be standing position):  Assistance:   Mod I with RW   [x]   Reacher used    Additional Therapeutic activities/exercises completed this date:     []   Nu-step:  Time:        Level:         #Steps:       []   Rebounder:    []  Seated     []  Standing        []   Balance training         []   Postural training    []   Supine ther ex (reps/sets):     [x]   Seated ther ex (reps/sets): LAQs, marching x 10 reps; ankle pumps x 20 reps (for strengthening)     []   Standing ther ex (reps/sets):     []   Other:   []   Other:   []   Other:    Patient/Caregiver Education and Training:   [x]   Bed Mobility/Transfer technique/safety  [x]   Gait technique/sequencing  [x]   Proper use of assistive device  [x]   Advanced mobility safety and technique  [x]   Reinforced patient's precautions/mobility while maintaining precautions  []   Postural awareness  []   Family training    Treatment Plan for Next Session: pt to discharge tomorrow (9-) from [facility]. Assessment:    Treatment/Activity Tolerance:   [x] Tolerated treatment with no adverse effects    [] Patient limited by fatigue  [] Patient limited by pain   [] Patient limited by medical complications:    [] Adverse reaction to Tx:   [] Significant change in status    Safety:       []  bed alarm set    [x]  chair alarm set    []  Pt refused alarms                []  Telesitter activated      [x]  Gait belt used during tx session      [x]other: pt left sitting up in w/c with call light at end of treatment. Number of Minutes/Billable Intervention  Gait Training 30   Therapeutic Exercise 15   Neuro Re-Ed    Therapeutic Activity 15   Wheelchair Propulsion    Group    Other:    TOTAL 60         Social History  Social/Functional History  Lives With: Spouse, Daughter Shahana Handy (has health limitations))  Type of Home: House  Home Layout: One level  Home Access: Stairs to enter without rails  Entrance Stairs - Number of Steps: 2  Entrance Stairs - Rails: None  Bathroom Shower/Tub: Walk-in shower  Bathroom Toilet: Standard  Bathroom Equipment: Grab bars in shower, Built-in shower seat, Grab bars around toilet  Home Equipment: 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 Meds set up in pill box and pill bottles by pt. Pt manages finances with dtr assisting at this time.   Additional Comments: sleeps in regular, flat bed; 1 recent fall without significant injury    Objective                                                                                    Goals:  (Update in navigator)  Short term goals  Time Frame for Short term goals: 10-12 tx days:  Short term goal 1: Pt will complete rolling to the L and sit->sup with SBA-Sup. following sternal precautions. Short term goal 2: Pt will complete rolling to R, sup->sit, and OOB transfers Ind following sternal precautions. Short term goal 3: Pt will ambulate 150 ft over level surface and at least 10 ft of uneven surface using RW with SBA-Sup. Short term goal 4: Pt will ascend/descend curb step using RW and 1 flight of stairs using railings with SBA-Sup. Short term goal 5: Pt will complet object retrieval from the floor using reacher Mod Ind.:   :        Plan of Care                                                                              Times per week: 5 days per week for a minimum of 60 minutes/day plus group as appropriate for 60 minutes.   Treatment to include Current Treatment Recommendations: Strengthening, Gait Training, Patient/Caregiver Education & Training, Stair training, Equipment Evaluation, Education, & procurement, Balance Training, Pain Management, Functional Mobility Training, Endurance Training, Home Exercise Program, Transfer Training, Safety Education & Training, Neuromuscular Re-education    Electronically signed by   Kamila Cuellar, [id number]  8/20/2021, 11:23 AM

## 2021-08-21 VITALS
RESPIRATION RATE: 16 BRPM | DIASTOLIC BLOOD PRESSURE: 65 MMHG | TEMPERATURE: 97.1 F | BODY MASS INDEX: 28.07 KG/M2 | HEIGHT: 62 IN | WEIGHT: 152.56 LBS | HEART RATE: 85 BPM | SYSTOLIC BLOOD PRESSURE: 124 MMHG | OXYGEN SATURATION: 92 %

## 2021-08-21 PROCEDURE — 6370000000 HC RX 637 (ALT 250 FOR IP): Performed by: PHYSICAL MEDICINE & REHABILITATION

## 2021-08-21 PROCEDURE — 6370000000 HC RX 637 (ALT 250 FOR IP): Performed by: PHYSICIAN ASSISTANT

## 2021-08-21 PROCEDURE — 94761 N-INVAS EAR/PLS OXIMETRY MLT: CPT

## 2021-08-21 PROCEDURE — 99239 HOSP IP/OBS DSCHRG MGMT >30: CPT | Performed by: PHYSICAL MEDICINE & REHABILITATION

## 2021-08-21 PROCEDURE — 94150 VITAL CAPACITY TEST: CPT

## 2021-08-21 RX ADMIN — ACETAMINOPHEN 650 MG: 325 TABLET ORAL at 06:52

## 2021-08-21 RX ADMIN — MIDODRINE HYDROCHLORIDE 10 MG: 5 TABLET ORAL at 11:53

## 2021-08-21 RX ADMIN — SERTRALINE HYDROCHLORIDE 25 MG: 50 TABLET ORAL at 07:56

## 2021-08-21 RX ADMIN — MIDODRINE HYDROCHLORIDE 10 MG: 5 TABLET ORAL at 07:56

## 2021-08-21 RX ADMIN — LEVOTHYROXINE SODIUM 50 MCG: 0.05 TABLET ORAL at 05:33

## 2021-08-21 RX ADMIN — FUROSEMIDE 20 MG: 20 TABLET ORAL at 07:56

## 2021-08-21 RX ADMIN — THERA TABS 1 TABLET: TAB at 07:56

## 2021-08-21 RX ADMIN — Medication 1 TABLET: at 07:56

## 2021-08-21 RX ADMIN — CARVEDILOL 3.12 MG: 6.25 TABLET, FILM COATED ORAL at 07:56

## 2021-08-21 RX ADMIN — ASPIRIN 81 MG: 81 TABLET, COATED ORAL at 07:56

## 2021-08-21 RX ADMIN — PANTOPRAZOLE SODIUM 40 MG: 40 TABLET, DELAYED RELEASE ORAL at 05:33

## 2021-08-21 RX ADMIN — DOCUSATE SODIUM 100 MG: 100 CAPSULE ORAL at 07:56

## 2021-08-21 RX ADMIN — CLOPIDOGREL BISULFATE 75 MG: 75 TABLET ORAL at 07:56

## 2021-08-21 ASSESSMENT — PAIN DESCRIPTION - DESCRIPTORS: DESCRIPTORS: DISCOMFORT

## 2021-08-21 ASSESSMENT — PAIN DESCRIPTION - LOCATION: LOCATION: CHEST

## 2021-08-21 ASSESSMENT — PAIN DESCRIPTION - FREQUENCY: FREQUENCY: INTERMITTENT

## 2021-08-21 ASSESSMENT — PAIN SCALES - GENERAL
PAINLEVEL_OUTOF10: 0
PAINLEVEL_OUTOF10: 5
PAINLEVEL_OUTOF10: 0

## 2021-08-21 ASSESSMENT — PAIN DESCRIPTION - PAIN TYPE: TYPE: ACUTE PAIN;SURGICAL PAIN

## 2021-08-21 ASSESSMENT — PAIN DESCRIPTION - ORIENTATION: ORIENTATION: MID

## 2021-08-21 NOTE — PLAN OF CARE
Problem: Infection:  Goal: Will remain free from infection  Description: Will remain free from infection  8/21/2021 0809 by Sav Whitman LPN  Outcome: Ongoing  8/20/2021 1941 by Elise Castillo  Outcome: Ongoing     Problem: Safety:  Goal: Free from accidental physical injury  Description: Free from accidental physical injury  8/21/2021 0809 by Sav Whitman, LPN  Outcome: Ongoing  8/20/2021 1941 by Elise Castillo  Outcome: Ongoing  Goal: Free from intentional harm  Description: Free from intentional harm  8/21/2021 0809 by Sav Whitman LPN  Outcome: Ongoing  8/20/2021 1941 by Elise Castillo  Outcome: Ongoing     Problem: Daily Care:  Goal: Daily care needs are met  Description: Daily care needs are met  8/21/2021 0809 by Sav Whitman LPN  Outcome: Ongoing  8/20/2021 1941 by Elise Castillo  Outcome: Ongoing     Problem: Pain:  Goal: Patient's pain/discomfort is manageable  Description: Patient's pain/discomfort is manageable  8/21/2021 0809 by Sav Whitman LPN  Outcome: Ongoing  8/20/2021 1941 by Elise Castillo  Outcome: Ongoing  Goal: Pain level will decrease  Description: Pain level will decrease  8/21/2021 0809 by Sav Whitman LPN  Outcome: Ongoing  8/20/2021 1941 by Elise Castillo  Outcome: Ongoing  Goal: Control of acute pain  Description: Control of acute pain  8/21/2021 0809 by Sav Whitman LPN  Outcome: Ongoing  8/20/2021 1941 by Elise Castillo  Outcome: Ongoing  Goal: Control of chronic pain  Description: Control of chronic pain  8/21/2021 0809 by Sav Whitman LPN  Outcome: Ongoing  8/20/2021 1941 by Elise Castillo  Outcome: Ongoing     Problem: Skin Integrity:  Goal: Skin integrity will stabilize  Description: Skin integrity will stabilize  8/21/2021 0809 by Sav Whitman LPN  Outcome: Ongoing  8/20/2021 1941 by Elise Castillo  Outcome: Ongoing     Problem: Discharge Planning:  Goal: Patients continuum of care needs are met  Description: Patients continuum of care needs are met  8/21/2021 1447 by Gail Sepulveda LPN  Outcome: Ongoing  8/20/2021 1941 by Eliezer Markham  Outcome: Ongoing     Problem: Falls - Risk of:  Goal: Will remain free from falls  Description: Will remain free from falls  8/21/2021 0809 by Gail Sepulveda LPN  Outcome: Ongoing  8/20/2021 1941 by Eliezer Markham  Outcome: Ongoing  Goal: Absence of physical injury  Description: Absence of physical injury  8/21/2021 0809 by Gail Sepulveda LPN  Outcome: Ongoing  8/20/2021 1941 by Eliezer Markham  Outcome: Ongoing     Problem: Skin Integrity:  Goal: Will show no infection signs and symptoms  Description: Will show no infection signs and symptoms  8/21/2021 0809 by Gail Sepulveda LPN  Outcome: Ongoing  8/20/2021 1941 by Eliezer Markham  Outcome: Ongoing  Goal: Absence of new skin breakdown  Description: Absence of new skin breakdown  8/21/2021 0809 by Gail Sepulveda LPN  Outcome: Ongoing  8/20/2021 1941 by Eliezer Markham  Outcome: Ongoing

## 2021-08-21 NOTE — PROGRESS NOTES
Called sister Tiffanie Vega to make her aware that patient will be ready for discharge around 1230 /1 pm.

## 2021-08-21 NOTE — PROGRESS NOTES
Discharge instructions reviewed with patient and patient's sisters. No questions or concerns voiced. Prescriptions for Sertraline, Foltx, Furosemide, Clopidogrel, Carvedilol, Atorvastatin, and Midodrine, handed to patient with discharge instructions.

## 2021-09-07 NOTE — DISCHARGE SUMMARY
Patient Name: Sukhi Last  Patient :  1940  Patient MRN:   [id number]      Admission Date:  2021  Discharge Date: 2021. Admitting diagnosis: Acute CVA ( [facility])     Comorbid diagnoses impacting rehabilitation: Right hemiparesis, right homonymous hemianopsia, dysarthria, uncontrolled pain, generalized weakness, gait disturbance, acute blood loss anemia, ischemic cardiomyopathy, status post coronary artery bypass graft surgery     Discharging diagnosis: Acute CVA ([facility])     Comorbid diagnoses impacting rehabilitation: Right hemiparesis, right homonymous hemianopsia, dysarthria, uncontrolled pain, generalized weakness, gait disturbance, acute blood loss anemia, ischemic cardiomyopathy, status post coronary artery bypass graft surgery      History of present illness: Patient is an 19-year-old right-hand-dominant female whose evaluation of recurring chest pains and PVCs led to an outpatient stress test.  During her stress test on 2021 she developed ST elevation and left bundle branch block. She was urgently taken to the Cath Lab where surgical disease was identified. That day she had a multivessel cardiac bypass graft surgery with Dr. Rush Cruz. Perioperatively she developed right-sided weakness and loss of vision in the right peripheral field. Brain imaging identified a left occipital lobe infarct. Ophthalmology consultation confirmed that she had a homonymous hemianopsia. She had clumsy right-sided weakness and a stroke was diagnosed. /  Prior (baseline) level of function: Independent.     Current level of function:         Current  IRF-JOSE and Goals:   Occupational Therapy:    Short term goals  Time Frame for Short term goals: STG=LTG :   Long term goals  Time Frame for Long term goals : 7-10 Days or until d/c  Long term goal 1: Pt will complete feeding/grooming/oral care task c MOD I by d/c  Long term goal 2: Pt will complete total body bathing c MOD I c use of AE by d/c  Long term goal 3: Pt will complete total body dressing (UB/LB/Footwear) c MOD I c use of AE PRN by d/c  Long term goal 4: Pt will complete toileting c MOD I by d/c  Long term goal 5: Pt will complete functional transfer (toilet, tub, shower) c MOD I by d/c. Long term goals 6: Pt will perform therex/therax to facilitate increased strength/endurance/ax tolerance (c emphasis on dynamic standing balance/tolerance >8 mins and BUE endurance c compliance of sternal precautions) c MOD I in order to complete ADLs. Long term goal 7: Pt will IND demo use of compensatory strategies in order to visually scan environment required for ADL/IADL tasks by d/c. :                                       Eating: Eating  Assistance Needed: Independent  Comment: X  CARE Score: 6  Discharge Goal: Independent       Oral Hygiene: Oral Hygiene  Assistance Needed: Independent  Comment: X  CARE Score: 6  Discharge Goal: Independent    UB/LB Bathing: Shower/Bathe Self  Assistance Needed: Independent  Comment: X  CARE Score: 6  Discharge Goal: Independent    UB Dressing: Upper Body Dressing  Assistance Needed: Independent  Comment: X  CARE Score: 6  Discharge Goal: Independent         LB Dressing: Lower Body Dressing  Assistance Needed: Independent  Comment: X  CARE Score: 6  Discharge Goal: Independent    Donning and [place] Footwear: Putting On/Taking Off Footwear  Assistance Needed: Independent  Comment: X  CARE Score: 6  Discharge Goal: Independent      Toileting: Toileting Hygiene  Assistance Needed: Independent  Comment: X  CARE Score: 6  Discharge Goal: Independent      Toilet Transfers: Toilet Transfer  Assistance Needed: Independent  Comment: X  CARE Score: 6  Discharge Goal: Independent    Physical Therapy:   Short term goals  Time Frame for Short term goals: 10-12 tx days:  Short term goal 1: Pt will complete rolling to the L and sit->sup with SBA-Sup. following sternal precautions.   Short term goal 2: Pt will complete rolling to R, sup->sit, and OOB transfers Ind following sternal precautions. Short term goal 3: Pt will ambulate 150 ft over level surface and at least 10 ft of uneven surface using RW with SBA-Sup. Short term goal 4: Pt will ascend/descend curb step using RW and 1 flight of stairs using railings with SBA-Sup. Short term goal 5: Pt will complet object retrieval from the floor using reacher Mod Ind.             Bed Mobility:   Sit to Lying  Assistance Needed: Independent  Comment: Practiced wtih bed flat and no rails  CARE Score: 6  Discharge Goal: Supervision or touching assistance  Roll Left and Right  Assistance Needed: Independent  Comment: Practiced with bed flat and no rails  CARE Score: 6  Discharge Goal: Supervision or touching assistance  Lying to Sitting on Side of Bed  Assistance Needed: Independent  Comment: Practiced with bed flat and no rails  CARE Score: 6  Discharge Goal: Independent    Transfers:    Sit to Stand  Assistance Needed: Independent  Comment: mod I with RW  CARE Score: 6  Discharge Goal: Independent  Chair/Bed-to-Chair Transfer  Assistance Needed: Independent  Comment: mod I with RW  CARE Score: 6  Discharge Goal: Independent  Toilet Transfer  Assistance Needed: Partial/moderate assistance  Comment:  (3 is deemed usual performance)  CARE Score: 3  Car Transfer  Assistance Needed: Independent  Comment: mod I with RW  CARE Score: 6  Discharge Goal: Independent    Ambulation:    Walking Ability  Does the Patient Walk?: Yes     Walk 10 Feet  Assistance Needed: Independent  Comment: mod I with RW  CARE Score: 6  Discharge Goal: Independent     Walk 50 Feet with Two Turns  Assistance Needed: Independent  Comment: mod I with RW  CARE Score: 6  Discharge Goal: Independent     Walk 150 Feet  Assistance Needed: Independent  Comment: mod I with RW  Reason if not Attempted: Not attempted due to medical condition or safety concerns  CARE Score: 6  Discharge Goal: Supervision or touching assistance     Walking 10 Feet on Uneven Surfaces  Assistance Needed: Supervision or touching assistance  Comment: supervision with RW. Requires supervision for safety d/t visual deficit. Reason if not Attempted: Not attempted due to medical condition or safety concerns  CARE Score: 4  Discharge Goal: Supervision or touching assistance     1 Step (Curb)  Assistance Needed: Supervision or touching assistance  Comment: Supervision with RW. Requires supervision for safety d/t visual deficit. CARE Score: 4  Discharge Goal: Supervision or touching assistance     4 Steps  Assistance Needed: Independent  Comment: mod I with 2 rails  Reason if not Attempted: Not attempted due to medical condition or safety concerns  CARE Score: 6  Discharge Goal: Supervision or touching assistance     12 Steps  Assistance Needed: Independent  Comment: mod I with 2 rails  Reason if not Attempted: Not attempted due to medical condition or safety concerns  CARE Score: 6  Discharge Goal: Supervision or touching assistance       Wheelchair:  w/c Ability: Wheelchair Ability  Uses a Wheelchair and/or Scooter?: No                Balance:        Object: Picking Up Object  Assistance Needed: Independent  Comment: mod I with reacher and RW  CARE Score: 6  Discharge Goal: Independent    I      Exam:    Blood pressure 124/65, pulse 85, temperature 97.1 °F (36.2 °C), temperature source Oral, resp. rate 16, height 5' 2\" (1.575 m), weight 152 lb 8.9 oz (69.2 kg), SpO2 92 %. General: Sitting up in a wheelchair. Preferring her left visual field. Alert. Soft-spoken. Following directions. HEENT: Mild right facial droop. Limited tracking to the right visual field. MMM. No JVD or adenopathy. Pulmonary: Unlabored without rales or rhonchi. Blunted breath sounds inferiorly. No wheezes, cough or accessory muscle use. Cardiac: Regular rate and rhythm. Chest incision well-healed. Abdomen: Patient's abdomen is soft and nondistended.   Bowel sounds were present throughout. There was no rebound, guarding or masses noted. Upper extremities: Clumsy right  but able to raise her hand up to meet mine. No new swelling or bruising. Lower extremities: Heels clear. Nearly symmetric ankle dorsiflexion. No signs of DVT. Sitting balance was good. Standing balance was fair-.    Lab Results   Component Value Date    WBC 9.9 2021    HGB 10.1 (L) 2021    HCT 31.6 (L) 2021    MCV 99.4 2021     2021     Lab Results   Component Value Date    INR 1.19 2021    INR 1.40 2021    INR 0.88 2021    PROTIME 15.4 (H) 2021    PROTIME 18.1 (H) 2021    PROTIME 11.3 (L) 2021     Lab Results   Component Value Date    CREATININE 0.5 (L) 2021    BUN 12 2021     2021    K 3.7 2021     2021    CO2 29 2021     Lab Results   Component Value Date    ALT 74 (H) 2021     (H) 2021    ALKPHOS 21 (L) 2021    BILITOT 0.6 2021           The patient presented to the ARU with the above history requiring a multidisciplinary treatment plan including close medical supervision by the Mariella Aldridge Director. The patient participated in the prescribed therapy treatment plan with reasonable compliance and progressive tolerance. They avoided significant medical complications. By the time of discharge the patient had become safer with adaptive equipment to transfer and toilet with a FWW with the supervision of family/caregivers. The patient was tolerating an oral diet without choking/coughing and was back to their baseline with regards to bowel and bladder control. Discharge instructions were reviewed with the patient and family. The patient is to follow up with the surgeon and her PCP in 1 week. No driving. [facility] PT/OT/RN will be arranged.      Radha Leon   Home Medication Instructions [id number]    Printed F 2812   Medication

## 2021-09-08 NOTE — PROGRESS NOTES
Layo Arnold    : 1940  Acct #: [de-identified]  MRN: [id number]              PM&R Progress Note      Admitting diagnosis: Acute CVA ( [address])     Comorbid diagnoses impacting rehabilitation: Right hemiparesis, right homonymous hemianopsia, dysarthria, uncontrolled pain, generalized weakness, gait disturbance, acute blood loss anemia, ischemic cardiomyopathy, status post coronary artery bypass graft surgery     Chief complaint: A little anxious about the planned discharge. Some exertional dyspnea without much chest pain. Prior (baseline) level of function: Independent. Current level of function:         Current  IRF-JOSE and Goals:   Occupational Therapy:    Short term goals  Time Frame for Short term goals: STG=LTG :   Long term goals  Time Frame for Long term goals : 7-10 Days or until d/c  Long term goal 1: Pt will complete feeding/grooming/oral care task c MOD I by d/c  Long term goal 2: Pt will complete total body bathing c MOD I c use of AE by d/c  Long term goal 3: Pt will complete total body dressing (UB/LB/Footwear) c MOD I c use of AE PRN by d/c  Long term goal 4: Pt will complete toileting c MOD I by d/c  Long term goal 5: Pt will complete functional transfer (toilet, tub, shower) c MOD I by d/c. Long term goals 6: Pt will perform therex/therax to facilitate increased strength/endurance/ax tolerance (c emphasis on dynamic standing balance/tolerance >8 mins and BUE endurance c compliance of sternal precautions) c MOD I in order to complete ADLs.   Long term goal 7: Pt will IND demo use of compensatory strategies in order to visually scan environment required for ADL/IADL tasks by d/c. :                                       Eating: Eating  Assistance Needed: Independent  Comment: X  CARE Score: 6  Discharge Goal: Independent       Oral Hygiene: Oral Hygiene  Assistance Needed: Independent  Comment: X  CARE Score: 6  Discharge Goal: Independent    UB/LB Bathing: Shower/Bathe Self  Assistance Independent  Comment: mod I with RW  CARE Score: 6  Discharge Goal: Independent  Chair/Bed-to-Chair Transfer  Assistance Needed: Independent  Comment: mod I with RW  CARE Score: 6  Discharge Goal: Independent  Toilet Transfer  Assistance Needed: Partial/moderate assistance  Comment:  (3 is deemed usual performance)  CARE Score: 3  Car Transfer  Assistance Needed: Independent  Comment: mod I with RW  CARE Score: 6  Discharge Goal: Independent    Ambulation:    Walking Ability  Does the Patient Walk?: Yes     Walk 10 Feet  Assistance Needed: Independent  Comment: mod I with RW  CARE Score: 6  Discharge Goal: Independent     Walk 50 Feet with Two Turns  Assistance Needed: Independent  Comment: mod I with RW  CARE Score: 6  Discharge Goal: Independent     Walk 150 Feet  Assistance Needed: Independent  Comment: mod I with RW  Reason if not Attempted: Not attempted due to medical condition or safety concerns  CARE Score: 6  Discharge Goal: Supervision or touching assistance     Walking 10 Feet on Uneven Surfaces  Assistance Needed: Supervision or touching assistance  Comment: supervision with RW. Requires supervision for safety d/t visual deficit. Reason if not Attempted: Not attempted due to medical condition or safety concerns  CARE Score: 4  Discharge Goal: Supervision or touching assistance     1 Step (Curb)  Assistance Needed: Supervision or touching assistance  Comment: Supervision with RW. Requires supervision for safety d/t visual deficit.   CARE Score: 4  Discharge Goal: Supervision or touching assistance     4 Steps  Assistance Needed: Independent  Comment: mod I with 2 rails  Reason if not Attempted: Not attempted due to medical condition or safety concerns  CARE Score: 6  Discharge Goal: Supervision or touching assistance     12 Steps  Assistance Needed: Independent  Comment: mod I with 2 rails  Reason if not Attempted: Not attempted due to medical condition or safety concerns  CARE Score: 6  Discharge Goal: Supervision or touching assistance       Wheelchair:  w/c Ability: Wheelchair Ability  Uses a Wheelchair and/or Scooter?: No                Balance:        Object: Picking Up Object  Assistance Needed: Independent  Comment: mod I with reacher and RW  CARE Score: 6  Discharge Goal: Independent    I      Exam:    Afebrile. Pulse 77. Respirations 16.  130/62. General: Up in a bedside chair with legs elevated. Soft-spoken. Following directions. HEENT: Soft-spoken. Persistent right visual field cut. MMM. No JVD or adenopathy. Pulmonary: Unlabored breathing without wheezes, cough or accessory muscle use. Cardiac: Regular rate and rhythm. Chest incision well-healed. Abdomen: Patient's abdomen is soft and nondistended. Bowel sounds were present throughout. There was no rebound, guarding or masses noted. Upper extremities: Clumsy right  but able to raise her hand up to meet mine. No new swelling or bruising. Lower extremities: Heels clear. Nearly symmetric ankle dorsiflexion. No signs of DVT. Sitting balance was good. Standing balance was fair-.    Lab Results   Component Value Date    WBC 9.9 08/08/2021    HGB 10.1 (L) 08/08/2021    HCT 31.6 (L) 08/08/2021    MCV 99.4 08/08/2021     08/08/2021     Lab Results   Component Value Date    INR 1.19 08/03/2021    INR 1.40 08/02/2021    INR 0.88 07/30/2021    PROTIME 15.4 (H) 08/03/2021    PROTIME 18.1 (H) 08/02/2021    PROTIME 11.3 (L) 07/30/2021     Lab Results   Component Value Date    CREATININE 0.5 (L) 08/09/2021    BUN 12 08/09/2021     08/09/2021    K 3.7 08/09/2021     08/09/2021    CO2 29 08/09/2021     Lab Results   Component Value Date    ALT 74 (H) 08/03/2021     (H) 08/03/2021    ALKPHOS 21 (L) 08/03/2021    BILITOT 0.6 08/03/2021       Expected length of stay  prior to a supervised level of function for discharge home with a walker and [address] OT/PT is 8/21/2021. Recommendations:    1.  Ischemic stroke with right hemiparesis and homonymous hemianopsia:   Progressive weightbearing tolerance noted during her daily occupational and physical therapy with speech-language pathology.       Tolerating the ongoing antiplatelet therapy with a baby aspirin and Plavix and the statin.   She is on Foltx due to elevated homocystine level.  She has completed adaptive equipment training while following sternal precautions.  Caregiver education tomorrow.  Verbal cues and SBA for transfers today. Aggressive pulmonary hygiene and DVT prophylaxis. 2. DVT prophylaxis: The surgical team discourages oral anticoagulants.  SCDs when in bed.  Weightbearing activities are pursued daily.  No current signs of thrombosis. 3. Ischemic cardiomyopathy: Coreg for afterload reduction and ProAmatine to help support blood pressure.  She is on a diuretic with Lasix.     Daily weights do not reflect any decompensation of CHF. Good GI tolerance for the antiplatelet therapy. 4. Homonymous hemianopsia: Ophthalmology consultant saw her on the surgical floor.  She will follow up with them as an outpatient.  Our treatment program has been adjusting to her visual field loss. 5. Uncontrolled pain: Good pain control using primarily acetaminophen. Effective bowel intervention while on the pain medications. This is a late entry note for service provided 8/20/2021.

## 2021-09-09 ENCOUNTER — TELEPHONE (OUTPATIENT)
Dept: CARDIOLOGY CLINIC | Age: 81
End: 2021-09-09

## 2021-09-09 DIAGNOSIS — I25.5 ISCHEMIC CARDIOMYOPATHY: Primary | ICD-10-CM

## 2021-09-09 NOTE — TELEPHONE ENCOUNTER
BMP ordered and faxed to [facility] ([phone]) as requested by patients daughter Keith Kumar to be collected during next nurse visit. Patients daughter Desiraetara Kumar is aware that order was sent. Did advise to call with any further questions or concerns, Keith Kumar voiced understanding.

## 2021-09-09 NOTE — TELEPHONE ENCOUNTER
Patients daughter called with concerns that patients electrolytes have not been checked since patient discharge from CABG procedure 8/2/2021 despite patient continuing \"water pill\". She states patient has been feeling fine with no complaints but just feels electrolytes need checked. States patient does get up to urinate 3-4 times per night. Eneida Lundy has follow up scheduled 9/23/2021 with Dr Anny Eller, but would like to know if lab order could be placed prior to this appointment. Patient does have home health nurse coming to her house for therapy and per daughter she can collect lab with an order. Could also schedule patient with Mellody Najjar, NP, tomorrow if needed. Please advise.

## 2021-09-10 ENCOUNTER — HOSPITAL ENCOUNTER (OUTPATIENT)
Age: 81
Setting detail: SPECIMEN
Discharge: HOME | End: 2021-09-10
Payer: MEDICARE

## 2021-09-10 LAB
ALBUMIN SERPL-MCNC: 4.1 GM/DL (ref 3.4–5)
ALP BLD-CCNC: 61 IU/L (ref 40–129)
ALT SERPL-CCNC: 14 U/L (ref 10–40)
ANION GAP SERPL CALCULATED.3IONS-SCNC: 9 MMOL/L (ref 4–16)
AST SERPL-CCNC: 27 IU/L (ref 15–37)
BASOPHILS ABSOLUTE: 0.1 K/CU MM
BASOPHILS RELATIVE PERCENT: 1 % (ref 0–1)
BILIRUB SERPL-MCNC: 0.4 MG/DL (ref 0–1)
BUN BLDV-MCNC: 9 MG/DL (ref 6–23)
CALCIUM SERPL-MCNC: 9.5 MG/DL (ref 8.3–10.6)
CHLORIDE BLD-SCNC: 100 MMOL/L (ref 99–110)
CO2: 28 MMOL/L (ref 21–32)
CREAT SERPL-MCNC: 0.7 MG/DL (ref 0.6–1.1)
DIFFERENTIAL TYPE: ABNORMAL
EOSINOPHILS ABSOLUTE: 0.2 K/CU MM
EOSINOPHILS RELATIVE PERCENT: 2.1 % (ref 0–3)
GFR AFRICAN AMERICAN: >60 ML/MIN/1.73M2
GFR NON-AFRICAN AMERICAN: >60 ML/MIN/1.73M2
GLUCOSE FASTING: 103 MG/DL (ref 70–99)
HCT VFR BLD CALC: 43.7 % (ref 37–47)
HEMOGLOBIN: 14 GM/DL (ref 12.5–16)
IMMATURE NEUTROPHIL %: 0.1 % (ref 0–0.43)
LYMPHOCYTES ABSOLUTE: 2.6 K/CU MM
LYMPHOCYTES RELATIVE PERCENT: 37 % (ref 24–44)
MCH RBC QN AUTO: 31.4 PG (ref 27–31)
MCHC RBC AUTO-ENTMCNC: 32 % (ref 32–36)
MCV RBC AUTO: 98 FL (ref 78–100)
MONOCYTES ABSOLUTE: 0.7 K/CU MM
MONOCYTES RELATIVE PERCENT: 9.4 % (ref 0–4)
PDW BLD-RTO: 13.9 % (ref 11.7–14.9)
PLATELET # BLD: 296 K/CU MM (ref 140–440)
PMV BLD AUTO: 9.3 FL (ref 7.5–11.1)
POTASSIUM SERPL-SCNC: 4 MMOL/L (ref 3.5–5.1)
RBC # BLD: 4.46 M/CU MM (ref 4.2–5.4)
SEGMENTED NEUTROPHILS ABSOLUTE COUNT: 3.6 K/CU MM
SEGMENTED NEUTROPHILS RELATIVE PERCENT: 50.4 % (ref 36–66)
SODIUM BLD-SCNC: 137 MMOL/L (ref 135–145)
TOTAL IMMATURE NEUTOROPHIL: 0.01 K/CU MM
TOTAL PROTEIN: 6.4 GM/DL (ref 6.4–8.2)
WBC # BLD: 7.1 K/CU MM (ref 4–10.5)

## 2021-09-10 PROCEDURE — 85025 COMPLETE CBC W/AUTO DIFF WBC: CPT

## 2021-09-10 PROCEDURE — 80053 COMPREHEN METABOLIC PANEL: CPT

## 2021-09-20 RX ORDER — FUROSEMIDE 20 MG/1
20 TABLET ORAL DAILY
Qty: 90 TABLET | Refills: 3 | Status: SHIPPED | OUTPATIENT
Start: 2021-09-20 | End: 2021-09-23 | Stop reason: ALTCHOICE

## 2021-09-20 RX ORDER — CLOPIDOGREL BISULFATE 75 MG/1
75 TABLET ORAL DAILY
Qty: 90 TABLET | Refills: 3 | Status: SHIPPED | OUTPATIENT
Start: 2021-09-20 | End: 2022-06-22

## 2021-09-20 RX ORDER — ASPIRIN 81 MG/1
81 TABLET ORAL DAILY
Qty: 90 TABLET | Refills: 3 | Status: SHIPPED | OUTPATIENT
Start: 2021-09-20 | End: 2022-03-24

## 2021-09-20 RX ORDER — MIDODRINE HYDROCHLORIDE 10 MG/1
10 TABLET ORAL
Qty: 90 TABLET | Refills: 3 | Status: SHIPPED | OUTPATIENT
Start: 2021-09-20 | End: 2021-09-23

## 2021-09-20 RX ORDER — B12/LEVOMEFOLATE CALCIUM/B-6 2-1.13-25
1 TABLET ORAL DAILY
Qty: 90 TABLET | Refills: 3 | Status: SHIPPED | OUTPATIENT
Start: 2021-09-20 | End: 2021-09-23 | Stop reason: ALTCHOICE

## 2021-09-20 RX ORDER — CARVEDILOL 3.12 MG/1
3.12 TABLET ORAL 2 TIMES DAILY
Qty: 180 TABLET | Refills: 3 | Status: SHIPPED | OUTPATIENT
Start: 2021-09-20 | End: 2021-12-02 | Stop reason: ALTCHOICE

## 2021-09-20 RX ORDER — ATORVASTATIN CALCIUM 20 MG/1
20 TABLET, FILM COATED ORAL NIGHTLY
Qty: 90 TABLET | Refills: 3 | Status: SHIPPED | OUTPATIENT
Start: 2021-09-20 | End: 2021-09-23 | Stop reason: ALTCHOICE

## 2021-09-23 ENCOUNTER — OFFICE VISIT (OUTPATIENT)
Dept: CARDIOLOGY CLINIC | Age: 81
End: 2021-09-23
Payer: MEDICARE

## 2021-09-23 VITALS
WEIGHT: 130 LBS | BODY MASS INDEX: 23.92 KG/M2 | SYSTOLIC BLOOD PRESSURE: 120 MMHG | HEIGHT: 62 IN | DIASTOLIC BLOOD PRESSURE: 70 MMHG | HEART RATE: 84 BPM | RESPIRATION RATE: 16 BRPM

## 2021-09-23 DIAGNOSIS — I49.3 PVC (PREMATURE VENTRICULAR CONTRACTION): ICD-10-CM

## 2021-09-23 DIAGNOSIS — I25.10 CAD IN NATIVE ARTERY: Primary | ICD-10-CM

## 2021-09-23 DIAGNOSIS — I50.43 ACUTE ON CHRONIC COMBINED SYSTOLIC AND DIASTOLIC HEART FAILURE (HCC): ICD-10-CM

## 2021-09-23 DIAGNOSIS — I25.5 ISCHEMIC CARDIOMYOPATHY: ICD-10-CM

## 2021-09-23 DIAGNOSIS — I20.0 UNSTABLE ANGINA (HCC): ICD-10-CM

## 2021-09-23 DIAGNOSIS — I10 ESSENTIAL HYPERTENSION: ICD-10-CM

## 2021-09-23 DIAGNOSIS — I63.9 ACUTE CVA (CEREBROVASCULAR ACCIDENT) (HCC): ICD-10-CM

## 2021-09-23 PROCEDURE — 1090F PRES/ABSN URINE INCON ASSESS: CPT | Performed by: INTERNAL MEDICINE

## 2021-09-23 PROCEDURE — 4040F PNEUMOC VAC/ADMIN/RCVD: CPT | Performed by: INTERNAL MEDICINE

## 2021-09-23 PROCEDURE — 99214 OFFICE O/P EST MOD 30 MIN: CPT | Performed by: INTERNAL MEDICINE

## 2021-09-23 PROCEDURE — G8420 CALC BMI NORM PARAMETERS: HCPCS | Performed by: INTERNAL MEDICINE

## 2021-09-23 PROCEDURE — G8427 DOCREV CUR MEDS BY ELIG CLIN: HCPCS | Performed by: INTERNAL MEDICINE

## 2021-09-23 PROCEDURE — G8399 PT W/DXA RESULTS DOCUMENT: HCPCS | Performed by: INTERNAL MEDICINE

## 2021-09-23 PROCEDURE — 1123F ACP DISCUSS/DSCN MKR DOCD: CPT | Performed by: INTERNAL MEDICINE

## 2021-09-23 PROCEDURE — 1036F TOBACCO NON-USER: CPT | Performed by: INTERNAL MEDICINE

## 2021-09-23 RX ORDER — MIDODRINE HYDROCHLORIDE 5 MG/1
5 TABLET ORAL 3 TIMES DAILY PRN
Qty: 90 TABLET | Refills: 3 | Status: SHIPPED | OUTPATIENT
Start: 2021-09-23 | End: 2022-01-13

## 2021-09-23 RX ORDER — FUROSEMIDE 20 MG/1
20 TABLET ORAL DAILY PRN
Qty: 90 TABLET | Refills: 1 | Status: SHIPPED | OUTPATIENT
Start: 2021-09-23 | End: 2022-06-24

## 2021-09-23 RX ORDER — ATORVASTATIN CALCIUM 40 MG/1
40 TABLET, FILM COATED ORAL DAILY
Qty: 90 TABLET | Refills: 1 | Status: SHIPPED | OUTPATIENT
Start: 2021-09-23 | End: 2021-12-23 | Stop reason: SDUPTHER

## 2021-09-23 NOTE — PROGRESS NOTES
CARDIOLOGY  NOTE    Chief Complaint: pvc     HPI:   Juan Miguel Alvarado is a 80y.o. year old who has Past medical history as noted below. Juan Miguel Alvarado had CABG with LIMA to LAD ,SVg to ramus, SVG to Om,and SVG to RCA in August 2021 after she was noted to have ST depression associated with chest pain after Lexiscan infusion before she could get images. Emergent cath showed multivessel disease. Unfortunately post CABG she developed stroke causing right-sided deficits and loss of vision in the right eye. She comes in for follow-up after discharge from rehab and says she is getting up frequently at night to urinate up to 5-6 times a day she is weak and tired Lasix dose was recently reduced she is on midodrine 5 mg 3 times a day and carvedilol. He is she is with her daughter who says her cholesterol is normal therefore they did not start her on cholesterol medication. She still tired and fatigued and has reduced exercise strength. He was initially evaluated for  abnormal EKG showing frequent PVCs. Started on metoprolol but it made her very tired and fatigued she could not continue taking it. Says she is tired and fatigued all the time she did have a Holter monitor in June 2021 for 24 hours during which she was noted to have significant PVC burden especially while sleeping. Evaluate for ut? Current Outpatient Medications   Medication Sig Dispense Refill    furosemide (LASIX) 20 MG tablet Take 1 tablet by mouth daily as needed (for weight gain of 5 lbs) 90 tablet 1    midodrine (PROAMATINE) 5 MG tablet Take 1 tablet by mouth 3 times daily as needed (for sbp less than 90) Takes 1/2 tab, three times a day.  90 tablet 3    atorvastatin (LIPITOR) 40 MG tablet Take 1 tablet by mouth daily 90 tablet 1    aspirin 81 MG EC tablet Take 1 tablet by mouth daily 90 tablet 3    carvedilol (COREG) 3.125 MG tablet Take 1 tablet by mouth 2 times daily 180 tablet 3    clopidogrel (PLAVIX) 75 MG tablet Take 1 tablet by mouth daily 90 tablet 3    docusate sodium (COLACE, DULCOLAX) 100 MG CAPS Take 100 mg by mouth 2 times daily as needed for Constipation      loperamide (IMODIUM) 2 MG capsule Take 2 mg by mouth 2 times daily as needed for Diarrhea      acetaminophen (TYLENOL) 325 MG tablet Take 325-650 mg by mouth every 6 hours as needed      Multiple Vitamin (MULTIVITAMIN) capsule Take 1 capsule by mouth Daily with lunch      levothyroxine (SYNTHROID) 50 MCG tablet Take 50 mcg by mouth Daily      ALPRAZolam (XANAX) 0.25 MG tablet Take 0.25 mg by mouth nightly as needed for Sleep      estradiol (ESTRACE) 0.1 MG/GM vaginal cream Apply pea sized amount to vagina nightly x7 days, then every other night for 7 days, then every 3rd night therafter (Patient not taking: Reported on 9/23/2021)       No current facility-administered medications for this visit. Allergies:   Iodine and Pcn [penicillins]    Patient History:  Past Medical History:   Diagnosis Date    Acute CVA (cerebrovascular accident) ([facility] [address] 8/9/2021    Arthritis     CAD in native artery 07/29/2021    Essential hypertension 07/29/2021    H/O cardiac catheterization 07/27/2021    Several vessel serious  stenosis, Urgent CABG is recommended.  H/O cardiovascular stress test 07/29/2021    Abnormal Stress EKG results and patient was transferred to [facility] cath lab per Dr. Nicola Dinero.  H/O echocardiogram 07/29/2021    Left ventricular function and size is normal, EF is estimated at 55-60%. Mild mitral ,tricuspid and moderate aortic regurgitation is present.  History of Holter monitoring 06/15/2021    Frequent PVCs. 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     Thyroid disease      Past Surgical History:   Procedure Laterality Date    APPENDECTOMY      BACK SURGERY      COLONOSCOPY      CORONARY ARTERY BYPASS GRAFT  08/02/2021    CABG X 5 vessel with Dr Venkat Mcguire N/A 8/2/2021    CABG CORONARY ARTERY BYPASS X 5, INTRAOPERATIVE [place], INDUCED HYPOTHERMIA AND BILATERAL ENDOHARVEST OF SAPHENOUS VEINS performed by Bennie Smallwood MD at [address] [place] [address]      HYSTERECTOMY      TUMOR REMOVAL       Family History   Problem Relation Age of Onset    Cancer Father      Social History     Tobacco Use    Smoking status: Never Smoker    Smokeless tobacco: Never Used   Substance Use Topics    Alcohol use: No        Review of Systems:   · Constitutional: No Fever or Weight Loss   · Eyes: No Decreased Vision  · ENT: No Headaches, Hearing Loss or Vertigo  · Cardiovascular: as per note above   · Respiratory: No cough or wheezing and as per note above. · Gastrointestinal: No abdominal pain, appetite loss, blood in stools, constipation, diarrhea or heartburn  · Genitourinary: No dysuria, trouble voiding, or hematuria  · Musculoskeletal:  denies any new  joint aches , swelling  or pain   · Integumentary: No rash or pruritis  · Neurological: No TIA or stroke symptoms  · Psychiatric: No anxiety or depression  · Endocrine: No malaise, fatigue or temperature intolerance  · Hematologic/Lymphatic: No bleeding problems, blood clots or swollen lymph nodes  · Allergic/Immunologic: No nasal congestion or hives    Objective:      Physical Exam:  /70   Pulse 84   Resp 16   Ht 5' 2\" (1.575 m)   Wt 130 lb (59 kg)   BMI 23.78 kg/m²   Wt Readings from Last 3 Encounters:   09/23/21 130 lb (59 kg)   09/08/21 132 lb (59.9 kg)   08/21/21 152 lb 8.9 oz (69.2 kg)     Body mass index is 23.78 kg/m². Vitals:    09/23/21 1219   BP: 120/70   Pulse: 84   Resp: 16        General Appearance:  No distress, conversant  Constitutional:  Well developed, Well nourished, No acute distress, Non-toxic appearance.    HENT:  Normocephalic, Atraumatic, Bilateral external ears normal, Oropharynx moist, No oral exudates, Nose normal. Neck- Normal range of motion, No tenderness, Supple, No stridor,no apical-carotid delay  Eyes:  PERRL, EOMI, Conjunctiva normal, No discharge. Respiratory:  Normal breath sounds, No respiratory distress, No wheezing, No chest tenderness. ,no use of accessory muscles, NO crackles  Cardiovascular: (PMI) apex non displaced,no lifts no thrills,S1 and S2 audible, No added heart sounds, No signs of ankle edema, or volume overload, No evidence of JVD, No crackles  GI:  Bowel sounds normal, Soft, No tenderness, No masses, No gross visceromegaly   :  No costovertebral angle tenderness   Musculoskeletal:  No edema, no tenderness, no deformities. Back- no tenderness  Integument:  Well hydrated, no rash   Lymphatic:  No lymphadenopathy noted   Neurologic:  Alert & oriented x 3, CN 2-12 normal, normal motor function, normal sensory function, no focal deficits noted   Psychiatric:  Speech and behavior appropriate       Medical decision making and Data review:  DATA:  Lab Results   Component Value Date    TROPONINT <0.010 09/30/2015     BNP:    Lab Results   Component Value Date    PROBNP 377.3 (H) 09/30/2015     PT/INR:  No results found for: PTINR  Lab Results   Component Value Date    LABA1C 5.5 07/30/2021    LABA1C 5.5 07/29/2021     Lab Results   Component Value Date    CHOL 237 (H) 09/10/2012    TRIG 109 09/10/2012    HDL 68 09/10/2012    LDLCALC 147 (H) 09/10/2012     Lab Results   Component Value Date    ALT 14 09/10/2021    AST 27 09/10/2021     No results for input(s): WBC, HGB, HCT, MCV, PLT in the last 72 hours.   TSH: No results found for: TSH  Lab Results   Component Value Date    AST 27 09/10/2021    ALT 14 09/10/2021    BILITOT 0.4 09/10/2021    ALKPHOS 61 09/10/2021     Lab Results   Component Value Date    PROBNP 377.3 (H) 09/30/2015     Lab Results   Component Value Date    LABA1C 5.5 07/30/2021    LABA1C 5.5 07/29/2021     Lab Results   Component Value Date    WBC 7.1 09/10/2021    HGB 14.0 09/10/2021    HCT 43.7 09/10/2021     09/10/2021       Cath 7/29/21  RESULTS:   LM: No significant disease. LAD: 80 % tandem lesions proximally, followed by long area of   diffuse disease + 99 & 95 % tandem lesions distally. CIRCUMFLEX: 90 & 80 % tandem stenosis in proximal portion. with similar lesions in a high good size OM. RCA: 90 % mid vessel tandem stenosis in its mid section in   addition to 50 %, hazy proximal stenosis. LMCA: Moderate Lumen Irregularity.     LAD: Multiple stenosis. 80 % tandem lesions proximally, followed by long area  of diffuse disease + 99 & 95 % tandem lesions distally.     LCx: Multiple stenosis. 90 & 80 % tandem stenosis in proximal portion. with similar lesions in a high good size OM.     RCA: Multiple stenosis. 90 % mid vessel tandem stenosis in its mid section in  addition to 50 %, hazy proximal stenosis. Echo 7/29/21  Summary   Left ventricular function and size is normal, EF is estimated at 55-60%. Mild left ventricular hypertrophy. E/A reversal; indeterminate diastolic function. Hypokineses of the basal anterior lateral and mid anterior lateral segments. Aneurysmal interatrial septum. Mild mitral ,tricuspid and moderate aortic regurgitation is present. RVSP is 25 mmHg. No evidence of pericardial effusion          All labs, medications and tests reviewed by myself including data and history from outside source , patient and available family . Assessment & Plan:      1. Unstable angina ([address]    2. Essential hypertension    3. Acute on chronic combined systolic and diastolic heart failure ([address]    4. Acute CVA (cerebrovascular accident) ([address]    5. Ischemic cardiomyopathy    6. PVC (premature ventricular contraction)    7. CAD in native artery         No problem-specific Assessment & Plan notes found for this encounter. ASCVD  This is post CABG with LIMA to LAD and diagonal, SVG to ramus, SVG to OM, SVG to RCA on July 2021 continue Plavix due to post CABG stroke for now continue aspirin.   We will get an echo to evaluate EF as per LV gram showed EF of 30% but echo showed normal EF weeks earlier her emergent cath. Will refer to cardiac rehab continue statins    Cardiomyopathy  Repeat  echo if EF is low will need ACE inhibitor's was to use Lasix as needed for weight gain of 5 pounds and midodrine as needed for blood pressure less than 90    Urinary urgency  Lasix as needed basis also advised to contact PCP to evaluate possible UTI refer to urology    POST CVA   Developed stroke post CABG day 2. Continue Plavix and statins for now      Dyslipidemia :  All available lab work was reviewed. Patient was advised to repeat lab work before next visit. Necessary orders were placed , instructions given by myself       Counseled extensively and medication compliance urged. We discussed that for the  prevention of ASCVD our  goal is aggressive risk modification. Patient is encouraged to exercise if they can , educated about  brisk walk for 30 minutes  at least 3 to 4 times a week if there are no physical limitations  Various goals were discussed and questions answered. Continue current medications. Appropriate prescriptions are addressed and refills ordered. Questions answered and patient verbalizes understanding. Call for any problems, questions, or concerns. Greater than 60 % of time spent counseling besides reviewing data and images     Continue all other medications of all above medical condition listed as is. Return in about 1 month (around 10/23/2021). Please note this report has been partially produced using speech recognition software and may contain errors related to that system including errors in grammar, punctuation, and spelling, as well as words and phrases that may be inappropriate. If there are any questions or concerns please feel free to contact the dictating provider for clarification.

## 2021-09-29 ENCOUNTER — PROCEDURE VISIT (OUTPATIENT)
Dept: CARDIOLOGY CLINIC | Age: 81
End: 2021-09-29
Payer: MEDICARE

## 2021-09-29 DIAGNOSIS — I25.5 ISCHEMIC CARDIOMYOPATHY: ICD-10-CM

## 2021-09-29 DIAGNOSIS — I50.43 ACUTE ON CHRONIC COMBINED SYSTOLIC AND DIASTOLIC HEART FAILURE (HCC): ICD-10-CM

## 2021-09-29 DIAGNOSIS — I63.9 ACUTE CVA (CEREBROVASCULAR ACCIDENT) (HCC): ICD-10-CM

## 2021-09-29 DIAGNOSIS — I20.0 UNSTABLE ANGINA (HCC): ICD-10-CM

## 2021-09-29 PROCEDURE — 93308 TTE F-UP OR LMTD: CPT | Performed by: INTERNAL MEDICINE

## 2021-10-01 ENCOUNTER — TELEPHONE (OUTPATIENT)
Dept: CARDIOLOGY CLINIC | Age: 81
End: 2021-10-01

## 2021-10-01 RX ORDER — LISINOPRIL 5 MG/1
5 TABLET ORAL DAILY
Qty: 30 TABLET | Refills: 5 | Status: SHIPPED | OUTPATIENT
Start: 2021-10-01 | End: 2021-11-04

## 2021-10-01 NOTE — TELEPHONE ENCOUNTER
----- Message from Marilee Triana MD sent at 9/30/2021  4:55 PM EDT -----  Her heart function is weaker, see if she is willing to try and if she can tolerate lisinopril 5 mg daily, she can see myself or Analilia Mendoza sooner if she is not feeling good she is supposed to come back see us in November which is fine

## 2021-11-04 ENCOUNTER — OFFICE VISIT (OUTPATIENT)
Dept: CARDIOLOGY CLINIC | Age: 81
End: 2021-11-04
Payer: MEDICARE

## 2021-11-04 VITALS
SYSTOLIC BLOOD PRESSURE: 104 MMHG | BODY MASS INDEX: 23.74 KG/M2 | WEIGHT: 129 LBS | DIASTOLIC BLOOD PRESSURE: 60 MMHG | HEIGHT: 62 IN | HEART RATE: 72 BPM

## 2021-11-04 DIAGNOSIS — I49.3 PVC (PREMATURE VENTRICULAR CONTRACTION): ICD-10-CM

## 2021-11-04 DIAGNOSIS — I20.0 UNSTABLE ANGINA (HCC): ICD-10-CM

## 2021-11-04 DIAGNOSIS — I50.43 ACUTE ON CHRONIC COMBINED SYSTOLIC AND DIASTOLIC HEART FAILURE (HCC): ICD-10-CM

## 2021-11-04 DIAGNOSIS — I63.9 ACUTE CVA (CEREBROVASCULAR ACCIDENT) (HCC): ICD-10-CM

## 2021-11-04 DIAGNOSIS — I25.5 ISCHEMIC CARDIOMYOPATHY: Primary | ICD-10-CM

## 2021-11-04 DIAGNOSIS — R07.89 CHEST PRESSURE: ICD-10-CM

## 2021-11-04 DIAGNOSIS — I10 ESSENTIAL HYPERTENSION: ICD-10-CM

## 2021-11-04 PROCEDURE — 1090F PRES/ABSN URINE INCON ASSESS: CPT | Performed by: INTERNAL MEDICINE

## 2021-11-04 PROCEDURE — G8484 FLU IMMUNIZE NO ADMIN: HCPCS | Performed by: INTERNAL MEDICINE

## 2021-11-04 PROCEDURE — G8399 PT W/DXA RESULTS DOCUMENT: HCPCS | Performed by: INTERNAL MEDICINE

## 2021-11-04 PROCEDURE — G8427 DOCREV CUR MEDS BY ELIG CLIN: HCPCS | Performed by: INTERNAL MEDICINE

## 2021-11-04 PROCEDURE — 99214 OFFICE O/P EST MOD 30 MIN: CPT | Performed by: INTERNAL MEDICINE

## 2021-11-04 PROCEDURE — 4040F PNEUMOC VAC/ADMIN/RCVD: CPT | Performed by: INTERNAL MEDICINE

## 2021-11-04 PROCEDURE — 1123F ACP DISCUSS/DSCN MKR DOCD: CPT | Performed by: INTERNAL MEDICINE

## 2021-11-04 PROCEDURE — G8420 CALC BMI NORM PARAMETERS: HCPCS | Performed by: INTERNAL MEDICINE

## 2021-11-04 PROCEDURE — 1036F TOBACCO NON-USER: CPT | Performed by: INTERNAL MEDICINE

## 2021-11-04 RX ORDER — SPIRONOLACTONE 25 MG/1
25 TABLET ORAL DAILY
Qty: 90 TABLET | Refills: 1 | Status: SHIPPED | OUTPATIENT
Start: 2021-11-04 | End: 2022-02-02

## 2021-11-04 RX ORDER — FOLIC ACID-PYRIDOXINE-CYANOCOBALAMIN TAB 2.5-25-2 MG 2.5-25-2 MG
TAB ORAL
COMMUNITY
Start: 2021-09-21 | End: 2022-01-13 | Stop reason: ALTCHOICE

## 2021-11-04 RX ORDER — LISINOPRIL 10 MG/1
10 TABLET ORAL DAILY
Qty: 30 TABLET | Refills: 3
Start: 2021-11-04 | End: 2022-01-13 | Stop reason: SDUPTHER

## 2021-11-04 NOTE — PROGRESS NOTES
CARDIOLOGY  NOTE    Chief Complaint: pvc     HPI:   Grace Akins is a 80y.o. year old who has Past medical history as noted below. Grace Akins is feeling tired fatigued and has lack of energy, she is stressed out about her  who is in nursing home due to dementia   She had CABG with LIMA to LAD ,SVg to ramus, SVG to Om,and SVG to RCA in August 2021 after she was noted to have ST depression associated with chest pain after Lexiscan infusion before she could get images. Emergent cath showed multivessel disease. Unfortunately post CABG she developed stroke causing right-sided deficits and loss of vision in the right eye. Her echo shows drop in EF of 15-20 % in sept 2021   She is going to start cardiac rehab.  she is with her daughter who says her cholesterol is normal therefore they did not start her on cholesterol medication. She still tired and fatigued and has reduced exercise strength. He was initially evaluated for  abnormal EKG showing frequent PVCs. Started on metoprolol but it made her very tired and fatigued she could not continue taking it. Says she is tired and fatigued all the time she did have a Holter monitor in June 2021 for 24 hours during which she was noted to have significant PVC burden especially while sleeping. Evaluate for ut? Current Outpatient Medications   Medication Sig Dispense Refill    FOLBIC 2.5-25-2 MG TABS       lisinopril (PRINIVIL;ZESTRIL) 10 MG tablet Take 1 tablet by mouth daily 30 tablet 3    spironolactone (ALDACTONE) 25 MG tablet Take 1 tablet by mouth daily 90 tablet 1    furosemide (LASIX) 20 MG tablet Take 1 tablet by mouth daily as needed (for weight gain of 5 lbs) 90 tablet 1    midodrine (PROAMATINE) 5 MG tablet Take 1 tablet by mouth 3 times daily as needed (for sbp less than 90) Takes 1/2 tab, three times a day.  90 tablet 3    atorvastatin (LIPITOR) 40 MG tablet Take 1 tablet by mouth daily 90 tablet 1    aspirin 81 MG EC tablet Take 1 tablet by mouth daily 90 tablet 3    carvedilol (COREG) 3.125 MG tablet Take 1 tablet by mouth 2 times daily 180 tablet 3    clopidogrel (PLAVIX) 75 MG tablet Take 1 tablet by mouth daily 90 tablet 3    docusate sodium (COLACE, DULCOLAX) 100 MG CAPS Take 100 mg by mouth 2 times daily as needed for Constipation      acetaminophen (TYLENOL) 325 MG tablet Take 325-650 mg by mouth every 6 hours as needed      Multiple Vitamin (MULTIVITAMIN) capsule Take 1 capsule by mouth Daily with lunch      levothyroxine (SYNTHROID) 50 MCG tablet Take 50 mcg by mouth Daily      ALPRAZolam (XANAX) 0.25 MG tablet Take 0.25 mg by mouth nightly as needed for Sleep      loperamide (IMODIUM) 2 MG capsule Take 2 mg by mouth 2 times daily as needed for Diarrhea      estradiol (ESTRACE) 0.1 MG/GM vaginal cream Apply pea sized amount to vagina nightly x7 days, then every other night for 7 days, then every 3rd night therafter (Patient not taking: Reported on 9/23/2021)       No current facility-administered medications for this visit. Allergies:   Iodine and Pcn [penicillins]    Patient History:  Past Medical History:   Diagnosis Date    Acute CVA (cerebrovascular accident) ([facility] [address] 08/09/2021    Arthritis     CAD in native artery 07/29/2021    Essential hypertension 07/29/2021    H/O cardiac catheterization 07/27/2021    Several vessel serious  stenosis, Urgent CABG is recommended.  H/O cardiovascular stress test 07/29/2021    Abnormal Stress EKG results and patient was transferred to [facility] cath lab per Dr. Matilde Spicer.  H/O echocardiogram 07/29/2021    Left ventricular function and size is normal, EF is estimated at 55-60%. Mild mitral ,tricuspid and moderate aortic regurgitation is present.  H/O echocardiogram 09/29/2021    Limited study , No Doppler study on Valves Left ventricular systolic function is severly depressed with an ejectionfraction of 15-20 %. EF is reduced compared to Echo in July 2021 Global hypokinesis Left Ventricular chamber size is Dilated No evidence of any pericardial effusion. Left sided pleural effusion noted, measuring 4.86 cm.  History of Holter monitoring 06/15/2021    Frequent PVCs. 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  Thyroid disease      Past Surgical History:   Procedure Laterality Date    APPENDECTOMY      BACK SURGERY      COLONOSCOPY      CORONARY ARTERY BYPASS GRAFT  08/02/2021    CABG X 5 vessel with Dr Delfina Kruse N/A 8/2/2021    CABG CORONARY ARTERY BYPASS X 5, INTRAOPERATIVE [place], INDUCED HYPOTHERMIA AND BILATERAL ENDOHARVEST OF SAPHENOUS VEINS performed by Christopher Perez MD at [address] [place] [address]      HYSTERECTOMY      TUMOR REMOVAL       Family History   Problem Relation Age of Onset    Cancer Father      Social History     Tobacco Use    Smoking status: Never Smoker    Smokeless tobacco: Never Used   Substance Use Topics    Alcohol use: No        Review of Systems:   · Constitutional: No Fever or Weight Loss   · Eyes: No Decreased Vision  · ENT: No Headaches, Hearing Loss or Vertigo  · Cardiovascular: as per note above   · Respiratory: No cough or wheezing and as per note above.    · Gastrointestinal: No abdominal pain, appetite loss, blood in stools, constipation, diarrhea or heartburn  · Genitourinary: No dysuria, trouble voiding, or hematuria  · Musculoskeletal:  denies any new  joint aches , swelling  or pain   · Integumentary: No rash or pruritis  · Neurological: No TIA or stroke symptoms  · Psychiatric: No anxiety or depression  · Endocrine: No malaise, fatigue or temperature intolerance  · Hematologic/Lymphatic: No bleeding problems, blood clots or swollen lymph nodes  · Allergic/Immunologic: No nasal congestion or hives    Objective:      Physical Exam:  /60   Pulse 72   Ht 5' 2\" (1.575 m)   Wt 129 lb (58.5 kg)   BMI 23.59 kg/m²   Wt Readings from Last 3 Encounters:   11/04/21 129 lb (58.5 kg)   09/23/21 130 lb (59 kg)   09/08/21 132 lb (59.9 kg)     Body mass index is 23.59 kg/m². Vitals:    11/04/21 1128   BP: 104/60   Pulse: 72        General Appearance:  No distress, conversant  Constitutional:  Well developed, Well nourished, No acute distress, Non-toxic appearance. HENT:  Normocephalic, Atraumatic, Bilateral external ears normal, Oropharynx moist, No oral exudates, Nose normal. Neck- Normal range of motion, No tenderness, Supple, No stridor,no apical-carotid delay  Eyes:  PERRL, EOMI, Conjunctiva normal, No discharge. Respiratory:  Normal breath sounds, No respiratory distress, No wheezing, No chest tenderness. ,no use of accessory muscles, NO crackles  Cardiovascular: (PMI) apex non displaced,no lifts no thrills,S1 and S2 audible, No added heart sounds, No signs of ankle edema, or volume overload, No evidence of JVD, No crackles  GI:  Bowel sounds normal, Soft, No tenderness, No masses, No gross visceromegaly   :  No costovertebral angle tenderness   Musculoskeletal:  No edema, no tenderness, no deformities.  Back- no tenderness  Integument:  Well hydrated, no rash   Lymphatic:  No lymphadenopathy noted   Neurologic:  Alert & oriented x 3, CN 2-12 normal, normal motor function, normal sensory function, no focal deficits noted   Psychiatric:  Speech and behavior appropriate       Medical decision making and Data review:  DATA:  Lab Results   Component Value Date    TROPONINT <0.010 09/30/2015     BNP:    Lab Results   Component Value Date    PROBNP 377.3 (H) 09/30/2015     PT/INR:  No results found for: PTINR  Lab Results   Component Value Date    LABA1C 5.5 07/30/2021    LABA1C 5.5 07/29/2021     Lab Results   Component Value Date    CHOL 237 (H) 09/10/2012    TRIG 109 09/10/2012    HDL 68 09/10/2012    LDLCALC 147 (H) 09/10/2012     Lab Results   Component Value Date    ALT 14 09/10/2021    AST 27 09/10/2021     No results for input(s): WBC, HGB, HCT, MCV, PLT in the last 72 hours. TSH: No results found for: TSH  Lab Results   Component Value Date    AST 27 09/10/2021    ALT 14 09/10/2021    BILITOT 0.4 09/10/2021    ALKPHOS 61 09/10/2021     Lab Results   Component Value Date    PROBNP 377.3 (H) 09/30/2015     Lab Results   Component Value Date    LABA1C 5.5 07/30/2021    LABA1C 5.5 07/29/2021     Lab Results   Component Value Date    WBC 7.1 09/10/2021    HGB 14.0 09/10/2021    HCT 43.7 09/10/2021     09/10/2021       Cath 7/29/21  RESULTS:   LM: No significant disease. LAD: 80 % tandem lesions proximally, followed by long area of   diffuse disease + 99 & 95 % tandem lesions distally. CIRCUMFLEX: 90 & 80 % tandem stenosis in proximal portion. with similar lesions in a high good size OM. RCA: 90 % mid vessel tandem stenosis in its mid section in   addition to 50 %, hazy proximal stenosis. LMCA: Moderate Lumen Irregularity.     LAD: Multiple stenosis. 80 % tandem lesions proximally, followed by long area  of diffuse disease + 99 & 95 % tandem lesions distally.     LCx: Multiple stenosis. 90 & 80 % tandem stenosis in proximal portion. with similar lesions in a high good size OM.     RCA: Multiple stenosis. 90 % mid vessel tandem stenosis in its mid section in  addition to 50 %, hazy proximal stenosis. Echo 7/29/21  Summary   Left ventricular function and size is normal, EF is estimated at 55-60%. Mild left ventricular hypertrophy. E/A reversal; indeterminate diastolic function. Hypokineses of the basal anterior lateral and mid anterior lateral segments. Aneurysmal interatrial septum. Mild mitral ,tricuspid and moderate aortic regurgitation is present. RVSP is 25 mmHg. No evidence of pericardial effusion    Echo 9/29/21  Summary   Limited study , No Doppler study on Valves   Left ventricular systolic function is severly depressed with an ejection   fraction of 15-20 %. EF is reduced compared to Echo in July 2021   Global hypokinesis   Left Ventricular chamber size is Dilated   No evidence of any pericardial effusion. Left sided pleural effusion noted, measuring 4.86 cm. All labs, medications and tests reviewed by myself including data and history from outside source , patient and available family . Assessment & Plan:      1. Ischemic cardiomyopathy    2. Acute CVA (cerebrovascular accident) ([facility] [address]    3. Acute on chronic combined systolic and diastolic heart failure ([address]    4. Essential hypertension    5. PVC (premature ventricular contraction)    6. Unstable angina (HCC)    7. Chest pressure         No problem-specific Assessment & Plan notes found for this encounter. ASCVD  This is post CABG with LIMA to LAD and diagonal, SVG to ramus, SVG to OM, SVG to RCA on July 2021 continue Plavix due to post CABG stroke for now continue aspirin. We will get an echo to evaluate EF as per LV gram showed EF of 30% but echo showed normal EF weeks earlier her emergent cath. Will refer to cardiac rehab continue statins    Ischemic Cardiomyopathy  Titrate up coreg and lisinopril , may need ICD and ischemia work up  Will need cath , continue midodrine as needed for blood pressure less than 90, add aldactone     Urinary urgency  Lasix as needed basis , frequent urination is better     POST CVA   Developed stroke post CABG day 2. Continue Plavix and statins for now      Dyslipidemia :  All available lab work was reviewed. Patient was advised to repeat lab work before next visit. Necessary orders were placed , instructions given by myself       Counseled extensively and medication compliance urged. We discussed that for the  prevention of ASCVD our  goal is aggressive risk modification. Patient is encouraged to exercise if they can , educated about  brisk walk for 30 minutes  at least 3 to 4 times a week if there are no physical limitations  Various goals were discussed and questions answered.  Continue current medications. Appropriate prescriptions are addressed and refills ordered. Questions answered and patient verbalizes understanding. Call for any problems, questions, or concerns. Greater than 60 % of time spent counseling besides reviewing data and images     Continue all other medications of all above medical condition listed as is. Return in about 1 month (around 12/4/2021). Please note this report has been partially produced using speech recognition software and may contain errors related to that system including errors in grammar, punctuation, and spelling, as well as words and phrases that may be inappropriate. If there are any questions or concerns please feel free to contact the dictating provider for clarification.

## 2021-11-05 ENCOUNTER — HOSPITAL ENCOUNTER (OUTPATIENT)
Dept: CARDIAC REHAB | Age: 81
Setting detail: THERAPIES SERIES
Discharge: HOME | End: 2021-11-05
Payer: MEDICARE

## 2021-11-05 PROCEDURE — G0423 INTENS CARDIAC REHAB NO EXER: HCPCS

## 2021-11-05 PROCEDURE — G0422 INTENS CARDIAC REHAB W/EXERC: HCPCS

## 2021-11-10 ENCOUNTER — HOSPITAL ENCOUNTER (OUTPATIENT)
Dept: CARDIAC REHAB | Age: 81
Setting detail: THERAPIES SERIES
Discharge: HOME | End: 2021-11-10
Payer: MEDICARE

## 2021-11-10 PROCEDURE — G0423 INTENS CARDIAC REHAB NO EXER: HCPCS

## 2021-11-10 PROCEDURE — G0422 INTENS CARDIAC REHAB W/EXERC: HCPCS

## 2021-11-12 ENCOUNTER — HOSPITAL ENCOUNTER (OUTPATIENT)
Dept: CARDIAC REHAB | Age: 81
Setting detail: THERAPIES SERIES
Discharge: HOME | End: 2021-11-12
Payer: MEDICARE

## 2021-11-12 PROCEDURE — G0422 INTENS CARDIAC REHAB W/EXERC: HCPCS

## 2021-11-15 ENCOUNTER — HOSPITAL ENCOUNTER (OUTPATIENT)
Dept: CARDIAC REHAB | Age: 81
Setting detail: THERAPIES SERIES
Discharge: HOME | End: 2021-11-15
Payer: MEDICARE

## 2021-11-15 PROCEDURE — G0422 INTENS CARDIAC REHAB W/EXERC: HCPCS

## 2021-11-15 PROCEDURE — G0423 INTENS CARDIAC REHAB NO EXER: HCPCS

## 2021-11-17 ENCOUNTER — HOSPITAL ENCOUNTER (OUTPATIENT)
Dept: CARDIAC REHAB | Age: 81
Setting detail: THERAPIES SERIES
Discharge: HOME | End: 2021-11-17
Payer: MEDICARE

## 2021-11-17 PROCEDURE — G0422 INTENS CARDIAC REHAB W/EXERC: HCPCS

## 2021-11-17 PROCEDURE — G0423 INTENS CARDIAC REHAB NO EXER: HCPCS

## 2021-11-17 NOTE — PROGRESS NOTES
Cardiac rehab Nutrition Therapy  11/17/2021   10:00-10:48    Client History:    Pertinent medications:   Current Outpatient Medications   Medication Instructions    acetaminophen (TYLENOL) 325-650 mg, Oral, EVERY 6 HOURS PRN    ALPRAZolam (XANAX) 0.25 mg, Oral, NIGHTLY PRN    aspirin 81 mg, Oral, DAILY    atorvastatin (LIPITOR) 40 mg, Oral, DAILY    carvedilol (COREG) 3.125 mg, Oral, 2 TIMES DAILY    clopidogrel (PLAVIX) 75 mg, Oral, DAILY    docusate (COLACE, DULCOLAX) 100 mg, Oral, 2 TIMES DAILY PRN    estradiol (ESTRACE) 0.1 MG/GM vaginal cream Apply pea sized amount to vagina nightly x7 days, then every other night for 7 days, then every 3rd night therafter    FOLBIC 2.5-25-2 MG TABS No dose, route, or frequency recorded.  furosemide (LASIX) 20 mg, Oral, DAILY PRN    levothyroxine (SYNTHROID) 50 mcg, Oral, DAILY    lisinopril (PRINIVIL;ZESTRIL) 10 mg, Oral, DAILY    loperamide (IMODIUM) 2 mg, Oral, 2 TIMES DAILY PRN    midodrine (PROAMATINE) 5 mg, Oral, 3 TIMES DAILY PRN, Takes 1/2 tab, three times a day.  Multiple Vitamin (MULTIVITAMIN) capsule 1 capsule, Oral, DAILY WITH LUNCH    spironolactone (ALDACTONE) 25 mg, Oral, DAILY      Social hx: Daughter moving in with patient. Spouse currently in LTC. Daughter shopping and preparing meals at this time. Does not smoke or drink alcohol. Pertinent Medical hx: CABG in August and post-op CVA with loss of peripheral vision on right side, thyroid disease, IBS    Activity habits: Less active in past months recovering from CABG and CVA. Has been exercising 3 days per week in cardiac rehab program. Uses cane for ambulation in community. Food/nutrition habits: Eating 3 meals each day now with encouragement from daughter. Breakfast meals-eggs, toast with peanut butter, decaff coffee with stevia, cheerios with soy milk. Lunch-slice bread, low fat turkey, lettuce. Supper meal varies-baked salmon, beef and noodles.  Eating more vegetables now since daughter cooking meals. Drinks mainly water, avoids milk, rare soda pop. Does not eat pork. No raw onions due to IBS. Biochemical data: 10/26/2021 - Trig-102, T chol 145, HDL 59, LDL 66    Anthropometrics:    Ht:62\"  Wt: 126#    IBW:121#   % of IBW: 104           BMI: 23.1, normal weight range    Weight hx: Loss since surgery ~18#, 12% in past 4 months significant loss. Attributes loss to reduced appetite. Diet hx: none     Estimated needs: 3769-7535 calories/day (25-30 guru/kg), 57-68 g protein (1-1.2 g/kg)     Nutrition dx: Nutrition- related knowledge deficit related to limited exposure to cardiac meal plan as evidenced by interest in learning about meal plan    Nutrition Prescription: Low sodium diet 1500 mg/day, 3 meals each day with fruit and/or vegetable included at meals      Nutrition Interventions: Discussion regarding P[employer]in food guidelines with low sodium and lower fat choices. Encouraged to continue meal pattern with 3 meals each day to ensure adequate intake to prevent additional weight loss. Reasonable food choices at this time. Has been willing to try new/different vegetables.      Nutrition related goals: include serving of fruit each day, continue to eat 3 meals each day     Adherence/barriers to goals: no barriers at this time, motivated to make changes as needed    Primary learner: attended alone, good support from daughter    Education materials provided: [employer] shopping list  Method of education:   Explanation      Handouts   Teach back     Response to education:    Verbalized understanding             Rec/plan:  Patient to continue cardiac rehab program

## 2021-11-22 ENCOUNTER — HOSPITAL ENCOUNTER (OUTPATIENT)
Dept: CARDIAC REHAB | Age: 81
Setting detail: THERAPIES SERIES
Discharge: HOME | End: 2021-11-22
Payer: MEDICARE

## 2021-11-22 PROCEDURE — G0423 INTENS CARDIAC REHAB NO EXER: HCPCS

## 2021-11-22 PROCEDURE — G0422 INTENS CARDIAC REHAB W/EXERC: HCPCS

## 2021-11-24 ENCOUNTER — HOSPITAL ENCOUNTER (OUTPATIENT)
Dept: CARDIAC REHAB | Age: 81
Setting detail: THERAPIES SERIES
Discharge: HOME | End: 2021-11-24
Payer: MEDICARE

## 2021-11-24 PROCEDURE — G0422 INTENS CARDIAC REHAB W/EXERC: HCPCS

## 2021-11-24 PROCEDURE — G0423 INTENS CARDIAC REHAB NO EXER: HCPCS

## 2021-11-29 ENCOUNTER — HOSPITAL ENCOUNTER (OUTPATIENT)
Dept: CARDIAC REHAB | Age: 81
Setting detail: THERAPIES SERIES
Discharge: HOME | End: 2021-11-29
Payer: MEDICARE

## 2021-11-29 PROCEDURE — G0422 INTENS CARDIAC REHAB W/EXERC: HCPCS

## 2021-11-29 PROCEDURE — G0423 INTENS CARDIAC REHAB NO EXER: HCPCS

## 2021-12-01 ENCOUNTER — APPOINTMENT (OUTPATIENT)
Dept: CARDIAC REHAB | Age: 81
End: 2021-12-01
Payer: MEDICARE

## 2021-12-02 ENCOUNTER — OFFICE VISIT (OUTPATIENT)
Dept: CARDIOLOGY CLINIC | Age: 81
End: 2021-12-02
Payer: MEDICARE

## 2021-12-02 VITALS
WEIGHT: 125.4 LBS | SYSTOLIC BLOOD PRESSURE: 112 MMHG | HEIGHT: 62 IN | DIASTOLIC BLOOD PRESSURE: 80 MMHG | HEART RATE: 72 BPM | BODY MASS INDEX: 23.08 KG/M2

## 2021-12-02 DIAGNOSIS — G47.33 OSA (OBSTRUCTIVE SLEEP APNEA): ICD-10-CM

## 2021-12-02 DIAGNOSIS — R07.9 CHEST PAIN, UNSPECIFIED TYPE: Primary | ICD-10-CM

## 2021-12-02 DIAGNOSIS — R53.83 OTHER FATIGUE: ICD-10-CM

## 2021-12-02 DIAGNOSIS — I25.5 ISCHEMIC CARDIOMYOPATHY: ICD-10-CM

## 2021-12-02 DIAGNOSIS — I50.43 ACUTE ON CHRONIC COMBINED SYSTOLIC AND DIASTOLIC HEART FAILURE (HCC): ICD-10-CM

## 2021-12-02 DIAGNOSIS — I25.10 CAD IN NATIVE ARTERY: ICD-10-CM

## 2021-12-02 DIAGNOSIS — I63.9 ACUTE CVA (CEREBROVASCULAR ACCIDENT) (HCC): ICD-10-CM

## 2021-12-02 DIAGNOSIS — R26.9 GAIT DISTURBANCE: ICD-10-CM

## 2021-12-02 DIAGNOSIS — I49.3 PVC (PREMATURE VENTRICULAR CONTRACTION): ICD-10-CM

## 2021-12-02 PROCEDURE — 1090F PRES/ABSN URINE INCON ASSESS: CPT | Performed by: INTERNAL MEDICINE

## 2021-12-02 PROCEDURE — 4040F PNEUMOC VAC/ADMIN/RCVD: CPT | Performed by: INTERNAL MEDICINE

## 2021-12-02 PROCEDURE — G8427 DOCREV CUR MEDS BY ELIG CLIN: HCPCS | Performed by: INTERNAL MEDICINE

## 2021-12-02 PROCEDURE — 93000 ELECTROCARDIOGRAM COMPLETE: CPT | Performed by: INTERNAL MEDICINE

## 2021-12-02 PROCEDURE — G8420 CALC BMI NORM PARAMETERS: HCPCS | Performed by: INTERNAL MEDICINE

## 2021-12-02 PROCEDURE — 1123F ACP DISCUSS/DSCN MKR DOCD: CPT | Performed by: INTERNAL MEDICINE

## 2021-12-02 PROCEDURE — 99214 OFFICE O/P EST MOD 30 MIN: CPT | Performed by: INTERNAL MEDICINE

## 2021-12-02 PROCEDURE — 1036F TOBACCO NON-USER: CPT | Performed by: INTERNAL MEDICINE

## 2021-12-02 PROCEDURE — G8399 PT W/DXA RESULTS DOCUMENT: HCPCS | Performed by: INTERNAL MEDICINE

## 2021-12-02 PROCEDURE — G8484 FLU IMMUNIZE NO ADMIN: HCPCS | Performed by: INTERNAL MEDICINE

## 2021-12-02 RX ORDER — METOPROLOL SUCCINATE 50 MG/1
50 TABLET, EXTENDED RELEASE ORAL DAILY
Qty: 30 TABLET | Refills: 3 | Status: SHIPPED | OUTPATIENT
Start: 2021-12-02 | End: 2022-01-13 | Stop reason: SDUPTHER

## 2021-12-02 NOTE — LETTER
[phone]  1940  [id number]    Have you had any Chest Pain that is not new? - Yes  If Yes DO EKG - How does it feel - Dull Ache   How long does the pain last -  Varies from minutes to hours    How long have you been having the pain - Months   Did you take a Motrin   And did it relieve the pain - Yes     DO EKG IF: Patient has a Heart Rate above 100 or below 40     CAD (Coronary Artery Disease) patient should have one on file every 6 months        Have you had any Shortness of Breath - No    Have you had any dizziness - No    Have you had any palpitations that are not new? - No    Do you have any edema - swelling in Right leg    If Yes - CHECK TO SEE IF THE EDEMA IS PITTING  How long have they been having edema - Months  If Yes - Have they worn compression stockings Yes  If they have worn compression stockings  Months    Vein \"LEG PROBLEM Questionnaire\"  1. Do you have prominent leg veins? No   2. Do you have any skin discoloration? No  3. Do you have any healed or active sores? No  4. Do you have swelling of the legs? Yes  5. Do you have a family history of varicose veins? No  6. Does your profession involve pro-longed        standing or heavy lifting? No  7. Have you been fighting overweight problems? No  8. Do you have restless legs? No  9. Do you have any night time cramps? No  10. Do you have any of the following in your legs:        Tiredness     When did you have your last labs drawn 10/26/2021  Where did you have them done   What doctor ordered     If we do not have these labs you are retrieve these labs for these providers!     Do you have a surgery or procedure scheduled in the near future - No     Ask patient if they want to sign up for [employer] if they are not already signed up     Check to see if we have an E-MAIL on file for the patient     Check medication list thoroughly!!! AND RECONCILE OUTSIDE MEDICATIONS  If dose

## 2021-12-02 NOTE — PATIENT INSTRUCTIONS
**It is YOUR responsibilty to bring medication bottles and/or updated medication list to [address] [place] [address]. This will allow us to better serve you and all your healthcare needs**   Please be informed that if you contact our office outside of normal business hours the physician on call cannot help with any scheduling or rescheduling issues, procedure instruction questions or any type of medication issue. We advise you for any urgent/emergency that you go to the nearest emergency room!     PLEASE CALL OUR OFFICE DURING NORMAL BUSINESS HOURS    Monday - Friday   8 am to 5 pm    [facility] [address]: [phone]    [place]:  [phone]

## 2021-12-03 ENCOUNTER — TELEPHONE (OUTPATIENT)
Dept: CARDIOLOGY CLINIC | Age: 81
End: 2021-12-03

## 2021-12-03 ENCOUNTER — HOSPITAL ENCOUNTER (OUTPATIENT)
Dept: CARDIAC REHAB | Age: 81
Setting detail: THERAPIES SERIES
Discharge: HOME | End: 2021-12-03
Payer: MEDICARE

## 2021-12-03 PROCEDURE — G0422 INTENS CARDIAC REHAB W/EXERC: HCPCS

## 2021-12-03 PROCEDURE — G0423 INTENS CARDIAC REHAB NO EXER: HCPCS

## 2021-12-03 NOTE — TELEPHONE ENCOUNTER
Patient called and is concerned cardiac rehab is only in [place] at this time. She is asking if Dr. Cris Wheeler is ok with that. I will send to Dr. Cris Wheeler for assurance.

## 2021-12-06 ENCOUNTER — APPOINTMENT (OUTPATIENT)
Dept: CARDIAC REHAB | Age: 81
End: 2021-12-06
Payer: MEDICARE

## 2021-12-07 NOTE — TELEPHONE ENCOUNTER
Called patient and talked to daughter. She said her mom has a cold right now and would like to start next week. They are concerned she would get kicked out. I ask her to call them and explain she is sick and they will start next week if she is feeling better.

## 2021-12-08 ENCOUNTER — APPOINTMENT (OUTPATIENT)
Dept: CARDIAC REHAB | Age: 81
End: 2021-12-08
Payer: MEDICARE

## 2021-12-10 ENCOUNTER — APPOINTMENT (OUTPATIENT)
Dept: CARDIAC REHAB | Age: 81
End: 2021-12-10
Payer: MEDICARE

## 2021-12-13 ENCOUNTER — APPOINTMENT (OUTPATIENT)
Dept: CARDIAC REHAB | Age: 81
End: 2021-12-13
Payer: MEDICARE

## 2021-12-15 ENCOUNTER — APPOINTMENT (OUTPATIENT)
Dept: CARDIAC REHAB | Age: 81
End: 2021-12-15
Payer: MEDICARE

## 2021-12-15 ENCOUNTER — HOSPITAL ENCOUNTER (OUTPATIENT)
Dept: CARDIAC REHAB | Age: 81
Setting detail: THERAPIES SERIES
Discharge: HOME | End: 2021-12-15
Payer: MEDICARE

## 2021-12-15 ENCOUNTER — PROCEDURE VISIT (OUTPATIENT)
Dept: CARDIOLOGY CLINIC | Age: 81
End: 2021-12-15
Payer: MEDICARE

## 2021-12-15 DIAGNOSIS — I50.43 ACUTE ON CHRONIC COMBINED SYSTOLIC AND DIASTOLIC HEART FAILURE (HCC): ICD-10-CM

## 2021-12-15 DIAGNOSIS — I25.5 ISCHEMIC CARDIOMYOPATHY: Primary | ICD-10-CM

## 2021-12-15 LAB
LV EF: 28 %
LVEF MODALITY: NORMAL

## 2021-12-15 PROCEDURE — 93306 TTE W/DOPPLER COMPLETE: CPT | Performed by: INTERNAL MEDICINE

## 2021-12-15 PROCEDURE — G0423 INTENS CARDIAC REHAB NO EXER: HCPCS

## 2021-12-15 PROCEDURE — G0422 INTENS CARDIAC REHAB W/EXERC: HCPCS

## 2021-12-16 ENCOUNTER — APPOINTMENT (OUTPATIENT)
Dept: CT IMAGING | Age: 81
End: 2021-12-16
Payer: MEDICARE

## 2021-12-16 ENCOUNTER — TELEPHONE (OUTPATIENT)
Dept: CARDIOLOGY CLINIC | Age: 81
End: 2021-12-16

## 2021-12-16 ENCOUNTER — HOSPITAL ENCOUNTER (EMERGENCY)
Age: 81
Discharge: HOME | End: 2021-12-16
Attending: EMERGENCY MEDICINE
Payer: MEDICARE

## 2021-12-16 ENCOUNTER — APPOINTMENT (OUTPATIENT)
Dept: GENERAL RADIOLOGY | Age: 81
End: 2021-12-16
Payer: MEDICARE

## 2021-12-16 VITALS
DIASTOLIC BLOOD PRESSURE: 58 MMHG | RESPIRATION RATE: 13 BRPM | TEMPERATURE: 97.9 F | BODY MASS INDEX: 22.63 KG/M2 | HEART RATE: 64 BPM | WEIGHT: 123 LBS | OXYGEN SATURATION: 100 % | HEIGHT: 62 IN | SYSTOLIC BLOOD PRESSURE: 129 MMHG

## 2021-12-16 DIAGNOSIS — L03.211 FACIAL CELLULITIS: Primary | ICD-10-CM

## 2021-12-16 LAB
ALBUMIN SERPL-MCNC: 4.2 GM/DL (ref 3.4–5)
ALP BLD-CCNC: 57 IU/L (ref 40–129)
ALT SERPL-CCNC: 21 U/L (ref 10–40)
ANION GAP SERPL CALCULATED.3IONS-SCNC: 13 MMOL/L (ref 4–16)
AST SERPL-CCNC: 20 IU/L (ref 15–37)
BASOPHILS ABSOLUTE: 0.1 K/CU MM
BASOPHILS RELATIVE PERCENT: 0.7 % (ref 0–1)
BILIRUB SERPL-MCNC: 0.5 MG/DL (ref 0–1)
BUN BLDV-MCNC: 11 MG/DL (ref 6–23)
CALCIUM SERPL-MCNC: 9.3 MG/DL (ref 8.3–10.6)
CHLORIDE BLD-SCNC: 102 MMOL/L (ref 99–110)
CO2: 24 MMOL/L (ref 21–32)
CREAT SERPL-MCNC: 0.7 MG/DL (ref 0.6–1.1)
DIFFERENTIAL TYPE: ABNORMAL
EKG ATRIAL RATE: 59 BPM
EKG DIAGNOSIS: NORMAL
EKG P AXIS: 68 DEGREES
EKG P-R INTERVAL: 166 MS
EKG Q-T INTERVAL: 468 MS
EKG QRS DURATION: 148 MS
EKG QTC CALCULATION (BAZETT): 463 MS
EKG R AXIS: 49 DEGREES
EKG T AXIS: 114 DEGREES
EKG VENTRICULAR RATE: 59 BPM
EOSINOPHILS ABSOLUTE: 0.1 K/CU MM
EOSINOPHILS RELATIVE PERCENT: 0.8 % (ref 0–3)
GFR AFRICAN AMERICAN: >60 ML/MIN/1.73M2
GFR NON-AFRICAN AMERICAN: >60 ML/MIN/1.73M2
GLUCOSE BLD-MCNC: 100 MG/DL (ref 70–99)
GLUCOSE BLD-MCNC: 94 MG/DL (ref 70–99)
HCT VFR BLD CALC: 42.7 % (ref 37–47)
HEMOGLOBIN: 14.2 GM/DL (ref 12.5–16)
IMMATURE NEUTROPHIL %: 0.2 % (ref 0–0.43)
LYMPHOCYTES ABSOLUTE: 3.1 K/CU MM
LYMPHOCYTES RELATIVE PERCENT: 36 % (ref 24–44)
MCH RBC QN AUTO: 30.3 PG (ref 27–31)
MCHC RBC AUTO-ENTMCNC: 33.3 % (ref 32–36)
MCV RBC AUTO: 91 FL (ref 78–100)
MONOCYTES ABSOLUTE: 0.8 K/CU MM
MONOCYTES RELATIVE PERCENT: 9.3 % (ref 0–4)
PDW BLD-RTO: 14.3 % (ref 11.7–14.9)
PLATELET # BLD: 331 K/CU MM (ref 140–440)
PMV BLD AUTO: 8.4 FL (ref 7.5–11.1)
POTASSIUM SERPL-SCNC: 4.4 MMOL/L (ref 3.5–5.1)
RBC # BLD: 4.69 M/CU MM (ref 4.2–5.4)
SEGMENTED NEUTROPHILS ABSOLUTE COUNT: 4.5 K/CU MM
SEGMENTED NEUTROPHILS RELATIVE PERCENT: 53 % (ref 36–66)
SODIUM BLD-SCNC: 139 MMOL/L (ref 135–145)
TOTAL IMMATURE NEUTOROPHIL: 0.02 K/CU MM
TOTAL PROTEIN: 6.7 GM/DL (ref 6.4–8.2)
TROPONIN T: <0.01 NG/ML
WBC # BLD: 8.5 K/CU MM (ref 4–10.5)

## 2021-12-16 PROCEDURE — 93010 ELECTROCARDIOGRAM REPORT: CPT | Performed by: INTERNAL MEDICINE

## 2021-12-16 PROCEDURE — 99284 EMERGENCY DEPT VISIT MOD MDM: CPT

## 2021-12-16 PROCEDURE — 85025 COMPLETE CBC W/AUTO DIFF WBC: CPT

## 2021-12-16 PROCEDURE — 70450 CT HEAD/BRAIN W/O DYE: CPT

## 2021-12-16 PROCEDURE — 93005 ELECTROCARDIOGRAM TRACING: CPT | Performed by: EMERGENCY MEDICINE

## 2021-12-16 PROCEDURE — 80053 COMPREHEN METABOLIC PANEL: CPT

## 2021-12-16 PROCEDURE — 70486 CT MAXILLOFACIAL W/O DYE: CPT

## 2021-12-16 PROCEDURE — 71045 X-RAY EXAM CHEST 1 VIEW: CPT

## 2021-12-16 PROCEDURE — 82962 GLUCOSE BLOOD TEST: CPT

## 2021-12-16 PROCEDURE — 84484 ASSAY OF TROPONIN QUANT: CPT

## 2021-12-16 RX ORDER — CLINDAMYCIN HYDROCHLORIDE 300 MG/1
300 CAPSULE ORAL 4 TIMES DAILY
Qty: 40 CAPSULE | Refills: 0 | Status: SHIPPED | OUTPATIENT
Start: 2021-12-16 | End: 2021-12-26

## 2021-12-16 RX ORDER — METHYLPREDNISOLONE 4 MG/1
TABLET ORAL
Qty: 1 KIT | Refills: 0 | Status: SHIPPED | OUTPATIENT
Start: 2021-12-16 | End: 2022-01-13 | Stop reason: ALTCHOICE

## 2021-12-16 ASSESSMENT — PAIN DESCRIPTION - LOCATION: LOCATION: EAR

## 2021-12-16 ASSESSMENT — PAIN DESCRIPTION - DESCRIPTORS: DESCRIPTORS: ACHING

## 2021-12-16 ASSESSMENT — PAIN DESCRIPTION - ORIENTATION: ORIENTATION: RIGHT

## 2021-12-16 ASSESSMENT — PAIN SCALES - GENERAL: PAINLEVEL_OUTOF10: 5

## 2021-12-16 ASSESSMENT — PAIN DESCRIPTION - FREQUENCY: FREQUENCY: CONTINUOUS

## 2021-12-16 ASSESSMENT — PAIN DESCRIPTION - PAIN TYPE: TYPE: ACUTE PAIN

## 2021-12-16 NOTE — TELEPHONE ENCOUNTER
Dr. Lim Poor pt to go to ER where she can have a CT scan and be seen by a doctor. Pt's daughter Erik Escobar) was advised and agreed pt should go to ER.

## 2021-12-16 NOTE — TELEPHONE ENCOUNTER
Pt's daughter called stating pt's left side of face is swollen from her nose to jaw and down under her jaw and her left side of mouth is not moving as much as the right side of her mouth. This was first noticed yesterday after pt came home from Cardiac Rehab. Pt has no other symptoms. Pt's b/p is 131/70; HR=80. Please advise.

## 2021-12-16 NOTE — ED TRIAGE NOTES
Arrived per w/c to room 3 for triage. Tolerated without difficulty. Bed in lowest position. Call light given. Gowned for exam. Monitor applied. EKG obtained. Blood sugar 100mg/dl. Daughter at bedside. Patient stated she did have twinges of pain in her left arm earlier today. Denies pain or discomfort at this time.

## 2021-12-16 NOTE — ED PROVIDER NOTES
Emergency Department Encounter  Location: [address]    Patient: Hoa Davis  MRN: [id number]  : 1940  Date of evaluation: 2021  ED Provider: Long Alberto DO, FACEP    Chief Complaint:    Facial Swelling (States her daughter noticed this morning she had swelling in her left jaw. States does not know how long the swelling has been there. Denies difficulty with speaking or thinking. Denies fever or chills. Denies nausea or vomitng. States does not have periphal vison in the right eye normally. Also states is having ear pain on the right. States had an echo yesterday at the doctors office. )    [place]:  Hoa Davis is a 80 y.o. female that presents to the emergency department with concerns for CVA. The patient's daughter noted that she is having some swelling on the left side of her face and her mouth appeared to be asymmetrical particularly on the left side. She is also complaining of some pain to her right ear. She had some tingling in her left arm. She denies any other focal findings. She called her primary doctor and her cardiologist who advised her to come to the emergency department. Patient had cardiac rehab yesterday and her daughter states that her blood pressure slightly higher than normal for her she states it was as high as 131/70. She states normally she is not that high. She is denying any chest pain or shortness of breath. She does have some slight nausea. She does have tenderness in her left facial region and some irritation to her right preauricular area. She did have a stroke last August. This is left her with some right-sided residual weakness. ROS - see HPI, below listed is current ROS at time of my eval:  At least 10 systems reviewed and otherwise acutely negative except as in the [address]   General:  No fevers, no chills, no weakness  Eyes:  No recent vison changes, no discharge  ENT:  No sore throat, no nasal congestion, no hearing changes  Cardiovascular:  No chest pain, no palpitations  Respiratory:  No shortness of breath, no cough, no wheezing  Gastrointestinal:  No pain, no nausea, no vomiting, no diarrhea  Musculoskeletal:  No muscle pain, no joint pain  Skin:  No rash, no pruritis, no easy bruising  Neurologic:  No speech problems, no headache, positive for residual right upper and lower extremity numbness, no extremity tingling, no extremity weakness  Psychiatric:  No anxiety  Genitourinary:  No dysuria, no hematuria  Endocrine:  No unexpected weight gain, no unexpected weight loss  Extremities:  no edema, no pain    Past Medical History:   Diagnosis Date    Acute CVA (cerebrovascular accident) ([facility] [address] 08/09/2021    Arthritis     CAD in native artery 07/29/2021    Essential hypertension 07/29/2021    H/O cardiac catheterization 07/27/2021    Several vessel serious  stenosis, Urgent CABG is recommended.  H/O cardiovascular stress test 07/29/2021    Abnormal Stress EKG results and patient was transferred to [facility] cath lab per Dr. Gary Yoder.  H/O echocardiogram 07/29/2021    Left ventricular function and size is normal, EF is estimated at 55-60%. Mild mitral ,tricuspid and moderate aortic regurgitation is present.  H/O echocardiogram 09/29/2021    Limited study , No Doppler study on Valves Left ventricular systolic function is severly depressed with an ejectionfraction of 15-20 %. EF is reduced compared to Echo in July 2021 Global hypokinesis Left Ventricular chamber size is Dilated No evidence of any pericardial effusion. Left sided pleural effusion noted, measuring 4.86 cm.  History of Holter monitoring 06/15/2021    Frequent PVCs. 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     Thyroid disease      Past Surgical History:   Procedure Laterality Date    APPENDECTOMY      BACK SURGERY      COLONOSCOPY      CORONARY ARTERY BYPASS GRAFT  08/02/2021    CABG X 5 vessel with Dr Jessica Brooks Abused: Not on file    Sexually Abused: Not on file   Housing Stability:     Unable to Pay for Housing in the Last Year: Not on file    Number of Places Lived in the Last Year: Not on file    Unstable Housing in the Last Year: Not on file     No current facility-administered medications for this encounter. Current Outpatient Medications   Medication Sig Dispense Refill    clindamycin (CLEOCIN) 300 MG capsule Take 1 capsule by mouth 4 times daily for 10 days 40 capsule 0    methylPREDNISolone (MEDROL, RAJEEV,) 4 MG tablet Take by mouth. 1 kit 0    metoprolol succinate (TOPROL XL) 50 MG extended release tablet Take 1 tablet by mouth daily 30 tablet 3    FOLBIC 2.5-25-2 MG TABS       lisinopril (PRINIVIL;ZESTRIL) 10 MG tablet Take 1 tablet by mouth daily 30 tablet 3    spironolactone (ALDACTONE) 25 MG tablet Take 1 tablet by mouth daily 90 tablet 1    furosemide (LASIX) 20 MG tablet Take 1 tablet by mouth daily as needed (for weight gain of 5 lbs) 90 tablet 1    midodrine (PROAMATINE) 5 MG tablet Take 1 tablet by mouth 3 times daily as needed (for sbp less than 90) Takes 1/2 tab, three times a day.  90 tablet 3    atorvastatin (LIPITOR) 40 MG tablet Take 1 tablet by mouth daily 90 tablet 1    aspirin 81 MG EC tablet Take 1 tablet by mouth daily 90 tablet 3    clopidogrel (PLAVIX) 75 MG tablet Take 1 tablet by mouth daily 90 tablet 3    docusate sodium (COLACE, DULCOLAX) 100 MG CAPS Take 100 mg by mouth 2 times daily as needed for Constipation      loperamide (IMODIUM) 2 MG capsule Take 2 mg by mouth 2 times daily as needed for Diarrhea      acetaminophen (TYLENOL) 325 MG tablet Take 325-650 mg by mouth every 6 hours as needed      estradiol (ESTRACE) 0.1 MG/GM vaginal cream Apply pea sized amount to vagina nightly x7 days, then every other night for 7 days, then every 3rd night therafter (Patient not taking: Reported on 9/23/2021)      Multiple Vitamin (MULTIVITAMIN) capsule Take 1 capsule by mouth Daily with lunch      levothyroxine (SYNTHROID) 50 MCG tablet Take 50 mcg by mouth Daily      ALPRAZolam (XANAX) 0.25 MG tablet Take 0.25 mg by mouth nightly as needed for Sleep       Allergies   Allergen Reactions    Iodine Hives     Topical only per pt; sts 1974 she had sx and broke out in hives after application of topical iodine only; denies knowledge of allergy to contrast dyes     Pcn [Penicillins] Hives       Nursing Notes Reviewed    Physical Exam:  ED Triage Vitals [12/16/21 1053]   Enc Vitals Group      BP (!) 129/58      Pulse 64      Resp 13      Temp 97.9 °F (36.6 °C)      Temp Source Oral      SpO2 100 %      Weight 123 lb (55.8 kg)      Height 5' 2\" (1.575 m)      Head Circumference       Peak Flow       Pain Score       Pain Loc       Pain Edu? Excl. in [address]? GENERAL APPEARANCE: Awake and alert. Cooperative. No acute distress. Nontoxic in appearance  HEAD: Normocephalic. Atraumatic. Patient does have tenderness palpation of her left nasal labial region. There is swelling in this region causing her mouth to appear asymmetrical. There are no motor or sensory deficits seen on exam.  EYES: EOM's grossly intact. Sclera anicteric. Pupils are equally reactive to light, extraocular motions are intact  ENT: Tolerates saliva. No trismus. Tympanic membranes are clear bilaterally. NECK: Supple. Trachea midline. CARDIO: RRR. Radial pulse 2+. LUNGS: Respirations unlabored. CTAB. ABDOMEN: Soft. Non-distended. Non-tender. EXTREMITIES: No acute deformities. SKIN: Warm and dry. NEUROLOGICAL: No gross facial drooping. Moves all 4 extremities spontaneously.    [place] [facility] Stroke Scale at 12:23 PM is:  Level of Consciousness:  0 - alert; keenly responsive    LOC Questions:  0 - answers both questions correctly    LOC Commands:  0 - performs both tasks correctly    Best Gaze:  0 - normal    Visual Fields:  0 - no visual loss patient does have residual visual loss of her peripheral vision in her right eye from her previous stroke    Facial Palsy:  0 - normal symmetric movement    Motor-Arm-Left:  0 - no drift, limb holds 90 (or 45) degrees for full 10 seconds    Motor-Leg-Left:  0 - no drift; leg holds 30 degree position for full 5 seconds    Motor-Arm-Right:  0 - no drift, limb holds 90 (or 45) degrees for full 10 seconds    Motor-Leg-Right:  0 - no drift; leg holds 30 degree position for full 5 seconds    Limb Ataxia:  0 - absent    Sensory:  0 - normal; no sensory loss    Best Language:  0 - no aphasia, normal    Dysarthria:  0 - normal    Extinction and Inattention:  0 - no abnormality  PSYCHIATRIC: Normal mood.      Labs:  Results for orders placed or performed during the hospital encounter of 12/16/21   CBC Auto Differential   Result Value Ref Range    WBC 8.5 4.0 - 10.5 K/CU MM    RBC 4.69 4.2 - 5.4 M/CU MM    Hemoglobin 14.2 12.5 - 16.0 GM/DL    Hematocrit 42.7 37 - 47 %    MCV 91.0 78 - 100 FL    MCH 30.3 27 - 31 PG    MCHC 33.3 32.0 - 36.0 %    RDW 14.3 11.7 - 14.9 %    Platelets 266 030 - 082 K/CU MM    MPV 8.4 7.5 - 11.1 FL    Differential Type AUTOMATED DIFFERENTIAL     Segs Relative 53.0 36 - 66 %    Lymphocytes % 36.0 24 - 44 %    Monocytes % 9.3 (H) 0 - 4 %    Eosinophils % 0.8 0 - 3 %    Basophils % 0.7 0 - 1 %    Segs Absolute 4.5 K/CU MM    Lymphocytes Absolute 3.1 K/CU MM    Monocytes Absolute 0.8 K/CU MM    Eosinophils Absolute 0.1 K/CU MM    Basophils Absolute 0.1 K/CU MM    Immature Neutrophil % 0.2 0 - 0.43 %    Total Immature Neutrophil 0.02 K/CU MM   Comprehensive Metabolic Panel   Result Value Ref Range    Sodium 139 135 - 145 MMOL/L    Potassium 4.4 3.5 - 5.1 MMOL/L    Chloride 102 99 - 110 mMol/L    CO2 24 21 - 32 MMOL/L    BUN 11 6 - 23 MG/DL    CREATININE 0.7 0.6 - 1.1 MG/DL    Glucose 94 70 - 99 MG/DL    Calcium 9.3 8.3 - 10.6 MG/DL    Albumin 4.2 3.4 - 5.0 GM/DL    Total Protein 6.7 6.4 - 8.2 GM/DL    Total Bilirubin 0.5 0.0 - 1.0 MG/DL    ALT 21 10 - 40 U/L AST 20 15 - 37 IU/L    Alkaline Phosphatase 57 40 - 129 IU/L    GFR Non-African American >60 >60 mL/min/1.73m2    GFR African American >60 >60 mL/min/1.73m2    Anion Gap 13 4 - 16   Troponin   Result Value Ref Range    Troponin T <0.010 <0.01 NG/ML   POCT Glucose   Result Value Ref Range    POC Glucose 100 (H) 70 - 99 MG/DL   EKG 12 Lead   Result Value Ref Range    Ventricular Rate 59 BPM    Atrial Rate 59 BPM    P-R Interval 166 ms    QRS Duration 148 ms    Q-T Interval 468 ms    QTc Calculation (Bazett) 463 ms    P Axis 68 degrees    R Axis 49 degrees    T Axis 114 degrees    Diagnosis       Sinus bradycardia  Possible Left atrial enlargement  Left bundle branch block  Abnormal ECG  When compared with ECG of 13-AUG-2021 13:47,  T wave inversion no longer evident in Anterolateral leads  QT has shortened         EKG (if obtained): (All EKG's are interpreted by myself in the absence of a cardiologist)  The Ekg interpreted by me in the absence of a cardiologist shows. sinus bradycardia, rate=59 with a rate of 59  Axis is   Left axis deviation  QTc is  463  Intervals and Durations are unremarkable. No specific ST-T wave changes appreciated. No evidence of acute ischemia. No significant change from prior EKG dated 12/2/2021    Radiographs (if obtained):  [] The following radiograph was interpreted by myself in the absence of a radiologist:  [x] Radiologist's Report reviewed at time of ED visit:  XR CHEST PORTABLE   Final Result   1. No acute cardiopulmonary disease. 2. Resolved bilateral pleural effusions. CT FACIAL BONES WO CONTRAST   Final Result   1. No acute intracranial abnormality. 2. New areas of encephalomalacia in the left occipital lobe since the   previous exam on 8/3/2021, compatible with infarct. 3. Cerebral parenchymal volume loss with chronic microvascular white matter   ischemic disease. 4. No evidence of an acute facial bone fracture.    5. Minimal soft tissue stranding along the lateral aspect of the left   mandibular ramus. CT Head WO Contrast   Final Result   1. No acute intracranial abnormality. 2. New areas of encephalomalacia in the left occipital lobe since the   previous exam on 8/3/2021, compatible with infarct. 3. Cerebral parenchymal volume loss with chronic microvascular white matter   ischemic disease. 4. No evidence of an acute facial bone fracture. 5. Minimal soft tissue stranding along the lateral aspect of the left   mandibular ramus. ED Course and MDM:  Patient presents to the emergency department with some swelling to her left nasolabial region. The patient has had skin cancers removed adjacent to this area and is concerned this may have something to do with that. Her facial CT does show some stranding in this area which makes me think that she may have some cellulitis. The patient will be started on clindamycin and Medrol Dosepak. I am seeing no evidence of acute CVA today. She will be discharged in stable condition to follow-up with her primary caregiver. She is instructed return for any problems or concerns and is discharged at this time. Final Impression:  1. Facial cellulitis      DISPOSITION Decision To Discharge    Patient referred to:  Sofia Melissa MD  [facility] [facility] [address] [phone]    Schedule an appointment as soon as possible for a visit in 1 week  For follow up    Discharge medications:  New Prescriptions    CLINDAMYCIN (CLEOCIN) 300 MG CAPSULE    Take 1 capsule by mouth 4 times daily for 10 days    METHYLPREDNISOLONE (MEDROL, RAJEEV,) 4 MG TABLET    Take by mouth.      (Please note that portions of this note may have been completed with a voice recognition program. Efforts were made to edit the dictations but occasionally words are mis-transcribed.)    Costa Vides, , [address] [facility]  Board certified in [address]  12/16/21 1224

## 2021-12-17 ENCOUNTER — APPOINTMENT (OUTPATIENT)
Dept: CARDIAC REHAB | Age: 81
End: 2021-12-17
Payer: MEDICARE

## 2021-12-17 ENCOUNTER — HOSPITAL ENCOUNTER (OUTPATIENT)
Dept: CARDIAC REHAB | Age: 81
Setting detail: THERAPIES SERIES
Discharge: HOME | End: 2021-12-17
Payer: MEDICARE

## 2021-12-17 PROCEDURE — G0423 INTENS CARDIAC REHAB NO EXER: HCPCS

## 2021-12-17 PROCEDURE — G0422 INTENS CARDIAC REHAB W/EXERC: HCPCS

## 2021-12-20 ENCOUNTER — APPOINTMENT (OUTPATIENT)
Dept: CARDIAC REHAB | Age: 81
End: 2021-12-20
Payer: MEDICARE

## 2021-12-22 ENCOUNTER — APPOINTMENT (OUTPATIENT)
Dept: CARDIAC REHAB | Age: 81
End: 2021-12-22
Payer: MEDICARE

## 2021-12-22 ENCOUNTER — HOSPITAL ENCOUNTER (OUTPATIENT)
Dept: CARDIAC REHAB | Age: 81
Setting detail: THERAPIES SERIES
Discharge: HOME | End: 2021-12-22
Payer: MEDICARE

## 2021-12-22 PROCEDURE — G0423 INTENS CARDIAC REHAB NO EXER: HCPCS

## 2021-12-22 PROCEDURE — G0422 INTENS CARDIAC REHAB W/EXERC: HCPCS

## 2021-12-23 RX ORDER — ATORVASTATIN CALCIUM 40 MG/1
40 TABLET, FILM COATED ORAL DAILY
Qty: 90 TABLET | Refills: 3 | Status: SHIPPED | OUTPATIENT
Start: 2021-12-23

## 2021-12-24 ENCOUNTER — APPOINTMENT (OUTPATIENT)
Dept: CARDIAC REHAB | Age: 81
End: 2021-12-24
Payer: MEDICARE

## 2021-12-27 ENCOUNTER — APPOINTMENT (OUTPATIENT)
Dept: CARDIAC REHAB | Age: 81
End: 2021-12-27
Payer: MEDICARE

## 2021-12-29 ENCOUNTER — HOSPITAL ENCOUNTER (OUTPATIENT)
Dept: CARDIAC REHAB | Age: 81
Setting detail: THERAPIES SERIES
Discharge: HOME | End: 2021-12-29
Payer: MEDICARE

## 2021-12-29 ENCOUNTER — APPOINTMENT (OUTPATIENT)
Dept: CARDIAC REHAB | Age: 81
End: 2021-12-29
Payer: MEDICARE

## 2021-12-29 PROCEDURE — G0422 INTENS CARDIAC REHAB W/EXERC: HCPCS

## 2021-12-29 PROCEDURE — G0423 INTENS CARDIAC REHAB NO EXER: HCPCS

## 2021-12-31 ENCOUNTER — APPOINTMENT (OUTPATIENT)
Dept: CARDIAC REHAB | Age: 81
End: 2021-12-31
Payer: MEDICARE

## 2022-01-05 ENCOUNTER — HOSPITAL ENCOUNTER (OUTPATIENT)
Dept: CARDIAC REHAB | Age: 82
Setting detail: THERAPIES SERIES
Discharge: HOME | End: 2022-01-05
Payer: MEDICARE

## 2022-01-05 PROCEDURE — G0423 INTENS CARDIAC REHAB NO EXER: HCPCS

## 2022-01-05 PROCEDURE — G0422 INTENS CARDIAC REHAB W/EXERC: HCPCS

## 2022-01-07 ENCOUNTER — HOSPITAL ENCOUNTER (OUTPATIENT)
Dept: CARDIAC REHAB | Age: 82
Setting detail: THERAPIES SERIES
Discharge: HOME | End: 2022-01-07
Payer: MEDICARE

## 2022-01-07 PROCEDURE — G0423 INTENS CARDIAC REHAB NO EXER: HCPCS

## 2022-01-07 PROCEDURE — G0422 INTENS CARDIAC REHAB W/EXERC: HCPCS

## 2022-01-12 ENCOUNTER — HOSPITAL ENCOUNTER (OUTPATIENT)
Dept: CARDIAC REHAB | Age: 82
Setting detail: THERAPIES SERIES
Discharge: HOME | End: 2022-01-12
Payer: MEDICARE

## 2022-01-12 PROCEDURE — G0423 INTENS CARDIAC REHAB NO EXER: HCPCS

## 2022-01-12 PROCEDURE — G0422 INTENS CARDIAC REHAB W/EXERC: HCPCS

## 2022-01-13 ENCOUNTER — TELEPHONE (OUTPATIENT)
Dept: CARDIOLOGY CLINIC | Age: 82
End: 2022-01-13

## 2022-01-13 ENCOUNTER — OFFICE VISIT (OUTPATIENT)
Dept: CARDIOLOGY CLINIC | Age: 82
End: 2022-01-13
Payer: MEDICARE

## 2022-01-13 VITALS
RESPIRATION RATE: 16 BRPM | DIASTOLIC BLOOD PRESSURE: 58 MMHG | SYSTOLIC BLOOD PRESSURE: 106 MMHG | HEART RATE: 60 BPM | WEIGHT: 123 LBS | HEIGHT: 62 IN | BODY MASS INDEX: 22.63 KG/M2

## 2022-01-13 DIAGNOSIS — I49.3 PVC (PREMATURE VENTRICULAR CONTRACTION): Primary | ICD-10-CM

## 2022-01-13 DIAGNOSIS — I25.5 ISCHEMIC CARDIOMYOPATHY: ICD-10-CM

## 2022-01-13 DIAGNOSIS — I20.0 UNSTABLE ANGINA (HCC): ICD-10-CM

## 2022-01-13 DIAGNOSIS — G47.33 OSA (OBSTRUCTIVE SLEEP APNEA): ICD-10-CM

## 2022-01-13 DIAGNOSIS — I50.43 ACUTE ON CHRONIC COMBINED SYSTOLIC AND DIASTOLIC HEART FAILURE (HCC): ICD-10-CM

## 2022-01-13 DIAGNOSIS — I10 ESSENTIAL HYPERTENSION: ICD-10-CM

## 2022-01-13 DIAGNOSIS — I63.9 ACUTE CVA (CEREBROVASCULAR ACCIDENT) (HCC): ICD-10-CM

## 2022-01-13 DIAGNOSIS — G47.10 HYPERSOMNIA: ICD-10-CM

## 2022-01-13 DIAGNOSIS — I25.10 CAD IN NATIVE ARTERY: ICD-10-CM

## 2022-01-13 PROCEDURE — 1123F ACP DISCUSS/DSCN MKR DOCD: CPT | Performed by: INTERNAL MEDICINE

## 2022-01-13 PROCEDURE — G8427 DOCREV CUR MEDS BY ELIG CLIN: HCPCS | Performed by: INTERNAL MEDICINE

## 2022-01-13 PROCEDURE — 1036F TOBACCO NON-USER: CPT | Performed by: INTERNAL MEDICINE

## 2022-01-13 PROCEDURE — 4040F PNEUMOC VAC/ADMIN/RCVD: CPT | Performed by: INTERNAL MEDICINE

## 2022-01-13 PROCEDURE — G8420 CALC BMI NORM PARAMETERS: HCPCS | Performed by: INTERNAL MEDICINE

## 2022-01-13 PROCEDURE — G8399 PT W/DXA RESULTS DOCUMENT: HCPCS | Performed by: INTERNAL MEDICINE

## 2022-01-13 PROCEDURE — 99214 OFFICE O/P EST MOD 30 MIN: CPT | Performed by: INTERNAL MEDICINE

## 2022-01-13 PROCEDURE — G8484 FLU IMMUNIZE NO ADMIN: HCPCS | Performed by: INTERNAL MEDICINE

## 2022-01-13 PROCEDURE — 1090F PRES/ABSN URINE INCON ASSESS: CPT | Performed by: INTERNAL MEDICINE

## 2022-01-13 RX ORDER — MIDODRINE HYDROCHLORIDE 5 MG/1
5 TABLET ORAL 2 TIMES DAILY
Qty: 90 TABLET | Refills: 3 | Status: SHIPPED | OUTPATIENT
Start: 2022-01-13 | End: 2022-01-13 | Stop reason: SDUPTHER

## 2022-01-13 RX ORDER — METOPROLOL SUCCINATE 100 MG/1
100 TABLET, EXTENDED RELEASE ORAL DAILY
Qty: 90 TABLET | Refills: 3 | Status: SHIPPED | OUTPATIENT
Start: 2022-01-13

## 2022-01-13 RX ORDER — MIDODRINE HYDROCHLORIDE 5 MG/1
5 TABLET ORAL 2 TIMES DAILY
Qty: 90 TABLET | Refills: 3 | Status: SHIPPED | OUTPATIENT
Start: 2022-01-13 | End: 2022-06-24

## 2022-01-13 RX ORDER — LISINOPRIL 10 MG/1
10 TABLET ORAL DAILY
Qty: 90 TABLET | Refills: 3 | Status: SHIPPED | OUTPATIENT
Start: 2022-01-13 | End: 2022-08-26 | Stop reason: SDUPTHER

## 2022-01-13 NOTE — TELEPHONE ENCOUNTER
Spoke to patients daughter. The midodrine directions should be 5 mg BID and they said 5 mg BID and 2.5mg TID.  Correction made and sent to pharmacy

## 2022-01-13 NOTE — PROGRESS NOTES
CARDIOLOGY  NOTE    Chief Complaint: pvc     HPI:   Leonora Perez is a 80y.o. year old who has Past medical history as noted below. Leonora Perez is feeling tired fatigued and has lack of energy, she is stressed out about her  who is in nursing home due to dementia and not doing well , she is feeling dizzy and occasionally gets lightheaded , she is in [address] [place] [address] for CHF/cardiomyopathy . She took midodrine once as she uses it if bp is less than 90  ECho in Dec shows EF of 20-25 % and moderate to severe Aortic regurgitation  She is going to cardiac rehab   Leonora Perez  had CABG with LIMA to LAD ,SVg to ramus, SVG to Om,and SVG to RCA in August 2021 after she was noted to have ST depression associated with chest pain after Lexiscan infusion before she could get images. Her EF before surgery was 50-55 % in July 2021 , post CABG and CVA her EF was15 % in Sept 2021    Emergent cath showed multivessel disease. Unfortunately post CABG she developed stroke causing right-sided deficits and loss of vision in the right eye. Her echo shows drop in EF of 15-20 % in sept 2021 , improved to 25-30 % by Dec  She is in  cardiac rehab.  she decided not get covid vaccine but is avoiding public spaces  She still tired and fatigued and has reduced exercise strength. Evaluate for ut? Current Outpatient Medications   Medication Sig Dispense Refill    metoprolol succinate (TOPROL XL) 100 MG extended release tablet Take 1 tablet by mouth daily 90 tablet 3    lisinopril (PRINIVIL;ZESTRIL) 10 MG tablet Take 1 tablet by mouth daily 90 tablet 3    midodrine (PROAMATINE) 5 MG tablet Take 1 tablet by mouth 2 times daily Takes 1/2 tab, three times a day.  90 tablet 3    atorvastatin (LIPITOR) 40 MG tablet Take 1 tablet by mouth daily 90 tablet 3    spironolactone (ALDACTONE) 25 MG tablet Take 1 tablet by mouth daily 90 tablet 1    aspirin 81 MG EC tablet Take 1 tablet by mouth daily 90 tablet 3    clopidogrel (PLAVIX) 75 MG tablet Take 1 tablet by mouth daily 90 tablet 3    docusate sodium (COLACE, DULCOLAX) 100 MG CAPS Take 100 mg by mouth 2 times daily as needed for Constipation      loperamide (IMODIUM) 2 MG capsule Take 2 mg by mouth 2 times daily as needed for Diarrhea      acetaminophen (TYLENOL) 325 MG tablet Take 325-650 mg by mouth every 6 hours as needed      estradiol (ESTRACE) 0.1 MG/GM vaginal cream Apply pea sized amount to vagina nightly x7 days, then every other night for 7 days, then every 3rd night therafter      Multiple Vitamin (MULTIVITAMIN) capsule Take 1 capsule by mouth Daily with lunch      levothyroxine (SYNTHROID) 50 MCG tablet Take 50 mcg by mouth Daily      ALPRAZolam (XANAX) 0.25 MG tablet Take 0.25 mg by mouth nightly as needed for Sleep      furosemide (LASIX) 20 MG tablet Take 1 tablet by mouth daily as needed (for weight gain of 5 lbs) 90 tablet 1     No current facility-administered medications for this visit. Allergies:   Iodine and Pcn [penicillins]    Patient History:  Past Medical History:   Diagnosis Date    Acute CVA (cerebrovascular accident) ([facility] [address] 08/09/2021    Arthritis     CAD in native artery 07/29/2021    Essential hypertension 07/29/2021    H/O cardiac catheterization 07/27/2021    Several vessel serious  stenosis, Urgent CABG is recommended.  H/O cardiovascular stress test 07/29/2021    Abnormal Stress EKG results and patient was transferred to [facility] cath lab per Dr. Angelica Arias.  H/O echocardiogram 07/29/2021    Left ventricular function and size is normal, EF is estimated at 55-60%. Mild mitral ,tricuspid and moderate aortic regurgitation is present.  H/O echocardiogram 09/29/2021    Limited study , No Doppler study on Valves Left ventricular systolic function is severly depressed with an ejectionfraction of 15-20 %. EF is reduced compared to Echo in July 2021 Global hypokinesis Left Ventricular chamber size is Dilated No evidence of any pericardial effusion. Left sided pleural effusion noted, measuring 4.86 cm.    H/O echocardiogram 12/15/2021    Left ventricular systolic function is severely depressed with an EF of 25-30%. Dilated left ventricle . Septum as akinetic Grade I diastolic dysfunction. Moderate-severe aortic regurgitation noted with pressure half time  of 353 msec. Doppler evaluation reveals mild mitral and tricuspid regurgitation. Normal pulmonary artery pressure with RVSP of 29 mmHg. No evidence of pericardial effusion.  History of Holter monitoring 06/15/2021    Frequent PVCs. 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  Thyroid disease      Past Surgical History:   Procedure Laterality Date    APPENDECTOMY      BACK SURGERY      COLONOSCOPY      CORONARY ARTERY BYPASS GRAFT  08/02/2021    CABG X 5 vessel with Dr Porras  N/A 8/2/2021    CABG CORONARY ARTERY BYPASS X 5, INTRAOPERATIVE [place], INDUCED HYPOTHERMIA AND BILATERAL ENDOHARVEST OF SAPHENOUS VEINS performed by Ministerio Roa MD at [address] [place] [address]      HYSTERECTOMY      TUMOR REMOVAL       Family History   Problem Relation Age of Onset    Cancer Father      Social History     Tobacco Use    Smoking status: Never Smoker    Smokeless tobacco: Never Used   Substance Use Topics    Alcohol use: No        Review of Systems:   · Constitutional: No Fever or Weight Loss   · Eyes: No Decreased Vision  · ENT: No Headaches, Hearing Loss or Vertigo  · Cardiovascular: as per note above   · Respiratory: No cough or wheezing and as per note above.    · Gastrointestinal: No abdominal pain, appetite loss, blood in stools, constipation, diarrhea or heartburn  · Genitourinary: No dysuria, trouble voiding, or hematuria  · Musculoskeletal:  denies any new  joint aches , swelling  or pain   · Integumentary: No rash or pruritis  · Neurological: No TIA or stroke symptoms  · Psychiatric: No anxiety or depression  · Endocrine: No malaise, fatigue or temperature intolerance  · Hematologic/Lymphatic: No bleeding problems, blood clots or swollen lymph nodes  · Allergic/Immunologic: No nasal congestion or hives    Objective:      Physical Exam:  BP (!) 106/58   Pulse 60   Resp 16   Ht 5' 2\" (1.575 m)   Wt 123 lb (55.8 kg)   BMI 22.50 kg/m²   Wt Readings from Last 3 Encounters:   01/13/22 123 lb (55.8 kg)   12/16/21 123 lb (55.8 kg)   12/02/21 125 lb 6.4 oz (56.9 kg)     Body mass index is 22.5 kg/m². Vitals:    01/13/22 1035   BP: (!) 106/58   Pulse: 60   Resp: 16        General Appearance:  No distress, conversant  Constitutional:  Well developed, Well nourished, No acute distress, Non-toxic appearance. HENT:  Normocephalic, Atraumatic, Bilateral external ears normal, Oropharynx moist, No oral exudates, Nose normal. Neck- Normal range of motion, No tenderness, Supple, No stridor,no apical-carotid delay  Eyes:  PERRL, EOMI, Conjunctiva normal, No discharge. Respiratory:  Normal breath sounds, No respiratory distress, No wheezing, No chest tenderness. ,no use of accessory muscles, NO crackles  Cardiovascular: (PMI) apex non displaced,no lifts no thrills,S1 and S2 audible, No added heart sounds, No signs of ankle edema, or volume overload, No evidence of JVD, No crackles  GI:  Bowel sounds normal, Soft, No tenderness, No masses, No gross visceromegaly   :  No costovertebral angle tenderness   Musculoskeletal:  No edema, no tenderness, no deformities.  Back- no tenderness  Integument:  Well hydrated, no rash   Lymphatic:  No lymphadenopathy noted   Neurologic:  Alert & oriented x 3, CN 2-12 normal, normal motor function, normal sensory function, no focal deficits noted   Psychiatric:  Speech and behavior appropriate       Medical decision making and Data review:  DATA:  Lab Results   Component Value Date    TROPONINT <0.010 12/16/2021     BNP:    Lab Results   Component Value Date    PROBNP 377.3 (H) 09/30/2015     PT/INR:  No results found for: [employer] [facility]  Lab Results   Component Value Date    LABA1C 5.5 07/30/2021    LABA1C 5.5 07/29/2021     Lab Results   Component Value Date    CHOL 237 (H) 09/10/2012    TRIG 109 09/10/2012    HDL 68 09/10/2012    LDLCALC 147 (H) 09/10/2012     Lab Results   Component Value Date    ALT 21 12/16/2021    AST 20 12/16/2021     No results for input(s): WBC, HGB, HCT, MCV, PLT in the last 72 hours. TSH: No results found for: TSH  Lab Results   Component Value Date    AST 20 12/16/2021    ALT 21 12/16/2021    BILITOT 0.5 12/16/2021    ALKPHOS 57 12/16/2021     Lab Results   Component Value Date    PROBNP 377.3 (H) 09/30/2015     Lab Results   Component Value Date    LABA1C 5.5 07/30/2021    LABA1C 5.5 07/29/2021     Lab Results   Component Value Date    WBC 8.5 12/16/2021    HGB 14.2 12/16/2021    HCT 42.7 12/16/2021     12/16/2021       Cath 7/29/21  RESULTS:   LM: No significant disease. LAD: 80 % tandem lesions proximally, followed by long area of   diffuse disease + 99 & 95 % tandem lesions distally. CIRCUMFLEX: 90 & 80 % tandem stenosis in proximal portion. with similar lesions in a high good size OM. RCA: 90 % mid vessel tandem stenosis in its mid section in   addition to 50 %, hazy proximal stenosis. LMCA: Moderate Lumen Irregularity.     LAD: Multiple stenosis. 80 % tandem lesions proximally, followed by long area  of diffuse disease + 99 & 95 % tandem lesions distally.     LCx: Multiple stenosis. 90 & 80 % tandem stenosis in proximal portion. with similar lesions in a high good size OM.     RCA: Multiple stenosis. 90 % mid vessel tandem stenosis in its mid section in  addition to 50 %, hazy proximal stenosis. Echo 7/29/21  Summary   Left ventricular function and size is normal, EF is estimated at 55-60%. Mild left ventricular hypertrophy. E/A reversal; indeterminate diastolic function.    Hypokineses of the basal anterior lateral and mid anterior lateral segments. Aneurysmal interatrial septum. Mild mitral ,tricuspid and moderate aortic regurgitation is present. RVSP is 25 mmHg. No evidence of pericardial effusion    Echo 9/29/21  Summary   Limited study , No Doppler study on Valves   Left ventricular systolic function is severly depressed with an ejection   fraction of 15-20 %. EF is reduced compared to Echo in July 2021   Global hypokinesis   Left Ventricular chamber size is Dilated   No evidence of any pericardial effusion. Left sided pleural effusion noted, measuring 4.86 cm. Echo 12/15/21  Left ventricular systolic function is severely depressed with an ejection   fraction of 25-30%. Dilated left ventricle . Septum as akinetic   Grade I diastolic dysfunction. Moderate to severe aortic regurgitation is noted with a pressure half time   of 353 msec. Doppler evaluation reveals mild mitral and tricuspid regurgitation. Normal pulmonary artery pressure with a RVSP of 29 mmHg. No evidence of pericardial effusion        All labs, medications and tests reviewed by myself including data and history from outside source , patient and available family . Assessment & Plan:      1. PVC (premature ventricular contraction)    2. HORACE (obstructive sleep apnea)    3. Hypersomnia    4. Unstable angina ([address]    5. Essential hypertension    6. CAD in native artery    7. Acute on chronic combined systolic and diastolic heart failure ([address]    8. Acute CVA (cerebrovascular accident) ([address]    9. Ischemic cardiomyopathy         No problem-specific Assessment & Plan notes found for this encounter. ASCVD  This is post CABG with LIMA to LAD and diagonal, SVG to ramus, SVG to OM, SVG to RCA on July 2021 continue Plavix due to post CABG stroke for now continue aspirin. Ischemic Cardiomyopathy  Increase  toprol xl to 100 mg  see if fatigue gets better . Increase midodrine to 5 mg bid instead of PRN , continue   Lisinopril 10 mg  , may need ICD ? But will plan cath first   Ef is 20 -25 %   Since her EF declined after CABG , will need  ischemia evaluation she ended up with CABG after ;last stress tets so she is hesitant to do stress test  will plan cath in few weeks     Urinary urgency  Lasix as needed basis hardly using it , frequent urination is better     POST CVA   Developed stroke post CABG day 2. Continue Plavix and statins for now      Dyslipidemia :  All available lab work was reviewed. Patient was advised to repeat lab work before next visit. Necessary orders were placed , instructions given by myself       Counseled extensively and medication compliance urged. We discussed that for the  prevention of ASCVD our  goal is aggressive risk modification. Patient is encouraged to exercise if they can , educated about  brisk walk for 30 minutes  at least 3 to 4 times a week if there are no physical limitations  Various goals were discussed and questions answered. Continue current medications. Appropriate prescriptions are addressed and refills ordered. Questions answered and patient verbalizes understanding. Call for any problems, questions, or concerns. Greater than 60 % of time spent counseling besides reviewing data and images     Continue all other medications of all above medical condition listed as is. Return in about 1 month (around 2/13/2022). Please note this report has been partially produced using speech recognition software and may contain errors related to that system including errors in grammar, punctuation, and spelling, as well as words and phrases that may be inappropriate. If there are any questions or concerns please feel free to contact the dictating provider for clarification.

## 2022-01-14 ENCOUNTER — HOSPITAL ENCOUNTER (OUTPATIENT)
Dept: CARDIAC REHAB | Age: 82
Setting detail: THERAPIES SERIES
Discharge: HOME | End: 2022-01-14
Payer: MEDICARE

## 2022-01-14 PROCEDURE — G0422 INTENS CARDIAC REHAB W/EXERC: HCPCS

## 2022-01-14 PROCEDURE — G0423 INTENS CARDIAC REHAB NO EXER: HCPCS

## 2022-01-20 ENCOUNTER — TELEPHONE (OUTPATIENT)
Dept: CARDIOLOGY CLINIC | Age: 82
End: 2022-01-20

## 2022-01-20 NOTE — TELEPHONE ENCOUNTER
I called patient and ask her to monitor BP when she is light headed. . I also ask patient to drink more water and she will call back Monday.

## 2022-01-20 NOTE — TELEPHONE ENCOUNTER
Pt was seen last week and  metoprolol succinate (TOPROL XL) 100 MG extended release tablet and midodrine (PROAMATINE) 5 MG tablet  States feels fuzzy and light headed  Please advise

## 2022-01-21 ENCOUNTER — HOSPITAL ENCOUNTER (OUTPATIENT)
Dept: CARDIAC REHAB | Age: 82
Setting detail: THERAPIES SERIES
Discharge: HOME | End: 2022-01-21
Payer: MEDICARE

## 2022-01-21 PROCEDURE — G0422 INTENS CARDIAC REHAB W/EXERC: HCPCS

## 2022-01-21 PROCEDURE — G0423 INTENS CARDIAC REHAB NO EXER: HCPCS

## 2022-01-24 NOTE — TELEPHONE ENCOUNTER
Try to not take midodrine and see if she feels any better will trying to use a higher dose of metoprolol increase the chance of heart improved

## 2022-01-24 NOTE — TELEPHONE ENCOUNTER
Patient called back today and states her BP is running 121/64 and 129/62. She states since the increase in Metoprolol and midodrine, she feels lightheaded \"like her head is swimming\" and fatigued. I will send to DR Josesito Marinelli to advise.

## 2022-01-28 ENCOUNTER — HOSPITAL ENCOUNTER (OUTPATIENT)
Dept: CARDIAC REHAB | Age: 82
Setting detail: THERAPIES SERIES
Discharge: HOME | End: 2022-01-28
Payer: MEDICARE

## 2022-01-28 PROCEDURE — G0423 INTENS CARDIAC REHAB NO EXER: HCPCS

## 2022-01-28 PROCEDURE — G0422 INTENS CARDIAC REHAB W/EXERC: HCPCS

## 2022-02-02 ENCOUNTER — HOSPITAL ENCOUNTER (OUTPATIENT)
Dept: CARDIAC REHAB | Age: 82
Setting detail: THERAPIES SERIES
Discharge: HOME | End: 2022-02-02
Payer: MEDICARE

## 2022-02-02 PROCEDURE — G0423 INTENS CARDIAC REHAB NO EXER: HCPCS

## 2022-02-02 PROCEDURE — G0422 INTENS CARDIAC REHAB W/EXERC: HCPCS

## 2022-02-02 RX ORDER — SPIRONOLACTONE 25 MG/1
25 TABLET ORAL DAILY
Qty: 90 TABLET | Refills: 1 | Status: SHIPPED | OUTPATIENT
Start: 2022-02-02 | End: 2022-06-24

## 2022-02-09 ENCOUNTER — HOSPITAL ENCOUNTER (OUTPATIENT)
Dept: CARDIAC REHAB | Age: 82
Setting detail: THERAPIES SERIES
Discharge: HOME | End: 2022-02-09
Payer: MEDICARE

## 2022-02-09 PROCEDURE — G0422 INTENS CARDIAC REHAB W/EXERC: HCPCS

## 2022-02-09 PROCEDURE — G0423 INTENS CARDIAC REHAB NO EXER: HCPCS

## 2022-02-11 ENCOUNTER — HOSPITAL ENCOUNTER (OUTPATIENT)
Dept: CARDIAC REHAB | Age: 82
Setting detail: THERAPIES SERIES
Discharge: HOME | End: 2022-02-11
Payer: MEDICARE

## 2022-02-11 PROCEDURE — G0422 INTENS CARDIAC REHAB W/EXERC: HCPCS

## 2022-02-11 PROCEDURE — G0423 INTENS CARDIAC REHAB NO EXER: HCPCS

## 2022-02-14 ENCOUNTER — HOSPITAL ENCOUNTER (OUTPATIENT)
Dept: CARDIAC REHAB | Age: 82
Setting detail: THERAPIES SERIES
Discharge: HOME | End: 2022-02-14
Payer: MEDICARE

## 2022-02-14 PROCEDURE — G0422 INTENS CARDIAC REHAB W/EXERC: HCPCS

## 2022-02-14 PROCEDURE — G0423 INTENS CARDIAC REHAB NO EXER: HCPCS

## 2022-02-16 ENCOUNTER — HOSPITAL ENCOUNTER (OUTPATIENT)
Dept: CARDIAC REHAB | Age: 82
Setting detail: THERAPIES SERIES
Discharge: HOME | End: 2022-02-16
Payer: MEDICARE

## 2022-02-16 ENCOUNTER — APPOINTMENT (OUTPATIENT)
Dept: CARDIAC REHAB | Age: 82
End: 2022-02-16
Payer: MEDICARE

## 2022-02-16 PROCEDURE — G0422 INTENS CARDIAC REHAB W/EXERC: HCPCS

## 2022-02-16 PROCEDURE — G0423 INTENS CARDIAC REHAB NO EXER: HCPCS

## 2022-02-18 ENCOUNTER — APPOINTMENT (OUTPATIENT)
Dept: CARDIAC REHAB | Age: 82
End: 2022-02-18
Payer: MEDICARE

## 2022-02-21 ENCOUNTER — HOSPITAL ENCOUNTER (OUTPATIENT)
Dept: CARDIAC REHAB | Age: 82
Setting detail: THERAPIES SERIES
Discharge: HOME | End: 2022-02-21
Payer: MEDICARE

## 2022-02-21 PROCEDURE — G0423 INTENS CARDIAC REHAB NO EXER: HCPCS

## 2022-02-21 PROCEDURE — G0422 INTENS CARDIAC REHAB W/EXERC: HCPCS

## 2022-02-23 ENCOUNTER — APPOINTMENT (OUTPATIENT)
Dept: CARDIAC REHAB | Age: 82
End: 2022-02-23
Payer: MEDICARE

## 2022-02-23 ENCOUNTER — HOSPITAL ENCOUNTER (OUTPATIENT)
Dept: CARDIAC REHAB | Age: 82
Setting detail: THERAPIES SERIES
Discharge: HOME | End: 2022-02-23
Payer: MEDICARE

## 2022-02-23 PROCEDURE — G0423 INTENS CARDIAC REHAB NO EXER: HCPCS

## 2022-02-23 PROCEDURE — G0422 INTENS CARDIAC REHAB W/EXERC: HCPCS

## 2022-02-25 ENCOUNTER — APPOINTMENT (OUTPATIENT)
Dept: CARDIAC REHAB | Age: 82
End: 2022-02-25
Payer: MEDICARE

## 2022-02-28 ENCOUNTER — HOSPITAL ENCOUNTER (OUTPATIENT)
Dept: CARDIAC REHAB | Age: 82
Setting detail: THERAPIES SERIES
Discharge: HOME | End: 2022-02-28
Payer: MEDICARE

## 2022-02-28 PROCEDURE — G0423 INTENS CARDIAC REHAB NO EXER: HCPCS

## 2022-02-28 PROCEDURE — G0422 INTENS CARDIAC REHAB W/EXERC: HCPCS

## 2022-03-02 ENCOUNTER — HOSPITAL ENCOUNTER (OUTPATIENT)
Dept: CARDIAC REHAB | Age: 82
Setting detail: THERAPIES SERIES
Discharge: HOME | End: 2022-03-02
Payer: MEDICARE

## 2022-03-02 PROCEDURE — G0423 INTENS CARDIAC REHAB NO EXER: HCPCS

## 2022-03-02 PROCEDURE — G0422 INTENS CARDIAC REHAB W/EXERC: HCPCS

## 2022-03-04 ENCOUNTER — HOSPITAL ENCOUNTER (OUTPATIENT)
Dept: CARDIAC REHAB | Age: 82
Setting detail: THERAPIES SERIES
Discharge: HOME | End: 2022-03-04
Payer: MEDICARE

## 2022-03-04 PROCEDURE — G0422 INTENS CARDIAC REHAB W/EXERC: HCPCS

## 2022-03-04 PROCEDURE — G0423 INTENS CARDIAC REHAB NO EXER: HCPCS

## 2022-03-07 ENCOUNTER — HOSPITAL ENCOUNTER (OUTPATIENT)
Dept: CARDIAC REHAB | Age: 82
Setting detail: THERAPIES SERIES
Discharge: HOME | End: 2022-03-07
Payer: MEDICARE

## 2022-03-07 PROCEDURE — G0423 INTENS CARDIAC REHAB NO EXER: HCPCS

## 2022-03-07 PROCEDURE — G0422 INTENS CARDIAC REHAB W/EXERC: HCPCS

## 2022-03-09 ENCOUNTER — HOSPITAL ENCOUNTER (OUTPATIENT)
Dept: CARDIAC REHAB | Age: 82
Setting detail: THERAPIES SERIES
Discharge: HOME | End: 2022-03-09
Payer: MEDICARE

## 2022-03-09 PROCEDURE — G0423 INTENS CARDIAC REHAB NO EXER: HCPCS

## 2022-03-09 PROCEDURE — G0422 INTENS CARDIAC REHAB W/EXERC: HCPCS

## 2022-03-11 ENCOUNTER — HOSPITAL ENCOUNTER (OUTPATIENT)
Dept: CARDIAC REHAB | Age: 82
Setting detail: THERAPIES SERIES
Discharge: HOME | End: 2022-03-11
Payer: MEDICARE

## 2022-03-11 PROCEDURE — G0422 INTENS CARDIAC REHAB W/EXERC: HCPCS

## 2022-03-11 PROCEDURE — G0423 INTENS CARDIAC REHAB NO EXER: HCPCS

## 2022-03-14 ENCOUNTER — HOSPITAL ENCOUNTER (OUTPATIENT)
Dept: CARDIAC REHAB | Age: 82
Setting detail: THERAPIES SERIES
Discharge: HOME | End: 2022-03-14
Payer: MEDICARE

## 2022-03-14 PROCEDURE — G0423 INTENS CARDIAC REHAB NO EXER: HCPCS

## 2022-03-14 PROCEDURE — G0422 INTENS CARDIAC REHAB W/EXERC: HCPCS

## 2022-03-16 ENCOUNTER — HOSPITAL ENCOUNTER (OUTPATIENT)
Dept: CARDIAC REHAB | Age: 82
Setting detail: THERAPIES SERIES
Discharge: HOME | End: 2022-03-16
Payer: MEDICARE

## 2022-03-16 PROCEDURE — G0422 INTENS CARDIAC REHAB W/EXERC: HCPCS

## 2022-03-16 PROCEDURE — G0423 INTENS CARDIAC REHAB NO EXER: HCPCS

## 2022-03-18 ENCOUNTER — HOSPITAL ENCOUNTER (OUTPATIENT)
Dept: CARDIAC REHAB | Age: 82
Setting detail: THERAPIES SERIES
Discharge: HOME | End: 2022-03-18
Payer: MEDICARE

## 2022-03-18 PROCEDURE — G0423 INTENS CARDIAC REHAB NO EXER: HCPCS

## 2022-03-18 PROCEDURE — G0422 INTENS CARDIAC REHAB W/EXERC: HCPCS

## 2022-03-21 ENCOUNTER — HOSPITAL ENCOUNTER (OUTPATIENT)
Dept: CARDIAC REHAB | Age: 82
Setting detail: THERAPIES SERIES
Discharge: HOME | End: 2022-03-21
Payer: MEDICARE

## 2022-03-21 PROCEDURE — G0423 INTENS CARDIAC REHAB NO EXER: HCPCS

## 2022-03-21 PROCEDURE — G0422 INTENS CARDIAC REHAB W/EXERC: HCPCS

## 2022-03-24 RX ORDER — ASPIRIN 81 MG/1
TABLET, COATED ORAL
Qty: 90 TABLET | Refills: 3 | Status: SHIPPED | OUTPATIENT
Start: 2022-03-24

## 2022-06-22 RX ORDER — CLOPIDOGREL BISULFATE 75 MG/1
75 TABLET ORAL DAILY
Qty: 90 TABLET | Refills: 1 | Status: SHIPPED | OUTPATIENT
Start: 2022-06-22 | End: 2022-06-24

## 2022-06-24 ENCOUNTER — HOSPITAL ENCOUNTER (EMERGENCY)
Age: 82
Discharge: OTHER INSTITUTION | End: 2022-06-24
Attending: STUDENT IN AN ORGANIZED HEALTH CARE EDUCATION/TRAINING PROGRAM
Payer: MEDICARE

## 2022-06-24 ENCOUNTER — APPOINTMENT (OUTPATIENT)
Dept: CT IMAGING | Age: 82
End: 2022-06-24
Payer: MEDICARE

## 2022-06-24 ENCOUNTER — APPOINTMENT (OUTPATIENT)
Dept: GENERAL RADIOLOGY | Age: 82
End: 2022-06-24
Payer: MEDICARE

## 2022-06-24 ENCOUNTER — APPOINTMENT (OUTPATIENT)
Dept: ULTRASOUND IMAGING | Age: 82
DRG: 149 | End: 2022-06-24
Attending: INTERNAL MEDICINE
Payer: MEDICARE

## 2022-06-24 ENCOUNTER — HOSPITAL ENCOUNTER (INPATIENT)
Age: 82
LOS: 1 days | Discharge: HOME | DRG: 149 | End: 2022-06-27
Attending: INTERNAL MEDICINE | Admitting: INTERNAL MEDICINE
Payer: MEDICARE

## 2022-06-24 VITALS
RESPIRATION RATE: 20 BRPM | BODY MASS INDEX: 22.63 KG/M2 | WEIGHT: 123 LBS | SYSTOLIC BLOOD PRESSURE: 126 MMHG | DIASTOLIC BLOOD PRESSURE: 72 MMHG | TEMPERATURE: 97.8 F | HEART RATE: 64 BPM | HEIGHT: 62 IN | OXYGEN SATURATION: 97 %

## 2022-06-24 DIAGNOSIS — R42 DIZZINESS: Primary | ICD-10-CM

## 2022-06-24 LAB
ALBUMIN SERPL-MCNC: 4.2 GM/DL (ref 3.4–5)
ALP BLD-CCNC: 62 IU/L (ref 40–129)
ALT SERPL-CCNC: 25 U/L (ref 10–40)
ANION GAP SERPL CALCULATED.3IONS-SCNC: 10 MMOL/L (ref 4–16)
APTT: 27.3 SECONDS (ref 25.1–37.1)
AST SERPL-CCNC: 30 IU/L (ref 15–37)
BACTERIA: ABNORMAL /HPF
BASOPHILS ABSOLUTE: 0.1 K/CU MM
BASOPHILS RELATIVE PERCENT: 1 % (ref 0–1)
BILIRUB SERPL-MCNC: 0.5 MG/DL (ref 0–1)
BILIRUBIN URINE: NEGATIVE MG/DL
BLOOD, URINE: ABNORMAL
BUN BLDV-MCNC: 11 MG/DL (ref 6–23)
CALCIUM SERPL-MCNC: 9.3 MG/DL (ref 8.3–10.6)
CAST TYPE: ABNORMAL /HPF
CHLORIDE BLD-SCNC: 100 MMOL/L (ref 99–110)
CLARITY: CLEAR
CO2: 27 MMOL/L (ref 21–32)
COLOR: YELLOW
CREAT SERPL-MCNC: 0.7 MG/DL (ref 0.6–1.1)
CRYSTAL TYPE: NEGATIVE /HPF
DIFFERENTIAL TYPE: ABNORMAL
EKG ATRIAL RATE: 54 BPM
EKG DIAGNOSIS: NORMAL
EKG P AXIS: 70 DEGREES
EKG P-R INTERVAL: 172 MS
EKG Q-T INTERVAL: 440 MS
EKG QRS DURATION: 100 MS
EKG QTC CALCULATION (BAZETT): 417 MS
EKG R AXIS: 45 DEGREES
EKG T AXIS: 59 DEGREES
EKG VENTRICULAR RATE: 54 BPM
EOSINOPHILS ABSOLUTE: 0.1 K/CU MM
EOSINOPHILS RELATIVE PERCENT: 1.2 % (ref 0–3)
EPITHELIAL CELLS, UA: 4 /HPF
GFR AFRICAN AMERICAN: >60 ML/MIN/1.73M2
GFR NON-AFRICAN AMERICAN: >60 ML/MIN/1.73M2
GLUCOSE BLD-MCNC: 77 MG/DL (ref 70–99)
GLUCOSE, URINE: NEGATIVE MG/DL
HCT VFR BLD CALC: 43.4 % (ref 37–47)
HEMOGLOBIN: 14.3 GM/DL (ref 12.5–16)
IMMATURE NEUTROPHIL %: 0.3 % (ref 0–0.43)
INR BLD: 0.9 INDEX
KETONES, URINE: NEGATIVE MG/DL
LEUKOCYTE ESTERASE, URINE: NEGATIVE
LYMPHOCYTES ABSOLUTE: 2.2 K/CU MM
LYMPHOCYTES RELATIVE PERCENT: 38.1 % (ref 24–44)
MCH RBC QN AUTO: 32.1 PG (ref 27–31)
MCHC RBC AUTO-ENTMCNC: 32.9 % (ref 32–36)
MCV RBC AUTO: 97.5 FL (ref 78–100)
MONOCYTES ABSOLUTE: 0.7 K/CU MM
MONOCYTES RELATIVE PERCENT: 11.7 % (ref 0–4)
NITRITE URINE, QUANTITATIVE: NEGATIVE
PDW BLD-RTO: 12.4 % (ref 11.7–14.9)
PH, URINE: 7 (ref 5–8)
PLATELET # BLD: 225 K/CU MM (ref 140–440)
PMV BLD AUTO: 8.6 FL (ref 7.5–11.1)
POTASSIUM SERPL-SCNC: 4.5 MMOL/L (ref 3.5–5.1)
PROTEIN UA: NEGATIVE MG/DL
PROTHROMBIN TIME: 11.1 SECONDS (ref 11.7–14.5)
RBC # BLD: 4.45 M/CU MM (ref 4.2–5.4)
RBC URINE: 2 /HPF (ref 0–6)
SARS-COV-2, NAAT: NOT DETECTED
SEGMENTED NEUTROPHILS ABSOLUTE COUNT: 2.8 K/CU MM
SEGMENTED NEUTROPHILS RELATIVE PERCENT: 47.7 % (ref 36–66)
SODIUM BLD-SCNC: 137 MMOL/L (ref 135–145)
SOURCE: NORMAL
SPECIFIC GRAVITY UA: 1.01 (ref 1–1.03)
TOTAL IMMATURE NEUTOROPHIL: 0.02 K/CU MM
TOTAL PROTEIN: 6.5 GM/DL (ref 6.4–8.2)
TROPONIN T: <0.01 NG/ML
TROPONIN T: <0.01 NG/ML
TSH HIGH SENSITIVITY: 7.69 UIU/ML (ref 0.27–4.2)
UROBILINOGEN, URINE: 0.2 MG/DL (ref 0.2–1)
WBC # BLD: 5.8 K/CU MM (ref 4–10.5)
WBC UA: 3 /HPF (ref 0–5)

## 2022-06-24 PROCEDURE — 93010 ELECTROCARDIOGRAM REPORT: CPT | Performed by: INTERNAL MEDICINE

## 2022-06-24 PROCEDURE — 84443 ASSAY THYROID STIM HORMONE: CPT

## 2022-06-24 PROCEDURE — 36415 COLL VENOUS BLD VENIPUNCTURE: CPT

## 2022-06-24 PROCEDURE — G0379 DIRECT REFER HOSPITAL OBSERV: HCPCS

## 2022-06-24 PROCEDURE — 85025 COMPLETE CBC W/AUTO DIFF WBC: CPT

## 2022-06-24 PROCEDURE — 84484 ASSAY OF TROPONIN QUANT: CPT

## 2022-06-24 PROCEDURE — 96372 THER/PROPH/DIAG INJ SC/IM: CPT

## 2022-06-24 PROCEDURE — 6370000000 HC RX 637 (ALT 250 FOR IP): Performed by: NURSE PRACTITIONER

## 2022-06-24 PROCEDURE — 81001 URINALYSIS AUTO W/SCOPE: CPT

## 2022-06-24 PROCEDURE — 87635 SARS-COV-2 COVID-19 AMP PRB: CPT

## 2022-06-24 PROCEDURE — 71045 X-RAY EXAM CHEST 1 VIEW: CPT

## 2022-06-24 PROCEDURE — 6360000002 HC RX W HCPCS: Performed by: NURSE PRACTITIONER

## 2022-06-24 PROCEDURE — 94761 N-INVAS EAR/PLS OXIMETRY MLT: CPT

## 2022-06-24 PROCEDURE — 80053 COMPREHEN METABOLIC PANEL: CPT

## 2022-06-24 PROCEDURE — 99285 EMERGENCY DEPT VISIT HI MDM: CPT

## 2022-06-24 PROCEDURE — 70450 CT HEAD/BRAIN W/O DYE: CPT

## 2022-06-24 PROCEDURE — G0378 HOSPITAL OBSERVATION PER HR: HCPCS

## 2022-06-24 PROCEDURE — 85730 THROMBOPLASTIN TIME PARTIAL: CPT

## 2022-06-24 PROCEDURE — 6370000000 HC RX 637 (ALT 250 FOR IP): Performed by: STUDENT IN AN ORGANIZED HEALTH CARE EDUCATION/TRAINING PROGRAM

## 2022-06-24 PROCEDURE — 72125 CT NECK SPINE W/O DYE: CPT

## 2022-06-24 PROCEDURE — 93005 ELECTROCARDIOGRAM TRACING: CPT | Performed by: STUDENT IN AN ORGANIZED HEALTH CARE EDUCATION/TRAINING PROGRAM

## 2022-06-24 PROCEDURE — 85610 PROTHROMBIN TIME: CPT

## 2022-06-24 PROCEDURE — 93880 EXTRACRANIAL BILAT STUDY: CPT

## 2022-06-24 RX ORDER — ASPIRIN 300 MG/1
300 SUPPOSITORY RECTAL DAILY
Status: DISCONTINUED | OUTPATIENT
Start: 2022-06-25 | End: 2022-06-27 | Stop reason: HOSPADM

## 2022-06-24 RX ORDER — LEVOTHYROXINE SODIUM 0.05 MG/1
50 TABLET ORAL DAILY
Status: DISCONTINUED | OUTPATIENT
Start: 2022-06-25 | End: 2022-06-27 | Stop reason: HOSPADM

## 2022-06-24 RX ORDER — LISINOPRIL 5 MG/1
10 TABLET ORAL DAILY
Status: DISCONTINUED | OUTPATIENT
Start: 2022-06-25 | End: 2022-06-27 | Stop reason: HOSPADM

## 2022-06-24 RX ORDER — ATORVASTATIN CALCIUM 40 MG/1
40 TABLET, FILM COATED ORAL DAILY
Status: DISCONTINUED | OUTPATIENT
Start: 2022-06-24 | End: 2022-06-27 | Stop reason: HOSPADM

## 2022-06-24 RX ORDER — DOCUSATE SODIUM 100 MG/1
100 CAPSULE, LIQUID FILLED ORAL 2 TIMES DAILY PRN
Status: DISCONTINUED | OUTPATIENT
Start: 2022-06-24 | End: 2022-06-27 | Stop reason: HOSPADM

## 2022-06-24 RX ORDER — POLYETHYLENE GLYCOL 3350 17 G/17G
17 POWDER, FOR SOLUTION ORAL DAILY PRN
Status: DISCONTINUED | OUTPATIENT
Start: 2022-06-24 | End: 2022-06-27 | Stop reason: HOSPADM

## 2022-06-24 RX ORDER — CLOPIDOGREL BISULFATE 75 MG/1
75 TABLET ORAL DAILY
Status: DISCONTINUED | OUTPATIENT
Start: 2022-06-25 | End: 2022-06-27 | Stop reason: HOSPADM

## 2022-06-24 RX ORDER — M-VIT,TX,IRON,MINS/CALC/FOLIC 27MG-0.4MG
1 TABLET ORAL
Status: DISCONTINUED | OUTPATIENT
Start: 2022-06-25 | End: 2022-06-27 | Stop reason: HOSPADM

## 2022-06-24 RX ORDER — MECLIZINE HCL 12.5 MG/1
12.5 TABLET ORAL 3 TIMES DAILY PRN
Status: DISCONTINUED | OUTPATIENT
Start: 2022-06-24 | End: 2022-06-27 | Stop reason: HOSPADM

## 2022-06-24 RX ORDER — METOPROLOL SUCCINATE 50 MG/1
50 TABLET, EXTENDED RELEASE ORAL DAILY
Status: DISCONTINUED | OUTPATIENT
Start: 2022-06-25 | End: 2022-06-27 | Stop reason: HOSPADM

## 2022-06-24 RX ORDER — ALPRAZOLAM 0.25 MG/1
0.25 TABLET ORAL ONCE
Status: COMPLETED | OUTPATIENT
Start: 2022-06-24 | End: 2022-06-24

## 2022-06-24 RX ORDER — ASPIRIN 81 MG/1
81 TABLET ORAL DAILY
Status: DISCONTINUED | OUTPATIENT
Start: 2022-06-25 | End: 2022-06-27 | Stop reason: HOSPADM

## 2022-06-24 RX ORDER — ALPRAZOLAM 0.25 MG/1
0.25 TABLET ORAL NIGHTLY PRN
Status: DISCONTINUED | OUTPATIENT
Start: 2022-06-24 | End: 2022-06-27 | Stop reason: HOSPADM

## 2022-06-24 RX ORDER — MECLIZINE HYDROCHLORIDE 25 MG/1
25 TABLET ORAL ONCE
Status: COMPLETED | OUTPATIENT
Start: 2022-06-24 | End: 2022-06-24

## 2022-06-24 RX ORDER — ENOXAPARIN SODIUM 100 MG/ML
40 INJECTION SUBCUTANEOUS DAILY
Status: DISCONTINUED | OUTPATIENT
Start: 2022-06-24 | End: 2022-06-27 | Stop reason: HOSPADM

## 2022-06-24 RX ORDER — ONDANSETRON 2 MG/ML
4 INJECTION INTRAMUSCULAR; INTRAVENOUS EVERY 6 HOURS PRN
Status: DISCONTINUED | OUTPATIENT
Start: 2022-06-24 | End: 2022-06-27 | Stop reason: HOSPADM

## 2022-06-24 RX ORDER — ONDANSETRON 4 MG/1
4 TABLET, ORALLY DISINTEGRATING ORAL EVERY 8 HOURS PRN
Status: DISCONTINUED | OUTPATIENT
Start: 2022-06-24 | End: 2022-06-27 | Stop reason: HOSPADM

## 2022-06-24 RX ADMIN — ENOXAPARIN SODIUM 40 MG: 100 INJECTION SUBCUTANEOUS at 21:46

## 2022-06-24 RX ADMIN — ATORVASTATIN CALCIUM 40 MG: 40 TABLET, FILM COATED ORAL at 21:46

## 2022-06-24 RX ADMIN — MECLIZINE HYDROCHLORIDE 25 MG: 25 TABLET ORAL at 11:48

## 2022-06-24 RX ADMIN — ALPRAZOLAM 0.25 MG: 0.25 TABLET ORAL at 21:46

## 2022-06-24 ASSESSMENT — PAIN - FUNCTIONAL ASSESSMENT: PAIN_FUNCTIONAL_ASSESSMENT: NONE - DENIES PAIN

## 2022-06-24 NOTE — ED PROVIDER NOTES
Emergency Department Encounter    Patient: Sukhi Last  MRN: [id number]  : 1940  Date of Evaluation: 2022  ED Provider:  Tyesha Driver MD    Triage Chief Complaint:   Dizziness (c/o dizziness since monday morning, some nausea, no vomiting or diarrhea. Hx of cabg x5 and stroke post op 2021)    [place]:  Sukhi Last is a 80 y.o. female presenting with complaints of dizziness. Patient states since Monday she has been feeling off balance and having dizziness. States she feels continuous dizziness at rest.  States she does have a frontal headache as well that is mild to moderate, constant, stabbing without exacerbating leaving factors. Denies blurred vision, focal neurodeficits from baseline or motor or sensory changes change from baseline. Patient states she always has some numbness and decreased motor function of the right upper and lower extremity which visual after stroke in 2021. Patient states she has been having some intermittent chest pain. States will happen a couple times a day and last about a minute. Patient states it feels more musculoskeletal in nature. Denies cough, sputum production, shortness of breath. Denies lower extremity edema, orthopnea or signs of fluid overload. Denies history of DVTs or blood clots in the past, recent trauma, recent travel, recent surgery, hormone use, hemoptysis. Denies abdominal pain. States she has some had some nausea without vomiting. Denies diarrhea or blood or melena stools.   Denies urinary symptoms include dysuria, increased frequency, urgency, hematuria    ROS - see HPI, below listed is current ROS at time of my eval:  At least 14 systems reviewed, negative other HPI    Past Medical History:   Diagnosis Date    Acute CVA (cerebrovascular accident) ([facility] [address] 2021    Arthritis     CAD in native artery 2021    Essential hypertension 2021    H/O cardiac catheterization 2021    Several vessel serious  stenosis, Urgent CABG is recommended.  H/O cardiovascular stress test 07/29/2021    Abnormal Stress EKG results and patient was transferred to [facility] cath lab per Dr. Edouard Hall.  H/O echocardiogram 07/29/2021    Left ventricular function and size is normal, EF is estimated at 55-60%. Mild mitral ,tricuspid and moderate aortic regurgitation is present.  H/O echocardiogram 09/29/2021    Limited study , No Doppler study on Valves Left ventricular systolic function is severly depressed with an ejectionfraction of 15-20 %. EF is reduced compared to Echo in July 2021 Global hypokinesis Left Ventricular chamber size is Dilated No evidence of any pericardial effusion. Left sided pleural effusion noted, measuring 4.86 cm.    H/O echocardiogram 12/15/2021    Left ventricular systolic function is severely depressed with an EF of 25-30%. Dilated left ventricle . Septum as akinetic Grade I diastolic dysfunction. Moderate-severe aortic regurgitation noted with pressure half time  of 353 msec. Doppler evaluation reveals mild mitral and tricuspid regurgitation. Normal pulmonary artery pressure with RVSP of 29 mmHg. No evidence of pericardial effusion.  History of Holter monitoring 06/15/2021    Frequent PVCs. 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     Thyroid disease      Past Surgical History:   Procedure Laterality Date    APPENDECTOMY      BACK SURGERY      COLONOSCOPY      CORONARY ARTERY BYPASS GRAFT  08/02/2021    CABG X 5 vessel with Dr Dejah Mccullough N/A 8/2/2021    CABG CORONARY ARTERY BYPASS X 5, INTRAOPERATIVE [place], INDUCED HYPOTHERMIA AND BILATERAL ENDOHARVEST OF SAPHENOUS VEINS performed by Roxann Cervantes MD at [facility] [address] (CERVIX STATUS UNKNOWN)      TUMOR REMOVAL       Family History   Problem Relation Age of Onset    Cancer Father      Social History     Socioeconomic History    Marital status:      Spouse name: Not on file    Number of children: Not on file    Years of education: Not on file    Highest education level: Not on file   Occupational History    Not on file   Tobacco Use    Smoking status: Never Smoker    Smokeless tobacco: Never Used   Vaping Use    Vaping Use: Never used   Substance and Sexual Activity    Alcohol use: No    Drug use: No    Sexual activity: Not on file   Other Topics Concern    Not on file   Social History Narrative    Not on file     Social Determinants of Health     Financial Resource Strain:     Difficulty of Paying Living Expenses: Not on file   Food Insecurity:     Worried About [address] [employer] in the Last Year: Not on file    [place] of Food in the Last Year: Not on file   Transportation Needs:     Lack of Transportation (Medical): Not on file    Lack of Transportation (Non-Medical): Not on file   Physical Activity:     Days of Exercise per Week: Not on file    Minutes of Exercise per Session: Not on file   Stress:     Feeling of Stress : Not on file   Social Connections:     Frequency of Communication with Friends and Family: Not on file    Frequency of Social Gatherings with Friends and Family: Not on file    Attends [employer] Services: Not on file    Active Member of [address] [place] [address] [employer] or Organizations: Not on file    Attends Club or Organization Meetings: Not on file    Marital Status: Not on file   Intimate Partner Violence:     Fear of Current or Ex-Partner: Not on file    Emotionally Abused: Not on file    Physically Abused: Not on file    Sexually Abused: Not on file   Housing Stability:     Unable to Pay for Housing in the Last Year: Not on file    Number of [place] in the Last Year: Not on file    Unstable Housing in the Last Year: Not on file     No current facility-administered medications for this encounter.      Current Outpatient Medications   Medication Sig Dispense Refill    ASPIRIN LOW DOSE 81 MG EC tablet TAKE 1 TABLET BY MOUTH DAILY 90 tablet 3    metoprolol succinate (TOPROL XL) 100 MG extended release tablet Take 1 tablet by mouth daily (Patient taking differently: Take 50 mg by mouth daily ) 90 tablet 3    lisinopril (PRINIVIL;ZESTRIL) 10 MG tablet Take 1 tablet by mouth daily 90 tablet 3    atorvastatin (LIPITOR) 40 MG tablet Take 1 tablet by mouth daily 90 tablet 3    docusate sodium (COLACE, DULCOLAX) 100 MG CAPS Take 100 mg by mouth 2 times daily as needed for Constipation      loperamide (IMODIUM) 2 MG capsule Take 2 mg by mouth 2 times daily as needed for Diarrhea      acetaminophen (TYLENOL) 325 MG tablet Take 325-650 mg by mouth every 6 hours as needed      estradiol (ESTRACE) 0.1 MG/GM vaginal cream Apply pea sized amount to vagina nightly x7 days, then every other night for 7 days, then every 3rd night therafter      Multiple Vitamin (MULTIVITAMIN) capsule Take 1 capsule by mouth Daily with lunch      levothyroxine (SYNTHROID) 50 MCG tablet Take 50 mcg by mouth Daily      ALPRAZolam (XANAX) 0.25 MG tablet Take 0.25 mg by mouth nightly as needed for Sleep       Allergies   Allergen Reactions    Iodine Hives     Topical only per pt; sts 1974 she had sx and broke out in hives after application of topical iodine only; denies knowledge of allergy to contrast dyes     Pcn [Penicillins] Hives       Nursing Notes Reviewed    Physical Exam:  Triage VS:    ED Triage Vitals [06/24/22 0956]   Enc Vitals Group      /72      Heart Rate 56      Resp 16      Temp 97.8 °F (36.6 °C)      Temp Source Oral      SpO2 100 %      Weight 123 lb (55.8 kg)      Height 5' 2\" (1.575 m)      Head Circumference       Peak Flow       Pain Score       Pain Loc       Pain Edu? Excl. in [address]? My pulse ox interpretation is - normal    General appearance:  No acute distress. Skin:  Warm. Dry. Eye:  Extraocular movements intact.      Ears, nose, mouth and throat:  Oral mucosa moist   Neck: Trachea midline. Extremity:  No swelling. Normal ROM     Heart:  Regular rate and rhythm, normal S1 & S2, no extra heart sounds. Perfusion:  intact  Respiratory:  Lungs clear to auscultation bilaterally. Respirations nonlabored. Abdominal:  Normal bowel sounds. Soft. Nontender. Non distended. Back:  No CVA tenderness to palpation     Neurological:  Alert and oriented times 3. No focal neuro deficits. No facial asymmetry, decreased sensation of the right side of the face which patient states is baseline for her.   No pronator drift of the bilateral upper and lower extremities, decreased sensation of the right upper and lower extremity which patient states is baseline for her, normal finger-nose-finger and heel shin, no dysarthria or dysphagia          Psychiatric:  Appropriate    I have reviewed and interpreted all of the currently available lab results from this visit (if applicable):  Results for orders placed or performed during the hospital encounter of 06/24/22   COVID-19, Rapid    Specimen: Nasopharyngeal   Result Value Ref Range    Source UNKNOWN     SARS-CoV-2, NAAT NOT DETECTED NOT DETECTED   CBC with Auto Differential   Result Value Ref Range    WBC 5.8 4.0 - 10.5 K/CU MM    RBC 4.45 4.2 - 5.4 M/CU MM    Hemoglobin 14.3 12.5 - 16.0 GM/DL    Hematocrit 43.4 37 - 47 %    MCV 97.5 78 - 100 FL    MCH 32.1 (H) 27 - 31 PG    MCHC 32.9 32.0 - 36.0 %    RDW 12.4 11.7 - 14.9 %    Platelets 434 346 - 236 K/CU MM    MPV 8.6 7.5 - 11.1 FL    Differential Type AUTOMATED DIFFERENTIAL     Segs Relative 47.7 36 - 66 %    Lymphocytes % 38.1 24 - 44 %    Monocytes % 11.7 (H) 0 - 4 %    Eosinophils % 1.2 0 - 3 %    Basophils % 1.0 0 - 1 %    Segs Absolute 2.8 K/CU MM    Lymphocytes Absolute 2.2 K/CU MM    Monocytes Absolute 0.7 K/CU MM    Eosinophils Absolute 0.1 K/CU MM    Basophils Absolute 0.1 K/CU MM    Immature Neutrophil % 0.3 0 - 0.43 %    Total Immature Neutrophil 0.02 K/CU MM   Comprehensive Metabolic Panel w/ Reflex to MG   Result Value Ref Range    Sodium 137 135 - 145 MMOL/L    Potassium 4.5 3.5 - 5.1 MMOL/L    Chloride 100 99 - 110 mMol/L    CO2 27 21 - 32 MMOL/L    BUN 11 6 - 23 MG/DL    CREATININE 0.7 0.6 - 1.1 MG/DL    Glucose 77 70 - 99 MG/DL    Calcium 9.3 8.3 - 10.6 MG/DL    Albumin 4.2 3.4 - 5.0 GM/DL    Total Protein 6.5 6.4 - 8.2 GM/DL    Total Bilirubin 0.5 0.0 - 1.0 MG/DL    ALT 25 10 - 40 U/L    AST 30 15 - 37 IU/L    Alkaline Phosphatase 62 40 - 129 IU/L    GFR Non-African American >60 >60 mL/min/1.73m2    GFR African American >60 >60 mL/min/1.73m2    Anion Gap 10 4 - 16   Troponin   Result Value Ref Range    Troponin T <0.010 <0.01 NG/ML   Urinalysis   Result Value Ref Range    Color, UA YELLOW YELLOW    Clarity, UA CLEAR CLEAR    Glucose, Urine NEGATIVE NEGATIVE MG/DL    Bilirubin Urine NEGATIVE NEGATIVE MG/DL    Ketones, Urine NEGATIVE NEGATIVE MG/DL    Specific Gravity, UA 1.010 1.001 - 1.035    Blood, Urine SMALL (A) NEGATIVE    pH, Urine 7.0 5.0 - 8.0    Protein, UA NEGATIVE NEGATIVE MG/DL    Urobilinogen, Urine 0.2 0.2 - 1.0 MG/DL    Nitrite Urine, Quantitative NEGATIVE NEGATIVE    Leukocyte Esterase, Urine NEGATIVE NEGATIVE    RBC, UA 2 0 - 6 /HPF    WBC, UA 3 0 - 5 /HPF    Epithelial Cells, UA 4 /HPF    Cast Type NO CAST FORMS SEEN NO CAST FORMS SEEN /HPF    Bacteria, UA FEW (A) NEGATIVE /HPF    Crystal Type NEGATIVE NEGATIVE /HPF   Protime-INR   Result Value Ref Range    Protime 11.1 (L) 11.7 - 14.5 SECONDS    INR 0.90 INDEX   PTT   Result Value Ref Range    aPTT 27.3 25.1 - 37.1 SECONDS   EKG 12 Lead   Result Value Ref Range    Ventricular Rate 54 BPM    Atrial Rate 54 BPM    P-R Interval 172 ms    QRS Duration 100 ms    Q-T Interval 440 ms    QTc Calculation (Bazett) 417 ms    P Axis 70 degrees    R Axis 45 degrees    T Axis 59 degrees    Diagnosis       Sinus bradycardia  Cannot rule out Inferior infarct , age undetermined  Abnormal ECG  When compared with ECG of 16-DEC-2021 10:58,  Left bundle branch block is no longer present  Minimal criteria for Inferior infarct are now present        Radiographs (if obtained):  Radiologist's Report Reviewed:  CT HEAD WO CONTRAST    Result Date: 6/24/2022  EXAMINATION: CT OF THE HEAD WITHOUT CONTRAST  6/24/2022 10:18 am TECHNIQUE: CT of the head was performed without the administration of intravenous contrast. Automated exposure control, iterative reconstruction, and/or weight based adjustment of the mA/kV was utilized to reduce the radiation dose to as low as reasonably achievable. COMPARISON: 12/16/2021 HISTORY: ORDERING SYSTEM PROVIDED HISTORY: headache, dizziness TECHNOLOGIST PROVIDED HISTORY: Reason for exam:->headache, dizziness Has a \"code stroke\" or \"stroke alert\" been called? ->No Decision Support Exception - unselect if not a suspected or confirmed emergency medical condition->Emergency Medical Condition (MA) Additional signs and symptoms: headache, dizziness FINDINGS: BRAIN/VENTRICLES: Stable area of encephalomalacia in the left occipital lobe with ex vacuo dilatation of the left lateral ventricle suggesting remote left PCA territory infarct. There is no acute intracranial hemorrhage, mass effect or midline shift. No abnormal extra-axial fluid collection. The gray-white differentiation is maintained without evidence of an acute infarct. There is prominence of the ventricles and sulci due to global parenchymal volume loss. There are nonspecific areas of hypoattenuation within the periventricular and subcortical white matter, which likely represent chronic microvascular ischemic change. ORBITS: The visualized portion of the orbits demonstrate no acute abnormality. SINUSES: The visualized paranasal sinuses and mastoid air cells demonstrate no acute abnormality. SOFT TISSUES/SKULL: No acute abnormality of the visualized skull or soft tissues. Stable exam without acute intracranial abnormality.  Chronic age-related cerebral atrophy with sequela of remote left PCA territory infarct. Chronic white matter microangiopathic ischemic changes, stable. CT CERVICAL SPINE WO CONTRAST    Result Date: 6/24/2022  EXAMINATION: CT OF THE CERVICAL SPINE WITHOUT CONTRAST 6/24/2022 10:17 am TECHNIQUE: CT of the cervical spine was performed without the administration of intravenous contrast. Multiplanar reformatted images are provided for review. Automated exposure control, iterative reconstruction, and/or weight based adjustment of the mA/kV was utilized to reduce the radiation dose to as low as reasonably achievable. COMPARISON: None. HISTORY: ORDERING SYSTEM PROVIDED HISTORY: pain, dizziness TECHNOLOGIST PROVIDED HISTORY: Reason for exam:->pain, dizziness Decision Support Exception - unselect if not a suspected or confirmed emergency medical condition->Emergency Medical Condition (MA) Additional signs and symptoms: headache, dizziness FINDINGS: BONES/ALIGNMENT: There is reversal of normal cervical lordosis. Lateral masses are symmetric. Odontoid process is intact. No evidence of acute traumatic fracture or traumatic malalignment. DEGENERATIVE CHANGES: Severe multilevel disc degenerative changes throughout the cervical spine. Moderate multilevel facet arthritis. C1-C2 articulation degenerative changes. SOFT TISSUES: Lung apices demonstrate no acute abnormality. No prevertebral soft tissue swelling. No acute traumatic fracture or traumatic malalignment. Reversal of the normal cervical lordosis with severe multilevel degenerative changes. XR CHEST PORTABLE    Result Date: 6/24/2022  EXAMINATION: ONE XRAY VIEW OF THE CHEST 6/24/2022 10:17 am COMPARISON: 12/16/2021 HISTORY: ORDERING SYSTEM PROVIDED HISTORY: intermittent chest pain TECHNOLOGIST PROVIDED HISTORY: Reason for exam:->intermittent chest pain Additional signs and symptoms: intermittent chest pain FINDINGS: Evidence of prior CABG with median sternotomy wires and mediastinal clips.  The lungs are without acute focal process. There is no effusion or pneumothorax. The cardiomediastinal silhouette is stable. The osseous structures are stable. No acute process. EKG (if obtained): (All EKG's are interpreted by myself in the absence of a cardiologist)  Sinus bradycardia, ventricular rate 54, PA interval 172, QRS duration 100, QTc 417, no significant ST elevation or depression    MDM:    80-year-old female presenting with continuous dizziness for several days. Patient states she feels unsteady at home and so she is going to fall. Patient does have a history of stroke with residual right-sided deficits vitals on presentation patient is mildly bradycardic but otherwise vitals are reassuring and patient afebrile satting on room air. Physical exam can be seen above. EKG shows sinus bradycardia without significant ischemic changes. Rubin Kasal was within normal limits. CBC and CMP are reassuring without leukocytosis, hemoglobin stable. Rapid COVID is negative. Chest x-ray is nonacute. CT head as well as cervical spine was obtained and nonacute as well. Patient is allergic to iodine so CTA head and neck unable to be obtained in the ED. She has a history of stroke with vertiginous symptoms and increasingly unsteady gait from baseline do believe patient would benefit from inpatient stay for MRI and further evaluation and treatment. Believe patient should be at a facility with neurology consultation. [facility] was called for transfer and they have accepted patient and patient transferred to their facility for further evaluation    Clinical Impression:  1. Dizziness          Comment: Please note this report has been produced using speech recognition software and may contain errors related to that system including errors in grammar, punctuation, and spelling, as well as words and phrases that may be inappropriate. Efforts were made to edit the dictations.         Guanakito Luna MD  06/24/22 3268

## 2022-06-24 NOTE — ED NOTES
Updated pt on plan of care for transporting her to [facility], pt denies any questions.       Marilu Martinez RN  06/24/22 5655

## 2022-06-24 NOTE — ED NOTES
Report called to Linda Mistry at [facility], room [address] [facility] [place]  06/24/22 6044

## 2022-06-24 NOTE — H&P
History and Physical      Name:  Selwyn Burkett /Age/Sex: 1940  (80 y.o. female)   MRN & CSN:  [id number] & [id number] Admission Date/Time: 2022  5:24 PM   Location:  [address] [place] [address] PCP: Dagoberto Spencer MD       Hospital Day: 1           Assessment and Plan:   # Vertigo - symptom onset Monday, worse since yesterday. Last known well  evening. CT head non acute. Unable to perform CTA head/neck due to allergies. Pt with hx CVA with residual right sided weakness, partial loss of peripheral vision on right, mild intermittent dysarthria. Will obtain MRI, limited TTE, carotid US, orthostatic vs, monitor on tele r/o arrhythmias. Continue neuro checks, speech/swallow eval, PT/OT. Check tsh. Continue patients asa/plavix, statin. Neurology consultation. # Left Rib pain - CXR non acute. Suspect musculoskeletal, reproducible on exam, recent history of using tool for yard work. EKG without acute ischemia. Trop negative. Analgesics as needed. # CAD s/p CABG 2021 - Continue pts anti-platelets, statin and beta-blocker. No acute ischemia on EKG, trop neg. # Chronic systolic HF/Hx CMP - TTE showing EF 40-45%. Continue pts acei/beta-blocker and diuretics, monitor volume status, strict I/O, daily wts, cardiac diet    # Hx CVA with residual right sided weakness, right peripheral vision loss and mild intermittent dysarthria. Continue asa/plavix, statin    Other chronic medical conditions-resume home medications unless contraindicated  # Essential HTN -patient managed on beta-blocker and ACE and diuretics. BP currently stable  # Mixed hyperlipidemia- on statin  # Chronic anxiety - resume xanax      Present on Admission:   Vertigo             Diet Diet NPO   DVT Prophylaxis [x] Lovenox, []  Heparin, [] SCDs, [] Ambulation  [] Long term AC   GI Prophylaxis [] PPI,  [] H2 Blocker,  [] Carafate,  [] Diet/Tube Feeds   Code Status Full code   Disposition Admit to med surg.     Patient plans to return home upon discharge. Chief Complaint: No chief complaint on file. History obtained from EHR and patient  History of Present Illness:   Sarahi Romero is a 80 y.o. female  with history of CAD status post CABG 66/7745, chronic systolic heart failure\cardiomyopathy, CVA, essential hypertension, hyperlipidemia, anxiety who presented to [facility] ED with vertigo. The patient reports vertigo that started early Monday morning. She reports associated headache and mild nausea. She states she has minimal relief lying still. Vertigo is worsened with minimal activity. She denies blurred or double vision. She denies swallowing difficulty. She reports mild intermittent dysarthria, residual right-sided weakness and peripheral vision loss on the right side since her previous CVA; denies any worsening. She denies earache or infection. She reports left lateral rib pain worsened with movement. She states she reports doing yard work and attribute her pain to over using tool. She denies shortness of breath. She denies recent infections f/c. She reports prior history of vertigo however states usually does not last as long. She denies any other acute issues. She was evaluated at [facility] ED. She presented hemodynamically stable, afebrile. CT of the head was nonacute. Unable to perform CTA head and neck due to her allergies. CT of cervical spine was nonacute. Chest x-ray was nonacute. EKG showed sinus bradycardia with no acute ischemia, troponin negative x1. Chemistry panel and CBC were unremarkable. Rapid COVID-negative. UA negative for infection. She was transferred to [facility] for further management. Ten point ROS: reviewed and are negative, unless as noted in above HPI. Objective:   No intake or output data in the 24 hours ending 06/24/22 1816     Vitals: There were no vitals filed for this visit. Physical Exam: 06/24/22     GEN -Awake appearing female, in NAD. Appears given age.   EYES -anicteric, conjunctiva pink. HENT -Head is normocephalic, atraumatic. MM are moist. No evidence of thrush. NECK -Supple, no apparent thyromegaly or masses. RESP -CTA, no wheezes, rales or rhonchi. Left lateral CW TTP. Symmetric chest movement   C/V -S1/S2 auscultated. RRR without appreciable M/R/G. No JVD. Cap refill <3 sec. No peripheral edema. GI -Abdomen is soft non distended, non tender to palpation. + BS. No masses or guarding.  -No CVA/ flank tenderness. LYMPH- No petechiae or ecchymoses. MS -No gross joint deformities. SKIN -Normal coloration, warm, dry. NEURO-mild right sided weakness, normal speech. Normal speech, no lateralizing weakness. PSYC-Awake, alert, oriented x 4 . Appropriate affect. Past Medical History:      Past Medical History:   Diagnosis Date    Acute CVA (cerebrovascular accident) ([address] 08/09/2021    Arthritis     CAD in native artery 07/29/2021    Essential hypertension 07/29/2021    H/O cardiac catheterization 07/27/2021    Several vessel serious  stenosis, Urgent CABG is recommended.  H/O cardiovascular stress test 07/29/2021    Abnormal Stress EKG results and patient was transferred to [facility] cath lab per Dr. Tammie Valle.  H/O echocardiogram 07/29/2021    Left ventricular function and size is normal, EF is estimated at 55-60%. Mild mitral ,tricuspid and moderate aortic regurgitation is present.  H/O echocardiogram 09/29/2021    Limited study , No Doppler study on Valves Left ventricular systolic function is severly depressed with an ejectionfraction of 15-20 %. EF is reduced compared to Echo in July 2021 Global hypokinesis Left Ventricular chamber size is Dilated No evidence of any pericardial effusion. Left sided pleural effusion noted, measuring 4.86 cm.    H/O echocardiogram 12/15/2021    Left ventricular systolic function is severely depressed with an EF of 25-30%. Dilated left ventricle . Septum as akinetic Grade I diastolic dysfunction.  Moderate-severe aortic regurgitation noted with pressure half time  of 353 msec. Doppler evaluation reveals mild mitral and tricuspid regurgitation. Normal pulmonary artery pressure with RVSP of 29 mmHg. No evidence of pericardial effusion.  History of Holter monitoring 06/15/2021    Frequent PVCs. 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  Thyroid disease        PMH reviewed  Past Surgical  History:    has a past surgical history that includes tumor removal; Dilation and curettage of uterus; Hysterectomy; Colonoscopy; Appendectomy; back surgery; Coronary artery bypass graft (08/02/2021); and Coronary artery bypass graft (N/A, 8/2/2021). Surgical history reviewed  Family  History:   family history includes Cancer in her father. Family history reviewed  Social History:     Social History     Socioeconomic History    Marital status:      Spouse name: Not on file    Number of children: Not on file    Years of education: Not on file    Highest education level: Not on file   Occupational History    Not on file   Tobacco Use    Smoking status: Never Smoker    Smokeless tobacco: Never Used   Vaping Use    Vaping Use: Never used   Substance and Sexual Activity    Alcohol use: No    Drug use: No    Sexual activity: Not on file   Other Topics Concern    Not on file   Social History Narrative    Not on file     Social Determinants of Health     Financial Resource Strain:     Difficulty of Paying Living Expenses: Not on file   Food Insecurity:     Worried About [address] in the Last Year: Not on file    [place] of Food in the Last Year: Not on file   Transportation Needs:     Lack of Transportation (Medical): Not on file    Lack of Transportation (Non-Medical):  Not on file   Physical Activity:     Days of Exercise per Week: Not on file    Minutes of Exercise per Session: Not on file   Stress:     Feeling of Stress : Not on file   Social Connections:     Frequency of Communication with Friends and Family: Not on file    Frequency of Social Gatherings with Friends and Family: Not on file    Attends Anabaptism Services: Not on file    Active Member of Clubs or Organizations: Not on file    Attends Club or Organization Meetings: Not on file    Marital Status: Not on file   Intimate Partner Violence:     Fear of Current or Ex-Partner: Not on file    Emotionally Abused: Not on file    Physically Abused: Not on file    Sexually Abused: Not on file   Housing Stability:     Unable to Pay for Housing in the Last Year: Not on file    Number of Michelle in the Last Year: Not on file    Unstable Housing in the Last Year: Not on file      reports that she has never smoked. She has never used smokeless tobacco.   reports no history of alcohol use. reports no history of drug use. Social history reviewed  Allergies: Allergies   Allergen Reactions    Iodine Hives     Topical only per pt; sts 1974 she had sx and broke out in hives after application of topical iodine only; denies knowledge of allergy to contrast dyes     Pcn [Penicillins] Hives       Home Medications:     Prior to Admission medications    Medication Sig Start Date End Date Taking?  Authorizing Provider   ASPIRIN LOW DOSE 81 MG EC tablet TAKE 1 TABLET BY MOUTH DAILY 3/24/22   ROMAINE Soni CNP   metoprolol succinate (TOPROL XL) 100 MG extended release tablet Take 1 tablet by mouth daily  Patient taking differently: Take 50 mg by mouth daily  1/13/22   Renee Lowe MD   lisinopril (PRINIVIL;ZESTRIL) 10 MG tablet Take 1 tablet by mouth daily 1/13/22   Renee Lowe MD   atorvastatin (LIPITOR) 40 MG tablet Take 1 tablet by mouth daily 12/23/21   ROMAINE Ojeda CNP   docusate sodium (COLACE, DULCOLAX) 100 MG CAPS Take 100 mg by mouth 2 times daily as needed for Constipation 8/20/21   Pretty Osorio MD   loperamide (IMODIUM) 2 MG capsule Take 2 mg by mouth 2 times daily as needed for Diarrhea    Historical Provider, MD   acetaminophen (TYLENOL) 325 MG tablet Take 325-650 mg by mouth every 6 hours as needed    Historical Provider, MD   estradiol (ESTRACE) 0.1 MG/GM vaginal cream Apply pea sized amount to vagina nightly x7 days, then every other night for 7 days, then every 3rd night therafter 11/4/20   Historical Provider, MD   Multiple Vitamin (MULTIVITAMIN) capsule Take 1 capsule by mouth Daily with lunch    Historical Provider, MD   levothyroxine (SYNTHROID) 50 MCG tablet Take 50 mcg by mouth Daily    Historical Provider, MD   ALPRAZolam (XANAX) 0.25 MG tablet Take 0.25 mg by mouth nightly as needed for Sleep    Historical Provider, MD         Medications:   Medications:    enoxaparin  40 mg SubCUTAneous Daily    [START ON 6/25/2022] aspirin  81 mg Oral Daily    Or    [START ON 6/25/2022] aspirin  300 mg Rectal Daily    atorvastatin  40 mg Oral Daily    levothyroxine  50 mcg Oral Daily    multivitamin  1 capsule Oral Lunch    ALPRAZolam  0.25 mg Oral Once      Infusions:   PRN Meds: ondansetron, 4 mg, Q8H PRN   Or  ondansetron, 4 mg, Q6H PRN  polyethylene glycol, 17 g, Daily PRN  ALPRAZolam, 0.25 mg, Nightly PRN  docusate, 100 mg, BID PRN        Data:     Laboratory this visit:  Reviewed  Recent Labs     06/24/22  1116   WBC 5.8   HGB 14.3   HCT 43.4         Recent Labs     06/24/22  1116      K 4.5      CO2 27   BUN 11   CREATININE 0.7     Recent Labs     06/24/22  1116   AST 30   ALT 25   BILITOT 0.5   ALKPHOS 62     Recent Labs     06/24/22  1116   INR 0.90         Radiology this visit:  Reviewed.     CT HEAD WO CONTRAST    Result Date: 6/24/2022  EXAMINATION: CT OF THE HEAD WITHOUT CONTRAST  6/24/2022 10:18 am TECHNIQUE: CT of the head was performed without the administration of intravenous contrast. Automated exposure control, iterative reconstruction, and/or weight based adjustment of the mA/kV was utilized to reduce the radiation dose to as low as reasonably achievable. COMPARISON: 12/16/2021 HISTORY: ORDERING SYSTEM PROVIDED HISTORY: headache, dizziness TECHNOLOGIST PROVIDED HISTORY: Reason for exam:->headache, dizziness Has a \"code stroke\" or \"stroke alert\" been called? ->No Decision Support Exception - unselect if not a suspected or confirmed emergency medical condition->Emergency Medical Condition (MA) Additional signs and symptoms: headache, dizziness FINDINGS: BRAIN/VENTRICLES: Stable area of encephalomalacia in the left occipital lobe with ex vacuo dilatation of the left lateral ventricle suggesting remote left PCA territory infarct. There is no acute intracranial hemorrhage, mass effect or midline shift. No abnormal extra-axial fluid collection. The gray-white differentiation is maintained without evidence of an acute infarct. There is prominence of the ventricles and sulci due to global parenchymal volume loss. There are nonspecific areas of hypoattenuation within the periventricular and subcortical white matter, which likely represent chronic microvascular ischemic change. ORBITS: The visualized portion of the orbits demonstrate no acute abnormality. SINUSES: The visualized paranasal sinuses and mastoid air cells demonstrate no acute abnormality. SOFT TISSUES/SKULL: No acute abnormality of the visualized skull or soft tissues. Stable exam without acute intracranial abnormality. Chronic age-related cerebral atrophy with sequela of remote left PCA territory infarct. Chronic white matter microangiopathic ischemic changes, stable. CT CERVICAL SPINE WO CONTRAST    Result Date: 6/24/2022  EXAMINATION: CT OF THE CERVICAL SPINE WITHOUT CONTRAST 6/24/2022 10:17 am TECHNIQUE: CT of the cervical spine was performed without the administration of intravenous contrast. Multiplanar reformatted images are provided for review.  Automated exposure control, iterative reconstruction, and/or weight based adjustment of the mA/kV was utilized to reduce the radiation dose to as low as reasonably achievable. COMPARISON: None. HISTORY: ORDERING SYSTEM PROVIDED HISTORY: pain, dizziness TECHNOLOGIST PROVIDED HISTORY: Reason for exam:->pain, dizziness Decision Support Exception - unselect if not a suspected or confirmed emergency medical condition->Emergency Medical Condition (MA) Additional signs and symptoms: headache, dizziness FINDINGS: BONES/ALIGNMENT: There is reversal of normal cervical lordosis. Lateral masses are symmetric. Odontoid process is intact. No evidence of acute traumatic fracture or traumatic malalignment. DEGENERATIVE CHANGES: Severe multilevel disc degenerative changes throughout the cervical spine. Moderate multilevel facet arthritis. C1-C2 articulation degenerative changes. SOFT TISSUES: Lung apices demonstrate no acute abnormality. No prevertebral soft tissue swelling. No acute traumatic fracture or traumatic malalignment. Reversal of the normal cervical lordosis with severe multilevel degenerative changes. XR CHEST PORTABLE    Result Date: 6/24/2022  EXAMINATION: ONE XRAY VIEW OF THE CHEST 6/24/2022 10:17 am COMPARISON: 12/16/2021 HISTORY: ORDERING SYSTEM PROVIDED HISTORY: intermittent chest pain TECHNOLOGIST PROVIDED HISTORY: Reason for exam:->intermittent chest pain Additional signs and symptoms: intermittent chest pain FINDINGS: Evidence of prior CABG with median sternotomy wires and mediastinal clips. The lungs are without acute focal process. There is no effusion or pneumothorax. The cardiomediastinal silhouette is stable. The osseous structures are stable. No acute process.            EKG this visit:  personally reviewed         Electronically signed by ROMAINE Russell CNP on 6/24/2022 at 6:16 PM

## 2022-06-25 LAB
CHOLESTEROL: 117 MG/DL
ERYTHROCYTE SEDIMENTATION RATE: 1 MM/HR (ref 0–30)
ESTIMATED AVERAGE GLUCOSE: 111 MG/DL
HBA1C MFR BLD: 5.5 % (ref 4.2–6.3)
HCT VFR BLD CALC: 41.9 % (ref 37–47)
HDLC SERPL-MCNC: 62 MG/DL
HEMOGLOBIN: 14 GM/DL (ref 12.5–16)
LDL CHOLESTEROL CALCULATED: 45 MG/DL
MCH RBC QN AUTO: 31.5 PG (ref 27–31)
MCHC RBC AUTO-ENTMCNC: 33.4 % (ref 32–36)
MCV RBC AUTO: 94.4 FL (ref 78–100)
PDW BLD-RTO: 12.2 % (ref 11.7–14.9)
PLATELET # BLD: 242 K/CU MM (ref 140–440)
PMV BLD AUTO: 8.6 FL (ref 7.5–11.1)
RBC # BLD: 4.44 M/CU MM (ref 4.2–5.4)
TRIGL SERPL-MCNC: 51 MG/DL
TROPONIN T: <0.01 NG/ML
WBC # BLD: 5.8 K/CU MM (ref 4–10.5)

## 2022-06-25 PROCEDURE — G0378 HOSPITAL OBSERVATION PER HR: HCPCS

## 2022-06-25 PROCEDURE — 6360000002 HC RX W HCPCS: Performed by: NURSE PRACTITIONER

## 2022-06-25 PROCEDURE — 92610 EVALUATE SWALLOWING FUNCTION: CPT

## 2022-06-25 PROCEDURE — 97162 PT EVAL MOD COMPLEX 30 MIN: CPT

## 2022-06-25 PROCEDURE — 83036 HEMOGLOBIN GLYCOSYLATED A1C: CPT

## 2022-06-25 PROCEDURE — 99204 OFFICE O/P NEW MOD 45 MIN: CPT | Performed by: STUDENT IN AN ORGANIZED HEALTH CARE EDUCATION/TRAINING PROGRAM

## 2022-06-25 PROCEDURE — 96372 THER/PROPH/DIAG INJ SC/IM: CPT

## 2022-06-25 PROCEDURE — 85652 RBC SED RATE AUTOMATED: CPT

## 2022-06-25 PROCEDURE — 36415 COLL VENOUS BLD VENIPUNCTURE: CPT

## 2022-06-25 PROCEDURE — 93005 ELECTROCARDIOGRAM TRACING: CPT | Performed by: NURSE PRACTITIONER

## 2022-06-25 PROCEDURE — 96375 TX/PRO/DX INJ NEW DRUG ADDON: CPT

## 2022-06-25 PROCEDURE — 6370000000 HC RX 637 (ALT 250 FOR IP): Performed by: NURSE PRACTITIONER

## 2022-06-25 PROCEDURE — 84484 ASSAY OF TROPONIN QUANT: CPT

## 2022-06-25 PROCEDURE — 94761 N-INVAS EAR/PLS OXIMETRY MLT: CPT

## 2022-06-25 PROCEDURE — 85027 COMPLETE CBC AUTOMATED: CPT

## 2022-06-25 PROCEDURE — 97116 GAIT TRAINING THERAPY: CPT

## 2022-06-25 PROCEDURE — 96374 THER/PROPH/DIAG INJ IV PUSH: CPT

## 2022-06-25 PROCEDURE — 80061 LIPID PANEL: CPT

## 2022-06-25 RX ORDER — CLOPIDOGREL BISULFATE 75 MG/1
75 TABLET ORAL DAILY
Qty: 30 TABLET | Refills: 3 | Status: SHIPPED | OUTPATIENT
Start: 2022-06-26 | End: 2022-06-27

## 2022-06-25 RX ORDER — KETOROLAC TROMETHAMINE 30 MG/ML
30 INJECTION, SOLUTION INTRAMUSCULAR; INTRAVENOUS ONCE
Status: COMPLETED | OUTPATIENT
Start: 2022-06-25 | End: 2022-06-25

## 2022-06-25 RX ORDER — MECLIZINE HCL 12.5 MG/1
12.5 TABLET ORAL 3 TIMES DAILY
Status: COMPLETED | OUTPATIENT
Start: 2022-06-25 | End: 2022-06-26

## 2022-06-25 RX ORDER — DEXAMETHASONE SODIUM PHOSPHATE 10 MG/ML
10 INJECTION, SOLUTION INTRAMUSCULAR; INTRAVENOUS ONCE
Status: COMPLETED | OUTPATIENT
Start: 2022-06-25 | End: 2022-06-25

## 2022-06-25 RX ADMIN — LEVOTHYROXINE SODIUM 50 MCG: 0.05 TABLET ORAL at 05:54

## 2022-06-25 RX ADMIN — MULTIPLE VITAMINS W/ MINERALS TAB 1 TABLET: TAB at 12:22

## 2022-06-25 RX ADMIN — LISINOPRIL 10 MG: 5 TABLET ORAL at 09:30

## 2022-06-25 RX ADMIN — CLOPIDOGREL BISULFATE 75 MG: 75 TABLET ORAL at 09:30

## 2022-06-25 RX ADMIN — ASPIRIN 81 MG: 81 TABLET, COATED ORAL at 09:30

## 2022-06-25 RX ADMIN — MECLIZINE 12.5 MG: 12.5 TABLET ORAL at 21:02

## 2022-06-25 RX ADMIN — MECLIZINE 12.5 MG: 12.5 TABLET ORAL at 17:12

## 2022-06-25 RX ADMIN — ATORVASTATIN CALCIUM 40 MG: 40 TABLET, FILM COATED ORAL at 09:30

## 2022-06-25 RX ADMIN — MECLIZINE 12.5 MG: 12.5 TABLET ORAL at 09:31

## 2022-06-25 RX ADMIN — DEXAMETHASONE SODIUM PHOSPHATE 10 MG: 10 INJECTION INTRAMUSCULAR; INTRAVENOUS at 12:23

## 2022-06-25 RX ADMIN — KETOROLAC TROMETHAMINE 30 MG: 30 INJECTION, SOLUTION INTRAMUSCULAR; INTRAVENOUS at 12:22

## 2022-06-25 RX ADMIN — ENOXAPARIN SODIUM 40 MG: 100 INJECTION SUBCUTANEOUS at 09:31

## 2022-06-25 RX ADMIN — ALPRAZOLAM 0.25 MG: 0.25 TABLET ORAL at 22:08

## 2022-06-25 RX ADMIN — METOPROLOL SUCCINATE 50 MG: 50 TABLET, EXTENDED RELEASE ORAL at 09:31

## 2022-06-25 ASSESSMENT — PAIN DESCRIPTION - DESCRIPTORS
DESCRIPTORS: ACHING
DESCRIPTORS: ACHING;DISCOMFORT;SORE

## 2022-06-25 ASSESSMENT — PAIN DESCRIPTION - PAIN TYPE: TYPE: ACUTE PAIN

## 2022-06-25 ASSESSMENT — PAIN DESCRIPTION - LOCATION
LOCATION: BACK;HEAD
LOCATION: BACK;HEAD

## 2022-06-25 ASSESSMENT — PAIN SCALES - GENERAL
PAINLEVEL_OUTOF10: 4
PAINLEVEL_OUTOF10: 5
PAINLEVEL_OUTOF10: 6
PAINLEVEL_OUTOF10: 5

## 2022-06-25 ASSESSMENT — PAIN DESCRIPTION - ORIENTATION
ORIENTATION: LEFT
ORIENTATION: LOWER

## 2022-06-25 ASSESSMENT — PAIN SCALES - WONG BAKER
WONGBAKER_NUMERICALRESPONSE: 4
WONGBAKER_NUMERICALRESPONSE: 2

## 2022-06-25 ASSESSMENT — PAIN DESCRIPTION - ONSET: ONSET: ON-GOING

## 2022-06-25 ASSESSMENT — PAIN DESCRIPTION - FREQUENCY: FREQUENCY: CONTINUOUS

## 2022-06-25 NOTE — DISCHARGE SUMMARY
FINAL PROGRESS NOTE    The patient was 80 y.o. female who was admitted for dizziness and vertigo. During the hospitalization the patient was evaluated by neurology and they recommended MRI but as it was a weekend and the patient had wires which were not connected MRI was not able to do it and they will wait till Monday when [employer] rep is present. At that time the patient wished to be discharged and the case was discussed with neurologist who were okay for the patient to be discharged on current medication till evaluated by them as an outpatient. The patient was seen and examined on the day of the discharge. Will be discharged to home with home health, diet cardiac and activity as tolerated.      Condition: Stable    MEDICATIONS:  Current Discharge Medication List      START taking these medications    Details   clopidogrel (PLAVIX) 75 MG tablet Take 1 tablet by mouth daily  Qty: 30 tablet, Refills: 3         CONTINUE these medications which have NOT CHANGED    Details   ASPIRIN LOW DOSE 81 MG EC tablet TAKE 1 TABLET BY MOUTH DAILY  Qty: 90 tablet, Refills: 3      metoprolol succinate (TOPROL XL) 100 MG extended release tablet Take 1 tablet by mouth daily  Qty: 90 tablet, Refills: 3      lisinopril (PRINIVIL;ZESTRIL) 10 MG tablet Take 1 tablet by mouth daily  Qty: 90 tablet, Refills: 3      atorvastatin (LIPITOR) 40 MG tablet Take 1 tablet by mouth daily  Qty: 90 tablet, Refills: 3      docusate sodium (COLACE, DULCOLAX) 100 MG CAPS Take 100 mg by mouth 2 times daily as needed for Constipation      loperamide (IMODIUM) 2 MG capsule Take 2 mg by mouth 2 times daily as needed for Diarrhea      acetaminophen (TYLENOL) 325 MG tablet Take 325-650 mg by mouth every 6 hours as needed      estradiol (ESTRACE) 0.1 MG/GM vaginal cream Apply pea sized amount to vagina nightly x7 days, then every other night for 7 days, then every 3rd night therafter      Multiple Vitamin (MULTIVITAMIN) capsule Take 1 capsule by mouth Daily with lunch      levothyroxine (SYNTHROID) 50 MCG tablet Take 50 mcg by mouth Daily      ALPRAZolam (XANAX) 0.25 MG tablet Take 0.25 mg by mouth nightly as needed for Sleep             LAST SET OF VITALS:    BP: (!) 97/56  Temp: 97.9 °F (36.6 °C)  Heart Rate: 60  Resp: 18  @PHPLASTWT(1)@    The patient is to follow their PCP as scheduled. No follow-up provider specified.     Electronically signed by Keny Mayer MD, MD on 6/25/2022 at 4:47 PM

## 2022-06-25 NOTE — PROGRESS NOTES
Speech Language Pathology  Facility/Department: [address] [place] [address]   CLINICAL BEDSIDE SWALLOW EVALUATION    NAME: Allyson England  : 1940  MRN: [id number]    ADMISSION DATE: 2022  ADMITTING DIAGNOSIS: has PVC (premature ventricular contraction); Fatigue; Chest pressure; HORACE (obstructive sleep apnea); Hypersomnia; Unstable angina ([address] Essential hypertension; CAD in native artery; Acute on chronic combined systolic and diastolic heart failure ([address] Acute CVA (cerebrovascular accident) ([address] Hemiparesis of right dominant side as late effect of cerebral infarction ([address] Right homonymous hemianopsia; Gait disturbance; Acute blood loss anemia; Uncontrolled pain; Ischemic cardiomyopathy; and Vertigo on their problem list.  ONSET DATE: this admission    Recent Chest Xray/CT of Chest: (22)      Date of Eval: 2022  Evaluating Therapist: CHIO Varela      IMPRESSIONS AND RECOMMENDATIONS:   Allyson England was referred for a bedside swallow evaluation after being admitted to [facility] with vertigo. Pt received ST tx for cognition during prior stay. Medical hx includes PVC, HORACE, angina, acute CVA, hemiparesis, ischemic cardiomyopathy, and vertigo. Pt was seen for a evaluation seated upright in bed. Pt was alert and pleasant throughout the evaluation. baseline vocal quality is adequate. Oral mechanism revealed that the oral mechanism is largely intact. Pt was presented with PO trials of thins by straw, purees, and regular solids. Oral stage appears mildly impaired characterized by prolonged mastication of regular solids. Pharyngeal stage appears intact at the bedside characterized by adequate timing of the pharyngeal swallow and adequate laryngeal elevation. No overt s/s of aspiration were observed throughout the evaluation today. Recommend easy to chew diet with thin liquids. Recommend general aspiration precautions. No further ST is recommended.  Results and recommendations d/w pt and RN present. Current Diet level:  Current Diet : Regular      Primary Complaint       Pain:  Pain Assessment  Pain Assessment: 0-10  Pain Level: 6  Patient's Stated Pain Goal: 0 - No pain  Pain Location: Back,Head  Pain Orientation: Lower  Pain Descriptors: Aching,Discomfort,Sore  Functional Pain Assessment: Activities are not prevented  Pain Type: Acute pain  Pain Frequency: Continuous  Pain Onset: On-going  Non-Pharmaceutical Pain Intervention(s): Emotional support  Pain Assessment in Advanced Dementia (PAINAD)  Non-Pharmaceutical Pain Intervention(s): Emotional support  Faces, Legs, Activity, Cry, and Consolability (FLACC)  Non-Pharmaceutical Pain Intervention(s): Emotional support    Reason for Referral  Lyndon Souza was referred for a bedside swallow evaluation to assess the efficiency of her swallow function, identify signs and symptoms of aspiration and make recommendations regarding safe dietary consistencies, effective compensatory strategies, and safe eating environment. Impression  Dysphagia Diagnosis: Mild oral stage dysphagia  Dysphagia Impression : mild oral phase dysphagia  Dysphagia Outcome Severity Scale: Level 6: Within functional limits/Modified independence     Treatment Plan  Requires SLP Intervention: No  Duration of Treatment: n/a  D/C Recommendations: To be determined       Recommended Diet and Intervention        Recommended Form of Meds: PO  Recommendations: Setup assist       Compensatory Swallowing Strategies  Compensatory Swallowing Strategies : Alternate solids and liquids;Eat/Feed slowly;Upright as possible for all oral intake;Remain upright for 30-45 minutes after meals;Small bites/sips    Treatment/Goals  Short-term Goals  Timeframe for Short-term Goals: n/a    General  Chart Reviewed: Yes  Comments: past CVA, vertigo now  Behavior/Cognition: Alert; Cooperative;Pleasant mood  Temperature Spikes Noted: No  Respiratory Status: Room air  O2 Device: None (Room air)  Communication Observation: Functional  Follows Directions: Simple  Dentition: Adequate  Patient Positioning: Upright in bed  Baseline Vocal Quality: Normal  Volitional Cough: Strong  Volitional Swallow: Delayed  Prior Dysphagia History: none reported  Consistencies Administered: Regular;Pureed; Thin           Vision/Hearing  Vision  Vision: Impaired  Hearing  Hearing: Within functional limits    Oral Motor Deficits       Oral Phase Dysfunction  Oral Phase  Oral Phase: Exceptions  Oral Phase  Oral Phase - Comment: mild impairment     Indicators of Pharyngeal Phase Dysfunction   Pharyngeal Phase   Pharyngeal Phase: appears intact at the bedside    Prognosis       Education  Patient Education: results and recommendations  Patient Education Response: Verbalizes understanding             Therapy Time  SLP Individual Minutes  Time In: 0915  Time Out: 0930  Minutes: [address] [place], [place]  6/25/2022 9:36 AM

## 2022-06-25 NOTE — CONSULTS
Neurology Service Consult Note  [facility]   Patient Name: Brett Mar  : 1940        Subjective:   Reason for consult: \"Since Monday have been really dizzy\"  80 y.o. right-handed female with past medical history listed below presenting to [facility] with complaints of room spinning dizziness that started Monday, 5 days prior to this exam.  Patient reports that she woke up she rolled over in bed and she realized that she kept experiencing the rolling sensation and throughout the day every time she would turn her head she would have room spinning dizziness that did make her nauseous, she states that it is self-limiting if she holds still however she just generally feels off. She states a year ago she had a stroke that caused right visual field cut and this was after a 5 bypass of her heart. She states she only takes aspirin 81 mg at home. She denies any previous history of vertigo or neuronitis. She states with her symptoms she has noted change in her vision other than her chronic right visual field cut she has no facial droop aphasia dysarthria unilateral arm or leg weakness she states she has chronic right-sided numbness in her right arm and her right leg. No family at the bedside. Allergy notes do show that she should be on Plavix but patient tells me she has not been taking Plavix. Past Medical History:   Diagnosis Date    Acute CVA (cerebrovascular accident) ([facility] [address] 2021    Arthritis     CAD in native artery 2021    Essential hypertension 2021    H/O cardiac catheterization 2021    Several vessel serious  stenosis, Urgent CABG is recommended.  H/O cardiovascular stress test 2021    Abnormal Stress EKG results and patient was transferred to [facility] cath lab per Dr. Ayah Miles.  H/O echocardiogram 2021    Left ventricular function and size is normal, EF is estimated at 55-60%. Mild mitral ,tricuspid and moderate aortic regurgitation is present.     H/O echocardiogram 09/29/2021    Limited study , No Doppler study on Valves Left ventricular systolic function is severly depressed with an ejectionfraction of 15-20 %. EF is reduced compared to Echo in July 2021 Global hypokinesis Left Ventricular chamber size is Dilated No evidence of any pericardial effusion. Left sided pleural effusion noted, measuring 4.86 cm.    H/O echocardiogram 12/15/2021    Left ventricular systolic function is severely depressed with an EF of 25-30%. Dilated left ventricle . Septum as akinetic Grade I diastolic dysfunction. Moderate-severe aortic regurgitation noted with pressure half time  of 353 msec. Doppler evaluation reveals mild mitral and tricuspid regurgitation. Normal pulmonary artery pressure with RVSP of 29 mmHg. No evidence of pericardial effusion.  History of Holter monitoring 06/15/2021    Frequent PVCs. 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     Thyroid disease     :   Past Surgical History:   Procedure Laterality Date    APPENDECTOMY      BACK SURGERY      COLONOSCOPY      CORONARY ARTERY BYPASS GRAFT  08/02/2021    CABG X 5 vessel with Dr Cm Wright 8/2/2021    CABG CORONARY ARTERY BYPASS X 5, INTRAOPERATIVE [place], INDUCED HYPOTHERMIA AND BILATERAL ENDOHARVEST OF SAPHENOUS VEINS performed by Elbert Maxwell MD at [address] [place] [address] (CERVIX STATUS UNKNOWN)      TUMOR REMOVAL       Medications:  Scheduled Meds:   enoxaparin  40 mg SubCUTAneous Daily    aspirin  81 mg Oral Daily    Or    aspirin  300 mg Rectal Daily    atorvastatin  40 mg Oral Daily    levothyroxine  50 mcg Oral Daily    therapeutic multivitamin-minerals  1 tablet Oral Lunch    clopidogrel  75 mg Oral Daily    lisinopril  10 mg Oral Daily    metoprolol succinate  50 mg Oral Daily     Continuous Infusions:  PRN Meds:.ondansetron **OR** ondansetron, polyethylene glycol, ALPRAZolam, docusate sodium, meclizine    Allergies   Allergen Reactions    Iodine Hives     Topical only per pt; sts 1974 she had sx and broke out in hives after application of topical iodine only; denies knowledge of allergy to contrast dyes     Pcn [Penicillins] Hives     Social History     Socioeconomic History    Marital status:      Spouse name: Not on file    Number of children: Not on file    Years of education: Not on file    Highest education level: Not on file   Occupational History    Not on file   Tobacco Use    Smoking status: Never Smoker    Smokeless tobacco: Never Used   Vaping Use    Vaping Use: Never used   Substance and Sexual Activity    Alcohol use: No    Drug use: No    Sexual activity: Not on file   Other Topics Concern    Not on file   Social History Narrative    Not on file     Social Determinants of Health     Financial Resource Strain:     Difficulty of Paying Living Expenses: Not on file   Food Insecurity:     Worried About Running Out of Food in the Last Year: Not on file    Pilo of Food in the Last Year: Not on file   Transportation Needs:     Lack of Transportation (Medical): Not on file    Lack of Transportation (Non-Medical):  Not on file   Physical Activity:     Days of Exercise per Week: Not on file    Minutes of Exercise per Session: Not on file   Stress:     Feeling of Stress : Not on file   Social Connections:     Frequency of Communication with Friends and Family: Not on file    Frequency of Social Gatherings with Friends and Family: Not on file    Attends Jewish Services: Not on file    Active Member of Clubs or Organizations: Not on file    Attends Club or Organization Meetings: Not on file    Marital Status: Not on file   Intimate Partner Violence:     Fear of Current or Ex-Partner: Not on file    Emotionally Abused: Not on file    Physically Abused: Not on file    Sexually Abused: Not on file   Housing Stability:     Unable to Pay for Housing in the Last Year: Not on file    Number of Places Lived in the Last Year: Not on file    Unstable Housing in the Last Year: Not on file      Family History   Problem Relation Age of Onset    Cancer Father          ROS (10 systems)  In addition to that documented in the HPI above, the additional ROS was obtained:  Constitutional: Denies fevers or chills  Eyes: Denies vision changes  ENMT: Denies sore throat  CV: Denies chest pain  Resp: Denies SOB  GI: Denies vomiting or diarrhea  : Denies painful urination  MSK: Denies recent trauma  Skin: Denies new rashes  Neuro: Denies new numbness or tingling or weakness  Endocrine: Denies unexpected weight loss  Heme: Denies bleeding disorders    Physical Exam:       [unfilled]   Wt Readings from Last 3 Encounters:   06/24/22 123 lb (55.8 kg)   01/13/22 123 lb (55.8 kg)   12/16/21 123 lb (55.8 kg)     Temp Readings from Last 3 Encounters:   06/25/22 97.5 °F (36.4 °C) (Oral)   06/24/22 97.8 °F (36.6 °C) (Oral)   12/16/21 97.9 °F (36.6 °C) (Oral)     BP Readings from Last 3 Encounters:   06/25/22 118/61   06/24/22 126/72   01/13/22 (!) 106/58     Pulse Readings from Last 3 Encounters:   06/25/22 68   06/24/22 64   01/13/22 60        Gen: A&O x 4, NAD, cooperative  HEENT: NC/AT, EOMI, PERRL, mmm, neck supple, no meningeal signs; Heart: Regular  Lungs: CTAB  Ext: no edema, no calf tenderness b/l  Psych: normal mood and affect  Skin: no rashes or lesions    NEUROLOGIC EXAM:    Mental Status: A&O to self, location, month and year, NAD, speech clear, language fluent, repetition and naming intact, follows commands appropriately    Cranial Nerve Exam:   CN II-XII: PERRL, chronic right visual field cut, no nystagmus, no gaze paresis, sensation V1-V3 intact b/l, muscles of facial expression symmetric; hearing intact to conversational tone, palate elevates symmetrically, shoulder elevation symmetric and tongue protrudes midline with movement side to side.     Motor Exam:       Strength 5/5 UE's/LE's b/l  Tone and bulk normal   No pronator drift    Deep Tendon Reflexes: 2/4 biceps, brachioradialis, patellar, and achilles b/l; flexor plantar responses b/l    Sensation: Intact light touch/temperature UE's/LE's b/l    Coordination/Cerebellum:       Tremors--none      Rapidly alternating movements: no dysdiadochokinesia b/l                Finger-to-Nose: no dysmetria b/l    Gait and stance:      Gait: deferred      LABS:     Recent Labs     06/24/22  1116 06/25/22  0053   WBC 5.8 5.8     --    K 4.5  --      --    CO2 27  --    BUN 11  --    CREATININE 0.7  --    GLUCOSE 77  --    INR 0.90  --          IMAGING:      CUS:  The right internal carotid artery demonstrates 0-50% stenosis.       The left internal carotid artery demonstrates 0-50% stenosis.       Bilateral vertebral arteries are patent with flow in the normal direction. CT head:  Stable exam without acute intracranial abnormality.       Chronic age-related cerebral atrophy with sequela of remote left PCA   territory infarct.  Chronic white matter microangiopathic ischemic changes,   stable. CT C-spine:  No acute traumatic fracture or traumatic malalignment.       Reversal of the normal cervical lordosis with severe multilevel degenerative   changes. MRI brain pending    Personally reviewed images agreed with the above impression. ASSESSMENT/PLAN:     3 51-year-old female with reported room spinning dizziness and cephalgia secondary to central versus peripheral (BPPV) etiology superimposed on history of right visual field cut cva, hypertension and severe CAD. Plan of care as follows:  1. Neuro exam:  1. Right visual field cut otherwise nonfocal  2. Neurodiagnostics:  1. MRI brain pending  2. Carotid ultrasound as above  3. CT head as above  4. CT C-spine as above  3. Medications:  1.  Hospitalist initiated dual antiplatelet therapy continue until after MRI which may change plan of care  2. Statin nightly  3. Schedule meclizine 3 times daily  4. Xanax home dose as needed before MRI  5. Toradol 30 mg and Decadron 10 mg for acute headache aborting   4. PT/OT/ST:  1. Vestibular therapy outpatient  5. Follow-up:  1. Pending completion of neurodiagnostics        Thank you for allowing us to participate in the care of your patient. If there are any questions regarding evaluation please feel free to contact us. ROMAINE Cobb - CNP, 6/25/2022     Attending Note:  This is a spit/shared visit with ROMAINE Grimm. I have reviewed the chart and we have discussed this case in detail. The patient was seen and examined by myself. Pertinent labs and imaging have been personally reviewed. Changes were made to the note as appropriate. I concur with the above assessment and plan. Dizziness is most consistent with BPPV vs peripheral etiology such as labyrinthitis, however stroke cannot be completely ruled out, although her exam is reassuring for this today. Was notified that her pacemaker wire is loose and isn't compatible with MRI currently. She would like to go home. Given her neurological exam being unremarkable, feel this is appropriate however if any worsening dizziness that lingers, new neurologic symptoms, she should return.      Davon Spine, DO 6/25/2022 11:36 PM  Neurology

## 2022-06-25 NOTE — PROGRESS NOTES
Hospitalist Progress Note  Felicitas Ferreira MD      Name:  Allyson England /Age/Sex: 1940  (80 y.o. female)   MRN & CSN:  [id number] & [id number] Admission Date/Time: 2022  5:24 PM   Location:  [address] [place] [address] PCP: Tiffanie Givens MD         Hospital Day: 2    Assessment and Plan:   Allyson England is a 80 y.o.  female  who presents with     # Vertigo -remained dizzy. Awaiting MRI. Appreciate neuro recommendations. The patient says she has about 8-10 tomorrow and would like to be discharged today. I told her depending on MRI and PT and OT then will be able to make that decision     # Left Rib pain - CXR non acute. Suspect musculoskeletal, reproducible on exam, recent history of using tool for yard work. EKG without acute ischemia. Trop negative. Analgesics as needed.       # CAD s/p CABG 2021 - Continue pts anti-platelets, statin and beta-blocker. No acute ischemia on EKG, trop neg.     # Chronic systolic HF/Hx CMP - TTE showing EF 40-45%. Continue pts acei/beta-blocker and diuretics, monitor volume status, strict I/O, daily wts, cardiac diet     # Hx CVA with residual right sided weakness, right peripheral vision loss and mild intermittent dysarthria. Continue asa/plavix, statin     Other chronic medical conditions-resume home medications unless contraindicated  # Essential HTN -patient managed on beta-blocker and ACE and diuretics. BP currently stable  # Mixed hyperlipidemia- on statin  # Chronic anxiety - resume xanax     There is no height or weight on file to calculate BMI. Diet ADULT DIET; Easy to Chew; Low Fat/Low Chol/High Fiber/2 gm Na   DVT Prophylaxis Lovenox    Code Status Full Code   Disposition To home in 1-2 days     Subjective: The patient remained dizzy.   Denies any chest pain or shortness of breath    Ten point ROS reviewed negative, unless as noted above    Objective:     No intake or output data in the 24 hours ending 22 1105     Vitals: BP (!) 115/54   Pulse 64   Temp 97.7 °F (36.5 °C) (Oral)   Resp 18   SpO2 98%     Physical Exam:     GEN No acute distress. HEENT moist mucous membranes, no icterus  RESP CTA B  CVS:   RRR  GI/ S/NT/ND BS+   MSK No gross joint deformities. SKIN Normal coloration, warm, dry. NEURO no focal deficit right visual deficit which is chronic  PSYCH Awake, alert, oriented x3.     Recent Results (from the past 24 hour(s))   CBC with Auto Differential    Collection Time: 06/24/22 11:16 AM   Result Value Ref Range    WBC 5.8 4.0 - 10.5 K/CU MM    RBC 4.45 4.2 - 5.4 M/CU MM    Hemoglobin 14.3 12.5 - 16.0 GM/DL    Hematocrit 43.4 37 - 47 %    MCV 97.5 78 - 100 FL    MCH 32.1 (H) 27 - 31 PG    MCHC 32.9 32.0 - 36.0 %    RDW 12.4 11.7 - 14.9 %    Platelets 291 729 - 038 K/CU MM    MPV 8.6 7.5 - 11.1 FL    Differential Type AUTOMATED DIFFERENTIAL     Segs Relative 47.7 36 - 66 %    Lymphocytes % 38.1 24 - 44 %    Monocytes % 11.7 (H) 0 - 4 %    Eosinophils % 1.2 0 - 3 %    Basophils % 1.0 0 - 1 %    Segs Absolute 2.8 K/CU MM    Lymphocytes Absolute 2.2 K/CU MM    Monocytes Absolute 0.7 K/CU MM    Eosinophils Absolute 0.1 K/CU MM    Basophils Absolute 0.1 K/CU MM    Immature Neutrophil % 0.3 0 - 0.43 %    Total Immature Neutrophil 0.02 K/CU MM   Comprehensive Metabolic Panel w/ Reflex to MG    Collection Time: 06/24/22 11:16 AM   Result Value Ref Range    Sodium 137 135 - 145 MMOL/L    Potassium 4.5 3.5 - 5.1 MMOL/L    Chloride 100 99 - 110 mMol/L    CO2 27 21 - 32 MMOL/L    BUN 11 6 - 23 MG/DL    CREATININE 0.7 0.6 - 1.1 MG/DL    Glucose 77 70 - 99 MG/DL    Calcium 9.3 8.3 - 10.6 MG/DL    Albumin 4.2 3.4 - 5.0 GM/DL    Total Protein 6.5 6.4 - 8.2 GM/DL    Total Bilirubin 0.5 0.0 - 1.0 MG/DL    ALT 25 10 - 40 U/L    AST 30 15 - 37 IU/L    Alkaline Phosphatase 62 40 - 129 IU/L    GFR Non-African American >60 >60 mL/min/1.73m2    GFR African American >60 >60 mL/min/1.73m2    Anion Gap 10 4 - 16   Troponin    Collection Time: 06/24/22 11:16 AM Result Value Ref Range    Troponin T <0.010 <0.01 NG/ML   Protime-INR    Collection Time: 06/24/22 11:16 AM   Result Value Ref Range    Protime 11.1 (L) 11.7 - 14.5 SECONDS    INR 0.90 INDEX   PTT    Collection Time: 06/24/22 11:16 AM   Result Value Ref Range    aPTT 27.3 25.1 - 37.1 SECONDS   COVID-19, Rapid    Collection Time: 06/24/22 11:20 AM    Specimen: Nasopharyngeal   Result Value Ref Range    Source UNKNOWN     SARS-CoV-2, NAAT NOT DETECTED NOT DETECTED   Troponin    Collection Time: 06/24/22  8:14 PM   Result Value Ref Range    Troponin T <0.010 <0.01 NG/ML   TSH    Collection Time: 06/24/22  8:14 PM   Result Value Ref Range    TSH, High Sensitivity 7.690 (H) 0.270 - 4.20 uIu/ml   CBC    Collection Time: 06/25/22 12:53 AM   Result Value Ref Range    WBC 5.8 4.0 - 10.5 K/CU MM    RBC 4.44 4.2 - 5.4 M/CU MM    Hemoglobin 14.0 12.5 - 16.0 GM/DL    Hematocrit 41.9 37 - 47 %    MCV 94.4 78 - 100 FL    MCH 31.5 (H) 27 - 31 PG    MCHC 33.4 32.0 - 36.0 %    RDW 12.2 11.7 - 14.9 %    Platelets 852 888 - 970 K/CU MM    MPV 8.6 7.5 - 11.1 FL   Hemoglobin A1c    Collection Time: 06/25/22 12:53 AM   Result Value Ref Range    Hemoglobin A1C 5.5 4.2 - 6.3 %    eAG 111 mg/dL   Lipid panel - fasting    Collection Time: 06/25/22 12:53 AM   Result Value Ref Range    Triglycerides 51 <150 MG/DL    Cholesterol 117 <200 MG/DL    HDL 62 >40 MG/DL    LDL Calculated 45 <100 MG/DL   Troponin    Collection Time: 06/25/22 12:53 AM   Result Value Ref Range    Troponin T <0.010 <0.01 NG/ML   EKG 12 lead    Collection Time: 06/25/22  9:05 AM   Result Value Ref Range    Ventricular Rate 78 BPM    Atrial Rate 78 BPM    P-R Interval 160 ms    QRS Duration 138 ms    Q-T Interval 430 ms    QTc Calculation (Bazett) 490 ms    P Axis 71 degrees    R Axis 19 degrees    T Axis 170 degrees    Diagnosis       Normal sinus rhythm  Nonspecific intraventricular block  Cannot rule out Anterior infarct , age undetermined  T wave abnormality, consider inferolateral ischemia  Abnormal ECG  When compared with ECG of 24-JUN-2022 10:58,  Questionable change in QRS duration  Minimal criteria for Anterior infarct are now present  Minimal criteria for Inferior infarct are no longer present         Medications:   Medications:    meclizine  12.5 mg Oral TID    dexamethasone  10 mg IntraVENous Once    ketorolac  30 mg IntraVENous Once    enoxaparin  40 mg SubCUTAneous Daily    aspirin  81 mg Oral Daily    Or    aspirin  300 mg Rectal Daily    atorvastatin  40 mg Oral Daily    levothyroxine  50 mcg Oral Daily    therapeutic multivitamin-minerals  1 tablet Oral Lunch    clopidogrel  75 mg Oral Daily    lisinopril  10 mg Oral Daily    metoprolol succinate  50 mg Oral Daily      Infusions:   PRN Meds: ondansetron, 4 mg, Q8H PRN   Or  ondansetron, 4 mg, Q6H PRN  polyethylene glycol, 17 g, Daily PRN  ALPRAZolam, 0.25 mg, Nightly PRN  docusate sodium, 100 mg, BID PRN  meclizine, 12.5 mg, TID PRN          Electronically signed by Felicitas Ferreira MD, MD on 6/25/2022 at 11:05 AM

## 2022-06-25 NOTE — PROGRESS NOTES
Physical Therapy  Facility/Department: [address] [place] [address]  Physical Therapy Initial Assessment    Name: Brett Mar  : 1940  MRN: [id number]  Date of Service: 2022    Discharge Recommendations:  24 hour supervision or assist,Home with Home health PT,Outpatient PT   PT Equipment Recommendations  Equipment Needed: Yes  Mobility Devices: Jessica Serene: Rolling     Functional Outcome Measure:    Acute Care Index of Function (ACIF):    Score: 0.82 (0.71 or greater = appropriate for home with home PT or OP PT and 24 hour supervision/assist)      Assessment   Assessment: Pt is an 80 y.o. female with medical history, surgical history, co-morbidities, and personal factors including Acute CVA (cerebrovascular accident) ([facility] [address] Arthritis, CAD in native artery, Essential hypertension, H/O cardiac catheterization, H/O cardiovascular stress test, H/O echocardiogram, H/O echocardiogram, H/O echocardiogram, History of Holter monitoring, Thyroid disease, tumor removal; Dilation and curettage of uterus; Hysterectomy; Colonoscopy; Appendectomy; back surgery; Coronary artery bypass graft (2021); and Coronary artery bypass graft (N/A, 2021) with admission for vertigo. Prior to admission, pt was independent with functional mobility and ADLs. Examination of body systems reveals decreased endurance and decreased balance (when not using an assistive device) which limit her overall functional mobility.        Therapy Prognosis: Good  Decision Making: Medium Complexity  Clinical Presentation: evolving  Requires PT Follow-Up: Yes  Activity Tolerance  Activity Tolerance: Patient tolerated evaluation without incident     Plan   Plan  Plan: 3-5 times per week  Current Treatment Recommendations: Strengthening,ROM,Balance training,Functional mobility training,Transfer training,ADL/Self-care training,IADL training,Endurance training,Safety education & training,Patient/Caregiver education & training,Therapeutic activities,Gait training,Stair training,Equipment evaluation, education, & procurement,Neuromuscular re-education,Positioning,Pain management,Home exercise program  Safety Devices  Type of Devices: All fall risk precautions in place,Call light within reach,Bed alarm in place,Nurse notified,Gait belt,Left in bed     Restrictions  Restrictions/Precautions  Restrictions/Precautions: General Precautions     Subjective   General  Chart Reviewed: Yes  Patient assessed for rehabilitation services?: Yes  Family / Caregiver Present: No  Follows Commands: Within Functional Limits  Subjective  Subjective: pain: denies; 0/10         Social/Functional History  Social/Functional History  Lives With: Spouse  Type of Home: House  Home Layout: One level  Home Access: Stairs to enter with rails  Entrance Stairs - Number of Steps: 2  Bathroom Shower/Tub: Walk-in shower  Bathroom Toilet: Standard  Bathroom Equipment: Grab bars in shower,Built-in shower seat  Home Equipment: Cane,Walker, standard  ADL Assistance: Independent  Homemaking Assistance: Independent  Ambulation Assistance: Independent  Transfer Assistance: Independent  Active : Yes  Occupation: Retired  Vision/Hearing  Vision  Vision: Within [facility] (baseline R visual field cut from [facility] in 2021)  Hearing  Hearing: Within functional limits    Cognition   Orientation  Overall Orientation Status: Within Normal Limits  Cognition  Overall Cognitive Status: WNL     Objective         Gross Assessment  Sensation: Intact (BLEs)        Strength RLE  Strength RLE: WFL  Strength LLE  Strength LLE: WFL           Bed mobility  Supine to Sit: Independent  Sit to Supine: Independent  Transfers  Sit to Stand: Independent  Stand to sit:  Independent  Ambulation  Surface: level tile  Device: No Device  Assistance: Contact guard assistance  Quality of Gait: decreased gait speed, decreased step length bilaterally, ataxic, unsteady, intermittent deviated path toward walls in hallway  Distance: 50 feet + 50 feet  Comments: therapist recommended a front wheeled walker at this time  More Ambulation?: Yes  Ambulation 2  Surface - 2: level tile  Device 2: Rolling Walker  Quality of Gait 2: decreased gait speed, decreased step length bilaterally, improved balance compared to ambulation without an A/D  Distance: 200 feet  Comments: with initial verbal and tactile cues for BLE placement, walker placement, and sequence throughout ambulation; with initial verbal cues for directions in order to successfully navigate hallway and return to correct room  Stairs/Curb  Stairs?: Yes  Stairs  # Steps : 2  Rails: Left ascending  Assistance: Contact guard assistance;Stand by assistance  Comment: with verbal cues to use \"up with the good/down with the bad\" stair negotiation technique (due to patient feeling weaker on RLE)     Balance  Posture: Fair  Sitting - Static: Good  Sitting - Dynamic: Good  Standing - Static: Good  Standing - Dynamic: Good;Fair (good with FWW; fair without A/D)          Gait Training:  Cues were given for safety, sequence, device management, balance, posture, awareness, path. AM-PAC Score  -PAC Inpatient Mobility Raw Score : 22 (06/25/22 1728)  -PAC Inpatient T-Scale Score : 53.28 (06/25/22 1728)  Mobility Inpatient CMS 0-100% Score: 20.91 (06/25/22 1728)  Mobility Inpatient CMS G-Code Modifier : CJ (06/25/22 1728)          Goals  Long Term Goals  Time Frame for Long term goals : In one week:  Long term goal 1: Pt will ambulate 500 feet with modified independence with LRAD  Long term goal 2: Pt will independently ascend/descend 12 steps with a handrail  Long term goal 3: Pt will independently complete 3 sets of 10 reps of BLE AROM exercises in available and allowed ROM       Education  Patient Education  Education Given To: Patient  Education Provided: Role of Therapy; Energy Conservation;Plan of Care;IADL Safety;Equipment;ADL Adaptive Strategies;Transfer Training  Education Method: Demonstration;Verbal  Education Outcome: Verbalized understanding;Demonstrated understanding      Time In: 1537  Time Out: 1612  Total Treatment Time: 35  Timed Code Treatment Minutes: [address] Brooke Souza, PT, DPT  License #: [id number]

## 2022-06-25 NOTE — PROGRESS NOTES
Looking at pt implant history - she has a left-over pacer wire from a procedure in the past. At this time the pt is not MRI elegible due to the lead being \"open\".  We can research it on Monday to verify that, but per Radiologist, she cannot have an MRI at this time

## 2022-06-25 NOTE — ACP (ADVANCE CARE PLANNING)
Patient does not have any ACP documents/Medical Power of . Patient only has a Financial POA on file. LSW notes hospital will follow [place]'s Next of Kin hierarchy in the following descending order for priority:    Guardian  Spouse  [de-identified] of adult Children  Parents  [de-identified] of adult Siblings  Nearest Relative not described above    Per [place] Next of Kin hierarchy:  Patients' child will be [address].

## 2022-06-25 NOTE — PROGRESS NOTES
Sent PS message to Dr. Carlos Oneill to advise that pt is not currently eligible for an MRI d/t having a loose pacer wire still in place (not connected to anything). Advised pt as well.

## 2022-06-26 ENCOUNTER — APPOINTMENT (OUTPATIENT)
Dept: GENERAL RADIOLOGY | Age: 82
DRG: 149 | End: 2022-06-26
Attending: INTERNAL MEDICINE
Payer: MEDICARE

## 2022-06-26 PROCEDURE — G0378 HOSPITAL OBSERVATION PER HR: HCPCS

## 2022-06-26 PROCEDURE — 99215 OFFICE O/P EST HI 40 MIN: CPT | Performed by: INTERNAL MEDICINE

## 2022-06-26 PROCEDURE — 71046 X-RAY EXAM CHEST 2 VIEWS: CPT

## 2022-06-26 PROCEDURE — 6370000000 HC RX 637 (ALT 250 FOR IP): Performed by: NURSE PRACTITIONER

## 2022-06-26 PROCEDURE — 94761 N-INVAS EAR/PLS OXIMETRY MLT: CPT

## 2022-06-26 PROCEDURE — 6360000002 HC RX W HCPCS: Performed by: NURSE PRACTITIONER

## 2022-06-26 PROCEDURE — 96372 THER/PROPH/DIAG INJ SC/IM: CPT

## 2022-06-26 RX ADMIN — MULTIPLE VITAMINS W/ MINERALS TAB 1 TABLET: TAB at 14:03

## 2022-06-26 RX ADMIN — ASPIRIN 81 MG: 81 TABLET, COATED ORAL at 09:30

## 2022-06-26 RX ADMIN — LISINOPRIL 10 MG: 5 TABLET ORAL at 09:30

## 2022-06-26 RX ADMIN — ENOXAPARIN SODIUM 40 MG: 100 INJECTION SUBCUTANEOUS at 09:30

## 2022-06-26 RX ADMIN — MECLIZINE 12.5 MG: 12.5 TABLET ORAL at 21:10

## 2022-06-26 RX ADMIN — ALPRAZOLAM 0.25 MG: 0.25 TABLET ORAL at 21:10

## 2022-06-26 RX ADMIN — MECLIZINE 12.5 MG: 12.5 TABLET ORAL at 14:05

## 2022-06-26 RX ADMIN — CLOPIDOGREL BISULFATE 75 MG: 75 TABLET ORAL at 09:30

## 2022-06-26 RX ADMIN — LEVOTHYROXINE SODIUM 50 MCG: 0.05 TABLET ORAL at 06:28

## 2022-06-26 RX ADMIN — METOPROLOL SUCCINATE 50 MG: 50 TABLET, EXTENDED RELEASE ORAL at 09:30

## 2022-06-26 RX ADMIN — MECLIZINE 12.5 MG: 12.5 TABLET ORAL at 09:30

## 2022-06-26 RX ADMIN — ATORVASTATIN CALCIUM 40 MG: 40 TABLET, FILM COATED ORAL at 09:30

## 2022-06-26 ASSESSMENT — PAIN SCALES - GENERAL: PAINLEVEL_OUTOF10: 4

## 2022-06-26 ASSESSMENT — PAIN - FUNCTIONAL ASSESSMENT: PAIN_FUNCTIONAL_ASSESSMENT: ACTIVITIES ARE NOT PREVENTED

## 2022-06-26 ASSESSMENT — PAIN DESCRIPTION - LOCATION: LOCATION: BACK

## 2022-06-26 ASSESSMENT — PAIN SCALES - WONG BAKER
WONGBAKER_NUMERICALRESPONSE: 2

## 2022-06-26 ASSESSMENT — PAIN DESCRIPTION - ONSET: ONSET: ON-GOING

## 2022-06-26 ASSESSMENT — PAIN DESCRIPTION - FREQUENCY: FREQUENCY: CONTINUOUS

## 2022-06-26 ASSESSMENT — PAIN DESCRIPTION - PAIN TYPE: TYPE: ACUTE PAIN

## 2022-06-26 ASSESSMENT — PAIN DESCRIPTION - DESCRIPTORS: DESCRIPTORS: ACHING;DISCOMFORT

## 2022-06-26 ASSESSMENT — PAIN DESCRIPTION - ORIENTATION: ORIENTATION: LEFT

## 2022-06-26 NOTE — PROGRESS NOTES
Hospitalist Progress Note  Ania Bolanos MD      Name:  Adelina Corado /Age/Sex: 1940  (80 y.o. female)   MRN & CSN:  [id number] & [id number] Admission Date/Time: 2022  5:24 PM   Location:  [address] [place] [address]- PCP: Amilcar Patterson MD         Hospital Day: 3    Assessment and Plan:   Adelina Corado is a 80 y.o.  female  who presents with     # Vertigo -remained dizzy. Awaiting MRI. Neurology following     # Left Rib pain - CXR non acute. Suspect musculoskeletal, reproducible on exam, recent history of using tool for yard work. EKG without acute ischemia. Trop negative. Analgesics as needed.       # CAD s/p CABG 2021 - Continue pts anti-platelets, statin and beta-blocker. No acute ischemia on EKG, trop neg.     # Chronic systolic HF/Hx CMP - TTE showing EF 40-45%. Continue pts acei/beta-blocker and diuretics, monitor volume status, strict I/O, daily wts, cardiac diet     # Hx CVA with residual right sided weakness, right peripheral vision loss and mild intermittent dysarthria. Continue asa/plavix, statin     Other chronic medical conditions-resume home medications unless contraindicated  # Essential HTN -patient managed on beta-blocker and ACE and diuretics. BP currently stable  # Mixed hyperlipidemia- on statin  # Chronic anxiety - resume xanax    There is no height or weight on file to calculate BMI. Diet ADULT DIET; Easy to Chew; Low Fat/Low Chol/High Fiber/2 gm Na   DVT Prophylaxis Lovenox    Code Status Full Code   Disposition To home in 1-2 days     Subjective: The patient said she remained dizzy this morning. Yesterday she decided to discharge but then later on changed her mind after talking to her daughter.     Ten point ROS reviewed negative, unless as noted above    Objective:     No intake or output data in the 24 hours ending 22 1048     Vitals: /66   Pulse 64   Temp 97.9 °F (36.6 °C) (Oral)   Resp 18   SpO2 98%     Physical Exam:     GEN No acute distress. HEENT moist mucous membranes, no icterus  RESP CTA B  CVS:   RRR  GI/ S/NT/ND BS+   NEURO no focal deficit  PSYCH Awake, alert, oriented x3. No results found for this or any previous visit (from the past 24 hour(s)).     Medications:   Medications:    meclizine  12.5 mg Oral TID    enoxaparin  40 mg SubCUTAneous Daily    aspirin  81 mg Oral Daily    Or    aspirin  300 mg Rectal Daily    atorvastatin  40 mg Oral Daily    levothyroxine  50 mcg Oral Daily    therapeutic multivitamin-minerals  1 tablet Oral Lunch    clopidogrel  75 mg Oral Daily    lisinopril  10 mg Oral Daily    metoprolol succinate  50 mg Oral Daily      Infusions:   PRN Meds: ondansetron, 4 mg, Q8H PRN   Or  ondansetron, 4 mg, Q6H PRN  polyethylene glycol, 17 g, Daily PRN  ALPRAZolam, 0.25 mg, Nightly PRN  docusate sodium, 100 mg, BID PRN  meclizine, 12.5 mg, TID PRN          Electronically signed by Rosanna Bee MD, MD on 6/26/2022 at 10:48 AM

## 2022-06-26 NOTE — CONSULTS
CARDIOLOGY CONSULT NOTE   Reason for consultation: Cardiac clearance for MRI    Referring physician:  Charmayne Girt, MD     Primary care physician: Alexia Faust MD      Dear   Thanks for the consult. History of present illness:Chrystal is a 80 y. o.year old who  presents with vertigo to the ER she had a history of bypass in December 2021 ischemic cardiomyopathy hypertension CVA in the past hyperlipidemia anxiety, she is admitted to [facility] and during the MRI is prescreening evaluation she was found to have pacemaker wire lead information in her medical records which was used for bypass surgery. Cardiac consult was called to further evaluate if she still has retained pacemaker leads which could be a contraindication for MRI of head. She used to see Dr. Wild Woods in the [facility] and then transferred care to Dr. Marianela Sheehan at [facility] she does not have AICD she had ischemic cardiomyopathy    Blood pressure, cholesterol, blood glucose and weight are well controlled. Past medical history:    has a past medical history of Acute CVA (cerebrovascular accident) ([address] Arthritis, CAD in native artery, Essential hypertension, H/O cardiac catheterization, H/O cardiovascular stress test, H/O echocardiogram, H/O echocardiogram, H/O echocardiogram, History of Holter monitoring, and Thyroid disease. Past surgical history:   has a past surgical history that includes tumor removal; Dilation and curettage of uterus; Hysterectomy; Colonoscopy; Appendectomy; back surgery; Coronary artery bypass graft (08/02/2021); and Coronary artery bypass graft (N/A, 8/2/2021). Social History:   reports that she has never smoked. She has never used smokeless tobacco. She reports that she does not drink alcohol and does not use drugs.   Family history:   no family history of CAD, STROKE of DM    Allergies   Allergen Reactions    Iodine Hives     Topical only per pt; sts 1974 she had sx and broke out in hives after application of topical iodine only; denies knowledge of allergy to contrast dyes     Pcn [Penicillins] Hives       meclizine (ANTIVERT) tablet 12.5 mg, TID  ondansetron (ZOFRAN-ODT) disintegrating tablet 4 mg, Q8H PRN   Or  ondansetron (ZOFRAN) injection 4 mg, Q6H PRN  polyethylene glycol (GLYCOLAX) packet 17 g, Daily PRN  enoxaparin (LOVENOX) injection 40 mg, Daily  aspirin EC tablet 81 mg, Daily   Or  aspirin suppository 300 mg, Daily  ALPRAZolam (XANAX) tablet 0.25 mg, Nightly PRN  atorvastatin (LIPITOR) tablet 40 mg, Daily  docusate sodium (COLACE) capsule 100 mg, BID PRN  levothyroxine (SYNTHROID) tablet 50 mcg, Daily  therapeutic multivitamin-minerals 1 tablet, Lunch  clopidogrel (PLAVIX) tablet 75 mg, Daily  meclizine (ANTIVERT) tablet 12.5 mg, TID PRN  lisinopril (PRINIVIL;ZESTRIL) tablet 10 mg, Daily  metoprolol succinate (TOPROL XL) extended release tablet 50 mg, Daily      Current Facility-Administered Medications   Medication Dose Route Frequency Provider Last Rate Last Admin    meclizine (ANTIVERT) tablet 12.5 mg  12.5 mg Oral TID ROMAINE Harley - CNP   12.5 mg at 06/26/22 0930    ondansetron (ZOFRAN-ODT) disintegrating tablet 4 mg  4 mg Oral Q8H PRN Marlena Severs, APRN - CNP        Or    ondansetron (ZOFRAN) injection 4 mg  4 mg IntraVENous Q6H PRN Marlena Severs, APRN - CNP        polyethylene glycol (GLYCOLAX) packet 17 g  17 g Oral Daily PRN Marlena Severs, APRN - CNP        enoxaparin (LOVENOX) injection 40 mg  40 mg SubCUTAneous Daily Marlena Severs, APRN - CNP   40 mg at 06/26/22 0930    aspirin EC tablet 81 mg  81 mg Oral Daily Marlena Severs, APRN - CNP   81 mg at 06/26/22 0930    Or    aspirin suppository 300 mg  300 mg Rectal Daily Marlena Severs, APRN - CNP        ALPRAZolam Aldon Nones) tablet 0.25 mg  0.25 mg Oral Nightly PRN Marlena Severs, APRN - CNP   0.25 mg at 06/25/22 2208    atorvastatin (LIPITOR) tablet 40 mg  40 mg Oral Daily ROMAINE Hubbard CNP   40 mg at 06/26/22 0930    docusate sodium (COLACE) capsule 100 mg  100 mg Oral BID PRN Huel Dent, APRN - CNP        levothyroxine (SYNTHROID) tablet 50 mcg  50 mcg Oral Daily Huel Dent, APRN - CNP   50 mcg at 06/26/22 4615    therapeutic multivitamin-minerals 1 tablet  1 tablet Oral Lunch Huel Dent, APRN - CNP   1 tablet at 06/25/22 1222    clopidogrel (PLAVIX) tablet 75 mg  75 mg Oral Daily Huel King and Queen, APRN - CNP   75 mg at 06/26/22 0930    meclizine (ANTIVERT) tablet 12.5 mg  12.5 mg Oral TID PRN Huel Dent, APRN - CNP        lisinopril (PRINIVIL;ZESTRIL) tablet 10 mg  10 mg Oral Daily Huel King and Queen, APRN - CNP   10 mg at 06/26/22 0930    metoprolol succinate (TOPROL XL) extended release tablet 50 mg  50 mg Oral Daily Huel King and Queen, APRN - CNP   50 mg at 06/26/22 0930     Review of Systems:   · Constitutional: No Fever or Weight Loss   · Eyes: No Decreased Vision  · ENT: No Headaches, Hearing Loss or Vertigo  · Cardiovascular: No chest pain, dyspnea on exertion, palpitations or loss of consciousness  · Respiratory: No cough or wheezing    · Gastrointestinal: No abdominal pain, appetite loss, blood in stools, constipation, diarrhea or heartburn  · Genitourinary: No dysuria, trouble voiding, or hematuria  · Musculoskeletal:  No gait disturbance, weakness or joint complaints  · Integumentary: No rash or pruritis  · Neurological: No TIA or stroke symptoms  · Psychiatric: No anxiety or depression  · Endocrine: No malaise, fatigue or temperature intolerance  · Hematologic/Lymphatic: No bleeding problems, blood clots or swollen lymph nodes  · Allergic/Immunologic: No nasal congestion or hives  All systems negative except as marked.      ·   ·      Physical Examination:    Vitals:    06/26/22 1026   BP:    Pulse:    Resp:    Temp:    SpO2: 98%      Wt Readings from Last 3 Encounters:   06/25/22 123 lb (55.8 kg)   06/24/22 123 lb (55.8 kg)   01/13/22 123 lb (55.8 kg)     There is no height or weight on file to calculate BMI. General Appearance:  No distress, conversant    Constitutional:  Well developed, Well nourished, No acute distress, Non-toxic appearance. HENT:  Normocephalic, Atraumatic, Bilateral external ears normal, Oropharynx moist, No oral exudates, Nose normal. Neck- Normal range of motion, No tenderness, Supple, No stridor,no apical-carotid delay, no carotid bruit  Eyes:  PERRL, EOMI, Conjunctiva normal, No discharge. Respiratory:  Normal breath sounds, No respiratory distress, No wheezing, No chest tenderness. ,no use of accessory muscles, diaphragm movement is normal  Cardiovascular: (PMI) apex non displaced,no lifts no thrills, no s3,no s4, Normal heart rate, Normal rhythm, No murmurs, No rubs, No gallops. Carotid arteries pulse and amplitude are normal no bruit, no abdominal bruit noted ( normal abdominal aorta ausculation), femoral arteries pulse and amplitude are normal no bruit, pedal pulses are normal  GI:  Bowel sounds normal, Soft, No tenderness, No masses, No pulsatile masses, no hepatosplenomegally, no bruits  : External genitalia appear normal, No masses or lesions. No discharge. No CVA tenderness. Musculoskeletal:  Intact distal pulses, No edema, No tenderness, No cyanosis, No clubbing. Good range of motion in all major joints. No tenderness to palpation or major deformities noted. Back- No tenderness. Integument:  Warm, Dry, No erythema, No rash. Skin: no rash, no ulcers  Lymphatic:  No lymphadenopathy noted. Neurologic:  Alert & oriented x 3, Normal motor function, Normal sensory function, No focal deficits noted.    Psychiatric:  Affect normal, Judgment normal, Mood normal.   Lab Review   Recent Labs     06/25/22  0053   WBC 5.8   HGB 14.0   HCT 41.9         Recent Labs     06/24/22  1116      K 4.5      CO2 27   BUN 11   CREATININE 0.7     Recent Labs     06/24/22  1116   AST 30   ALT 25   BILITOT 0.5   ALKPHOS 62     No results for input(s): TROPONINI in the last 72 hours. No results found for: BNP  Lab Results   Component Value Date    INR 0.90 06/24/2022    PROTIME 11.1 (L) 06/24/2022         EKG: Sinus rhythm IVCD    Chest Xray:    ECHO: LVEF 25-30% before surgery and lives recent echo at [facility] in April 22 shows LVEF 4045%  Labs, echo, meds reviewed  Assessment: 80 y. o.year old with PMH of  has a past medical history of Acute CVA (cerebrovascular accident) ([address] Arthritis, CAD in native artery, Essential hypertension, H/O cardiac catheterization, H/O cardiovascular stress test, H/O echocardiogram, H/O echocardiogram, H/O echocardiogram, History of Holter monitoring, and Thyroid disease. Recommendations:    1. MRI clearance. Cardiac consult was called to clear patient for MRI, however routine questionnaire had alert for pacer wire therefore chest x-ray was ordered and found to have retained epicardial pacer wire visible, therefore patient cannot be cleared at this time for MRI. Recommended to get in touch with Dr. Courtney Varma for future discussion of retrieving the cyst and epicardial pacer wire so the patient can have MRI in future  2. Ischemic cardiomyopathy LVEF improved from 35 to 40-45% after CABGsurgery continue beta-blocker lisinopril he follows up with Dr. Rolando Hayden at [facility]  3. Health maintenance: exerise and diet  All labs, medications and tests reviewed, continue all other medications of all above medical condition listed as is.          Jena Chopra MD, 6/26/2022 1:57 PM

## 2022-06-27 VITALS
DIASTOLIC BLOOD PRESSURE: 53 MMHG | RESPIRATION RATE: 18 BRPM | SYSTOLIC BLOOD PRESSURE: 129 MMHG | OXYGEN SATURATION: 99 % | HEART RATE: 61 BPM | TEMPERATURE: 97.2 F

## 2022-06-27 LAB
EKG ATRIAL RATE: 78 BPM
EKG DIAGNOSIS: NORMAL
EKG P AXIS: 71 DEGREES
EKG P-R INTERVAL: 160 MS
EKG Q-T INTERVAL: 430 MS
EKG QRS DURATION: 138 MS
EKG QTC CALCULATION (BAZETT): 490 MS
EKG R AXIS: 19 DEGREES
EKG T AXIS: 170 DEGREES
EKG VENTRICULAR RATE: 78 BPM

## 2022-06-27 PROCEDURE — G0378 HOSPITAL OBSERVATION PER HR: HCPCS

## 2022-06-27 PROCEDURE — 6370000000 HC RX 637 (ALT 250 FOR IP): Performed by: NURSE PRACTITIONER

## 2022-06-27 PROCEDURE — 6360000002 HC RX W HCPCS: Performed by: NURSE PRACTITIONER

## 2022-06-27 PROCEDURE — 1200000000 HC SEMI PRIVATE

## 2022-06-27 PROCEDURE — 93010 ELECTROCARDIOGRAM REPORT: CPT | Performed by: INTERNAL MEDICINE

## 2022-06-27 PROCEDURE — 96372 THER/PROPH/DIAG INJ SC/IM: CPT

## 2022-06-27 PROCEDURE — 94761 N-INVAS EAR/PLS OXIMETRY MLT: CPT

## 2022-06-27 RX ORDER — CLOPIDOGREL BISULFATE 75 MG/1
75 TABLET ORAL DAILY
Qty: 21 TABLET | Refills: 0 | Status: SHIPPED | OUTPATIENT
Start: 2022-06-27 | End: 2022-07-18

## 2022-06-27 RX ADMIN — ATORVASTATIN CALCIUM 40 MG: 40 TABLET, FILM COATED ORAL at 10:01

## 2022-06-27 RX ADMIN — ASPIRIN 81 MG: 81 TABLET, COATED ORAL at 10:01

## 2022-06-27 RX ADMIN — ENOXAPARIN SODIUM 40 MG: 100 INJECTION SUBCUTANEOUS at 10:02

## 2022-06-27 RX ADMIN — METOPROLOL SUCCINATE 50 MG: 50 TABLET, EXTENDED RELEASE ORAL at 10:01

## 2022-06-27 RX ADMIN — LEVOTHYROXINE SODIUM 50 MCG: 0.05 TABLET ORAL at 05:17

## 2022-06-27 RX ADMIN — CLOPIDOGREL BISULFATE 75 MG: 75 TABLET ORAL at 10:05

## 2022-06-27 RX ADMIN — LISINOPRIL 10 MG: 5 TABLET ORAL at 10:01

## 2022-06-27 ASSESSMENT — PAIN - FUNCTIONAL ASSESSMENT: PAIN_FUNCTIONAL_ASSESSMENT: ACTIVITIES ARE NOT PREVENTED

## 2022-06-27 ASSESSMENT — PAIN SCALES - GENERAL
PAINLEVEL_OUTOF10: 0
PAINLEVEL_OUTOF10: 3

## 2022-06-27 ASSESSMENT — PAIN DESCRIPTION - ORIENTATION: ORIENTATION: LEFT

## 2022-06-27 ASSESSMENT — PAIN SCALES - WONG BAKER
WONGBAKER_NUMERICALRESPONSE: 0
WONGBAKER_NUMERICALRESPONSE: 2
WONGBAKER_NUMERICALRESPONSE: 2
WONGBAKER_NUMERICALRESPONSE: 0

## 2022-06-27 ASSESSMENT — PAIN DESCRIPTION - FREQUENCY: FREQUENCY: CONTINUOUS

## 2022-06-27 ASSESSMENT — PAIN DESCRIPTION - DESCRIPTORS: DESCRIPTORS: ACHING;DISCOMFORT;SORE

## 2022-06-27 ASSESSMENT — PAIN DESCRIPTION - PAIN TYPE: TYPE: ACUTE PAIN

## 2022-06-27 ASSESSMENT — PAIN DESCRIPTION - LOCATION: LOCATION: BACK

## 2022-06-27 ASSESSMENT — PAIN DESCRIPTION - ONSET: ONSET: ON-GOING

## 2022-06-27 NOTE — PROGRESS NOTES
Pt has a left over epicardial pacemaker lead per the chest Xray and per the patient's screening form. We cannot safely scan this patient and MRI order will be cancelled. RN aware and will inform the doctor.

## 2022-06-27 NOTE — PLAN OF CARE
Problem: Discharge Planning  Goal: Discharge to home or other facility with appropriate resources  6/27/2022 0926 by Hyacinth Roberts LPN  Outcome: Progressing  Flowsheets (Taken 6/27/2022 0775)  Discharge to home or other facility with appropriate resources: Identify barriers to discharge with patient and caregiver  6/26/2022 2320 by Tra Steel RN  Outcome: Progressing     Problem: Safety - Adult  Goal: Free from fall injury  6/27/2022 0926 by Hyacinth Roberts LPN  Outcome: Progressing  6/26/2022 2320 by Tra Steel RN  Outcome: Progressing     Problem: Pain  Goal: Verbalizes/displays adequate comfort level or baseline comfort level  6/27/2022 0926 by Hyacinth Roberts LPN  Outcome: Progressing  Flowsheets (Taken 6/27/2022 0915)  Verbalizes/displays adequate comfort level or baseline comfort level: Encourage patient to monitor pain and request assistance  6/26/2022 2320 by Tra Steel RN  Outcome: Progressing     Problem: Chronic Conditions and Co-morbidities  Goal: Patient's chronic conditions and co-morbidity symptoms are monitored and maintained or improved  6/27/2022 0926 by Hyacinth Roberts LPN  Outcome: Progressing  Flowsheets (Taken 6/27/2022 0923)  Care Plan - Patient's Chronic Conditions and Co-Morbidity Symptoms are Monitored and Maintained or Improved: Monitor and assess patient's chronic conditions and comorbid symptoms for stability, deterioration, or improvement  6/26/2022 2320 by Tra Steel RN  Outcome: Progressing     Problem: ABCDS Injury Assessment  Goal: Absence of physical injury  6/27/2022 0926 by Hyacinth Roberts LPN  Outcome: Progressing  6/26/2022 2320 by Tra Steel RN  Outcome: Progressing

## 2022-06-27 NOTE — DISCHARGE SUMMARY
FINAL PROGRESS NOTE    The patient was 80 y.o. female who was admitted for dizziness and vertigo. During the hospitalization the patient was evaluated by neurology and they recommended MRI but as it was a weekend and the patient had wires which were not connected. Discussed with cardiology and a. The patient had epicardiac lesions during the CABG) left. Discussed with neurology and they recommended no more MRI at the patient is already on Plavix and aspirin. Discussed with neurology about aspirin and Plavix. Currently the patient is on aspirin and Plavix for 21 days and then aspirin monotherapy. Further disposition about Plavix will be obtained neurology. The patient was seen and examined on the day of the discharge. Will be discharged to home with home health, diet cardiac and activity as tolerated.      Condition: Stable    MEDICATIONS:  Current Discharge Medication List      START taking these medications    Details   clopidogrel (PLAVIX) 75 MG tablet Take 1 tablet by mouth daily  Qty: 21         CONTINUE these medications which have NOT CHANGED    Details   ASPIRIN LOW DOSE 81 MG EC tablet TAKE 1 TABLET BY MOUTH DAILY  Qty: 90 tablet, Refills: 3      metoprolol succinate (TOPROL XL) 100 MG extended release tablet Take 1 tablet by mouth daily  Qty: 90 tablet, Refills: 3      lisinopril (PRINIVIL;ZESTRIL) 10 MG tablet Take 1 tablet by mouth daily  Qty: 90 tablet, Refills: 3      atorvastatin (LIPITOR) 40 MG tablet Take 1 tablet by mouth daily  Qty: 90 tablet, Refills: 3      docusate sodium (COLACE, DULCOLAX) 100 MG CAPS Take 100 mg by mouth 2 times daily as needed for Constipation      loperamide (IMODIUM) 2 MG capsule Take 2 mg by mouth 2 times daily as needed for Diarrhea      acetaminophen (TYLENOL) 325 MG tablet Take 325-650 mg by mouth every 6 hours as needed      estradiol (ESTRACE) 0.1 MG/GM vaginal cream Apply pea sized amount to vagina nightly x7 days, then every other night for 7 days, then every 3rd night therafter      Multiple Vitamin (MULTIVITAMIN) capsule Take 1 capsule by mouth Daily with lunch      levothyroxine (SYNTHROID) 50 MCG tablet Take 50 mcg by mouth Daily      ALPRAZolam (XANAX) 0.25 MG tablet Take 0.25 mg by mouth nightly as needed for Sleep             LAST SET OF VITALS:    BP: (!) 129/53  Temp: 97.2 °F (36.2 °C)  Heart Rate: 61  Resp: 18  @PHPLASTWT(1)@    The patient is to follow their PCP as scheduled.     Walker Gramajo MD  [address]  [address] [facility]  [phone]    Schedule an appointment as soon as possible for a visit in 1 week  Hospital Follow DO Jason  [address]  [address] [address] [address] [facility]    Schedule an appointment as soon as possible for a visit in 2 weeks  Hospital Follow Up, make a follow up appointment for 1-2 weeks       Electronically signed by Lakeisha Silvestre MD, MD on 6/27/2022 at 11:16 AM

## 2022-06-30 NOTE — PROGRESS NOTES
CARDIOLOGY  NOTE    Chief Complaint: pvc     HPI:   Maulik is a 80y.o. year old who has Past medical history as noted below. Maulik is feeling tired fatigued and has lack of energy, she is stressed out about her  who is in nursing home due to dementia and not doing well , she is feeling dizzy and occasionally gets lightheaded , she is in [address] [place] [address] for CHF/cardiomyopathy . Maulik  had CABG with LIMA to LAD ,SVg to ramus, SVG to Om,and SVG to RCA in August 2021 after she was noted to have ST depression associated with chest pain after Lexiscan infusion before she could get images. Her EF before surgery was 50-55 % in July 2021 , post CABG and CVA her EF was15 % in Sept 2021    Emergent cath showed multivessel disease. Unfortunately post CABG she developed stroke causing right-sided deficits and loss of vision in the right eye. Her echo shows drop in EF of 15-20 % in sept 2021   She is in  cardiac rehab.  she decided not get covid vaccine but is avoiding public spaces  She still tired and fatigued and has reduced exercise strength. Evaluate for ut? Current Outpatient Medications   Medication Sig Dispense Refill    metoprolol succinate (TOPROL XL) 50 MG extended release tablet Take 1 tablet by mouth daily 30 tablet 3    FOLBIC 2.5-25-2 MG TABS       lisinopril (PRINIVIL;ZESTRIL) 10 MG tablet Take 1 tablet by mouth daily 30 tablet 3    spironolactone (ALDACTONE) 25 MG tablet Take 1 tablet by mouth daily 90 tablet 1    furosemide (LASIX) 20 MG tablet Take 1 tablet by mouth daily as needed (for weight gain of 5 lbs) 90 tablet 1    midodrine (PROAMATINE) 5 MG tablet Take 1 tablet by mouth 3 times daily as needed (for sbp less than 90) Takes 1/2 tab, three times a day.  90 tablet 3    atorvastatin (LIPITOR) 40 MG tablet Take 1 tablet by mouth daily 90 tablet 1    aspirin 81 MG EC tablet Take 1 tablet by mouth daily 90 tablet 3    clopidogrel (PLAVIX) 75 MG Last office visit:06.01.2022  Last labs: 05.26.2022  Next office visit:08.24.2022   tablet Take 1 tablet by mouth daily 90 tablet 3    docusate sodium (COLACE, DULCOLAX) 100 MG CAPS Take 100 mg by mouth 2 times daily as needed for Constipation      loperamide (IMODIUM) 2 MG capsule Take 2 mg by mouth 2 times daily as needed for Diarrhea      acetaminophen (TYLENOL) 325 MG tablet Take 325-650 mg by mouth every 6 hours as needed      Multiple Vitamin (MULTIVITAMIN) capsule Take 1 capsule by mouth Daily with lunch      levothyroxine (SYNTHROID) 50 MCG tablet Take 50 mcg by mouth Daily      ALPRAZolam (XANAX) 0.25 MG tablet Take 0.25 mg by mouth nightly as needed for Sleep      estradiol (ESTRACE) 0.1 MG/GM vaginal cream Apply pea sized amount to vagina nightly x7 days, then every other night for 7 days, then every 3rd night therafter (Patient not taking: Reported on 9/23/2021)       No current facility-administered medications for this visit. Allergies:   Iodine and Pcn [penicillins]    Patient History:  Past Medical History:   Diagnosis Date    Acute CVA (cerebrovascular accident) ([facility] [address] 08/09/2021    Arthritis     CAD in native artery 07/29/2021    Essential hypertension 07/29/2021    H/O cardiac catheterization 07/27/2021    Several vessel serious  stenosis, Urgent CABG is recommended.  H/O cardiovascular stress test 07/29/2021    Abnormal Stress EKG results and patient was transferred to [facility] cath lab per Dr. Odalys Cruz.  H/O echocardiogram 07/29/2021    Left ventricular function and size is normal, EF is estimated at 55-60%. Mild mitral ,tricuspid and moderate aortic regurgitation is present.  H/O echocardiogram 09/29/2021    Limited study , No Doppler study on Valves Left ventricular systolic function is severly depressed with an ejectionfraction of 15-20 %. EF is reduced compared to Echo in July 2021 Global hypokinesis Left Ventricular chamber size is Dilated No evidence of any pericardial effusion. Left sided pleural effusion noted, measuring 4.86 cm.     History of Holter monitoring 06/15/2021    Frequent PVCs. 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  Thyroid disease      Past Surgical History:   Procedure Laterality Date    APPENDECTOMY      BACK SURGERY      COLONOSCOPY      CORONARY ARTERY BYPASS GRAFT  08/02/2021    CABG X 5 vessel with Dr Marisa Dela Cruz N/A 8/2/2021    CABG CORONARY ARTERY BYPASS X 5, INTRAOPERATIVE [place], INDUCED HYPOTHERMIA AND BILATERAL ENDOHARVEST OF SAPHENOUS VEINS performed by Garo Trevino MD at [address] [place] [address]      HYSTERECTOMY      TUMOR REMOVAL       Family History   Problem Relation Age of Onset    Cancer Father      Social History     Tobacco Use    Smoking status: Never Smoker    Smokeless tobacco: Never Used   Substance Use Topics    Alcohol use: No        Review of Systems:   · Constitutional: No Fever or Weight Loss   · Eyes: No Decreased Vision  · ENT: No Headaches, Hearing Loss or Vertigo  · Cardiovascular: as per note above   · Respiratory: No cough or wheezing and as per note above.    · Gastrointestinal: No abdominal pain, appetite loss, blood in stools, constipation, diarrhea or heartburn  · Genitourinary: No dysuria, trouble voiding, or hematuria  · Musculoskeletal:  denies any new  joint aches , swelling  or pain   · Integumentary: No rash or pruritis  · Neurological: No TIA or stroke symptoms  · Psychiatric: No anxiety or depression  · Endocrine: No malaise, fatigue or temperature intolerance  · Hematologic/Lymphatic: No bleeding problems, blood clots or swollen lymph nodes  · Allergic/Immunologic: No nasal congestion or hives    Objective:      Physical Exam:  /80   Pulse 72   Ht 5' 2\" (1.575 m)   Wt 125 lb 6.4 oz (56.9 kg)   BMI 22.94 kg/m²   Wt Readings from Last 3 Encounters:   12/02/21 125 lb 6.4 oz (56.9 kg)   11/04/21 129 lb (58.5 kg)   09/23/21 130 lb (59 kg)     Body mass index is 22.94 kg/m². Vitals:    12/02/21 1406   BP: 112/80   Pulse:         General Appearance:  No distress, conversant  Constitutional:  Well developed, Well nourished, No acute distress, Non-toxic appearance. HENT:  Normocephalic, Atraumatic, Bilateral external ears normal, Oropharynx moist, No oral exudates, Nose normal. Neck- Normal range of motion, No tenderness, Supple, No stridor,no apical-carotid delay  Eyes:  PERRL, EOMI, Conjunctiva normal, No discharge. Respiratory:  Normal breath sounds, No respiratory distress, No wheezing, No chest tenderness. ,no use of accessory muscles, NO crackles  Cardiovascular: (PMI) apex non displaced,no lifts no thrills,S1 and S2 audible, No added heart sounds, No signs of ankle edema, or volume overload, No evidence of JVD, No crackles  GI:  Bowel sounds normal, Soft, No tenderness, No masses, No gross visceromegaly   :  No costovertebral angle tenderness   Musculoskeletal:  No edema, no tenderness, no deformities. Back- no tenderness  Integument:  Well hydrated, no rash   Lymphatic:  No lymphadenopathy noted   Neurologic:  Alert & oriented x 3, CN 2-12 normal, normal motor function, normal sensory function, no focal deficits noted   Psychiatric:  Speech and behavior appropriate       Medical decision making and Data review:  DATA:  Lab Results   Component Value Date    TROPONINT <0.010 09/30/2015     BNP:    Lab Results   Component Value Date    PROBNP 377.3 (H) 09/30/2015     PT/INR:  No results found for: PTINR  Lab Results   Component Value Date    LABA1C 5.5 07/30/2021    LABA1C 5.5 07/29/2021     Lab Results   Component Value Date    CHOL 237 (H) 09/10/2012    TRIG 109 09/10/2012    HDL 68 09/10/2012    LDLCALC 147 (H) 09/10/2012     Lab Results   Component Value Date    ALT 14 09/10/2021    AST 27 09/10/2021     No results for input(s): WBC, HGB, HCT, MCV, PLT in the last 72 hours.   TSH: No results found for: TSH  Lab Results   Component Value Date    AST 27 09/10/2021    ALT 14 09/10/2021    BILITOT 0.4 09/10/2021    ALKPHOS 61 09/10/2021     Lab Results   Component Value Date    PROBNP 377.3 (H) 09/30/2015     Lab Results   Component Value Date    LABA1C 5.5 07/30/2021    LABA1C 5.5 07/29/2021     Lab Results   Component Value Date    WBC 7.1 09/10/2021    HGB 14.0 09/10/2021    HCT 43.7 09/10/2021     09/10/2021       Cath 7/29/21  RESULTS:   LM: No significant disease. LAD: 80 % tandem lesions proximally, followed by long area of   diffuse disease + 99 & 95 % tandem lesions distally. CIRCUMFLEX: 90 & 80 % tandem stenosis in proximal portion. with similar lesions in a high good size OM. RCA: 90 % mid vessel tandem stenosis in its mid section in   addition to 50 %, hazy proximal stenosis. LMCA: Moderate Lumen Irregularity.     LAD: Multiple stenosis. 80 % tandem lesions proximally, followed by long area  of diffuse disease + 99 & 95 % tandem lesions distally.     LCx: Multiple stenosis. 90 & 80 % tandem stenosis in proximal portion. with similar lesions in a high good size OM.     RCA: Multiple stenosis. 90 % mid vessel tandem stenosis in its mid section in  addition to 50 %, hazy proximal stenosis. Echo 7/29/21  Summary   Left ventricular function and size is normal, EF is estimated at 55-60%. Mild left ventricular hypertrophy. E/A reversal; indeterminate diastolic function. Hypokineses of the basal anterior lateral and mid anterior lateral segments. Aneurysmal interatrial septum. Mild mitral ,tricuspid and moderate aortic regurgitation is present. RVSP is 25 mmHg. No evidence of pericardial effusion    Echo 9/29/21  Summary   Limited study , No Doppler study on Valves   Left ventricular systolic function is severly depressed with an ejection   fraction of 15-20 %. EF is reduced compared to Echo in July 2021   Global hypokinesis   Left Ventricular chamber size is Dilated   No evidence of any pericardial effusion.    Left sided pleural effusion noted, measuring 4.86 cm. All labs, medications and tests reviewed by myself including data and history from outside source , patient and available family . Assessment & Plan:      1. Chest pain, unspecified type    2. CAD in native artery    3. Acute on chronic combined systolic and diastolic heart failure ([address]    4. Acute CVA (cerebrovascular accident) ([address]    5. Gait disturbance    6. Ischemic cardiomyopathy    7. PVC (premature ventricular contraction)    8. Other fatigue    9. HORACE (obstructive sleep apnea)         No problem-specific Assessment & Plan notes found for this encounter. ASCVD  This is post CABG with LIMA to LAD and diagonal, SVG to ramus, SVG to OM, SVG to RCA on July 2021 continue Plavix due to post CABG stroke for now continue aspirin. We will get an echo to evaluate EF as per LV gram showed EF of 30% but echo showed normal EF weeks earlier her emergent cath. continue cardiac rehab continue statins    Ischemic Cardiomyopathy  Will change coreg to toprol xl to see if fatigue gets better . Increase  Lisinopril 10 mg  , may need ICD and ischemia work up  Will need cath if EF is still low  , continue midodrine as needed for blood pressure less than 90, add aldactone , repeat echo in Jan /Febn 2022 and debate icd   Since her EF declined after CABG , she may need some ischemia evaluation she ended up with CABG after ;last stress tets so she is hesitant will plan cath in few weeks if EF du snot improve     Urinary urgency  Lasix as needed basis hardly using it , frequent urination is better     POST CVA   Developed stroke post CABG day 2. Continue Plavix and statins for now      Dyslipidemia :  All available lab work was reviewed. Patient was advised to repeat lab work before next visit. Necessary orders were placed , instructions given by myself       Counseled extensively and medication compliance urged.   We discussed that for the  prevention of ASCVD our  goal is aggressive risk modification. Patient is encouraged to exercise if they can , educated about  brisk walk for 30 minutes  at least 3 to 4 times a week if there are no physical limitations  Various goals were discussed and questions answered. Continue current medications. Appropriate prescriptions are addressed and refills ordered. Questions answered and patient verbalizes understanding. Call for any problems, questions, or concerns. Greater than 60 % of time spent counseling besides reviewing data and images     Continue all other medications of all above medical condition listed as is. Return in about 2 months (around 2/2/2022). Please note this report has been partially produced using speech recognition software and may contain errors related to that system including errors in grammar, punctuation, and spelling, as well as words and phrases that may be inappropriate. If there are any questions or concerns please feel free to contact the dictating provider for clarification.

## 2022-08-03 NOTE — DISCHARGE SUMMARY
Discharge Summary  Felicitas Ferreira MD, MD     Name:  Allyson England /Age/Sex: 1940  (80 y.o. female)   MRN & CSN:  [id number] & [id number] Admission Date/Time: 2022  5:24 PM   Attending:  No att. providers found Discharging Physician: Felicitas Ferreira MD, MD     Hospital Course: Allyson England is a 80 y.o.  female  who presents with dizziness    The patient was 80 y.o. female who was admitted for dizziness and vertigo. During the hospitalization the patient was evaluated by neurology and they recommended MRI but as it was a weekend and the patient had wires which were not connected. Discussed with cardiology and a. The patient had epicardiac lesions during the CABG) left. Discussed with neurology and they recommended no more MRI at the patient is already on Plavix and aspirin. Discussed with neurology about aspirin and Plavix. Currently the patient is on aspirin and Plavix for 21 days and then aspirin monotherapy. Further disposition about Plavix will be obtained neurology. The patient was seen and examined on the day of the discharge. Will be discharged to home with home health, diet cardiac and activity as tolerated. The patient expressed appropriate understanding of and agreement with the discharge recommendations, medications, and plan.      Consults this admission:  IP CONSULT TO NEUROLOGY  IP CONSULT TO HOME CARE NEEDS  IP CONSULT TO CARDIOLOGY    Discharge Instruction:     Follow up appointments:     Tiffanie Givens MD  [address] [place] [address]  [address] [facility] [phone]    Schedule an appointment as soon as possible for a visit in 1 week  Hospital Follow DO Jason  [address] [place] [address]    Schedule an appointment as soon as possible for a visit in 2 weeks  Hospital Follow Up, make a follow up appointment for 1-2 weeks       Primary care physician:  within 2 weeks    Diet:  cardiac diet     Activity: activity as Electronically signed by Camila Hough MD, MD on 8/3/2022 at 4:32 PM

## 2022-08-26 RX ORDER — LISINOPRIL 10 MG/1
10 TABLET ORAL DAILY
Qty: 90 TABLET | Refills: 3 | Status: SHIPPED | OUTPATIENT
Start: 2022-08-26

## 2022-11-05 NOTE — CARE COORDINATION
Spoke with Dr. Davion Vann who is requesting new rapid COVID PTA to ARU. Left  for [employer] and spoke with Stephan Reyna. Per Saira Sherman she'll request order. Patient meets criteria and is approved to come to ARU. Patient able to admit once medically stable and after ARU Medical Director and  sign the pre-admission screen (PAS), pending new rapid COVID results. 1115:  COVID negative test noted. Notified ARU charge of admission. · LUKE OM1 in 2019  · Stent 2021 at [facility]  · On ASA  · Taken off atenolol 6mths ago and changed to labetolol  · Unclear why no statin

## 2022-12-27 RX ORDER — CLOPIDOGREL BISULFATE 75 MG/1
TABLET ORAL
Qty: 90 TABLET | Refills: 1 | OUTPATIENT
Start: 2022-12-27

## 2023-03-28 RX ORDER — METOPROLOL SUCCINATE 100 MG/1
100 TABLET, EXTENDED RELEASE ORAL DAILY
Qty: 90 TABLET | Refills: 3 | OUTPATIENT
Start: 2023-03-28

## 2023-03-28 RX ORDER — METOPROLOL SUCCINATE 100 MG/1
100 TABLET, EXTENDED RELEASE ORAL DAILY
Qty: 90 TABLET | Refills: 3 | Status: SHIPPED | OUTPATIENT
Start: 2023-03-28

## 2023-07-25 ENCOUNTER — HOSPITAL ENCOUNTER (OUTPATIENT)
Dept: CT IMAGING | Age: 83
Discharge: HOME | End: 2023-07-25
Attending: FAMILY MEDICINE
Payer: MEDICARE

## 2023-07-25 DIAGNOSIS — R10.13 EPIGASTRIC ABDOMINAL PAIN: ICD-10-CM

## 2023-07-25 PROCEDURE — 6360000004 HC RX CONTRAST MEDICATION: Performed by: FAMILY MEDICINE

## 2023-07-25 PROCEDURE — 74177 CT ABD & PELVIS W/CONTRAST: CPT

## 2023-07-25 RX ADMIN — IOPAMIDOL 100 ML: 755 INJECTION, SOLUTION INTRAVENOUS at 12:34

## 2023-07-25 RX ADMIN — IOPAMIDOL 20 ML: 755 INJECTION, SOLUTION INTRAVENOUS at 12:35

## 2023-09-20 ENCOUNTER — HOSPITAL ENCOUNTER (OUTPATIENT)
Age: 83
Discharge: HOME | End: 2023-09-20
Payer: MEDICARE

## 2023-09-20 ENCOUNTER — HOSPITAL ENCOUNTER (OUTPATIENT)
Dept: CT IMAGING | Age: 83
Discharge: HOME | End: 2023-09-20
Payer: MEDICARE

## 2023-09-20 DIAGNOSIS — R10.12 ABDOMINAL PAIN, LEFT UPPER QUADRANT: ICD-10-CM

## 2023-09-20 LAB
ALBUMIN SERPL-MCNC: 3.2 GM/DL (ref 3.4–5)
ALP BLD-CCNC: 1199 IU/L (ref 40–129)
ALT SERPL-CCNC: 169 U/L (ref 10–40)
AMYLASE: 50 U/L (ref 25–115)
ANION GAP SERPL CALCULATED.3IONS-SCNC: 16 MMOL/L (ref 4–16)
AST SERPL-CCNC: 252 IU/L (ref 15–37)
BASOPHILS ABSOLUTE: 0 K/CU MM
BASOPHILS RELATIVE PERCENT: 0.3 % (ref 0–1)
BILIRUB SERPL-MCNC: 4.9 MG/DL (ref 0–1)
BUN SERPL-MCNC: 8 MG/DL (ref 6–23)
CALCIUM SERPL-MCNC: 8.9 MG/DL (ref 8.3–10.6)
CHLORIDE BLD-SCNC: 88 MMOL/L (ref 99–110)
CHOLEST SERPL-MCNC: 168 MG/DL
CO2: 22 MMOL/L (ref 21–32)
CREAT SERPL-MCNC: 0.5 MG/DL (ref 0.6–1.1)
DIFFERENTIAL TYPE: ABNORMAL
EOSINOPHILS ABSOLUTE: 0 K/CU MM
EOSINOPHILS RELATIVE PERCENT: 0.1 % (ref 0–3)
GFR SERPL CREATININE-BSD FRML MDRD: >60 ML/MIN/1.73M2
GLUCOSE SERPL-MCNC: 116 MG/DL (ref 70–99)
HCT VFR BLD CALC: 44 % (ref 37–47)
HDLC SERPL-MCNC: 37 MG/DL
HEMOGLOBIN: 14.9 GM/DL (ref 12.5–16)
IMMATURE NEUTROPHIL %: 0.2 % (ref 0–0.43)
LDLC SERPL CALC-MCNC: 111 MG/DL
LYMPHOCYTES ABSOLUTE: 1.3 K/CU MM
LYMPHOCYTES RELATIVE PERCENT: 14.1 % (ref 24–44)
MCH RBC QN AUTO: 31 PG (ref 27–31)
MCHC RBC AUTO-ENTMCNC: 33.9 % (ref 32–36)
MCV RBC AUTO: 91.5 FL (ref 78–100)
MONOCYTES ABSOLUTE: 0.7 K/CU MM
MONOCYTES RELATIVE PERCENT: 7.8 % (ref 0–4)
PDW BLD-RTO: 13.8 % (ref 11.7–14.9)
PLATELET # BLD: 397 K/CU MM (ref 140–440)
PMV BLD AUTO: 8.9 FL (ref 7.5–11.1)
POTASSIUM SERPL-SCNC: 4.4 MMOL/L (ref 3.5–5.1)
RBC # BLD: 4.81 M/CU MM (ref 4.2–5.4)
SEGMENTED NEUTROPHILS ABSOLUTE COUNT: 7.4 K/CU MM
SEGMENTED NEUTROPHILS RELATIVE PERCENT: 77.5 % (ref 36–66)
SODIUM BLD-SCNC: 126 MMOL/L (ref 135–145)
TOTAL IMMATURE NEUTOROPHIL: 0.02 K/CU MM
TOTAL PROTEIN: 6.5 GM/DL (ref 6.4–8.2)
TRIGL SERPL-MCNC: 98 MG/DL
WBC # BLD: 9.5 K/CU MM (ref 4–10.5)

## 2023-09-20 PROCEDURE — 36415 COLL VENOUS BLD VENIPUNCTURE: CPT

## 2023-09-20 PROCEDURE — 74176 CT ABD & PELVIS W/O CONTRAST: CPT

## 2023-09-20 PROCEDURE — 82150 ASSAY OF AMYLASE: CPT

## 2023-09-20 PROCEDURE — 80061 LIPID PANEL: CPT

## 2023-09-20 PROCEDURE — 85025 COMPLETE CBC W/AUTO DIFF WBC: CPT

## 2023-09-20 PROCEDURE — 80053 COMPREHEN METABOLIC PANEL: CPT

## (undated) DEVICE — GLOVE SURG SZ 6 THK91MIL LTX FREE SYN POLYISOPRENE ANTI

## (undated) DEVICE — CANNULA SAPH VEIN OPQ BLNT 3MM

## (undated) DEVICE — TOWEL,OR,DSP,ST,WHITE,DLX,XR,4/PK,20PK/C: Brand: MEDLINE

## (undated) DEVICE — GLOVE SURG SZ 65 CRM LTX FREE POLYISOPRENE POLYMER BEAD ANTI

## (undated) DEVICE — BLOOD TRANSFUSION FILTER: Brand: HAEMONETICS

## (undated) DEVICE — CATHETERIZATION KIT 7 FRX20 CM 3L

## (undated) DEVICE — Z DUPLICATE USE 2436340 CANNULA PERF L15IN DIA29FR VEN 3 STG THN WALL DSGN W VENT

## (undated) DEVICE — SUTURE ETHBND EXCEL SZ 2-0 L36IN NONABSORBABLE GRN L26MM SH X523H

## (undated) DEVICE — SENSOR OXMTR SM AD DISP FOR INVOS SYS

## (undated) DEVICE — CLIP INT SM WIDE RED TI TRNSVRS GRV CHEVRON SHP W PRECIS

## (undated) DEVICE — SWAN-GANZ CCOMBO V THERMODILUTION CATHETER: Brand: SWAN-GANZ CCOMBO V

## (undated) DEVICE — OPEN HEART PACK: Brand: MEDLINE INDUSTRIES, INC.

## (undated) DEVICE — TUBING, SUCTION, 9/32" X 10', STRAIGHT: Brand: MEDLINE

## (undated) DEVICE — SUTURE PROL 7-0 L18IN NONABSORBABLE BLU L9.3MM BV-1 3/8 CIR M8701

## (undated) DEVICE — GLOVE ORANGE PI 7   MSG9070

## (undated) DEVICE — KIT CVC AD 7FR L20CM POLYUR BLU FLEXTIP ANTIMIC MULTILUMEN

## (undated) DEVICE — MPS® DELIVERY SET W/ARREST AGENT AND ADDITIVE CASSETTES, HEAT EXCHANGER & 10 FT. DELIVERY TUBING: Brand: MPS

## (undated) DEVICE — Device: Brand: VIRTUOSAPH PLUS WITH RADIAL INDICATION

## (undated) DEVICE — APPLICATOR MEDICATED 10.5 CC SOLUTION HI LT ORNG CHLORAPREP

## (undated) DEVICE — GLOVE SURG SZ 7 CRM LTX FREE POLYISOPRENE POLYMER BEAD ANTI

## (undated) DEVICE — CATHETER ETER IV L1IN OD22GA POLYUR PERIPH WNG HUB SFTY SHLD

## (undated) DEVICE — COVER US PRB W12XL244CM SURGICAL INTRAOPERATIVE PLAS TAPR L

## (undated) DEVICE — PENCIL ES CRD L10FT HND SWCHING ROCK SWCH W/ EDGE COAT BLDE

## (undated) DEVICE — CANNULA PERF L5.5IN DIA9FR AORT ROOT AG STD TIP W/ VENT LN

## (undated) DEVICE — SUTURE NONABSORBABLE MONOFILAMENT 6-0 C-1 4X18 IN PROLENE M8718

## (undated) DEVICE — DRAIN,WOUND,ROUND,24FR,5/16",FULL-FLUTED: Brand: MEDLINE

## (undated) DEVICE — DEVICE RESUS AD TB L40IN SELF INFL MASK TEXT BG DBL SWVL EL

## (undated) DEVICE — SUTURE S STL SZ 5 L18IN NONABSORBABLE SIL L48MM CCS 1/4 CIR M657G

## (undated) DEVICE — SUTURE VCRL SZ 0 L27IN ABSRB UD L36MM CT-1 1/2 CIR J260H

## (undated) DEVICE — SUTURE VCRL 2-0 L36IN ABSRB UD CTX L48MM 1/2 CIR TAPERPOINT J979H

## (undated) DEVICE — BANDAGE,GAUZE,BULKEE II,4.5"X4.1YD,STRL: Brand: MEDLINE

## (undated) DEVICE — CONNECTOR PIPE 3/8X1/2IN REDUC STR

## (undated) DEVICE — 3M™ STERI-DRAPE™ INSTRUMENT POUCH 1018: Brand: STERI-DRAPE™

## (undated) DEVICE — MINI STICK MAX COAXIAL MICROINTRODUCER KIT: Brand: ANGIODYNAMICS

## (undated) DEVICE — ELECTRODE ES L4IN PTFE INSUL BLDE W/ SFTY SL DISP EDGE

## (undated) DEVICE — INTENDED FOR TISSUE SEPARATION, AND OTHER PROCEDURES THAT REQUIRE A SHARP SURGICAL BLADE TO PUNCTURE OR CUT.: Brand: BARD-PARKER ® STAINLESS STEEL BLADES

## (undated) DEVICE — LEAD PACE L475MM CHNL A OR V MYOCARDIAL STEROID ELUT SIL

## (undated) DEVICE — SUTURE MCRYL SZ 3-0 L27IN ABSRB UD L24MM PS-1 3/8 CIR PRIM Y936H

## (undated) DEVICE — DECANTER BAG 9": Brand: MEDLINE INDUSTRIES, INC.

## (undated) DEVICE — LOOP VES W25MM THK1MM MAXI RED SIL FLD REPELLENT 100 PER

## (undated) DEVICE — CANNULA ART 20FR NVENT 3 8IN CONN EOPA 3D

## (undated) DEVICE — BLADE OPHTH GRN ROUNDED TIP 1 SIDE SHRP GRINDLESS MINI-BLDE

## (undated) DEVICE — ROTATING SURGICAL PUNCHES, 1 PER POUCH: Brand: A&E MEDICAL / ROTATING SURGICAL PUNCHES

## (undated) DEVICE — DRAIN SURG SGL COLL PT TB FOR ATS BG OASIS

## (undated) DEVICE — TUBING INSUFFLATOR HEAT HI FLO SET PNEUMOCLEAR

## (undated) DEVICE — MARKER SURG SKIN UTIL REGULAR/FINE 2 TIP W/ RUL AND 9 LBL

## (undated) DEVICE — TRAY PREP DRY W/ PREM GLV 2 APPL 6 SPNG 2 UNDPD 1 OVERWRAP

## (undated) DEVICE — SUTURE ETHIB EXCL X BR GRN V-7 DA 2-0 30 PX977 PX977H

## (undated) DEVICE — SUTURE S STL SZ 5 L18IN NONABSORBABLE SIL V-40 L48MM 1/2 M650G

## (undated) DEVICE — APPLICATOR MEDICATED 3 CC SOLUTION CLR STRL CHLORAPREP

## (undated) DEVICE — ZINACTIVE USE 2539609 APPLICATOR MEDICATED 10.5 CC SOLUTION HI LT ORNG CHLORAPREP

## (undated) DEVICE — CLIP LIG M BLU TI HRT SHP WIRE HORZ 600 PER BX

## (undated) DEVICE — CONNECTOR DRNGE W3/8-0.5XH3/16XL3/16IN 2:1 SIL CARD STR

## (undated) DEVICE — TOTAL TRAY, 16FR 10ML SIL FOLEY, URN: Brand: MEDLINE

## (undated) DEVICE — BLADE ES L2.75IN ELASTOMERIC COAT DURABLE BEND UPTO 90DEG

## (undated) DEVICE — TUBING SUCT 9 11FR L475IN RIG SHFT MINI SUC TIP DLP

## (undated) DEVICE — BLADE SAW W10XL35MM THK0.6MM STRNM FOR PRI AND REPEAT

## (undated) DEVICE — SUTURE SURGLON SZ 1 L30IN NONABSORBABLE BLK NO NDL NYL PRE 8886191971

## (undated) DEVICE — DECANTER FLD 9IN ST BG FOR ASEP TRNSF OF FLD

## (undated) DEVICE — COUNTER NDL 30 COUNT FOAM STRP SGL MAG

## (undated) DEVICE — WAX SURG 2.5GM HEMSTAT BNE BEESWAX PARAFFIN ISO PALMITATE

## (undated) DEVICE — FOGARTY - HYDRAGRIP SURGICAL - CLAMP INSERTS: Brand: FOGARTY SOFTJAW

## (undated) DEVICE — TOWEL,OR,DSP,ST,BLUE,STD,6/PK,12PK/CS: Brand: MEDLINE

## (undated) DEVICE — BLANKET THER AD W24XL60IN FAB COVERING SUP SFT ULT THN LTWT

## (undated) DEVICE — DRAPE,UTILITY,XL,4/PK,STERILE: Brand: MEDLINE

## (undated) DEVICE — SOLUTION SURG PREP ANTIMICROBIAL 4 OZ SKIN WND EXIDINE

## (undated) DEVICE — GOWN,SIRUS,FABRNF,RAGLAN,L,ST,30/CS: Brand: MEDLINE

## (undated) DEVICE — GEL US 20GM NONIRRITATING OVERWRAPPED FILE PCH TRNSMIT

## (undated) DEVICE — KIT INTRO 9FR L4IN POLYUR PERC SHTH RADPQ W INTEGR HEMSTAS

## (undated) DEVICE — ELECTRODE ES AD CRDLSS PT RET REM POLYHESIVE

## (undated) DEVICE — SUTURE VCRL SZ 2 L27IN ABSRB UD L65MM TP-1 1/2 CIR J849G

## (undated) DEVICE — ADHESIVE SKIN CLSR 0.7ML TOP DERMBND ADV

## (undated) DEVICE — SUTURE NRLN SZ 1 L18IN NONABSORBABLE BLK L36MM CT-1 1/2 CIR C520D